# Patient Record
Sex: FEMALE | Race: WHITE | NOT HISPANIC OR LATINO | Employment: PART TIME | URBAN - METROPOLITAN AREA
[De-identification: names, ages, dates, MRNs, and addresses within clinical notes are randomized per-mention and may not be internally consistent; named-entity substitution may affect disease eponyms.]

---

## 2018-09-17 ENCOUNTER — OFFICE VISIT (OUTPATIENT)
Dept: URGENT CARE | Facility: CLINIC | Age: 53
End: 2018-09-17

## 2018-09-17 VITALS
RESPIRATION RATE: 18 BRPM | SYSTOLIC BLOOD PRESSURE: 115 MMHG | BODY MASS INDEX: 29.02 KG/M2 | WEIGHT: 170 LBS | HEIGHT: 64 IN | OXYGEN SATURATION: 97 % | DIASTOLIC BLOOD PRESSURE: 67 MMHG | TEMPERATURE: 97.4 F | HEART RATE: 78 BPM

## 2018-09-17 DIAGNOSIS — F41.8 DEPRESSION WITH ANXIETY: Primary | ICD-10-CM

## 2018-09-17 PROCEDURE — 99202 OFFICE O/P NEW SF 15 MIN: CPT | Performed by: NURSE PRACTITIONER

## 2018-09-17 RX ORDER — FLUOXETINE 20 MG/1
20 TABLET, FILM COATED ORAL DAILY
COMMUNITY
End: 2018-10-09

## 2018-09-17 RX ORDER — SIMVASTATIN 20 MG
20 TABLET ORAL
COMMUNITY
End: 2018-10-09

## 2018-09-17 RX ORDER — QUETIAPINE FUMARATE 50 MG/1
50 TABLET, EXTENDED RELEASE ORAL
COMMUNITY
End: 2018-10-09 | Stop reason: SDUPTHER

## 2018-09-17 NOTE — PATIENT INSTRUCTIONS
#s given for [facility] PCPs in the area  If she can get an appt in 2 weeks (for follow up) I will RX Prozac and Seroquel #14  Call or return for problems/concerns

## 2018-09-17 NOTE — PROGRESS NOTES
[facility]        NAME: Maggy Burr is a 46 y o  female  : 1965    MRN: [id number]  DATE: 2018  TIME: 1:19 PM    Assessment and Plan   Depression with anxiety [F41 8]  1  Depression with anxiety           Patient Instructions     Patient Instructions   #s given for [facility] PCPs in the area  If she can get an appt in 2 weeks (for follow up) I will RX Prozac and Seroquel #14  Call or return for problems/concerns    Follow up with PCP in 3-5 days  Proceed to  ER if symptoms worsen  Chief Complaint     Chief Complaint   Patient presents with    Medication Refill         History of Present Illness       Pt  Here for refills of her medication- has been without for 3 months due to loss of insurance  Is now having anxiety and depression, denies suicidal ideation  Review of Systems   Review of Systems   Constitutional: Negative for activity change, diaphoresis, fatigue and fever  HENT: Negative for congestion, facial swelling, hearing loss, rhinorrhea, sinus pain, sinus pressure, sneezing, sore throat and voice change  Eyes: Negative for discharge and visual disturbance  Respiratory: Negative for cough, choking, chest tightness, shortness of breath, wheezing and stridor  Cardiovascular: Negative for chest pain, palpitations and leg swelling  Gastrointestinal: Negative for abdominal distention, abdominal pain, constipation, diarrhea, nausea and vomiting  Endocrine: Negative for polydipsia, polyphagia and polyuria  Genitourinary: Negative for difficulty urinating, dysuria, frequency and urgency  Musculoskeletal: Negative for arthralgias, back pain, gait problem, joint swelling, myalgias, neck pain and neck stiffness  Skin: Negative for color change, rash and wound  Neurological: Negative for dizziness, syncope, speech difficulty, weakness, light-headedness and headaches  Hematological: Negative for adenopathy  Does not bruise/bleed easily  Psychiatric/Behavioral: Positive for dysphoric mood and sleep disturbance  Negative for agitation, behavioral problems, confusion, hallucinations and suicidal ideas  The patient is nervous/anxious  Current Medications       Current Outpatient Prescriptions:     FLUoxetine (PROzac) 20 MG tablet, Take 20 mg by mouth daily, Disp: , Rfl:     QUEtiapine (SEROquel XR) 50 mg, Take 50 mg by mouth daily at bedtime, Disp: , Rfl:     simvastatin (ZOCOR) 20 mg tablet, Take 20 mg by mouth daily at bedtime, Disp: , Rfl:     Current Allergies     Allergies as of 09/17/2018    (No Known Allergies)            The following portions of the patient's history were reviewed and updated as appropriate: allergies, current medications, past family history, past medical history, past social history, past surgical history and problem list      No past medical history on file  No past surgical history on file  No family history on file  Medications have been verified  Objective   /67   Pulse 78   Temp (!) 97 4 °F (36 3 °C)   Resp 18   Ht 5' 4" (1 626 m)   Wt 77 1 kg (170 lb)   SpO2 97%   BMI 29 18 kg/m²        Physical Exam     Physical Exam   Constitutional: She is oriented to person, place, and time  Vital signs are normal  She appears well-developed and well-nourished  She is active and cooperative  No distress  Eyes: EOM are normal    Cardiovascular: Normal rate, regular rhythm and normal heart sounds  No murmur heard  Pulmonary/Chest: Effort normal and breath sounds normal  No respiratory distress  She has no wheezes  Neurological: She is alert and oriented to person, place, and time  Skin: Skin is warm and dry  No rash noted  She is not diaphoretic  No erythema  Psychiatric: Her speech is normal and behavior is normal  Judgment and thought content normal  Her mood appears anxious  Her affect is not angry  She exhibits a depressed mood (flat affect, lack of eye contact)     Nursing note and vitals reviewed

## 2018-10-09 ENCOUNTER — OFFICE VISIT (OUTPATIENT)
Dept: INTERNAL MEDICINE CLINIC | Facility: CLINIC | Age: 53
End: 2018-10-09

## 2018-10-09 VITALS
HEART RATE: 95 BPM | HEIGHT: 64 IN | WEIGHT: 182 LBS | DIASTOLIC BLOOD PRESSURE: 80 MMHG | RESPIRATION RATE: 18 BRPM | BODY MASS INDEX: 31.07 KG/M2 | SYSTOLIC BLOOD PRESSURE: 118 MMHG | OXYGEN SATURATION: 98 % | TEMPERATURE: 98.5 F

## 2018-10-09 DIAGNOSIS — J30.2 SEASONAL ALLERGIES: ICD-10-CM

## 2018-10-09 DIAGNOSIS — J43.9 PULMONARY EMPHYSEMA, UNSPECIFIED EMPHYSEMA TYPE (HCC): ICD-10-CM

## 2018-10-09 DIAGNOSIS — E78.2 MIXED HYPERLIPIDEMIA: ICD-10-CM

## 2018-10-09 DIAGNOSIS — Z11.59 NEED FOR HEPATITIS C SCREENING TEST: ICD-10-CM

## 2018-10-09 DIAGNOSIS — Z78.0 POST-MENOPAUSAL: ICD-10-CM

## 2018-10-09 DIAGNOSIS — Z13.31 POSITIVE DEPRESSION SCREENING: ICD-10-CM

## 2018-10-09 DIAGNOSIS — Z12.39 SCREENING FOR BREAST CANCER: ICD-10-CM

## 2018-10-09 DIAGNOSIS — Z13.1 SCREENING FOR DIABETES MELLITUS (DM): ICD-10-CM

## 2018-10-09 DIAGNOSIS — Z23 ENCOUNTER FOR VACCINATION: ICD-10-CM

## 2018-10-09 DIAGNOSIS — Z72.0 TOBACCO ABUSE: ICD-10-CM

## 2018-10-09 DIAGNOSIS — K63.5 HYPERPLASTIC COLONIC POLYP, UNSPECIFIED PART OF COLON: ICD-10-CM

## 2018-10-09 DIAGNOSIS — Z59.89 DOES NOT HAVE HEALTH INSURANCE: ICD-10-CM

## 2018-10-09 DIAGNOSIS — F41.8 DEPRESSION WITH ANXIETY: Primary | ICD-10-CM

## 2018-10-09 DIAGNOSIS — M15.9 PRIMARY OSTEOARTHRITIS INVOLVING MULTIPLE JOINTS: ICD-10-CM

## 2018-10-09 PROBLEM — J45.30 MILD PERSISTENT ASTHMA WITHOUT COMPLICATION: Status: RESOLVED | Noted: 2018-10-09 | Resolved: 2018-10-09

## 2018-10-09 PROBLEM — J45.30 MILD PERSISTENT ASTHMA WITHOUT COMPLICATION: Status: ACTIVE | Noted: 2018-10-09

## 2018-10-09 PROBLEM — M15.0 PRIMARY OSTEOARTHRITIS INVOLVING MULTIPLE JOINTS: Status: ACTIVE | Noted: 2018-10-09

## 2018-10-09 PROCEDURE — 99203 OFFICE O/P NEW LOW 30 MIN: CPT | Performed by: INTERNAL MEDICINE

## 2018-10-09 RX ORDER — GEMFIBROZIL 600 MG/1
600 TABLET, FILM COATED ORAL
Qty: 60 TABLET | Refills: 5 | Status: CANCELLED | OUTPATIENT
Start: 2018-10-09

## 2018-10-09 RX ORDER — ATORVASTATIN CALCIUM 20 MG/1
20 TABLET, FILM COATED ORAL DAILY
Qty: 90 TABLET | Refills: 0 | Status: SHIPPED | OUTPATIENT
Start: 2018-10-09 | End: 2019-04-09 | Stop reason: SDUPTHER

## 2018-10-09 RX ORDER — ALBUTEROL SULFATE 90 UG/1
2 AEROSOL, METERED RESPIRATORY (INHALATION) EVERY 6 HOURS PRN
Status: ON HOLD | COMMUNITY
End: 2020-01-14 | Stop reason: SDUPTHER

## 2018-10-09 RX ORDER — QUETIAPINE FUMARATE 50 MG/1
50 TABLET, EXTENDED RELEASE ORAL
Qty: 30 TABLET | Refills: 5 | Status: CANCELLED | OUTPATIENT
Start: 2018-10-09

## 2018-10-09 RX ORDER — SIMVASTATIN 20 MG
20 TABLET ORAL
Qty: 30 TABLET | Refills: 5 | Status: CANCELLED | OUTPATIENT
Start: 2018-10-09

## 2018-10-09 RX ORDER — FLUOXETINE HYDROCHLORIDE 20 MG/1
20 CAPSULE ORAL DAILY
Qty: 90 CAPSULE | Refills: 0 | Status: SHIPPED | OUTPATIENT
Start: 2018-10-09 | End: 2018-12-15 | Stop reason: SDUPTHER

## 2018-10-09 RX ORDER — GEMFIBROZIL 600 MG/1
600 TABLET, FILM COATED ORAL
COMMUNITY
End: 2018-10-09

## 2018-10-09 RX ORDER — FLUTICASONE FUROATE AND VILANTEROL 100; 25 UG/1; UG/1
1 POWDER RESPIRATORY (INHALATION) DAILY
COMMUNITY
End: 2019-08-02

## 2018-10-09 RX ORDER — CETIRIZINE HYDROCHLORIDE 10 MG/1
10 TABLET, CHEWABLE ORAL DAILY
COMMUNITY
End: 2019-01-09

## 2018-10-09 RX ORDER — FLUOXETINE 20 MG/1
20 TABLET, FILM COATED ORAL DAILY
Qty: 30 TABLET | Refills: 5 | Status: CANCELLED | OUTPATIENT
Start: 2018-10-09

## 2018-10-09 RX ORDER — QUETIAPINE FUMARATE 50 MG/1
50 TABLET, EXTENDED RELEASE ORAL
Qty: 90 TABLET | Refills: 0 | Status: SHIPPED | OUTPATIENT
Start: 2018-10-09 | End: 2018-10-11 | Stop reason: SDUPTHER

## 2018-10-09 SDOH — ECONOMIC STABILITY - INCOME SECURITY: OTHER PROBLEMS RELATED TO HOUSING AND ECONOMIC CIRCUMSTANCES: Z59.89

## 2018-10-09 NOTE — PATIENT INSTRUCTIONS

## 2018-10-09 NOTE — PROGRESS NOTES
Assessment/Plan:    No problem-specific Assessment & Plan notes found for this encounter  Diagnoses and all orders for this visit:    Depression with anxiety  -     FLUoxetine (PROzac) 20 mg capsule; Take 1 capsule (20 mg total) by mouth daily  -     QUEtiapine (SEROquel XR) 50 mg; Take 1 tablet (50 mg total) by mouth daily at bedtime    Mixed hyperlipidemia  -     atorvastatin (LIPITOR) 20 mg tablet; Take 1 tablet (20 mg total) by mouth daily    Positive depression screening    Seasonal allergies    Post-menopausal    Tobacco abuse    Primary osteoarthritis involving multiple joints    Pulmonary emphysema, unspecified emphysema type (HCC)    Hyperplastic colonic polyp, unspecified part of colon    Does not have health insurance    Screening for breast cancer    Screening for diabetes mellitus (DM)    Encounter for vaccination    Need for hepatitis C screening test    Other orders  -     Discontinue: gemfibrozil (LOPID) 600 mg tablet; Take 600 mg by mouth 2 (two) times a day before meals  -     cetirizine (ZyrTEC) 10 MG chewable tablet; Chew 10 mg daily  -     albuterol (PROVENTIL HFA,VENTOLIN HFA) 90 mcg/act inhaler; Inhale 2 puffs every 6 (six) hours as needed for wheezing  -     fluticasone-vilanterol (BREO ELLIPTA) 100-25 mcg/inh inhaler; Inhale 1 puff daily Rinse mouth after use  -     Cancel: Mammo screening bilateral w 3d & cad; Future  -     Cancel: FLUoxetine (PROzac) 20 MG tablet; Take 1 tablet (20 mg total) by mouth daily  -     Cancel: QUEtiapine (SEROquel XR) 50 mg; Take 1 tablet (50 mg total) by mouth daily at bedtime  -     Cancel: simvastatin (ZOCOR) 20 mg tablet; Take 1 tablet (20 mg total) by mouth daily at bedtime  -     Cancel: gemfibrozil (LOPID) 600 mg tablet; Take 1 tablet (600 mg total) by mouth 2 (two) times a day before meals      A/P: Doing ok, but needs to restart her meds  Will change to $4 scripts from [facility]  Continue MDI, but could use a LAMA, but not covered   Needs labs, alpha one, and vaccines, but defers due to no insurance  Defers mammo as well  Continue treatment otherwise and RTC four weeks for f/u  Subjective:      Patient ID: Mary Akhtar is a 48 y o  female  WF, former pt of Dr Vriginia Reece and no insurance, presents to establish  No new issues, but depression and YANIRA are bad after running out of her meds several months ago  Not suicidal or violent  Was controlled before on her prior meds  Remains active w/o difficulty and no falls  Smokes daily and occasional ETOH  Has COPD and uses MDI with rescue inhaler at least once a day  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  Overdue for labs, mammo, and vaccines  The following portions of the patient's history were reviewed and updated as appropriate:   She  has a past medical history of Anxiety; COPD (chronic obstructive pulmonary disease) ([facility] [address] ); Depression; Menopause; Mild persistent asthma without complication (); Obesity; and Primary osteoarthritis involving multiple joints (10/9/2018)  She   Patient Active Problem List    Diagnosis Date Noted    Mixed hyperlipidemia 10/09/2018    Post-menopausal 10/09/2018    Pulmonary emphysema ([address] ) 10/09/2018    Tobacco abuse 10/09/2018    Hyperplastic colonic polyp 10/09/2018    Seasonal allergies 10/09/2018    Primary osteoarthritis involving multiple joints 10/09/2018    Positive depression screening 10/09/2018    Depression with anxiety 2018     She  has a past surgical history that includes Ankle surgery; Nose surgery; Total hip arthroplasty;  section; Tubal ligation; Total hip arthroplasty; Dilation and curettage, diagnostic / therapeutic; Tubal ligation; and Colonoscopy w/ polypectomy  Her family history includes Mental illness in her brother and mother; No Known Problems in her father; Suicidality in her brother  She  reports that she has been smoking  She has a 60 00 pack-year smoking history   She has never used smokeless tobacco  She reports that she drinks alcohol  She reports that she does not use drugs  Current Outpatient Prescriptions   Medication Sig Dispense Refill    albuterol (PROVENTIL HFA,VENTOLIN HFA) 90 mcg/act inhaler Inhale 2 puffs every 6 (six) hours as needed for wheezing      cetirizine (ZyrTEC) 10 MG chewable tablet Chew 10 mg daily      fluticasone-vilanterol (BREO ELLIPTA) 100-25 mcg/inh inhaler Inhale 1 puff daily Rinse mouth after use   QUEtiapine (SEROquel XR) 50 mg Take 1 tablet (50 mg total) by mouth daily at bedtime 90 tablet 0    atorvastatin (LIPITOR) 20 mg tablet Take 1 tablet (20 mg total) by mouth daily 90 tablet 0    FLUoxetine (PROzac) 20 mg capsule Take 1 capsule (20 mg total) by mouth daily 90 capsule 0     No current facility-administered medications for this visit  Current Outpatient Prescriptions on File Prior to Visit   Medication Sig    [DISCONTINUED] FLUoxetine (PROzac) 20 MG tablet Take 20 mg by mouth daily    [DISCONTINUED] QUEtiapine (SEROquel XR) 50 mg Take 50 mg by mouth daily at bedtime    [DISCONTINUED] simvastatin (ZOCOR) 20 mg tablet Take 20 mg by mouth daily at bedtime     No current facility-administered medications on file prior to visit  She has No Known Allergies       Review of Systems   Constitutional: Positive for fatigue  Negative for activity change, chills, diaphoresis and fever  HENT: Negative  Eyes: Negative for visual disturbance  Respiratory: Negative for cough, chest tightness, shortness of breath and wheezing  Cardiovascular: Negative for chest pain, palpitations and leg swelling  Gastrointestinal: Negative for abdominal pain, constipation, diarrhea, nausea and vomiting  Endocrine: Negative for cold intolerance and heat intolerance  Genitourinary: Negative for difficulty urinating, dysuria and frequency  Musculoskeletal: Negative for arthralgias, gait problem and myalgias  Allergic/Immunologic: Positive for environmental allergies  Neurological: Negative for dizziness, tremors, seizures, syncope, light-headedness and headaches  Psychiatric/Behavioral: Positive for decreased concentration, dysphoric mood, sleep disturbance and suicidal ideas  Negative for confusion and hallucinations  The patient is nervous/anxious  Objective:      /80 (BP Location: Left arm, Patient Position: Sitting, Cuff Size: Adult)   Pulse 95   Temp 98 5 °F (36 9 °C) (Tympanic)   Resp 18   Ht 5' 4" (1 626 m)   Wt 82 6 kg (182 lb)   SpO2 98%   BMI 31 24 kg/m²          Physical Exam   Constitutional: She is oriented to person, place, and time  She appears well-developed and well-nourished  No distress  HENT:   Head: Normocephalic and atraumatic  Mouth/Throat: Oropharynx is clear and moist    Eyes: Pupils are equal, round, and reactive to light  Conjunctivae and EOM are normal    Neck: Neck supple  No JVD present  Cardiovascular: Normal rate, regular rhythm and normal heart sounds  No murmur heard  Pulmonary/Chest: Effort normal and breath sounds normal  No respiratory distress  She has no wheezes  Abdominal: Soft  Bowel sounds are normal  She exhibits no distension  There is no tenderness  Musculoskeletal: She exhibits no edema  Neurological: She is alert and oriented to person, place, and time  No cranial nerve deficit  Coordination normal    Psychiatric: She has a normal mood and affect  Her behavior is normal  Judgment and thought content normal    Nursing note and vitals reviewed

## 2018-10-10 ENCOUNTER — TELEPHONE (OUTPATIENT)
Dept: INTERNAL MEDICINE CLINIC | Facility: CLINIC | Age: 53
End: 2018-10-10

## 2018-10-11 DIAGNOSIS — F41.8 DEPRESSION WITH ANXIETY: ICD-10-CM

## 2018-10-11 RX ORDER — QUETIAPINE FUMARATE 50 MG/1
50 TABLET, EXTENDED RELEASE ORAL
Qty: 30 TABLET | Refills: 0 | Status: SHIPPED | OUTPATIENT
Start: 2018-10-11 | End: 2019-01-10

## 2018-10-11 NOTE — TELEPHONE ENCOUNTER
Pt has no insurance, according to [facility]  its cheaper to go to [facility]   I will call patient to see if they are willing to do this

## 2018-12-15 DIAGNOSIS — F41.8 DEPRESSION WITH ANXIETY: ICD-10-CM

## 2018-12-16 RX ORDER — FLUOXETINE HYDROCHLORIDE 20 MG/1
CAPSULE ORAL
Qty: 90 CAPSULE | Refills: 0 | Status: SHIPPED | OUTPATIENT
Start: 2018-12-16 | End: 2019-04-11 | Stop reason: SDUPTHER

## 2018-12-30 ENCOUNTER — HOSPITAL ENCOUNTER (EMERGENCY)
Facility: HOSPITAL | Age: 53
Discharge: HOME | End: 2018-12-30
Admitting: EMERGENCY MEDICINE

## 2018-12-30 ENCOUNTER — APPOINTMENT (EMERGENCY)
Dept: CT IMAGING | Facility: HOSPITAL | Age: 53
End: 2018-12-30

## 2018-12-30 VITALS
TEMPERATURE: 97.9 F | HEART RATE: 81 BPM | WEIGHT: 160 LBS | BODY MASS INDEX: 27.31 KG/M2 | OXYGEN SATURATION: 96 % | HEIGHT: 64 IN | SYSTOLIC BLOOD PRESSURE: 116 MMHG | DIASTOLIC BLOOD PRESSURE: 69 MMHG | RESPIRATION RATE: 16 BRPM

## 2018-12-30 DIAGNOSIS — G51.0 LEFT-SIDED BELL'S PALSY: Primary | ICD-10-CM

## 2018-12-30 LAB
ALBUMIN SERPL BCP-MCNC: 4.7 G/DL (ref 3.5–5.7)
ALP SERPL-CCNC: 82 U/L (ref 40–150)
ALT SERPL W P-5'-P-CCNC: 12 U/L (ref 7–52)
ANION GAP SERPL CALCULATED.3IONS-SCNC: 10 MMOL/L (ref 4–13)
APTT PPP: 36 SECONDS (ref 26–38)
AST SERPL W P-5'-P-CCNC: 12 U/L (ref 13–39)
BASOPHILS # BLD AUTO: 0 THOUSANDS/ΜL (ref 0–0.1)
BASOPHILS NFR BLD AUTO: 0 % (ref 0–2)
BILIRUB SERPL-MCNC: 0.6 MG/DL (ref 0.2–1)
BUN SERPL-MCNC: 13 MG/DL (ref 7–25)
CALCIUM SERPL-MCNC: 9.6 MG/DL (ref 8.6–10.5)
CHLORIDE SERPL-SCNC: 105 MMOL/L (ref 98–107)
CO2 SERPL-SCNC: 23 MMOL/L (ref 21–31)
CREAT SERPL-MCNC: 1.07 MG/DL (ref 0.6–1.2)
EOSINOPHIL # BLD AUTO: 0.7 THOUSAND/ΜL (ref 0–0.61)
EOSINOPHIL NFR BLD AUTO: 4 % (ref 0–5)
ERYTHROCYTE [DISTWIDTH] IN BLOOD BY AUTOMATED COUNT: 14.5 % (ref 11.5–14.5)
GFR SERPL CREATININE-BSD FRML MDRD: 59 ML/MIN/1.73SQ M
GLUCOSE SERPL-MCNC: 95 MG/DL (ref 65–99)
HCT VFR BLD AUTO: 45.1 % (ref 34.8–46.1)
HGB BLD-MCNC: 15.4 G/DL (ref 12–16)
INR PPP: 1.01 (ref 0.9–1.5)
LYMPHOCYTES # BLD AUTO: 4.3 THOUSANDS/ΜL (ref 0.6–4.47)
LYMPHOCYTES NFR BLD AUTO: 28 % (ref 21–51)
MCH RBC QN AUTO: 31.3 PG (ref 26–34)
MCHC RBC AUTO-ENTMCNC: 34.2 G/DL (ref 31–37)
MCV RBC AUTO: 92 FL (ref 81–99)
MONOCYTES # BLD AUTO: 1.1 THOUSAND/ΜL (ref 0.17–1.22)
MONOCYTES NFR BLD AUTO: 7 % (ref 2–12)
NEUTROPHILS # BLD AUTO: 9.4 THOUSANDS/ΜL (ref 1.4–6.5)
NEUTS SEG NFR BLD AUTO: 60 % (ref 42–75)
NRBC BLD AUTO-RTO: 0 /100 WBCS
PLATELET # BLD AUTO: 350 THOUSANDS/UL (ref 149–390)
PMV BLD AUTO: 8.6 FL (ref 8.6–11.7)
POTASSIUM SERPL-SCNC: 3.8 MMOL/L (ref 3.5–5.5)
PROT SERPL-MCNC: 7.8 G/DL (ref 6.4–8.9)
PROTHROMBIN TIME: 11.7 SECONDS (ref 10.2–13)
RBC # BLD AUTO: 4.92 MILLION/UL (ref 3.9–5.2)
SODIUM SERPL-SCNC: 138 MMOL/L (ref 134–143)
WBC # BLD AUTO: 15.5 THOUSAND/UL (ref 4.8–10.8)

## 2018-12-30 PROCEDURE — 96360 HYDRATION IV INFUSION INIT: CPT

## 2018-12-30 PROCEDURE — 80053 COMPREHEN METABOLIC PANEL: CPT | Performed by: PHYSICIAN ASSISTANT

## 2018-12-30 PROCEDURE — 36415 COLL VENOUS BLD VENIPUNCTURE: CPT | Performed by: PHYSICIAN ASSISTANT

## 2018-12-30 PROCEDURE — 99284 EMERGENCY DEPT VISIT MOD MDM: CPT

## 2018-12-30 PROCEDURE — 85025 COMPLETE CBC W/AUTO DIFF WBC: CPT | Performed by: PHYSICIAN ASSISTANT

## 2018-12-30 PROCEDURE — 85610 PROTHROMBIN TIME: CPT | Performed by: PHYSICIAN ASSISTANT

## 2018-12-30 PROCEDURE — 85730 THROMBOPLASTIN TIME PARTIAL: CPT | Performed by: PHYSICIAN ASSISTANT

## 2018-12-30 PROCEDURE — 70450 CT HEAD/BRAIN W/O DYE: CPT

## 2018-12-30 RX ORDER — SODIUM CHLORIDE 9 MG/ML
125 INJECTION, SOLUTION INTRAVENOUS CONTINUOUS
Status: DISCONTINUED | OUTPATIENT
Start: 2018-12-30 | End: 2018-12-30 | Stop reason: HOSPADM

## 2018-12-30 RX ORDER — VALACYCLOVIR HYDROCHLORIDE 1 G/1
1000 TABLET, FILM COATED ORAL 3 TIMES DAILY
Qty: 21 TABLET | Refills: 0 | Status: SHIPPED | OUTPATIENT
Start: 2018-12-30 | End: 2019-05-10

## 2018-12-30 RX ORDER — PREDNISONE 20 MG/1
40 TABLET ORAL DAILY
Qty: 14 TABLET | Refills: 0 | Status: SHIPPED | OUTPATIENT
Start: 2018-12-30 | End: 2019-01-06

## 2018-12-30 RX ADMIN — SODIUM CHLORIDE 125 ML/HR: 0.9 INJECTION, SOLUTION INTRAVENOUS at 20:19

## 2018-12-31 NOTE — DISCHARGE INSTRUCTIONS
Abdullahi Palsy   WHAT YOU NEED TO KNOW:   Bell palsy is a sudden weakness or paralysis of one side of your face  Bell palsy occurs when the nerve that controls the muscles in your face becomes swollen or irritated  DISCHARGE INSTRUCTIONS:   Medicines:   · Medicine may be given  to decrease swelling and irritation of your facial nerve  You may receive antiviral medicine if your healthcare provider thinks a virus caused your Bell palsy  Your healthcare provider may also suggest acetaminophen or ibuprofen to reduce pain  These medicines are available without a doctor's order  Ask which medicine to take, and how much you need  Follow directions  Acetaminophen can cause liver damage, and ibuprofen can cause stomach bleeding or kidney damage  · Take your medicine as directed  Contact your healthcare provider if you think your medicine is not helping or if you have side effects  Tell him if you are allergic to any medicine  Keep a list of the medicines, vitamins, and herbs you take  Include the amounts, and when and why you take them  Bring the list or the pill bottles to follow-up visits  Carry your medicine list with you in case of an emergency  Follow up with your healthcare provider as directed:  Write down your questions so you remember to ask them during your visits  Eye care: Your healthcare provider may recommend that you use artificial tears during the day to keep your eye moist  You may need to use an eye ointment at night  You may also need to wear a patch over your eye and tape it shut while you sleep  This helps keep your eye from getting dry and infected  Wear sunglasses to protect your eye from direct sunlight  Stay away from places that have fumes, dust, or other particles in the air that may harm your eye  Physical therapy:  A physical therapist may teach you how to massage your face and exercise the nerves and muscles in your face  This may help you get better sooner and prevent long-term problems  You can exercise on your own when your facial movement begins to return  Open and close your eye, wink, and smile wide  Do the exercises for 15 or 20 minutes several times a day  Contact your healthcare provider if:   · You have a fever  · Your eye becomes red, irritated, or painful  · You have questions or concerns about your condition or care  Return to the emergency department if:   · You develop weakness or numbness on one side of your body (other than your face)  · You have double vision, or you lose vision in your eye  · You have trouble thinking clearly  © 2017 [employer]  Information is for End User's use only and may not be sold, redistributed or otherwise used for commercial purposes  All illustrations and images included in CareNotes® are the copyrighted property of A [employer] M [employer] Inc  or Blair Johansen  The above information is an  only  It is not intended as medical advice for individual conditions or treatments  Talk to your doctor, nurse or pharmacist before following any medical regimen to see if it is safe and effective for you

## 2018-12-31 NOTE — ED PROVIDER NOTES
History  Chief Complaint   Patient presents with   Cris Metzger     started yesterday     Patient is a 59-year-old female with no significant past medical history other than depression who presents to the emergency department with left-sided facial droop that began yesterday around 1500  She otherwise offers no other complaints she answers all questions appropriately she is alert and oriented x3  Does not take any blood thinners  Prior to Admission Medications   Prescriptions Last Dose Informant Patient Reported? Taking? FLUoxetine (PROzac) 20 mg capsule   No Yes   Sig: TAKE 1 CAPSULE BY MOUTH ONCE DAILY   QUEtiapine (SEROquel XR) 50 mg   No Yes   Sig: Take 1 tablet (50 mg total) by mouth daily at bedtime   albuterol (PROVENTIL HFA,VENTOLIN HFA) 90 mcg/act inhaler   Yes Yes   Sig: Inhale 2 puffs every 6 (six) hours as needed for wheezing   atorvastatin (LIPITOR) 20 mg tablet   No Yes   Sig: Take 1 tablet (20 mg total) by mouth daily   cetirizine (ZyrTEC) 10 MG chewable tablet Not Taking at Unknown time  Yes No   Sig: Chew 10 mg daily   fluticasone-vilanterol (BREO ELLIPTA) 100-25 mcg/inh inhaler   Yes Yes   Sig: Inhale 1 puff daily Rinse mouth after use        Facility-Administered Medications: None       Past Medical History:   Diagnosis Date    Anxiety     COPD (chronic obstructive pulmonary disease) ([facility] [address] )     Depression     Menopause     Mild persistent asthma without complication 5615    Obesity     Primary osteoarthritis involving multiple joints 10/9/2018       Past Surgical History:   Procedure Laterality Date    ANKLE SURGERY       SECTION      COLONOSCOPY W/ POLYPECTOMY      DILATION AND CURETTAGE, DIAGNOSTIC / THERAPEUTIC      NOSE SURGERY      TOTAL HIP ARTHROPLASTY      TOTAL HIP ARTHROPLASTY      TUBAL LIGATION      TUBAL LIGATION         Family History   Problem Relation Age of Onset    Mental illness Mother     No Known Problems Father     Mental illness Brother     Suicidality Brother      I have reviewed and agree with the history as documented  Social History   Substance Use Topics    Smoking status: Current Every Day Smoker     Packs/day: 1 50     Years: 40 00    Smokeless tobacco: Never Used    Alcohol use Yes      Comment: socially        Review of Systems   Constitutional: Negative for chills, diaphoresis, fatigue and fever  HENT: Negative for congestion, ear pain, rhinorrhea, sneezing and sore throat  Respiratory: Negative for cough, shortness of breath, wheezing and stridor  Cardiovascular: Negative for chest pain, palpitations and leg swelling  Gastrointestinal: Negative for abdominal distention, abdominal pain, blood in stool, constipation, diarrhea, nausea and vomiting  Genitourinary: Negative for difficulty urinating, dysuria, frequency, hematuria and urgency  Musculoskeletal: Negative for gait problem, myalgias and neck pain  Skin: Negative for rash  Neurological: Positive for facial asymmetry  Negative for dizziness, syncope, weakness, light-headedness and headaches  All other systems reviewed and are negative  Physical Exam  Physical Exam   Constitutional: She is oriented to person, place, and time  She appears well-developed and well-nourished  No distress  HENT:   Head: Normocephalic and atraumatic  Eyes: Pupils are equal, round, and reactive to light  EOM are normal    Neck: Normal range of motion  Neck supple  Cardiovascular: Normal rate, regular rhythm, normal heart sounds and intact distal pulses  Exam reveals no gallop and no friction rub  No murmur heard  Pulmonary/Chest: Effort normal and breath sounds normal  No respiratory distress  She has no wheezes  She has no rales  She exhibits no tenderness  Musculoskeletal: Normal range of motion  She exhibits no edema, tenderness or deformity  Lymphadenopathy:     She has no cervical adenopathy     Neurological: She is alert and oriented to person, place, and time  She displays normal reflexes  A cranial nerve deficit is present  No sensory deficit  She exhibits normal muscle tone  Coordination normal  GCS eye subscore is 4  GCS verbal subscore is 5  GCS motor subscore is 6  Moderate left-sided history droop with moderate flattening of the left nasal labial fold weakness does involve the left-sided forehead and eyebrow go with limited range of motion as well as limited closure of the left eyelid  Remaining neurologic exam is intact without focal findings and a stroke score of 2 for facial droop  Skin: Skin is warm and dry  No rash noted  She is not diaphoretic  No erythema  No pallor  Psychiatric: She has a normal mood and affect  Her behavior is normal    Nursing note and vitals reviewed        Vital Signs  ED Triage Vitals [12/30/18 1907]   Temperature Pulse Respirations Blood Pressure SpO2   97 9 °F (36 6 °C) 90 20 134/74 99 %      Temp Source Heart Rate Source Patient Position - Orthostatic VS BP Location FiO2 (%)   Temporal Monitor Lying Left arm --      Pain Score       8           Vitals:    12/30/18 1907   BP: 134/74   Pulse: 90   Patient Position - Orthostatic VS: Lying       Visual Acuity      ED Medications  Medications   sodium chloride 0 9 % infusion (not administered)       Diagnostic Studies  Results Reviewed     Procedure Component Value Units Date/Time    Comprehensive metabolic panel [[id number]]  (Abnormal) Collected:  12/30/18 1914    Lab Status:  Final result Specimen:  Blood from Arm, Right Updated:  12/30/18 1937     Sodium 138 mmol/L      Potassium 3 8 mmol/L      Chloride 105 mmol/L      CO2 23 mmol/L      ANION GAP 10 mmol/L      BUN 13 mg/dL      Creatinine 1 07 mg/dL      Glucose 95 mg/dL      Calcium 9 6 mg/dL      AST 12 (L) U/L      ALT 12 U/L      Alkaline Phosphatase 82 U/L      Total Protein 7 8 g/dL      Albumin 4 7 g/dL      Total Bilirubin 0 60 mg/dL      eGFR 59 ml/min/1 73sq m     Narrative:         Consolidated [employer] Disease Education Program recommendations are as follows:  GFR calculation is accurate only with a steady state creatinine  Chronic Kidney disease less than 60 ml/min/1 73 sq  meters  Kidney failure less than 15 ml/min/1 73 sq  meters  Protime-INR [[id number]]  (Normal) Collected:  12/30/18 1914    Lab Status:  Final result Specimen:  Blood from Arm, Right Updated:  12/30/18 1930     Protime 11 7 seconds      INR 1 01    APTT [[id number]]  (Normal) Collected:  12/30/18 1914    Lab Status:  Final result Specimen:  Blood from Arm, Right Updated:  12/30/18 1930     PTT 36 seconds     CBC and differential [[id number]]  (Abnormal) Collected:  12/30/18 1914    Lab Status:  Final result Specimen:  Blood from Arm, Right Updated:  12/30/18 1921     WBC 15 50 (H) Thousand/uL      RBC 4 92 Million/uL      Hemoglobin 15 4 g/dL      Hematocrit 45 1 %      MCV 92 fL      MCH 31 3 pg      MCHC 34 2 g/dL      RDW 14 5 %      MPV 8 6 fL      Platelets 274 Thousands/uL      nRBC 0 /100 WBCs      Neutrophils Relative 60 %      Lymphocytes Relative 28 %      Monocytes Relative 7 %      Eosinophils Relative 4 %      Basophils Relative 0 %      Neutrophils Absolute 9 40 (H) Thousands/µL      Lymphocytes Absolute 4 30 Thousands/µL      Monocytes Absolute 1 10 Thousand/µL      Eosinophils Absolute 0 70 (H) Thousand/µL      Basophils Absolute 0 00 Thousands/µL                  CT head without contrast    (Results Pending)              Procedures  Procedures       Phone Contacts  ED Phone Contact    ED Course  ED Course as of Dec 30 2006   Sun Dec 30, 2018   1932 Case discussed and care soon by Dr Epstein       36 Dr Jamal Wilkins to follow up on CT scan result                    Stroke Assessment     Row Name 12/30/18 1929             NIH Stroke Scale    Interval  --      Level of Consciousness (1a ) 0      LOC Questions (1b ) 0      LOC Commands (1c ) 0      Best Gaze (2 ) 0      Visual (3 ) 0      Facial Palsy (4 ) 2 Motor Arm, Left (5a ) 0      Motor Arm, Right (5b ) 0      Motor Leg, Left (6a ) 0      Motor Leg, Right (6b ) 0      Limb Ataxia (7 ) 0      Sensory (8 ) 0      Best Language (9 ) 0      Dysarthria (10 ) 0      Extinction and Inattention (11 ) (Formerly Neglect) 0      Total 2                          MDM  CritCare Time    Disposition  Final diagnoses:   Left-sided Bell's palsy     Time reflects when diagnosis was documented in both MDM as applicable and the Disposition within this note     Time User Action Codes Description Comment    12/30/2018  7:56 PM Galileo Kirk Add [G51 0] Left-sided Bell's palsy       ED Disposition     None      Follow-up Information     Follow up With Specialties Details Why Contact Info    Metta Leyden, DO Internal Medicine In 3 days  [address]  [phone]            Patient's Medications   Discharge Prescriptions    PREDNISONE 20 MG TABLET    Take 2 tablets (40 mg total) by mouth daily for 7 days       Start Date: 12/30/2018End Date: 1/6/2019       Order Dose: 40 mg       Quantity: 14 tablet    Refills: 0    VALACYCLOVIR (VALTREX) 1,000 MG TABLET    Take 1 tablet (1,000 mg total) by mouth 3 (three) times a day for 7 days       Start Date: 12/30/2018End Date: 1/6/2019       Order Dose: 1,000 mg       Quantity: 21 tablet    Refills: 0     No discharge procedures on file      ED Provider  Electronically Signed by           Anastasia Snyder PA-C  12/30/18 2006

## 2018-12-31 NOTE — ED ATTENDING ATTESTATION
Amy Morocho MD, saw and evaluated the patient  I have discussed the patient with the Advanced Practice Provider and agree with the non-physician practitioner's findings, Plan of Care, and MDM as documented in the non-physician practitioner's note, except where noted  All available labs and Radiology studies were reviewed  At this point I agree with the current assessment done in the Emergency Department  I have conducted an independent evaluation of this patient a history and physical is as follows:  Left-sided facial droop with otherwise neurologic examination        Critical Care Time  CritCare Time    Procedures

## 2018-12-31 NOTE — ED RE-EVALUATION NOTE
Patient is laying comfortably on the stretcher and I discussed with her the results of her blood tests and CT scan head  Patient remains stable and neurologically intact  Patient will be discharged with a prescription for steroids and antiviral   Patient agrees       Rigo Moore MD  12/30/18 [id number]

## 2019-01-09 ENCOUNTER — OFFICE VISIT (OUTPATIENT)
Dept: INTERNAL MEDICINE CLINIC | Facility: CLINIC | Age: 54
End: 2019-01-09

## 2019-01-09 VITALS
DIASTOLIC BLOOD PRESSURE: 64 MMHG | SYSTOLIC BLOOD PRESSURE: 112 MMHG | HEIGHT: 64 IN | WEIGHT: 182 LBS | RESPIRATION RATE: 18 BRPM | BODY MASS INDEX: 31.07 KG/M2 | HEART RATE: 86 BPM | TEMPERATURE: 98 F

## 2019-01-09 DIAGNOSIS — Z72.0 TOBACCO ABUSE: ICD-10-CM

## 2019-01-09 DIAGNOSIS — F41.8 DEPRESSION WITH ANXIETY: Primary | ICD-10-CM

## 2019-01-09 DIAGNOSIS — J40 BRONCHITIS: ICD-10-CM

## 2019-01-09 DIAGNOSIS — G51.0 LEFT-SIDED BELL'S PALSY: ICD-10-CM

## 2019-01-09 PROCEDURE — 99213 OFFICE O/P EST LOW 20 MIN: CPT | Performed by: INTERNAL MEDICINE

## 2019-01-09 RX ORDER — AMOXICILLIN AND CLAVULANATE POTASSIUM 875; 125 MG/1; MG/1
1 TABLET, FILM COATED ORAL EVERY 12 HOURS SCHEDULED
Qty: 20 TABLET | Refills: 0 | Status: SHIPPED | OUTPATIENT
Start: 2019-01-09 | End: 2019-01-19

## 2019-01-09 RX ORDER — GUAIFENESIN 600 MG
600 TABLET, EXTENDED RELEASE 12 HR ORAL EVERY 12 HOURS SCHEDULED
Qty: 20 TABLET | Refills: 0 | Status: SHIPPED | OUTPATIENT
Start: 2019-01-09 | End: 2019-05-10

## 2019-01-09 NOTE — PROGRESS NOTES
Assessment/Plan:    No problem-specific Assessment & Plan notes found for this encounter  Diagnoses and all orders for this visit:    Depression with anxiety    Left-sided Bell's palsy    Bronchitis  -     amoxicillin-clavulanate (AUGMENTIN) 875-125 mg per tablet; Take 1 tablet by mouth every 12 (twelve) hours for 10 days  -     guaiFENesin (MUCINEX) 600 mg 12 hr tablet; Take 1 tablet (600 mg total) by mouth every 12 (twelve) hours    Tobacco abuse    Other orders  -     Cancel: Mammo screening bilateral w 3d & cad; Future      A/P: Psych issues better and tolerating and able to afford the meds  Will continue  Abdullahi's slowly improving  Continue current treatment  Will add abx and mucinex for the URI  Rest and increase po fluids  Stop smoking  Hold on labs due to insurance issues, but labs in the ER were good except for elevated WBC  RTC four months for routine  Subjective:      Patient ID: Everette Steele is a 48 y o  female  Smoking [facility] RTC for f/u depression and YANIRA after starting SSRI and seroquel  Doing better with less depression and YANIRA  Was recently in the ER for left facial droop and dx with Bell's Palsy  Slow to improve  Now with a one week h/o URI s/s  No travel and no one else ill  No fever or chills  Notes a sore throat, headache, PND, yellow bloody d/c, and productive white cough  No SOB or wheezing  The following portions of the patient's history were reviewed and updated as appropriate:   She  has a past medical history of Anxiety; Bell's palsy; COPD (chronic obstructive pulmonary disease) ([facility] [address] ); Depression; Menopause; Mild persistent asthma without complication (69/1/6659); Obesity; and Primary osteoarthritis involving multiple joints (10/9/2018)    She   Patient Active Problem List    Diagnosis Date Noted    Mixed hyperlipidemia 10/09/2018    Post-menopausal 10/09/2018    Pulmonary emphysema ([address] ) 10/09/2018    Tobacco abuse 10/09/2018    Hyperplastic colonic polyp 10/09/2018  Seasonal allergies 10/09/2018    Primary osteoarthritis involving multiple joints 10/09/2018    Positive depression screening 10/09/2018    Depression with anxiety 2018     She  has a past surgical history that includes Ankle surgery; Nose surgery; Total hip arthroplasty;  section; Tubal ligation; Total hip arthroplasty; Dilation and curettage, diagnostic / therapeutic; Tubal ligation; and Colonoscopy w/ polypectomy  Her family history includes Mental illness in her brother and mother; No Known Problems in her father; Suicidality in her brother  She  reports that she has been smoking  She has a 60 00 pack-year smoking history  She has never used smokeless tobacco  She reports that she drinks alcohol  She reports that she does not use drugs  Current Outpatient Prescriptions   Medication Sig Dispense Refill    albuterol (PROVENTIL HFA,VENTOLIN HFA) 90 mcg/act inhaler Inhale 2 puffs every 6 (six) hours as needed for wheezing      atorvastatin (LIPITOR) 20 mg tablet Take 1 tablet (20 mg total) by mouth daily 90 tablet 0    FLUoxetine (PROzac) 20 mg capsule TAKE 1 CAPSULE BY MOUTH ONCE DAILY 90 capsule 0    fluticasone-vilanterol (BREO ELLIPTA) 100-25 mcg/inh inhaler Inhale 1 puff daily Rinse mouth after use   QUEtiapine (SEROquel XR) 50 mg Take 1 tablet (50 mg total) by mouth daily at bedtime 30 tablet 0    amoxicillin-clavulanate (AUGMENTIN) 875-125 mg per tablet Take 1 tablet by mouth every 12 (twelve) hours for 10 days 20 tablet 0    guaiFENesin (MUCINEX) 600 mg 12 hr tablet Take 1 tablet (600 mg total) by mouth every 12 (twelve) hours 20 tablet 0    valACYclovir (VALTREX) 1,000 mg tablet Take 1 tablet (1,000 mg total) by mouth 3 (three) times a day for 7 days 21 tablet 0     No current facility-administered medications for this visit        Current Outpatient Prescriptions on File Prior to Visit   Medication Sig    albuterol (PROVENTIL HFA,VENTOLIN HFA) 90 mcg/act inhaler Inhale 2 puffs every 6 (six) hours as needed for wheezing    atorvastatin (LIPITOR) 20 mg tablet Take 1 tablet (20 mg total) by mouth daily    FLUoxetine (PROzac) 20 mg capsule TAKE 1 CAPSULE BY MOUTH ONCE DAILY    fluticasone-vilanterol (BREO ELLIPTA) 100-25 mcg/inh inhaler Inhale 1 puff daily Rinse mouth after use   QUEtiapine (SEROquel XR) 50 mg Take 1 tablet (50 mg total) by mouth daily at bedtime    valACYclovir (VALTREX) 1,000 mg tablet Take 1 tablet (1,000 mg total) by mouth 3 (three) times a day for 7 days    [DISCONTINUED] cetirizine (ZyrTEC) 10 MG chewable tablet Chew 10 mg daily     No current facility-administered medications on file prior to visit  She has No Known Allergies       Review of Systems   Constitutional: Negative for activity change, chills, diaphoresis, fatigue and fever  HENT: Positive for congestion, postnasal drip and rhinorrhea  Negative for sinus pain, sinus pressure and sore throat  Eyes: Negative for visual disturbance  Respiratory: Positive for cough  Negative for chest tightness, shortness of breath and wheezing  Cardiovascular: Negative for chest pain, palpitations and leg swelling  Gastrointestinal: Negative for abdominal pain, constipation, diarrhea, nausea and vomiting  Endocrine: Negative for cold intolerance and heat intolerance  Genitourinary: Negative for difficulty urinating, dysuria and frequency  Musculoskeletal: Negative for arthralgias, gait problem and myalgias  Neurological: Positive for weakness and numbness  Negative for dizziness, tremors, seizures, syncope, light-headedness and headaches  Psychiatric/Behavioral: Positive for dysphoric mood  Negative for confusion and sleep disturbance  The patient is nervous/anxious            Objective:      /64   Pulse 86   Temp 98 °F (36 7 °C) (Tympanic)   Resp 18   Ht 5' 4" (1 626 m)   Wt 82 6 kg (182 lb)   BMI 31 24 kg/m²          Physical Exam   Constitutional: She is oriented to person, place, and time  She appears well-developed and well-nourished  HENT:   Head: Normocephalic and atraumatic  Right Ear: External ear normal    Left Ear: External ear normal    Mouth/Throat: Oropharynx is clear and moist  No oropharyngeal exudate  Eyes: Pupils are equal, round, and reactive to light  Conjunctivae and EOM are normal    Neck: Neck supple  Cardiovascular: Normal rate, regular rhythm and normal heart sounds  No murmur heard  Pulmonary/Chest: Effort normal and breath sounds normal  No respiratory distress  She has no wheezes  She has no rales  Abdominal: Soft  Bowel sounds are normal  She exhibits no distension  There is no tenderness  Musculoskeletal: She exhibits no edema  Lymphadenopathy:     She has no cervical adenopathy  Neurological: She is alert and oriented to person, place, and time  Left facial muscle weakness noted  Psychiatric: She has a normal mood and affect  Her behavior is normal  Judgment and thought content normal    Nursing note and vitals reviewed

## 2019-01-09 NOTE — PATIENT INSTRUCTIONS
Abdullahi Palsy   WHAT YOU NEED TO KNOW:   What is Bell palsy? Bell palsy is a sudden weakness or paralysis of one side of your face  Bell palsy occurs when the nerve that controls the muscles in your face becomes swollen or irritated  What causes Bell palsy? It is not known for sure what causes the facial nerve swelling that leads to Bell palsy  The herpes simplex virus may cause nerve swelling  Viruses that cause colds and the flu may increase your risk for Bell palsy  Your risk is also higher if you are pregnant or have diabetes  What are the signs and symptoms of Bell palsy? You may notice pain around an ear 1 or 2 days before you have other symptoms  A few days later, weakness may appear on the same side of your face as the ear pain  The symptoms often increase over the following several days  Bell palsy usually lasts about 2 to 3 weeks, but it can last several months  You may have any of the following:  · Weakness or paralysis on one side of your face    · Drooping of one side of your face    · Trouble closing your eye on the affected side of your face    · More sensitive hearing on the affected side    · Numbness or pain in your ear, tongue, or face    · A decrease in the amount of tears and saliva you make    · No ability to taste with the front part of your tongue    · Problems chewing food  How is Bell palsy diagnosed? Your healthcare provider will test how well you can move different parts of your face  He may also do an electromyography (EMG)  This test measures the electrical activity of your muscles  An EMG also tests the nerves that control muscles  How is Bell palsy treated? Bell palsy often goes away without treatment  Some treatments may help you get better faster or help prevent other problems caused by Bell palsy  · Eye care  may be needed if you have problems blinking or closing your eye   Use artificial tears during the day to keep your eye moist  Your healthcare provider may ask that you use an eye ointment at night  You may also need to wear a patch over your eye and tape it shut while you sleep  This helps to keep your eye from getting dry and infected  Wear sunglasses to protect your eye from direct sunlight  Stay away from places that have fumes, dust, or other particles in the air that may harm your eye  · Medicine may be given  to decrease swelling and irritation of your facial nerve  You may receive antiviral medicine if your healthcare provider thinks a virus caused your Bell palsy  Your healthcare provider may also suggest acetaminophen or ibuprofen to reduce pain  These medicines are available without a doctor's order  Ask which medicine to take, and how much you need  Follow directions  Acetaminophen can cause liver damage, and ibuprofen can cause stomach bleeding or kidney damage  · Physical therapy  may be recommended by your healthcare provider  A physical therapist can teach you how to massage your face and exercise the nerves and muscles in your face  This may help you get better sooner and prevent long-term problems  You can exercise on your own when your facial movement begins to return  Open and close your eye, wink, and smile wide  Do the exercises for 15 or 20 minutes several times a day  When should I contact my healthcare provider? · You have a fever  · Your eye becomes red, irritated, or painful  · You have questions or concerns about your condition or care  When should I seek immediate care or call 911? · You develop weakness or numbness on one side of your body (other than your face)  · You have double vision, or you lose vision in your eye  · You have trouble thinking clearly  CARE AGREEMENT:   You have the right to help plan your care  Learn about your health condition and how it may be treated  Discuss treatment options with your caregivers to decide what care you want to receive  You always have the right to refuse treatment   The above information is an  only  It is not intended as medical advice for individual conditions or treatments  Talk to your doctor, nurse or pharmacist before following any medical regimen to see if it is safe and effective for you  © 2017 [employer] Information is for End User's use only and may not be sold, redistributed or otherwise used for commercial purposes  All illustrations and images included in CareNotes® are the copyrighted property of A D [employer] M [employer]  or Blair Johansen

## 2019-01-10 ENCOUNTER — TELEPHONE (OUTPATIENT)
Dept: INTERNAL MEDICINE CLINIC | Facility: CLINIC | Age: 54
End: 2019-01-10

## 2019-01-10 DIAGNOSIS — F41.8 DEPRESSION WITH ANXIETY: Primary | ICD-10-CM

## 2019-01-10 DIAGNOSIS — F41.8 DEPRESSION WITH ANXIETY: ICD-10-CM

## 2019-01-10 RX ORDER — QUETIAPINE FUMARATE 50 MG/1
50 TABLET, FILM COATED ORAL
Qty: 90 TABLET | Refills: 0 | Status: SHIPPED | OUTPATIENT
Start: 2019-01-10 | End: 2019-01-10 | Stop reason: SDUPTHER

## 2019-01-10 RX ORDER — QUETIAPINE FUMARATE 50 MG/1
50 TABLET, FILM COATED ORAL
Qty: 90 TABLET | Refills: 1 | Status: SHIPPED | OUTPATIENT
Start: 2019-01-10 | End: 2019-07-30 | Stop reason: SDUPTHER

## 2019-01-10 NOTE — TELEPHONE ENCOUNTER
Pt called stating that Seroquel XR 50 mg is not covered under insurance and is costly  Pharmacy told her to call get new script for the nonXR Seroquel or a different medication be sent in to her pharmacy

## 2019-04-09 DIAGNOSIS — E78.2 MIXED HYPERLIPIDEMIA: ICD-10-CM

## 2019-04-09 RX ORDER — ATORVASTATIN CALCIUM 20 MG/1
20 TABLET, FILM COATED ORAL DAILY
Qty: 90 TABLET | Refills: 1 | Status: SHIPPED | OUTPATIENT
Start: 2019-04-09 | End: 2019-11-06 | Stop reason: SDUPTHER

## 2019-04-11 DIAGNOSIS — F41.8 DEPRESSION WITH ANXIETY: ICD-10-CM

## 2019-04-11 RX ORDER — FLUOXETINE HYDROCHLORIDE 20 MG/1
CAPSULE ORAL
Qty: 90 CAPSULE | Refills: 1 | Status: SHIPPED | OUTPATIENT
Start: 2019-04-11 | End: 2019-11-06 | Stop reason: SDUPTHER

## 2019-05-01 ENCOUNTER — OFFICE VISIT (OUTPATIENT)
Dept: URGENT CARE | Facility: CLINIC | Age: 54
End: 2019-05-01
Payer: COMMERCIAL

## 2019-05-01 ENCOUNTER — APPOINTMENT (OUTPATIENT)
Dept: RADIOLOGY | Facility: CLINIC | Age: 54
End: 2019-05-01
Payer: COMMERCIAL

## 2019-05-01 VITALS
RESPIRATION RATE: 18 BRPM | DIASTOLIC BLOOD PRESSURE: 62 MMHG | SYSTOLIC BLOOD PRESSURE: 132 MMHG | TEMPERATURE: 98 F | OXYGEN SATURATION: 99 % | HEART RATE: 74 BPM
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
## 2019-05-01 DIAGNOSIS — S60.212A CONTUSION OF LEFT WRIST, INITIAL ENCOUNTER: ICD-10-CM

## 2019-05-01 DIAGNOSIS — S13.4XXA NECK PAIN WITH NECK STIFFNESS AFTER WHIPLASH INJURY TO NECK: ICD-10-CM

## 2019-05-01 DIAGNOSIS — S69.92XA LEFT WRIST INJURY, INITIAL ENCOUNTER: ICD-10-CM
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
## 2019-05-01 DIAGNOSIS — S16.1XXA STRAIN OF MUSCLE, FASCIA AND TENDON AT NECK LEVEL, INITIAL ENCOUNTER: ICD-10-CM

## 2019-05-01 PROCEDURE — 72040 X-RAY EXAM NECK SPINE 2-3 VW: CPT

## 2019-05-01 PROCEDURE — G0382 LEV 3 HOSP TYPE B ED VISIT: HCPCS | Performed by: NURSE PRACTITIONER

## 2019-05-01 PROCEDURE — 73110 X-RAY EXAM OF WRIST: CPT

## 2019-05-01 RX ORDER — PREDNISONE 20 MG/1
20 TABLET ORAL 2 TIMES DAILY WITH MEALS
Qty: 10 TABLET | Refills: 0 | Status: SHIPPED | OUTPATIENT
Start: 2019-05-01 | End: 2019-05-06

## 2019-05-01 RX ORDER — CYCLOBENZAPRINE HCL 10 MG
10 TABLET ORAL 3 TIMES DAILY PRN
Qty: 30 TABLET | Refills: 0 | Status: SHIPPED | OUTPATIENT
Start: 2019-05-01 | End: 2019-05-10

## 2019-05-10 ENCOUNTER — OFFICE VISIT (OUTPATIENT)
Dept: INTERNAL MEDICINE CLINIC | Facility: CLINIC | Age: 54
End: 2019-05-10

## 2019-05-10 VITALS
OXYGEN SATURATION: 98 % | TEMPERATURE: 99.4 F | HEART RATE: 104 BPM | DIASTOLIC BLOOD PRESSURE: 72 MMHG | WEIGHT: 169 LBS | BODY MASS INDEX: 28.85 KG/M2 | RESPIRATION RATE: 18 BRPM | HEIGHT: 64 IN | SYSTOLIC BLOOD PRESSURE: 122 MMHG

## 2019-05-10 DIAGNOSIS — Z13.1 SCREENING FOR DIABETES MELLITUS (DM): ICD-10-CM

## 2019-05-10 DIAGNOSIS — J43.9 PULMONARY EMPHYSEMA, UNSPECIFIED EMPHYSEMA TYPE (HCC): ICD-10-CM

## 2019-05-10 DIAGNOSIS — M15.9 PRIMARY OSTEOARTHRITIS INVOLVING MULTIPLE JOINTS: ICD-10-CM

## 2019-05-10 DIAGNOSIS — Z12.11 SCREEN FOR COLON CANCER: Primary | ICD-10-CM

## 2019-05-10 DIAGNOSIS — F41.8 DEPRESSION WITH ANXIETY: ICD-10-CM

## 2019-05-10 DIAGNOSIS — G25.81 RESTLESS LEG SYNDROME: ICD-10-CM

## 2019-05-10 DIAGNOSIS — M25.532 LEFT WRIST PAIN: ICD-10-CM

## 2019-05-10 DIAGNOSIS — Z13.0 SCREENING FOR DEFICIENCY ANEMIA: ICD-10-CM

## 2019-05-10 DIAGNOSIS — Z13.29 SCREENING FOR THYROID DISORDER: ICD-10-CM

## 2019-05-10 DIAGNOSIS — Z12.39 SCREENING FOR BREAST CANCER: ICD-10-CM
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
## 2019-05-10 DIAGNOSIS — J30.2 SEASONAL ALLERGIES: ICD-10-CM

## 2019-05-10 DIAGNOSIS — E66.3 OVERWEIGHT (BMI 25.0-29.9): ICD-10-CM

## 2019-05-10 DIAGNOSIS — E78.2 MIXED HYPERLIPIDEMIA: ICD-10-CM

## 2019-05-10 DIAGNOSIS — Z59.89 DOES NOT HAVE HEALTH INSURANCE: ICD-10-CM

## 2019-05-10 DIAGNOSIS — Z72.0 TOBACCO ABUSE: ICD-10-CM

## 2019-05-10 PROCEDURE — 99212 OFFICE O/P EST SF 10 MIN: CPT | Performed by: INTERNAL MEDICINE

## 2019-05-10 RX ORDER — PRAMIPEXOLE DIHYDROCHLORIDE 0.5 MG/1
0.25 TABLET ORAL
Qty: 90 TABLET | Refills: 1 | Status: SHIPPED | OUTPATIENT
Start: 2019-05-10 | End: 2019-05-10 | Stop reason: SDUPTHER

## 2019-05-10 RX ORDER — LORATADINE 10 MG/1
10 TABLET ORAL DAILY
Qty: 90 TABLET | Refills: 1 | Status: ON HOLD | OUTPATIENT
Start: 2019-05-10 | End: 2020-01-14 | Stop reason: SDUPTHER

## 2019-05-10 RX ORDER — LORATADINE 10 MG/1
10 TABLET ORAL DAILY
Qty: 90 TABLET | Refills: 1 | Status: SHIPPED | OUTPATIENT
Start: 2019-05-10 | End: 2019-05-10 | Stop reason: SDUPTHER

## 2019-05-10 RX ORDER — PRAMIPEXOLE DIHYDROCHLORIDE 0.5 MG/1
0.25 TABLET ORAL
Qty: 90 TABLET | Refills: 1 | Status: SHIPPED | OUTPATIENT
Start: 2019-05-10 | End: 2020-01-15 | Stop reason: HOSPADM

## 2019-05-10 SDOH — ECONOMIC STABILITY - INCOME SECURITY: OTHER PROBLEMS RELATED TO HOUSING AND ECONOMIC CIRCUMSTANCES: Z59.89

## 2019-05-20 DIAGNOSIS — K63.5 HYPERPLASTIC COLONIC POLYP, UNSPECIFIED PART OF COLON: Primary | ICD-10-CM

## 2019-05-31 ENCOUNTER — TELEPHONE (OUTPATIENT)
Dept: INTERNAL MEDICINE CLINIC | Facility: CLINIC | Age: 54
End: 2019-05-31

## 2019-07-30 DIAGNOSIS — F41.8 DEPRESSION WITH ANXIETY: ICD-10-CM

## 2019-07-30 RX ORDER — QUETIAPINE FUMARATE 50 MG/1
TABLET, FILM COATED ORAL
Qty: 90 TABLET | Refills: 1 | Status: SHIPPED | OUTPATIENT
Start: 2019-07-30 | End: 2020-01-15 | Stop reason: HOSPADM

## 2019-08-02 ENCOUNTER — TELEPHONE (OUTPATIENT)
Dept: INTERNAL MEDICINE CLINIC | Facility: CLINIC | Age: 54
End: 2019-08-02

## 2019-08-02 DIAGNOSIS — J43.9 PULMONARY EMPHYSEMA, UNSPECIFIED EMPHYSEMA TYPE (HCC): Primary | ICD-10-CM

## 2019-08-02 NOTE — TELEPHONE ENCOUNTER
Patient called stating that she has no insurance coverage right now  She has a coupon thing downloaded onto her phone that she can use to get affordable inhalers, she would like trelegy prescribed for her, this is one of the inhalers that they can give her for a cheap out of pocket price

## 2019-10-01 DIAGNOSIS — J43.9 PULMONARY EMPHYSEMA, UNSPECIFIED EMPHYSEMA TYPE (HCC): ICD-10-CM

## 2019-10-01 NOTE — TELEPHONE ENCOUNTER
Pt is getting assistance through [employer] for her inhalers, they needs  A printed script

## 2019-11-06 DIAGNOSIS — F41.8 DEPRESSION WITH ANXIETY: ICD-10-CM

## 2019-11-06 DIAGNOSIS — E78.2 MIXED HYPERLIPIDEMIA: ICD-10-CM

## 2019-11-06 RX ORDER — FLUOXETINE HYDROCHLORIDE 20 MG/1
CAPSULE ORAL
Qty: 90 CAPSULE | Refills: 1 | Status: SHIPPED | OUTPATIENT
Start: 2019-11-06 | End: 2019-12-02

## 2019-11-06 RX ORDER — ATORVASTATIN CALCIUM 20 MG/1
TABLET, FILM COATED ORAL
Qty: 90 TABLET | Refills: 1 | Status: ON HOLD | OUTPATIENT
Start: 2019-11-06 | End: 2020-01-14 | Stop reason: SDUPTHER

## 2019-12-02 ENCOUNTER — OFFICE VISIT (OUTPATIENT)
Dept: INTERNAL MEDICINE CLINIC | Facility: CLINIC | Age: 54
End: 2019-12-02
Payer: COMMERCIAL

## 2019-12-02 VITALS
DIASTOLIC BLOOD PRESSURE: 72 MMHG | TEMPERATURE: 97.1 F | SYSTOLIC BLOOD PRESSURE: 116 MMHG | RESPIRATION RATE: 18 BRPM | HEART RATE: 96 BPM | WEIGHT: 172 LBS | BODY MASS INDEX: 29.37 KG/M2 | HEIGHT: 64 IN | OXYGEN SATURATION: 99 %

## 2019-12-02 DIAGNOSIS — F41.8 DEPRESSION WITH ANXIETY: ICD-10-CM

## 2019-12-02 DIAGNOSIS — Z13.31 POSITIVE DEPRESSION SCREENING: ICD-10-CM

## 2019-12-02 DIAGNOSIS — Z72.0 TOBACCO ABUSE: ICD-10-CM

## 2019-12-02 DIAGNOSIS — Z13.0 SCREENING FOR DEFICIENCY ANEMIA: ICD-10-CM

## 2019-12-02 DIAGNOSIS — Z11.4 SCREENING FOR HIV (HUMAN IMMUNODEFICIENCY VIRUS): ICD-10-CM

## 2019-12-02 DIAGNOSIS — F32.2 SEVERE MAJOR DEPRESSION, SINGLE EPISODE (HCC): ICD-10-CM

## 2019-12-02 DIAGNOSIS — Z12.11 SCREEN FOR COLON CANCER: ICD-10-CM

## 2019-12-02 DIAGNOSIS — M15.9 PRIMARY OSTEOARTHRITIS INVOLVING MULTIPLE JOINTS: ICD-10-CM

## 2019-12-02 DIAGNOSIS — E78.2 MIXED HYPERLIPIDEMIA: ICD-10-CM

## 2019-12-02 DIAGNOSIS — J98.01 BRONCHOSPASM: ICD-10-CM

## 2019-12-02 DIAGNOSIS — F41.8 ANXIETY WITH DEPRESSION: ICD-10-CM

## 2019-12-02 DIAGNOSIS — Z11.59 NEED FOR HEPATITIS C SCREENING TEST: ICD-10-CM

## 2019-12-02 DIAGNOSIS — Z59.89 DOES NOT HAVE HEALTH INSURANCE: ICD-10-CM

## 2019-12-02 DIAGNOSIS — M25.561 ACUTE PAIN OF RIGHT KNEE: ICD-10-CM

## 2019-12-02 DIAGNOSIS — Z12.39 SCREENING FOR BREAST CANCER: Primary | ICD-10-CM

## 2019-12-02 PROCEDURE — 99213 OFFICE O/P EST LOW 20 MIN: CPT | Performed by: INTERNAL MEDICINE

## 2019-12-02 RX ORDER — ESCITALOPRAM OXALATE 10 MG/1
10 TABLET ORAL
Qty: 90 TABLET | Refills: 3 | Status: SHIPPED | OUTPATIENT
Start: 2019-12-02 | End: 2019-12-19 | Stop reason: SDUPTHER

## 2019-12-02 SDOH — ECONOMIC STABILITY - INCOME SECURITY: OTHER PROBLEMS RELATED TO HOUSING AND ECONOMIC CIRCUMSTANCES: Z59.89

## 2019-12-02 NOTE — PATIENT INSTRUCTIONS
Cigarette Smoking and Your Health   AMBULATORY CARE:   Risks to your health if you smoke:  Nicotine and other chemicals found in tobacco damage every cell in your body  Even if you are a light smoker, you have an increased risk for cancer, heart disease, and lung disease  If you are pregnant or have diabetes, smoking increases your risk for complications  Benefits to your health if you stop smoking:   · You decrease respiratory symptoms such as coughing, wheezing, and shortness of breath  · You reduce your risk for cancers of the lung, mouth, throat, kidney, bladder, pancreas, stomach, and cervix  If you already have cancer, you increase the benefits of chemotherapy  You also reduce your risk for cancer returning or a second cancer from developing  · You reduce your risk for heart disease, blood clots, heart attack, and stroke  · You reduce your risk for lung infections, and diseases such as pneumonia, asthma, chronic bronchitis, and emphysema  · Your circulation improves  More oxygen can be delivered to your body  If you have diabetes, you lower your risk for complications, such as kidney, artery, and eye diseases  You also lower your risk for nerve damage  Nerve damage can lead to amputations, poor vision, and blindness  · You improve your body's ability to heal and to fight infections  Benefits to the health of others if you stop smoking:  Tobacco is harmful to nonsmokers who breathe in your secondhand smoke  The following are ways the health of others around you may improve when you stop smoking:  · You lower the risks for lung cancer and heart disease in nonsmoking adults  · If you are pregnant, you lower the risk for miscarriage, early delivery, low birth weight, and stillbirth  You also lower your baby's risk for SIDS, obesity, developmental delay, and neurobehavioral problems, such as ADHD       · If you have children, you lower their risk for ear infections, colds, pneumonia, bronchitis, and asthma  For more information and support to stop smoking:   · Smokefree  gov  Phone: 2- 328 - 120-9884  Web Address: www smokefree  [employer]  Follow up with your healthcare provider as directed:  Write down your questions so you remember to ask them during your visits  © 2017 [employer] Information is for End User's use only and may not be sold, redistributed or otherwise used for commercial purposes  All illustrations and images included in CareNotes® are the copyrighted property of A [employer] M [employer] Inc  or Blair Johansen  The above information is an  only  It is not intended as medical advice for individual conditions or treatments  Talk to your doctor, nurse or pharmacist before following any medical regimen to see if it is safe and effective for you  Depression   AMBULATORY CARE:   Depression  is a medical condition that causes feelings of sadness or hopelessness that do not go away  Depression may cause you to lose interest in things you used to enjoy  These feelings may interfere with your daily life  Common symptoms include the following:   · Appetite changes, or weight gain or loss    · Trouble going to sleep or staying asleep, or sleeping too much    · Fatigue or lack of energy    · Feeling restless, irritable, or withdrawn    · Feeling worthless, hopeless, discouraged, or guilty    · Trouble concentrating, remembering things, doing daily tasks, or making decisions    · Thoughts about hurting or killing yourself  Call 911 for any of the following:   · You think about harming yourself or someone else  Contact your healthcare provider if:   · Your symptoms do not improve  · You cannot make it to your next appointment  · You have new symptoms  · You have questions or concerns about your condition or care  Treatment for depression  may include medicine to improve or balance your mood  Therapy may also be used to treat your depression   A therapist will help you learn to cope with your thoughts and feelings  Therapy can be done alone or in a group  It may also be done with family members or a significant other  Self-care:   · Get regular physical activity  Try to exercise for 30 minutes, 3 to 5 days a week  Work with your healthcare provider to develop an exercise plan that you enjoy  Physical activity may improve your symptoms  · Get enough sleep  Create a routine to help you relax before bed  You can listen to music, read, or do yoga  Try to go to bed and wake up at the same time every day  Sleep is important for emotional health  · Eat a variety of healthy foods from all of the food groups  A healthy meal plan is low in fat, salt, and added sugar  Ask your healthcare provider for more information about a meal plan that is right for you  · Do not drink alcohol or use drugs  Alcohol and drugs can make your symptoms worse  Follow up with your healthcare provider as directed: Your healthcare provider will monitor your progress at follow-up visits  He or she will also monitor your medicine if you take antidepressants  Your healthcare provider will ask if the medicine is helping  Tell him or her about any side effects or problems you may have with your medicine  The type or amount of medicine may need to be changed  Write down your questions so you remember to ask them during your visits  © 2017 [employer]  Information is for End User's use only and may not be sold, redistributed or otherwise used for commercial purposes  All illustrations and images included in CareNotes® are the copyrighted property of [employer]  or Blair Johansen  The above information is an  only  It is not intended as medical advice for individual conditions or treatments  Talk to your doctor, nurse or pharmacist before following any medical regimen to see if it is safe and effective for you

## 2019-12-02 NOTE — PROGRESS NOTES
Assessment/Plan:  Problem List Items Addressed This Visit        Musculoskeletal and Integument    Primary osteoarthritis involving multiple joints       Other    Depression with anxiety    Relevant Medications    escitalopram (LEXAPRO) 10 mg tablet    Other Relevant Orders    Comprehensive metabolic panel    Hemoglobin A1C    TSH, 3rd generation with Free T4 reflex    Prolactin    Mixed hyperlipidemia    Relevant Orders    Comprehensive metabolic panel    TSH, 3rd generation with Free T4 reflex    Lipid Panel with Direct LDL reflex    Tobacco abuse      Other Visit Diagnoses     Screening for breast cancer    -  Primary    Relevant Orders    Mammo screening bilateral w 3d & cad    Screen for colon cancer        Relevant Orders    Occult Blood, Fecal Immunochemical    Screening for HIV (human immunodeficiency virus)        Relevant Orders    Human Immunodeficiency Virus 1/2 Antigen / Antibody ( Fourth Generation) with Reflex Testing    Does not have health insurance        Positive depression screening        Bronchospasm        Screening for deficiency anemia        Relevant Orders    CBC and differential    Need for hepatitis C screening test        Relevant Orders    Hepatitis C antibody    Acute pain of right knee        Severe major depression, single episode (HCC)        Relevant Medications    escitalopram (LEXAPRO) 10 mg tablet    Anxiety with depression        Relevant Medications    escitalopram (LEXAPRO) 10 mg tablet           Diagnoses and all orders for this visit:    Screening for breast cancer  -     Mammo screening bilateral w 3d & cad; Future    Depression with anxiety  -     Comprehensive metabolic panel; Future  -     Hemoglobin A1C; Future  -     TSH, 3rd generation with Free T4 reflex; Future  -     Prolactin; Future  -     escitalopram (LEXAPRO) 10 mg tablet;  Take 1 tablet (10 mg total) by mouth daily in the early morning    Screen for colon cancer  -     Occult Blood, Fecal Immunochemical; Future    Screening for HIV (human immunodeficiency virus)  -     Human Immunodeficiency Virus 1/2 Antigen / Antibody ( Fourth Generation) with Reflex Testing; Future    Does not have health insurance    Mixed hyperlipidemia  -     Comprehensive metabolic panel; Future  -     TSH, 3rd generation with Free T4 reflex; Future  -     Lipid Panel with Direct LDL reflex; Future    Tobacco abuse    Primary osteoarthritis involving multiple joints    Positive depression screening    Bronchospasm    Screening for deficiency anemia  -     CBC and differential; Future    Need for hepatitis C screening test  -     Hepatitis C antibody; Future    Acute pain of right knee    Severe major depression, single episode ([facility] [address] )    Anxiety with depression        No problem-specific Assessment & Plan notes found for this encounter  A/P: Doing poorly  Will check labs, but no insurance  Order mammo and CRC, but no insurance  Defers vaccines  Will change the prozac to lexapro and continue adjunct therapy with the atypical  Recommend counseling  Knee PE with slight effusion and suspect tendonitis/bursitis or possibly gout  Start OT NSAID since it is getting better  Wean tobacco  To the ER if feels suicidal  RTC two weeks for f/u on MDD, labs, knee, etc      Subjective:      Patient ID: Andriette Bosworth is a 47 y o  female  WF RTC for f/u MDD/YANIRA, hyperlipidemia, etc  Doing ok,but reports YANIRA/MDD is worse despite her meds  Trouble relaxing, concentrating, sherman, feels "blah", etc  No suicidal ideations  Denies any new triggers  No counseling  Remains active w/o difficulty and no falls  DJD pain is controlled, but reports a two day history of right knee pain, swelling, and redness  NO trauma  No prior issues and no h/o gout  Reports s/s better today, but not gone    Still smoking  Due for labs, CRC, vaccines, and mammo, but no insurance         The following portions of the patient's history were reviewed and updated as appropriate:   She has a past medical history of Anxiety, Bell's palsy, COPD (chronic obstructive pulmonary disease) ([facility] [address] ), Depression, Menopause, Mild persistent asthma without complication (), Obesity, and Primary osteoarthritis involving multiple joints (10/9/2018)  ,  does not have any pertinent problems on file  ,   has a past surgical history that includes Ankle surgery; Nose surgery; Total hip arthroplasty;  section; Tubal ligation; Total hip arthroplasty; Dilation and curettage, diagnostic / therapeutic; Tubal ligation; and Colonoscopy w/ polypectomy  ,  family history includes Mental illness in her brother and mother; No Known Problems in her father; Suicidality in her brother  ,   reports that she has been smoking  She has a 60 00 pack-year smoking history  She has never used smokeless tobacco  She reports that she drinks alcohol  She reports that she does not use drugs  ,  is allergic to pollen extract     Current Outpatient Medications   Medication Sig Dispense Refill    atorvastatin (LIPITOR) 20 mg tablet TAKE 1 TABLET BY MOUTH ONCE DAILY 90 tablet 1    fluticasone-umeclidinium-vilanterol (TRELEGY) 100-62 5-25 MCG/INH inhaler Inhale 1 puff daily Rinse mouth after use  3 Inhaler 3    loratadine (CLARITIN) 10 mg tablet Take 1 tablet (10 mg total) by mouth daily 90 tablet 1    pramipexole (MIRAPEX) 0 5 mg tablet Take 0 5 tablets (0 25 mg total) by mouth daily at bedtime 90 tablet 1    QUEtiapine (SEROquel) 50 mg tablet TAKE 1 TABLET BY MOUTH ONCE DAILY AT BEDTIME 90 tablet 1    albuterol (PROVENTIL HFA,VENTOLIN HFA) 90 mcg/act inhaler Inhale 2 puffs every 6 (six) hours as needed for wheezing      escitalopram (LEXAPRO) 10 mg tablet Take 1 tablet (10 mg total) by mouth daily in the early morning 90 tablet 3     No current facility-administered medications for this visit  Review of Systems   Constitutional: Negative for activity change, chills, diaphoresis, fatigue and fever  HENT: Negative      Eyes: Negative for visual disturbance  Respiratory: Negative for cough, chest tightness, shortness of breath and wheezing  Cardiovascular: Negative for chest pain, palpitations and leg swelling  Gastrointestinal: Negative for abdominal pain, constipation, diarrhea, nausea and vomiting  Endocrine: Negative for cold intolerance and heat intolerance  Genitourinary: Negative for difficulty urinating, dysuria and frequency  Musculoskeletal: Negative for arthralgias, gait problem and myalgias  Neurological: Negative for dizziness, seizures, syncope, weakness, light-headedness and headaches  Psychiatric/Behavioral: Positive for dysphoric mood  Negative for confusion, self-injury, sleep disturbance and suicidal ideas  The patient is nervous/anxious  PHQ-9 Depression Screening    PHQ-9:    Frequency of the following problems over the past two weeks:       Little interest or pleasure in doing things:  3 - nearly every day  Feeling down, depressed, or hopeless:  3 - nearly every day  Trouble falling or staying asleep, or sleeping too much:  3 - nearly every day  Feeling tired or having little energy:  3 - nearly every day  Poor appetite or overeating:  3 - nearly every day  Feeling bad about yourself - or that you are a failure or have let yourself or your family down:  3 - nearly every day  Trouble concentrating on things, such as reading the newspaper or watching television:  3 - nearly every day  Moving or speaking so slowly that other people could have noticed   Or the opposite - being so fidgety or restless that you have been moving around a lot more than usual:  2 - more than half the days  Thoughts that you would be better off dead, or of hurting yourself in some way:  0 - not at all  PHQ-2 Score:  6  PHQ-9 Score:  23        Objective:  Vitals:    12/02/19 1435   BP: 116/72   BP Location: Left arm   Patient Position: Sitting   Cuff Size: Adult   Pulse: 96   Resp: 18   Temp: (!) 97 1 °F (36 2 °C) TempSrc: Tympanic   SpO2: 99%   Weight: 78 kg (172 lb)   Height: 5' 4" (1 626 m)     Body mass index is 29 52 kg/m²  Physical Exam   Constitutional: She is oriented to person, place, and time  She appears well-developed and well-nourished  No distress  HENT:   Head: Normocephalic and atraumatic  Mouth/Throat: Oropharynx is clear and moist    Eyes: Pupils are equal, round, and reactive to light  Conjunctivae and EOM are normal    Neck: Neck supple  No JVD present  Cardiovascular: Normal rate, regular rhythm and normal heart sounds  Pulmonary/Chest: Effort normal and breath sounds normal  No respiratory distress  She has no wheezes  She has no rales  Abdominal: Soft  Bowel sounds are normal  She exhibits no distension  There is no tenderness  Musculoskeletal: She exhibits no edema  Neurological: She is alert and oriented to person, place, and time  Psychiatric: She has a normal mood and affect  Her behavior is normal  Judgment and thought content normal    Nursing note and vitals reviewed  Tobacco Cessation Counseling: Tobacco cessation counseling and education was provided  The patient is sincerely urged to quit consumption of tobacco  She is not ready to quit tobacco  The numerous health risks of tobacco consumption were discussed  If she decides to quit, there are a number of helpful adjunctive aids, and she can see me to discuss nicotine replacement therapy, chantix, or bupropion anytime in the future  Depression Screening Follow-up Plan: Patient's depression screening was positive with a PHQ-2 score of 6  Their PHQ-9 score was 23  Patient assessed for underlying major depression  They have no active suicidal ideations  Brief counseling provided and recommend additional follow-up/re-evaluation next office visit

## 2019-12-19 ENCOUNTER — OFFICE VISIT (OUTPATIENT)
Dept: INTERNAL MEDICINE CLINIC | Facility: CLINIC | Age: 54
End: 2019-12-19
Payer: COMMERCIAL

## 2019-12-19 VITALS
HEART RATE: 80 BPM | TEMPERATURE: 98.2 F | SYSTOLIC BLOOD PRESSURE: 122 MMHG | HEIGHT: 64 IN | WEIGHT: 168 LBS | BODY MASS INDEX: 28.68 KG/M2 | DIASTOLIC BLOOD PRESSURE: 72 MMHG

## 2019-12-19 DIAGNOSIS — M25.561 ACUTE PAIN OF RIGHT KNEE: ICD-10-CM

## 2019-12-19 DIAGNOSIS — Z02.89 ENCOUNTER FOR COMPLETION OF FORM WITH PATIENT: ICD-10-CM

## 2019-12-19 DIAGNOSIS — F41.8 DEPRESSION WITH ANXIETY: Primary | ICD-10-CM

## 2019-12-19 DIAGNOSIS — Z59.89 DOES NOT HAVE HEALTH INSURANCE: ICD-10-CM

## 2019-12-19 PROCEDURE — 99212 OFFICE O/P EST SF 10 MIN: CPT | Performed by: INTERNAL MEDICINE

## 2019-12-19 RX ORDER — ESCITALOPRAM OXALATE 20 MG/1
20 TABLET ORAL
Qty: 90 TABLET | Refills: 1 | Status: ON HOLD | OUTPATIENT
Start: 2019-12-19 | End: 2020-01-15 | Stop reason: SDUPTHER

## 2019-12-19 SDOH — ECONOMIC STABILITY - INCOME SECURITY: OTHER PROBLEMS RELATED TO HOUSING AND ECONOMIC CIRCUMSTANCES: Z59.89

## 2019-12-19 NOTE — PROGRESS NOTES
Assessment/Plan:  Problem List Items Addressed This Visit        Other    Depression with anxiety - Primary    Relevant Medications    escitalopram (LEXAPRO) 20 mg tablet      Other Visit Diagnoses     Acute pain of right knee        Does not have health insurance        Encounter for completion of form with patient               Diagnoses and all orders for this visit:    Depression with anxiety  -     escitalopram (LEXAPRO) 20 mg tablet; Take 1 tablet (20 mg total) by mouth daily in the early morning    Acute pain of right knee    Does not have health insurance    Encounter for completion of form with patient        No problem-specific Assessment & Plan notes found for this encounter  A/P: Doing better and unable to afford labs  YANIRA is better, but could be better  Will increase the lexapro  Discussed the knee and most likely DJD  Continue current treatment and once she gets insurance can image and refer to PT  Forms filled out  RTC four months for routine  Subjective:      Patient ID: Taran Hope is a 47 y o  female  WF RTC for f/u YANIRA/MDD and right knee pain  SSRI were switched and started on ice/heat and NSAID's  Reports YANIRA/MDD is significantly better  Tolerating the meds  Knee is only slightly better  Now reports an "noise" with bending  No swelling  Needs forms filled out for disabilty  Still no insurance  The following portions of the patient's history were reviewed and updated as appropriate:   She has a past medical history of Anxiety, Bell's palsy, COPD (chronic obstructive pulmonary disease) ([facility] [address] ), Depression, Menopause, Mild persistent asthma without complication (6500), Obesity, and Primary osteoarthritis involving multiple joints (10/9/2018)  ,  does not have any pertinent problems on file  ,   has a past surgical history that includes Ankle surgery; Nose surgery; Total hip arthroplasty;  section; Tubal ligation;  Total hip arthroplasty; Dilation and curettage, diagnostic / therapeutic; Tubal ligation; and Colonoscopy w/ polypectomy  ,  family history includes Mental illness in her brother and mother; No Known Problems in her father; Suicidality in her brother  ,   reports that she has been smoking  She has a 60 00 pack-year smoking history  She has never used smokeless tobacco  She reports that she drinks alcohol  She reports that she does not use drugs  ,  is allergic to pollen extract     Current Outpatient Medications   Medication Sig Dispense Refill    albuterol (PROVENTIL HFA,VENTOLIN HFA) 90 mcg/act inhaler Inhale 2 puffs every 6 (six) hours as needed for wheezing      atorvastatin (LIPITOR) 20 mg tablet TAKE 1 TABLET BY MOUTH ONCE DAILY 90 tablet 1    escitalopram (LEXAPRO) 20 mg tablet Take 1 tablet (20 mg total) by mouth daily in the early morning 90 tablet 1    fluticasone-umeclidinium-vilanterol (TRELEGY) 100-62 5-25 MCG/INH inhaler Inhale 1 puff daily Rinse mouth after use  3 Inhaler 3    loratadine (CLARITIN) 10 mg tablet Take 1 tablet (10 mg total) by mouth daily 90 tablet 1    pramipexole (MIRAPEX) 0 5 mg tablet Take 0 5 tablets (0 25 mg total) by mouth daily at bedtime 90 tablet 1    QUEtiapine (SEROquel) 50 mg tablet TAKE 1 TABLET BY MOUTH ONCE DAILY AT BEDTIME 90 tablet 1     No current facility-administered medications for this visit  Review of Systems   Constitutional: Positive for activity change  Negative for chills, diaphoresis, fatigue and fever  Respiratory: Negative for cough, chest tightness, shortness of breath and wheezing  Cardiovascular: Negative for chest pain, palpitations and leg swelling  Gastrointestinal: Negative for abdominal pain, constipation, diarrhea, nausea and vomiting  Genitourinary: Negative for difficulty urinating, dysuria and frequency  Musculoskeletal: Positive for arthralgias  Negative for gait problem, joint swelling and myalgias  Neurological: Negative for light-headedness and headaches  Psychiatric/Behavioral: Positive for dysphoric mood  Negative for confusion, sleep disturbance and suicidal ideas  The patient is nervous/anxious  PHQ-9 Depression Screening    PHQ-9:    Frequency of the following problems over the past two weeks:             Objective:  Vitals:    12/19/19 1257   BP: 122/72   Pulse: 80   Temp: 98 2 °F (36 8 °C)   TempSrc: Tympanic   Weight: 76 2 kg (168 lb)   Height: 5' 4" (1 626 m)     Body mass index is 28 84 kg/m²  Physical Exam   Constitutional: She is oriented to person, place, and time  She appears well-developed and well-nourished  No distress  HENT:   Head: Normocephalic and atraumatic  Mouth/Throat: Oropharynx is clear and moist    Eyes: Pupils are equal, round, and reactive to light  Conjunctivae and EOM are normal    Cardiovascular: Normal rate, regular rhythm and normal heart sounds  Pulmonary/Chest: Effort normal and breath sounds normal  No respiratory distress  She has no wheezes  She has no rales  Musculoskeletal: Normal range of motion  She exhibits tenderness  She exhibits no edema or deformity  Right knee w/o any gross deformities, increase temp, erythema, or swelling  No effusion or ballotment  Lots of crepitus  Collaterals intact  Negative Drawer's  Negative Payton's  ROM wnl  Tenderness diffusely         Neurological: She is alert and oriented to person, place, and time  Psychiatric: She has a normal mood and affect  Her behavior is normal  Judgment and thought content normal    Nursing note and vitals reviewed

## 2020-01-08 ENCOUNTER — HOSPITAL ENCOUNTER (INPATIENT)
Facility: HOSPITAL | Age: 55
LOS: 6 days | Discharge: HOME | DRG: 885 | End: 2020-01-15
Attending: PSYCHIATRY & NEUROLOGY | Admitting: PSYCHIATRY & NEUROLOGY
Payer: COMMERCIAL

## 2020-01-08 DIAGNOSIS — F33.2 MAJOR DEPRESSIVE DISORDER, RECURRENT, SEVERE WITHOUT PSYCHOTIC FEATURES (HCC): Primary | Chronic | ICD-10-CM

## 2020-01-08 DIAGNOSIS — F41.8 DEPRESSION WITH ANXIETY: ICD-10-CM

## 2020-01-08 DIAGNOSIS — J30.2 SEASONAL ALLERGIES: ICD-10-CM

## 2020-01-08 DIAGNOSIS — J43.9 PULMONARY EMPHYSEMA, UNSPECIFIED EMPHYSEMA TYPE (HCC): ICD-10-CM

## 2020-01-08 DIAGNOSIS — E55.9 VITAMIN D DEFICIENCY: ICD-10-CM

## 2020-01-08 DIAGNOSIS — R45.851 SUICIDAL IDEATIONS: ICD-10-CM

## 2020-01-08 DIAGNOSIS — Z72.0 TOBACCO ABUSE: ICD-10-CM

## 2020-01-08 DIAGNOSIS — E78.2 MIXED HYPERLIPIDEMIA: ICD-10-CM

## 2020-01-08 DIAGNOSIS — G25.81 RESTLESS LEG SYNDROME: ICD-10-CM

## 2020-01-08 DIAGNOSIS — J43.9 PULMONARY EMPHYSEMA (HCC): ICD-10-CM

## 2020-01-08 DIAGNOSIS — R09.81 NASAL CONGESTION: ICD-10-CM

## 2020-01-08 DIAGNOSIS — F10.20 ALCOHOL USE DISORDER, MODERATE, DEPENDENCE (HCC): ICD-10-CM

## 2020-01-08 LAB
ALBUMIN SERPL BCP-MCNC: 4.6 G/DL (ref 3.5–5.7)
ALP SERPL-CCNC: 74 U/L (ref 40–150)
ALT SERPL W P-5'-P-CCNC: 13 U/L (ref 7–52)
AMPHETAMINES SERPL QL SCN: NEGATIVE
ANION GAP SERPL CALCULATED.3IONS-SCNC: 11 MMOL/L (ref 4–13)
AST SERPL W P-5'-P-CCNC: 13 U/L (ref 13–39)
BARBITURATES UR QL: NEGATIVE
BASOPHILS # BLD AUTO: 0.1 THOUSANDS/ΜL (ref 0–0.1)
BASOPHILS NFR BLD AUTO: 1 % (ref 0–2)
BENZODIAZ UR QL: NEGATIVE
BILIRUB SERPL-MCNC: 0.3 MG/DL (ref 0.2–1)
BILIRUB UR QL STRIP: NEGATIVE
BUN SERPL-MCNC: 7 MG/DL (ref 7–25)
CALCIUM SERPL-MCNC: 9.4 MG/DL (ref 8.6–10.5)
CHLORIDE SERPL-SCNC: 111 MMOL/L (ref 98–107)
CLARITY UR: CLEAR
CO2 SERPL-SCNC: 20 MMOL/L (ref 21–31)
COCAINE UR QL: NEGATIVE
COLOR UR: ABNORMAL
CREAT SERPL-MCNC: 0.82 MG/DL (ref 0.6–1.2)
EOSINOPHIL # BLD AUTO: 0.4 THOUSAND/ΜL (ref 0–0.61)
EOSINOPHIL NFR BLD AUTO: 4 % (ref 0–5)
ERYTHROCYTE [DISTWIDTH] IN BLOOD BY AUTOMATED COUNT: 15.2 % (ref 11.5–14.5)
ETHANOL SERPL-MCNC: 186.1 MG/DL
GFR SERPL CREATININE-BSD FRML MDRD: 81 ML/MIN/1.73SQ M
GLUCOSE SERPL-MCNC: 104 MG/DL (ref 65–99)
GLUCOSE UR STRIP-MCNC: NEGATIVE MG/DL
HCT VFR BLD AUTO: 42.3 % (ref 42–47)
HGB BLD-MCNC: 13.6 G/DL (ref 12–16)
HGB UR QL STRIP.AUTO: NEGATIVE
KETONES UR STRIP-MCNC: NEGATIVE MG/DL
LEUKOCYTE ESTERASE UR QL STRIP: NEGATIVE
LYMPHOCYTES # BLD AUTO: 3 THOUSANDS/ΜL (ref 0.6–4.47)
LYMPHOCYTES NFR BLD AUTO: 27 % (ref 21–51)
MCH RBC QN AUTO: 30.4 PG (ref 26–34)
MCHC RBC AUTO-ENTMCNC: 32.1 G/DL (ref 31–37)
MCV RBC AUTO: 95 FL (ref 81–99)
METHADONE UR QL: NEGATIVE
MONOCYTES # BLD AUTO: 0.6 THOUSAND/ΜL (ref 0.17–1.22)
MONOCYTES NFR BLD AUTO: 5 % (ref 2–12)
NEUTROPHILS # BLD AUTO: 7.2 THOUSANDS/ΜL (ref 1.4–6.5)
NEUTS SEG NFR BLD AUTO: 64 % (ref 42–75)
NITRITE UR QL STRIP: NEGATIVE
OPIATES UR QL SCN: NEGATIVE
PCP UR QL: NEGATIVE
PH UR STRIP.AUTO: 6 [PH]
PLATELET # BLD AUTO: 432 THOUSANDS/UL (ref 149–390)
PMV BLD AUTO: 7.3 FL (ref 8.6–11.7)
POTASSIUM SERPL-SCNC: 3.7 MMOL/L (ref 3.5–5.5)
PROT SERPL-MCNC: 7.9 G/DL (ref 6.4–8.9)
PROT UR STRIP-MCNC: NEGATIVE MG/DL
RBC # BLD AUTO: 4.46 MILLION/UL (ref 3.9–5.2)
SODIUM SERPL-SCNC: 142 MMOL/L (ref 134–143)
SP GR UR STRIP.AUTO: <=1.005 (ref 1–1.03)
THC UR QL: NEGATIVE
TSH SERPL DL<=0.05 MIU/L-ACNC: 0.69 UIU/ML (ref 0.45–5.33)
UROBILINOGEN UR QL STRIP.AUTO: 0.2 E.U./DL
WBC # BLD AUTO: 11.4 THOUSAND/UL (ref 4.8–10.8)

## 2020-01-08 PROCEDURE — 84443 ASSAY THYROID STIM HORMONE: CPT | Performed by: EMERGENCY MEDICINE

## 2020-01-08 PROCEDURE — 81003 URINALYSIS AUTO W/O SCOPE: CPT | Performed by: EMERGENCY MEDICINE

## 2020-01-08 PROCEDURE — 85025 COMPLETE CBC W/AUTO DIFF WBC: CPT | Performed by: EMERGENCY MEDICINE

## 2020-01-08 PROCEDURE — 99283 EMERGENCY DEPT VISIT LOW MDM: CPT | Performed by: EMERGENCY MEDICINE

## 2020-01-08 PROCEDURE — 80053 COMPREHEN METABOLIC PANEL: CPT | Performed by: EMERGENCY MEDICINE

## 2020-01-08 PROCEDURE — 36415 COLL VENOUS BLD VENIPUNCTURE: CPT | Performed by: EMERGENCY MEDICINE

## 2020-01-08 PROCEDURE — 80320 DRUG SCREEN QUANTALCOHOLS: CPT | Performed by: EMERGENCY MEDICINE

## 2020-01-08 PROCEDURE — 99285 EMERGENCY DEPT VISIT HI MDM: CPT

## 2020-01-08 PROCEDURE — 93005 ELECTROCARDIOGRAM TRACING: CPT

## 2020-01-08 PROCEDURE — 80307 DRUG TEST PRSMV CHEM ANLYZR: CPT | Performed by: EMERGENCY MEDICINE

## 2020-01-08 RX ORDER — NICOTINE 21 MG/24HR
21 PATCH, TRANSDERMAL 24 HOURS TRANSDERMAL ONCE
Status: DISCONTINUED | OUTPATIENT
Start: 2020-01-08 | End: 2020-01-09 | Stop reason: SDUPTHER

## 2020-01-08 RX ORDER — LORAZEPAM 1 MG/1
1 TABLET ORAL ONCE
Status: COMPLETED | OUTPATIENT
Start: 2020-01-08 | End: 2020-01-08

## 2020-01-08 RX ADMIN — NICOTINE 21 MG: 21 PATCH, EXTENDED RELEASE TRANSDERMAL at 21:22

## 2020-01-08 RX ADMIN — LORAZEPAM 1 MG: 1 TABLET ORAL at 21:23

## 2020-01-09 PROBLEM — F33.9 RECURRENT MAJOR DEPRESSIVE DISORDER (HCC): Status: ACTIVE | Noted: 2020-01-09

## 2020-01-09 LAB
ATRIAL RATE: 91 BPM
ETHANOL SERPL-MCNC: <10 MG/DL
P AXIS: 66 DEGREES
PR INTERVAL: 130 MS
QRS AXIS: 47 DEGREES
QRSD INTERVAL: 70 MS
QT INTERVAL: 392 MS
QTC INTERVAL: 482 MS
T WAVE AXIS: 62 DEGREES
VENTRICULAR RATE: 91 BPM

## 2020-01-09 PROCEDURE — 80320 DRUG SCREEN QUANTALCOHOLS: CPT | Performed by: EMERGENCY MEDICINE

## 2020-01-09 PROCEDURE — 36415 COLL VENOUS BLD VENIPUNCTURE: CPT | Performed by: EMERGENCY MEDICINE

## 2020-01-09 PROCEDURE — 93010 ELECTROCARDIOGRAM REPORT: CPT | Performed by: INTERNAL MEDICINE

## 2020-01-09 RX ORDER — ACETAMINOPHEN 325 MG/1
650 TABLET ORAL EVERY 4 HOURS PRN
Status: DISCONTINUED | OUTPATIENT
Start: 2020-01-09 | End: 2020-01-15 | Stop reason: HOSPADM

## 2020-01-09 RX ORDER — ESCITALOPRAM OXALATE 10 MG/1
20 TABLET ORAL DAILY
Status: DISCONTINUED | OUTPATIENT
Start: 2020-01-09 | End: 2020-01-15 | Stop reason: HOSPADM

## 2020-01-09 RX ORDER — LORAZEPAM 2 MG/ML
2 INJECTION INTRAMUSCULAR EVERY 4 HOURS PRN
Status: DISCONTINUED | OUTPATIENT
Start: 2020-01-09 | End: 2020-01-12

## 2020-01-09 RX ORDER — LORATADINE 10 MG/1
10 TABLET ORAL DAILY PRN
Status: DISCONTINUED | OUTPATIENT
Start: 2020-01-09 | End: 2020-01-15 | Stop reason: HOSPADM

## 2020-01-09 RX ORDER — FLUTICASONE FUROATE AND VILANTEROL 100; 25 UG/1; UG/1
1 POWDER RESPIRATORY (INHALATION) DAILY
Status: DISCONTINUED | OUTPATIENT
Start: 2020-01-10 | End: 2020-01-15 | Stop reason: HOSPADM

## 2020-01-09 RX ORDER — QUETIAPINE FUMARATE 25 MG/1
50 TABLET, FILM COATED ORAL ONCE
Status: COMPLETED | OUTPATIENT
Start: 2020-01-09 | End: 2020-01-09

## 2020-01-09 RX ORDER — TRAZODONE HYDROCHLORIDE 50 MG/1
50 TABLET ORAL
Status: DISCONTINUED | OUTPATIENT
Start: 2020-01-09 | End: 2020-01-15 | Stop reason: HOSPADM

## 2020-01-09 RX ORDER — LORAZEPAM 1 MG/1
1 TABLET ORAL EVERY 4 HOURS PRN
Status: DISCONTINUED | OUTPATIENT
Start: 2020-01-09 | End: 2020-01-12

## 2020-01-09 RX ORDER — QUETIAPINE FUMARATE 50 MG/1
50 TABLET, FILM COATED ORAL
Status: DISCONTINUED | OUTPATIENT
Start: 2020-01-09 | End: 2020-01-10

## 2020-01-09 RX ORDER — PRAMIPEXOLE DIHYDROCHLORIDE 0.25 MG/1
0.25 TABLET ORAL ONCE
Status: COMPLETED | OUTPATIENT
Start: 2020-01-09 | End: 2020-01-09

## 2020-01-09 RX ORDER — NICOTINE 21 MG/24HR
1 PATCH, TRANSDERMAL 24 HOURS TRANSDERMAL DAILY
Status: CANCELLED | OUTPATIENT
Start: 2020-01-09

## 2020-01-09 RX ORDER — PRAMIPEXOLE DIHYDROCHLORIDE 0.5 MG/1
0.25 TABLET ORAL
Status: DISCONTINUED | OUTPATIENT
Start: 2020-01-09 | End: 2020-01-12

## 2020-01-09 RX ORDER — ALBUTEROL SULFATE 90 UG/1
2 AEROSOL, METERED RESPIRATORY (INHALATION) EVERY 6 HOURS PRN
Status: DISCONTINUED | OUTPATIENT
Start: 2020-01-09 | End: 2020-01-15 | Stop reason: HOSPADM

## 2020-01-09 RX ORDER — ACETAMINOPHEN 325 MG/1
975 TABLET ORAL ONCE
Status: COMPLETED | OUTPATIENT
Start: 2020-01-09 | End: 2020-01-09

## 2020-01-09 RX ORDER — ATORVASTATIN CALCIUM 20 MG/1
20 TABLET, FILM COATED ORAL ONCE
Status: COMPLETED | OUTPATIENT
Start: 2020-01-09 | End: 2020-01-09

## 2020-01-09 RX ORDER — ACETAMINOPHEN 325 MG/1
975 TABLET ORAL EVERY 6 HOURS PRN
Status: DISCONTINUED | OUTPATIENT
Start: 2020-01-09 | End: 2020-01-15 | Stop reason: HOSPADM

## 2020-01-09 RX ORDER — NICOTINE 21 MG/24HR
21 PATCH, TRANSDERMAL 24 HOURS TRANSDERMAL DAILY
Status: COMPLETED | OUTPATIENT
Start: 2020-01-09 | End: 2020-01-10

## 2020-01-09 RX ORDER — HYDROXYZINE HYDROCHLORIDE 25 MG/1
25 TABLET, FILM COATED ORAL EVERY 4 HOURS PRN
Status: DISCONTINUED | OUTPATIENT
Start: 2020-01-09 | End: 2020-01-15 | Stop reason: HOSPADM

## 2020-01-09 RX ORDER — ACETAMINOPHEN 325 MG/1
650 TABLET ORAL EVERY 6 HOURS PRN
Status: DISCONTINUED | OUTPATIENT
Start: 2020-01-09 | End: 2020-01-15 | Stop reason: HOSPADM

## 2020-01-09 RX ORDER — PRAMIPEXOLE DIHYDROCHLORIDE 0.5 MG/1
TABLET ORAL
Status: COMPLETED
Start: 2020-01-09 | End: 2020-01-09

## 2020-01-09 RX ORDER — THIAMINE MONONITRATE (VIT B1) 100 MG
100 TABLET ORAL DAILY
Status: DISCONTINUED | OUTPATIENT
Start: 2020-01-09 | End: 2020-01-15 | Stop reason: HOSPADM

## 2020-01-09 RX ORDER — FOLIC ACID 1 MG/1
1 TABLET ORAL DAILY
Status: DISCONTINUED | OUTPATIENT
Start: 2020-01-09 | End: 2020-01-15 | Stop reason: HOSPADM

## 2020-01-09 RX ORDER — NICOTINE 21 MG/24HR
21 PATCH, TRANSDERMAL 24 HOURS TRANSDERMAL DAILY
Status: DISCONTINUED | OUTPATIENT
Start: 2020-01-10 | End: 2020-01-09

## 2020-01-09 RX ORDER — ATORVASTATIN CALCIUM 20 MG/1
20 TABLET, FILM COATED ORAL DAILY
Status: DISCONTINUED | OUTPATIENT
Start: 2020-01-10 | End: 2020-01-15 | Stop reason: HOSPADM

## 2020-01-09 RX ADMIN — ACETAMINOPHEN 975 MG: 325 TABLET ORAL at 08:16

## 2020-01-09 RX ADMIN — LORAZEPAM 1 MG: 1 TABLET ORAL at 16:55

## 2020-01-09 RX ADMIN — QUETIAPINE FUMARATE 50 MG: 25 TABLET ORAL at 01:08

## 2020-01-09 RX ADMIN — Medication 1 TABLET: at 16:13

## 2020-01-09 RX ADMIN — FOLIC ACID 1 MG: 1 TABLET ORAL at 16:13

## 2020-01-09 RX ADMIN — NICOTINE 21 MG: 21 PATCH, EXTENDED RELEASE TRANSDERMAL at 21:20

## 2020-01-09 RX ADMIN — Medication 100 MG: at 16:13

## 2020-01-09 RX ADMIN — ESCITALOPRAM OXALATE 20 MG: 10 TABLET ORAL at 08:22

## 2020-01-09 RX ADMIN — ATORVASTATIN CALCIUM 20 MG: 20 TABLET, FILM COATED ORAL at 01:08

## 2020-01-09 RX ADMIN — PRAMIPEXOLE DIHYDROCHLORIDE 0.25 MG: 0.25 TABLET ORAL at 01:10

## 2020-01-09 RX ADMIN — QUETIAPINE FUMARATE 50 MG: 50 TABLET ORAL at 21:19

## 2020-01-09 RX ADMIN — PRAMIPEXOLE DIHYDROCHLORIDE 0.25 MG: 0.5 TABLET ORAL at 21:19

## 2020-01-09 RX ADMIN — PRAMIPEXOLE DIHYDROCHLORIDE 0.25 MG: 0.5 TABLET ORAL at 01:10

## 2020-01-09 NOTE — ED NOTES
Patient is a 47year old female who originally came to ED to be assessed for depression at the urging of family  She was intoxicated at the time of arrival and apparently insisted on leaving to smoke a cigarette and was unable to demonstrate adequate insight  Secondary to outward expression of suicidal ideas with a plan and her impulsivity, a 302 petition was completed by the ED physicians to hold her for safety  At this time, she is clinically sober, alert, and oriented x4  She presents with a flat affect and relates that she has not been doing well emotionally  When asked about triggers to her depression and anxiety, she states, "well I got dumped again"  Patient reports increased depression with tearfulness, sadness, social withdrawal, increased use of alcohol and poor sleep  She also reports anxiety  She reports sudden onset of suicidal ideas on 1/8/20 with a stated plan to crash her car into a tree or wall to end her life  Within the past week, patient was picked up on a DUI and brought to the hospital for a blood draw  Although she has not heard anything, she admits she was intoxicated  She drinks 3-5 glasses of wine several days per week  She has no insurance currently so her PCP prescribes Seroquel and Lexapro  She has no outpatient counseling, but would benefit from this  Patient demonstrates poor insight, and while intoxicated, poor judgment and impulse control  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  She worries about losing her job secondary to her current mental health and substance abuse issues  Family has expressed concerns for her safety  Reviewed the 36 and rights (patient indicated that she did not clearly understand same when initially explained)  She now voices understanding  She is in agreement with inpatient treatment and was willing to sign a 201 after explaining her rights and 72 hour notice  Patient is calm and cooperative  No HI    No hallucinations, delusions, or paranoia  Sleep is poor (falls asleep, but wakes after a few hours and cannot return to sleep) and appetite is fair

## 2020-01-09 NOTE — QUICK NOTE
Additional patient belongings brought in are as follows:    Eyeglasses (green)  Eyeglass case  Whole blend shampoo  Whole blend conditioner  Contact lens case  Contact lens solution  Lt purple comb  Black brush  Degree deodorant     Pharmacy:  Trele inhaler

## 2020-01-09 NOTE — ED NOTES
Pt  Crying when called to triage  Reluctant to walk into ED  Stating "I want to go home" "I don't want to be here"  Pt's son supportive and encouraging pt   To treatment room      Sarika Dexter RN  01/09/20 0025

## 2020-01-09 NOTE — ED NOTES
Patient requesting her daily Lexapro  ED physician aware  Resting without complaints  Contact lenses removed and in specimen cups at bedside  Breakfast tray ordered        Hardeep Nelson RN  01/09/20 5324
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
## 2020-01-09 NOTE — ED NOTES
Patient's son was contacted at her request Rose [place] # [phone])  He was provided with an update, visiting hours, OA phone number  He voiced support and understanding  Patient was notified and provided with an update regarding transfer to OA  Orders still pending  Unit indicated that they may need to await a scheduled discharge to create bed availability, but that they should be able to accept her at some point today

## 2020-01-09 NOTE — ED NOTES
EVS (Eligibility Verification System) called - [phone]  Automated system indicates: patient is not eligible  Patient states she has no insurance  She recently applied for MA, but was denied  Patient was assured that this could be explored by Case Management / Rachael Beltránks / Liability representatives on the inpatient unit

## 2020-01-09 NOTE — PROGRESS NOTES
Patient belongings are as follows:    Room:  [facility] [place]  Yellow sweatshirt  Eye glasses (tortoises shell color)  Black/white fuzzy socks    Cupboard:  Purple sneakers  Gray coat  Gray leggings w/string Hellen Fogo)    Cupboard:  Large bag swedish fish    Contraband cupboard:  [employer] cigarettes x2  Pink lighter  Altoids  Cell phone w/     On patient (RN ok):  Silver braclet  Teal/purple braclet  2 yellow ring ( 1 green stone)  1 silver ring (purple stone)

## 2020-01-09 NOTE — ED NOTES
Call received from Doretha Tadeo  There was some miscommunication by Intake regarding bed availability  There is no bed currently available for an Older female, but there is a scheduled discharge at 1300  Patient will likely be accepted and be able to go up to the Unit following that discharge and room cleaning; potentially no later than 1400

## 2020-01-09 NOTE — ED NOTES
Patient was updated that a bed will not be available until after 1300  She is agreeable to remaining in the ED until a bed is available on OA  TV with DVD player provided  One to one continues  Breakfast tray ordered

## 2020-01-09 NOTE — ED NOTES
Pt  Exited treatment room attempting to leave ED  Pt  tearful stating "I need a cigarette, I need a cigarette, you don't understand I've been smoking since I'm 14 I need a cigarette just let me go outside"  Two nurses, [facility], provider, and a  escorted pt  Back to treatment room  Pt  Briefly combative with staff  Escorted to room, explained 201 vs  302 vs  [place]  Pt's son bedside, tearful  Pt  Tearful  Pt  Dressed into paper scrubs and able to provide urine sample        Francis Walker RN  01/09/20 8253

## 2020-01-09 NOTE — ED NOTES
Patient is accepted at [facility] [address]  Patient is accepted by Dr Streeter Ahr per Alix Connelly,       Patient may go to the floor once RN report is received  (awaiting D/C for bed availability)    Unit RN will call ED for nurse report when bed is available prior to patient transfer

## 2020-01-09 NOTE — ED NOTES
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

## 2020-01-09 NOTE — ED NOTES
Crisis spoke to [place],  in regards to referring this pt to [facility]  Pt was referred at 05:02 per communication hand-off but upon further investigation it is now known there was a miscommunication about bed availability and this pt cannot be referred until there is a confirmed D/C for today as there are no beds currently available  Per previous notes, pt was informed there is a probable D/C at 13:00 today, so bed availability would be pending that D/C  Pt is in agreement w/ waiting in the ED for same  201, ekg, and medical clearance note faxed to Intake for referral when bed availability is confirmed
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
## 2020-01-09 NOTE — PLAN OF CARE
Problem: Alteration in Thoughts and Perception  Goal: Treatment Goal: Gain control of psychotic behaviors/thinking, reduce/eliminate presenting symptoms and demonstrate improved reality functioning upon discharge  Outcome: Progressing  Goal: Verbalize thoughts and feelings  Description  Interventions:  - Promote a nonjudgmental and trusting relationship with the patient through active listening and therapeutic communication  - Assess patient's level of functioning, behavior and potential for risk  - Engage patient in 1 on 1 interactions  - Encourage patient to express fears, feelings, frustrations, and discuss symptoms    - [place] patient to reality, help patient recognize reality-based thinking   - Administer medications as ordered and assess for potential side effects  - Provide the patient education related to the signs and symptoms of the illness and desired effects of prescribed medications  Outcome: Progressing  Goal: Refrain from acting on delusional thinking/internal stimuli  Description  Interventions:  - Monitor patient closely, per order   - Utilize least restrictive measures   - Set reasonable limits, give positive feedback for acceptable   - Administer medications as ordered and monitor of potential side effects  Outcome: Progressing  Goal: Agree to be compliant with medication regime, as prescribed and report medication side effects  Description  Interventions:  - Offer appropriate PRN medication and supervise ingestion; conduct AIMS, as needed   Outcome: Progressing  Goal: Attend and participate in unit activities, including therapeutic, recreational, and educational groups  Description  Interventions:  -Encourage Visitation and family involvement in care  Outcome: Progressing  Goal: Recognize dysfunctional thoughts, communicate reality-based thoughts at the time of discharge  Description  Interventions:  - Provide medication and psycho-education to assist patient in compliance and developing insight into his/her illness   Outcome: Progressing  Goal: Complete daily ADLs, including personal hygiene independently, as able  Description  Interventions:  - Observe, teach, and assist patient with ADLS  - Monitor and promote a balance of rest/activity, with adequate nutrition and elimination   Outcome: Progressing     Problem: Ineffective Coping  Goal: Cooperates with admission process  Description  Interventions:   - Complete admission process  Outcome: Progressing  Goal: Identifies ineffective coping skills  Outcome: Progressing  Goal: Identifies healthy coping skills  Outcome: Progressing  Goal: Demonstrates healthy coping skills  Outcome: Progressing  Goal: Participates in unit activities  Description  Interventions:  - Provide therapeutic environment   - Provide required programming   - Redirect inappropriate behaviors   Outcome: Progressing  Goal: Patient/Family participate in treatment and DC plans  Description  Interventions:  - Provide therapeutic environment  Outcome: Progressing  Goal: Patient/Family verbalizes awareness of resources  Outcome: Progressing  Goal: Understands least restrictive measures  Description  Interventions:  - Utilize least restrictive behavior  Outcome: Progressing  Goal: Free from restraint events  Description  - Utilize least restrictive measures   - Provide behavioral interventions   - Redirect inappropriate behaviors   Outcome: Progressing     Problem: Risk for Self Injury/Neglect  Goal: Treatment Goal: Remain safe during length of stay, learn and adopt new coping skills, and be free of self-injurious ideation, impulses and acts at the time of discharge  Outcome: Progressing  Goal: Verbalize thoughts and feelings  Description  Interventions:  - Assess and re-assess patient's lethality and potential for self-injury  - Engage patient in 1:1 interactions, daily, for a minimum of 15 minutes  - Encourage patient to express feelings, fears, frustrations, hopes  - Establish rapport/trust with patient   Outcome: Progressing  Goal: Refrain from harming self  Description  Interventions:  - Monitor patient closely, per order  - Develop a trusting relationship  - Supervise medication ingestion, monitor effects and side effects   Outcome: Progressing  Goal: Attend and participate in unit activities, including therapeutic, recreational, and educational groups  Description  Interventions:  - Provide therapeutic and educational activities daily, encourage attendance and participation, and document same in the medical record  - Obtain collateral information, encourage visitation and family involvement in care   Outcome: Progressing  Goal: Recognize maladaptive responses and adopt new coping mechanisms  Outcome: Progressing  Goal: Complete daily ADLs, including personal hygiene independently, as able  Description  Interventions:  - Observe, teach, and assist patient with ADLS  - Monitor and promote a balance of rest/activity, with adequate nutrition and elimination  Outcome: Progressing     Problem: Depression  Goal: Treatment Goal: Demonstrate behavioral control of depressive symptoms, verbalize feelings of improved mood/affect, and adopt new coping skills prior to discharge  Outcome: Progressing  Goal: Verbalize thoughts and feelings  Description  Interventions:  - Assess and re-assess patient's level of risk   - Engage patient in 1:1 interactions, daily, for a minimum of 15 minutes   - Encourage patient to express feelings, fears, frustrations, hopes   Outcome: Progressing  Goal: Refrain from harming self  Description  Interventions:  - Monitor patient closely, per order   - Supervise medication ingestion, monitor effects and side effects   Outcome: Progressing  Goal: Refrain from isolation  Description  Interventions:  - Develop a trusting relationship   - Encourage socialization   Outcome: Progressing  Goal: Refrain from self-neglect  Outcome: Progressing  Goal: Attend and participate in unit activities, including therapeutic, recreational, and educational groups  Description  Interventions:  - Provide therapeutic and educational activities daily, encourage attendance and participation, and document same in the medical record   Outcome: Progressing  Goal: Complete daily ADLs, including personal hygiene independently, as able  Description  Interventions:  - Observe, teach, and assist patient with ADLS  -  Monitor and promote a balance of rest/activity, with adequate nutrition and elimination   Outcome: Progressing     Problem: Anxiety  Goal: Anxiety is at manageable level  Description  Interventions:  - Assess and monitor patient's anxiety level  - Monitor for signs and symptoms (heart palpitations, chest pain, shortness of breath, headaches, nausea, feeling jumpy, restlessness, irritable, apprehensive)  - Collaborate with interdisciplinary team and initiate plan and interventions as ordered    - [employer] patient to unit/surroundings  - Explain treatment plan  - Encourage participation in care  - Encourage verbalization of concerns/fears  - Identify coping mechanisms  - Assist in developing anxiety-reducing skills  - Administer/offer alternative therapies  - Limit or eliminate stimulants  Outcome: Progressing

## 2020-01-09 NOTE — ED PROCEDURE NOTE
PROCEDURE  ECG 12 Lead Documentation Only  Date/Time: 1/9/2020 1:33 AM  Performed by: Dallas Sanz MD  Authorized by: Dallas Sanz MD     Indications / Diagnosis:  Medical clearance  ECG reviewed by me, the ED Provider: yes    Patient location:  ED  Previous ECG:     Comparison to cardiac monitor: Yes    Interpretation:     Interpretation: normal    Rate:     ECG rate:  91    ECG rate assessment: normal    Rhythm:     Rhythm: sinus rhythm    Ectopy:     Ectopy: none    QRS:     QRS axis:  Normal  Conduction:     Conduction: normal    ST segments:     ST segments:  Non-specific  T waves:     T waves: non-specific           Dallas Sanz MD  01/09/20 0133

## 2020-01-10 PROBLEM — F10.20 ALCOHOL USE DISORDER, MODERATE, DEPENDENCE (HCC): Status: ACTIVE | Noted: 2020-01-10

## 2020-01-10 LAB
25(OH)D3 SERPL-MCNC: 7.2 NG/ML (ref 30–100)
VIT B12 SERPL-MCNC: 449 PG/ML (ref 100–900)

## 2020-01-10 PROCEDURE — 99222 1ST HOSP IP/OBS MODERATE 55: CPT | Performed by: PSYCHIATRY & NEUROLOGY

## 2020-01-10 PROCEDURE — 82306 VITAMIN D 25 HYDROXY: CPT | Performed by: PHYSICIAN ASSISTANT

## 2020-01-10 PROCEDURE — 82607 VITAMIN B-12: CPT | Performed by: PHYSICIAN ASSISTANT

## 2020-01-10 RX ORDER — LIDOCAINE 50 MG/G
1 PATCH TOPICAL DAILY
Status: DISCONTINUED | OUTPATIENT
Start: 2020-01-10 | End: 2020-01-15 | Stop reason: HOSPADM

## 2020-01-10 RX ADMIN — FLUTICASONE FUROATE AND VILANTEROL TRIFENATATE 1 PUFF: 100; 25 POWDER RESPIRATORY (INHALATION) at 08:50

## 2020-01-10 RX ADMIN — LIDOCAINE 1 PATCH: 50 PATCH TOPICAL at 16:22

## 2020-01-10 RX ADMIN — ESCITALOPRAM OXALATE 20 MG: 10 TABLET ORAL at 08:50

## 2020-01-10 RX ADMIN — ATORVASTATIN CALCIUM 20 MG: 20 TABLET, FILM COATED ORAL at 08:50

## 2020-01-10 RX ADMIN — ACETAMINOPHEN 975 MG: 325 TABLET ORAL at 05:32

## 2020-01-10 RX ADMIN — PRAMIPEXOLE DIHYDROCHLORIDE 0.25 MG: 0.5 TABLET ORAL at 22:21

## 2020-01-10 RX ADMIN — Medication 1 TABLET: at 08:50

## 2020-01-10 RX ADMIN — QUETIAPINE FUMARATE 75 MG: 50 TABLET ORAL at 22:21

## 2020-01-10 RX ADMIN — FOLIC ACID 1 MG: 1 TABLET ORAL at 08:50

## 2020-01-10 RX ADMIN — ALBUTEROL SULFATE 2 PUFF: 90 AEROSOL, METERED RESPIRATORY (INHALATION) at 05:00

## 2020-01-10 RX ADMIN — LORAZEPAM 1 MG: 1 TABLET ORAL at 08:50

## 2020-01-10 RX ADMIN — Medication 100 MG: at 08:50

## 2020-01-10 NOTE — PROGRESS NOTES
01/10/20 1412   Team Meeting   Meeting Type Tx Team Meeting   Initial Conference Date 01/10/20   Next Conference Date 02/07/20   Team Members Present   Team Members Present Physician;Nurse;;Occupational Therapist   Physician Team Member Dr Deanna Mayer MD    Nursing Team Member Lizeth Perkins RN    Social Work Team Member Yari Arshad MSW, LSW    OT Team Member Myron Seals [place]    Patient/Family Present   Patient Present Yes   Patient's Family Present No     Initial Meeting     Pt and team met to review strengths, coping skills, and goals for patient treatment;   Pt identified stressors and goals as 1) "how to deal with multiple stressors at one time" 2) "learn goal setting - big goals into smaller ones"     Pt agreeable to goals - signed; copy on chart

## 2020-01-10 NOTE — H&P
Michael Wang#  :1965 F  IOA:[id number]    ERL:[id number]  Adm Date: 2020  8:38 PM   ATT PHY: Jason Mullins, [facility]       Chief Complaint: depression    History of Presenting Illness: Wolf Colon is a(n) 47y o  year old female  presenting to [address] [place] [address] for depression and suicidal ideations  We are asked to follow the patient along with reference to her medical co-morbidities  The patient was seen after reviewing the chart and discussing the case with caring staff  Today during our encounter, the patient reported no acute concerns  Labs reviewed and are largely stable  Allergies   Allergen Reactions    Pollen Extract        No current facility-administered medications on file prior to encounter  Current Outpatient Medications on File Prior to Encounter   Medication Sig Dispense Refill    atorvastatin (LIPITOR) 20 mg tablet TAKE 1 TABLET BY MOUTH ONCE DAILY 90 tablet 1    escitalopram (LEXAPRO) 20 mg tablet Take 1 tablet (20 mg total) by mouth daily in the early morning 90 tablet 1    fluticasone-umeclidinium-vilanterol (TRELEGY) 100-62 5-25 MCG/INH inhaler Inhale 1 puff daily Rinse mouth after use   3 Inhaler 3    pramipexole (MIRAPEX) 0 5 mg tablet Take 0 5 tablets (0 25 mg total) by mouth daily at bedtime 90 tablet 1    QUEtiapine (SEROquel) 50 mg tablet TAKE 1 TABLET BY MOUTH ONCE DAILY AT BEDTIME 90 tablet 1    albuterol (PROVENTIL HFA,VENTOLIN HFA) 90 mcg/act inhaler Inhale 2 puffs every 6 (six) hours as needed for wheezing      loratadine (CLARITIN) 10 mg tablet Take 1 tablet (10 mg total) by mouth daily 90 tablet 1       Active Ambulatory Problems     Diagnosis Date Noted    Depression with anxiety 2018    Mixed hyperlipidemia 10/09/2018    Post-menopausal 10/09/2018    Pulmonary emphysema ([address] ) 10/09/2018    Tobacco abuse 10/09/2018    Hyperplastic colonic polyp 10/09/2018    Seasonal allergies 10/09/2018    Primary osteoarthritis involving multiple joints 10/09/2018     Resolved Ambulatory Problems     Diagnosis Date Noted    Mild persistent asthma without complication      Past Medical History:   Diagnosis Date    Anxiety     Bell's palsy     COPD (chronic obstructive pulmonary disease) ([facility] [address]     Depression     Menopause     Obesity        Past Surgical History:   Procedure Laterality Date    ANKLE SURGERY       SECTION      COLONOSCOPY W/ POLYPECTOMY      DILATION AND CURETTAGE, DIAGNOSTIC / THERAPEUTIC      NOSE SURGERY      TOTAL HIP ARTHROPLASTY      TOTAL HIP ARTHROPLASTY      TUBAL LIGATION      TUBAL LIGATION         Social History     Socioeconomic History    Marital status: Single     Spouse name: None    Number of children: None    Years of education: None    Highest education level: None   Occupational History    Occupation: Homecare   Social Needs    Financial resource strain: None    Food insecurity:     Worry: None     Inability: None    Transportation needs:     Medical: None     Non-medical: None   Tobacco Use    Smoking status: Current Every Day Smoker     Packs/day: 2 00     Years: 40 00     Pack years: 80 00    Smokeless tobacco: Never Used   Substance and Sexual Activity    Alcohol use:  Yes     Alcohol/week: 3 0 - 5 0 standard drinks     Types: 3 - 5 Glasses of wine per week     Frequency: 2-3 times a week     Drinks per session: 3 or 4     Comment: socially    Drug use: No    Sexual activity: Not Currently     Partners: Male     Birth control/protection: None   Lifestyle    Physical activity:     Days per week: None     Minutes per session: None    Stress: None   Relationships    Social connections:     Talks on phone: None     Gets together: None     Attends Restoration service: None     Active member of club or organization: None     Attends meetings of clubs or organizations: None     Relationship status: None    Intimate partner violence:     Fear of current or ex partner: None     Emotionally abused: None     Physically abused: None     Forced sexual activity: None   Other Topics Concern    None   Social History Narrative      Has 3 children    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  Lives with son and two roommates  Family History   Problem Relation Age of Onset    Mental illness Mother     Suicidality Mother     No Known Problems Father     Mental illness Brother     Suicidality Brother        Review of Systems   Musculoskeletal: Positive for arthralgias  All other systems reviewed and are negative  Vitals:    01/10/20 0720   BP: 129/75   Pulse: 86   Resp: 18   Temp: 98 7 °F (37 1 °C)   SpO2: 98%       Physical Exam   Constitutional: Awake and Alert  Well-developed and well-nourished  No distress  HENT:   Head: Normocephalic and atraumatic  Cardiovascular: RRR with normal heart sounds  Exam reveals no friction rub  No murmur heard  Pulmonary/Chest: Effort normal and breath sounds normal  No respiratory distress  Patient has no wheezes, rales, or rhonchi  Abdominal: Soft  Bowel sounds are normal  No distension  There is no tenderness  There is no rebound and no guarding  Neurological: Cranial Nerves grossly intact  No sensory deficit  Coordination normal    Skin: Skin is warm and dry  No rash noted  No diaphoresis  No erythema  No edema  No cyanosis  Grace Ferreira is a(n) 47y o  year old female with MDD    1  Cardiac with history of Hyperlipidemia  Lipitor 20mg once daily  2  COPD/asthma  Breo Ellipta and PRN albuterol  3  Alcohol Abuse  Thiamine, folic acid, and multivitamin  4  Tobacco Abuse  Nicotine Transdermal Patch 21mg  5  DJD/OA with right knee pain  Tylenol as needed  Voltaren gel as needed for the knee pains  6  MDD  Defer to psychiatry  Prognosis: Fair  Discharge Plan: In progress      Advanced Directives: I have discussed in detail the patient the advanced directives  The patient does not appear to have a POA or a living will  First contact is Darlene Lord who can be reached at [phone], With reference to cardiac/pulmonary resuscitation efforts, the patient will be a FULL CODE  I have spent more than 50 minutes gathering data, doing physical examination, and discussing the advanced directives, which was witnessed by caring staff  The patient was discussed with Dr Luly Lara and he is in agreement with the above note

## 2020-01-10 NOTE — PROGRESS NOTES
[id number]  Patient attended group and was visible in the community this evening  She presents with anxiety and seems somewhat fearful about other patients but was reassured  Stated she is mostly feeling anxious about wanting a cigarette and was happy to receive her nicotine patch at bed time  CIWA found to be 4 due to anxiety and headache  Patient denied Tylenol  Did not appear to need Ativan for withdrawal  She states she only drinks occasionally  Will continue monitoring

## 2020-01-10 NOTE — SOCIAL WORK
SW placed phone call to [address] [place] [address] [phone] to schedule new pt intake - left message on voicemail for April Art - awaiting response

## 2020-01-10 NOTE — PROGRESS NOTES
01/10/20 1015   Team Meeting   Meeting Type Daily Rounds   Team Members Present   Team Members Present Physician;Nurse;; ;Occupational Therapist;Other (Discipline and Name)   Physician Team Member Dr Ferdinand Foster MD; Shimon Salgado[address]   Nursing Team Member Keith Whitman, [facility]   Care Management Team Member Burke Alcantara [address], [facility] [address]   Social Work Team Member Wanda Lynne, 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

## 2020-01-10 NOTE — SOCIAL WORK
SW met with patient in small group room; pt flat, depressed, tearful at times; pt denies SI/HI/AH/VH, dep 7/10, anx 7/10;  Pt AOx4 and able to provide assessment information without assistance     Pt is 48 yo  female - single; Pt admitted due to increased depression and SI with plan to drive into tree; pt stressors include caregivers stress, financial stress and relationship stressors; Pt strengths include insight, support system and voluntary admission; Pt limitations include ineffective coping skills, no insurance; Coping skills include spending time with children, computer games, listening to music, coloring, and audio books; pt admits MH hx Bipolar Depression; prev AIP [address] (); hx psych meds including lexapro and seroquel; admits med compliance; admits hx 2 SA in past - SA via OD (), SA via jumping from birdge "but the  saved me before I jumped" (); pt denies access to firearms; denies hx violence; Admits hx psychological trauma x 3 but did not disclose details; pt mother and brother - depression; older son - depression; brother hung himself at age 32 (pt was 29); mother and brother - etoh abuse; no family dementia;   Pt admits daily tobacco use - ciggs - 1 5 pk daily since age 15; admits wine use - 1-2 bottles weekly; denies drug use;  Pt agreeable to D&A referral to [facility]; pt denies hx legal issues but was pulled over last week - DUI pending blood results "but I was drinking so I know it's coming"; pt is single - no current relationship - hx 3 marriages; pt identifies as heterosexual - no concerns; pt has 2 adult sons - Sharlene Das (35) and Tommy Sargent (24); pt has 2 dogs, 2 chinchillas and 1 hamster; pt parents Vicky () and Lynn Amador (alive no relationship); pt only brother - Lynn Amador -  at age 32 via completed suicide; pt lives in her own home with a roommate and a friend that she provides care for; pt has associates degree in [employer] B[employer] and working towards BS in Psychology; 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; pt denies [place] hx; pt wears glasses; identifies as Spiritual but not Muslim; pt has no reiligous or cultural needs to be met while on unit; pt drives herself to appts; pt receives $1260/mo in part time employment; no POA - declined information; PCP - Dr Kaylen Lorenzo - signed Katiuska Stanton; no current psych provider - signed JOHNSON for referral; Pharmacy: [facility]     Pt signed ROIs for sons Isabela Grates and Joaner Minium), PCP, psych referral, D&A referral (outpt services)    SW and patient discussed coping skills and salf-care importance;  SW will provide patient with worksheets for self-care, coping skills, relaxation techniques, healthy boundaries and self-esteem

## 2020-01-10 NOTE — PROGRESS NOTES
Patient compliant with medications and meals  Patient requested ativan PRN with morning medications for increased anxiety  Patient rated her depression and anxiety 7 out of 10  Denies any SI/HI  No s/s of withdrawal noted  cwal was 4 this shift  Patient positive for groups  No other concerns or problems reported this shift  Q 7 mins checks in place for safety  Will continue to monitor for behaviors and changes

## 2020-01-10 NOTE — TREATMENT PLAN
TREATMENT PLAN REVIEW - [facility] Venu Weber 47 y o  1965 female MRN: [id number]    [address] [facility]  Room / Bed: Erasmo Villarreal [facility] Encounter: [id number]          Admit Date/Time:  1/8/2020  8:38 PM    Treatment Team: Attending Provider: Shelly Bermeo MD; Consulting Physician: Adelaida Sweet MD; Physician Assistant: Mike Ricardo PA-C; Patient Care Technician: Brisa Wan; Patient Care Assistant: Woody Garcia; Patient Care Assistant: Enio Magana;  Certified Nursing Assistant: Tash Angulo; : KALYAN Fierro; Patient Care Assistant: Annamaria Partida; Care Manager: Laura Barron RN; Recreational Therapist: Amber Chery; Registered Nurse: Jordana Sher RN    Diagnosis: Principal Problem:    Recurrent major depressive disorder [facility])  Active Problems:    Mixed hyperlipidemia    Tobacco abuse    Alcohol use disorder, moderate, dependence ([facility] [address] )      Patient Strengths/Assets: cooperative, communication skills, patient is on a voluntary commitment    Patient Barriers/Limitations: difficulty adapting, lack of financial means, poor self-care, substance abuse    Short Term Goals: decrease in depressive symptoms, decrease in anxiety symptoms, decrease in suicidal thoughts, ability to stay safe on the unit, sleep improvement, mood stabilization, acceptance of need for psychiatric treatment    Long Term Goals: improvement in depression, improvement in anxiety, stabilization of mood, free of suicidal thoughts, improved insight, acceptance of need for psychiatric medications, acceptance of need for psychiatric treatment, acceptance of need for psychiatric follow up after discharge    Progress Towards Goals: initial treatment plan    Recommended Treatment: medication management, patient medication education, group therapy, milieu therapy, continued Behavioral Health psychiatric evaluation/assessment process    Treatment Frequency: daily medication monitoring, group and milieu therapy daily, monitoring through interdisciplinary rounds, monitoring through weekly patient care conferences    Expected Discharge Date: 10 midnights    Discharge Plan: will be determined    Treatment Plan Created/Updated By: Kendall Aguilar MD

## 2020-01-10 NOTE — H&P
Psychiatric Evaluation - Behavioral Health     Identification Data:Carmen Melendez 47 y o  female MRN: [id number]  Unit/Bed#[facility] 209-01 Encounter: [id number]    Chief Complaint: depression, suicidal ideation and alcohol use    History of Present Illness     Kee Mehta is a 47 y o  female with a history of depression, alcohol use and insomnia who was admitted to the inpatient adult psychiatric unit on a voluntary 201 commitment basis due to depression, anxiety, signs and symptoms of alcohol abuse and suicidal ideation  Pt presented to ED very intoxicated, agitated, with SI  Initially 302 was petitioned by ED physician for safety but later pt signed 201  On evaluation in the inpatient psychiatric unit Carmen, reports increased depression, tearfulness, sadness, social withdrawal, poor sleep and increased use of alcohol due to ongoing life's stressors  She also reports increase anxiety and suicidal ideas with plan to crash her car into a tree or wall to end her life  Last week, patient admits she was picked up on a DUI and brought to the hospital for a blood draw  Although she was not hurt, she admits she was intoxicated  She drinks 3-5 glasses of wine several days per week  Pt states she has no insurance so her PCP has been prescribing Seroquel and Lexapro  Patient reports feeling overwhelmed and admits she has problem with drinking with worsening of judgment and impulse control when she is intoxicated  Patient denies any current SI and she is able to CFS  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  She also worries about losing her job secondary to her depression and and substance abuse issues         Psychiatric Review Of Systems:    Sleep changes: decreased  Appetite changes:decreased  Weight changes: no  Energy: decreased  Interest/pleasure/: yes  Anhedonia: yes  Anxiety: yes  Silvana: no  Guilt:  no  Hopeless:  no  Self injurious behavior/risky behavior: yes  Suicidal ideation: yes, no plan  Homicidal ideation: no  Auditory hallucinations: no  Visual hallucinations: no  Delusional thinking: no  Eating disorder history: no  Obsessive/compulsive symptoms: no    Historical Information     Past Psychiatric History:     Past Inpatient Psychiatric Treatment:   One past inpatient psychiatric admission  Past Outpatient Psychiatric Treatment:    Was in outpatient psychiatric treatment in the past with a psychiatrist  Past Suicide Attempts: yes  Past Violent Behavior: denies  Past Psychiatric Medication Trials: Lexapro and Seroquel     Substance Abuse History:    Social History     Tobacco History     Smoking Status  Current Every Day Smoker Smoking Frequency  2 packs/day for 40 years ([de-identified] pk yrs)    Smokeless Tobacco Use  Never Used          Alcohol History     Alcohol Use Status  Yes Drinks/Week  3-5 Glasses of wine per week Amount  3 0 - 5 0 standard drinks of alcohol/wk Comment  socially          Drug Use     Drug Use Status  No          Sexual Activity     Sexually Active  Not Currently Partners  Male Birth Control/Protection  None          Activities of Daily Living    Not Asked               Additional Substance Use Detail     Questions Responses    Substance Use Assessment Denies substance use within the past 12 months    Alcohol Use Frequency 3 or more times/week    Cannabis frequency Never used    Comment: Never used on 1/9/2020     Alcohol Drink of Choice Wine    Cocaine frequency Never used    Comment: Never used on 1/9/2020     Crack Cocaine Frequency Denies use in past 12 months    Methamphetamine Frequency Denies use in past 12 months    Narcotic Frequency Denies use in past 12 months    Benzodiazepine Frequency Denies use in past 12 months    Amphetamine frequency Denies use in past 12 months    Barbituate Frequency Denies use use in past 12 months    Inhalant frequency Never used    Comment: Never used on 1/9/2020     Hallucinogen frequency Never used    Comment: Never used on 1/9/2020 Ecstasy frequency Never used    Comment: Never used on 2020     Other drug frequency Never used    Comment: Never used on 2020     Opiate frequency Denies use in past 12 months    Last reviewed by Rebekah Amor RN on 2020        I have assessed this patient for substance use within the past 12 months    Alcohol use: drinks 3 times per week  Recreational drug use: denies    Social History:    Education: high school graduate  Marital History:   Children: 2 adult children  Living Arrangement: lives alone in an apartment  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 [address] )     Depression     Menopause     Mild persistent asthma without complication     Obesity     Primary osteoarthritis involving multiple joints 10/9/2018     Past Surgical History:   Procedure Laterality Date    ANKLE SURGERY       SECTION      COLONOSCOPY W/ POLYPECTOMY      DILATION AND CURETTAGE, DIAGNOSTIC / THERAPEUTIC      NOSE SURGERY      TOTAL HIP ARTHROPLASTY      TOTAL HIP ARTHROPLASTY      TUBAL LIGATION      TUBAL LIGATION         Medical Review Of Systems:    Pertinent items are noted in HPI  Allergies: Allergies   Allergen Reactions    Pollen Extract        Medications: All current active medications have been reviewed      OBJECTIVE:    Vital signs in last 24 hours:    Temp:  [98 2 °F (36 8 °C)-98 7 °F (37 1 °C)] 98 7 °F (37 1 °C)  HR:  [81-86] 86  Resp:  [18] 18  BP: (105-147)/(68-79) 129/75      Intake/Output Summary (Last 24 hours) at 1/10/2020 1329  Last data filed at 2020  Gross per 24 hour   Intake 480 ml   Output    Net 480 ml        Mental Status Evaluation:    Appearance:  age appropriate   Behavior:  cooperative Speech:  normal rate and volume   Mood:  depressed   Affect:  constricted   Language: naming objects   Thought Process:  goal directed   Associations: intact associations   Thought Content:  no overt delusions   Perceptual Disturbances: none   Risk Potential: Suicidal ideation - Yes, without plan  Homicidal ideation - None at present  Potential for aggression - No   Sensorium:  oriented to person, place and time/date   Memory:  recent memory mildly impaired   Consciousness:  alert and awake   Attention: attention span and concentration appear shorter than expected for age   Intellect: average   Fund of Knowledge: awareness of current events: yes   Insight:  limited   Judgment: limited   Muscle Strength Muscle Tone: normal  normal   Gait/Station: normal gait/station   Motor Activity: no abnormal movements       Laboratory Results:   I have personally reviewed all pertinent laboratory/tests results  Imaging Studies:   No results found  Code Status: Level 1 - Full Code  Advance Directive and Living Will: <no information>    Assessment/Plan   Principal Problem:    Recurrent major [facility] disorder ([facility] )  Active Problems:    Mixed hyperlipidemia    Tobacco abuse    Alcohol use disorder, moderate, dependence ([facility] )      Patient Strengths: cooperative, communication skills, patient is on a voluntary commitment     Patient Barriers: financial instability, limited insight, poor self-care, substance abuse    Treatment Plan:     Planned Treatment and Medication Changes: All current active medications have been reviewed  Encourage group therapy, milieu therapy and occupational therapy  Behavioral Health checks every 7 minutes  Lexapro 20 mg and Seroquel 75 mg daily were ordered  Risks / Benefits of Treatment:    Risks, benefits, and possible side effects of medications explained to patient and patient verbalizes understanding and agreement for treatment      Counseling / Coordination of Care:    Patient's presentation on admission and proposed treatment plan discussed with treatment team   Diagnosis, medication changes and treatment plan reviewed with patient  Events leading to admission reviewed with patient      Inpatient Psychiatric Certification:    Estimated length of stay: 10 midnights      Evans Harrington MD 01/10/20

## 2020-01-10 NOTE — PLAN OF CARE
Problem: Ineffective Coping  Goal: Identifies healthy coping skills  Outcome: Progressing  Goal: Participates in unit activities  Description  Interventions:  - Provide therapeutic environment   - Provide required programming   - Redirect inappropriate behaviors   Outcome: Progressing  Goal: Patient/Family participate in treatment and DC plans  Description  Interventions:  - Provide therapeutic environment  Outcome: Progressing   Patient agreed to meet with this writer and complete [facility] Admission Self Assessment  Patient identified her stressors as " a lot of things plying up  My oldest son moved and my youngest has mental health illness " Patient states she is feeling overwhelmed, hopeless, overworked which has caused her mood swings and panic attacks  Patient stated during our conversation she has been working as a [employer] [address] to a male friend of hers which has become non stop due to his increased health/wellness needs  Patient also states she feels very guilty because she is not able to sustain his care and he may need a higher level of care she is not able to provide  Patient also stated the stress she is feeling has effected her ability to cope in the community "all" of the time  When ask what she likes most about her life she replied, " my two children and my pets " What she likes least about her life is " I hate my job  Taking care of a friend who needs 24/7 care " Patient did complete High School, has an Associates Degree in Office Billing/Management and recently attempted to attend college for her Bachelors Degree in Psychology but needed to stop due to her demanding job  Patient enjoys reading, playing with her animals, visiting her children, arts/crafts and music  Patient feels working on her self esteem, assertiveness, anger management, improving her concentration, relaxation techniques, knowledge of her illness and community resources would be beneficial to her   When Pt was ask how will she know she will be ready for discharge Patient simply replied, "I am now " Patient was very tearful during our conversation but remained focused and cooperative  Patient signed document and was given copy

## 2020-01-10 NOTE — PROGRESS NOTES
[id number]  Patient sleeping throughout the night without difficulty  Awoke early this AM and reported feeling SOB  Does have hx of COPD  Received albuterol inhaler around 0500  Received Tylenol for complaints of knee pain and went back to sleep  Will continue monitoring

## 2020-01-10 NOTE — PROGRESS NOTES
[address] [facility] Admission Self Assessment completed  01/10/20 1330   Activity/Group Checklist   Group Admission/Discharge  [facility] Admission Self Assessment )   Attendance Attended   Attendance Duration (min) 16-30   Interactions Interacted appropriately   Affect/Mood Appropriate;Normal range   Goals Achieved Identified feelings; Discussed coping strategies; Discussed discharge plans; Discussed self-esteem issues; Able to listen to others; Able to engage in interactions; Able to reflect/comment on own behavior;Able to manage/cope with feelings; Able to self-disclose

## 2020-01-10 NOTE — PLAN OF CARE
Problem: Ineffective Coping  Goal: Identifies ineffective coping skills  Outcome: Progressing  Goal: Identifies healthy coping skills  Outcome: Progressing  Goal: Demonstrates healthy coping skills  Outcome: Progressing  Goal: Participates in unit activities  Description  Interventions:  - Provide therapeutic environment   - Provide required programming   - Redirect inappropriate behaviors   Outcome: Progressing  Goal: Patient/Family participate in treatment and DC plans  Description  Interventions:  - Provide therapeutic environment  Outcome: Progressing  Goal: Patient/Family verbalizes awareness of resources  Outcome: Progressing  Goal: Understands least restrictive measures  Description  Interventions:  - Utilize least restrictive behavior  Outcome: Progressing  Goal: Free from restraint events  Description  - Utilize least restrictive measures   - Provide behavioral interventions   - Redirect inappropriate behaviors   Outcome: Progressing     Problem: Ineffective Coping  Goal: Cooperates with admission process  Description  Interventions:   - Complete admission process  Outcome: Completed

## 2020-01-11 PROBLEM — F33.9 RECURRENT MAJOR DEPRESSIVE DISORDER (HCC): Chronic | Status: ACTIVE | Noted: 2020-01-09

## 2020-01-11 PROBLEM — F33.2 MAJOR DEPRESSIVE DISORDER, RECURRENT, SEVERE WITHOUT PSYCHOTIC FEATURES (HCC): Chronic | Status: ACTIVE | Noted: 2020-01-09

## 2020-01-11 LAB — HCV AB SER QL: NORMAL

## 2020-01-11 PROCEDURE — 99232 SBSQ HOSP IP/OBS MODERATE 35: CPT | Performed by: NURSE PRACTITIONER

## 2020-01-11 PROCEDURE — 86803 HEPATITIS C AB TEST: CPT | Performed by: PHYSICIAN ASSISTANT

## 2020-01-11 RX ORDER — GABAPENTIN 100 MG/1
100 CAPSULE ORAL 3 TIMES DAILY
Status: DISCONTINUED | OUTPATIENT
Start: 2020-01-11 | End: 2020-01-12

## 2020-01-11 RX ORDER — NICOTINE 21 MG/24HR
21 PATCH, TRANSDERMAL 24 HOURS TRANSDERMAL DAILY
Status: DISCONTINUED | OUTPATIENT
Start: 2020-01-11 | End: 2020-01-15 | Stop reason: HOSPADM

## 2020-01-11 RX ORDER — FLUCONAZOLE 100 MG/1
200 TABLET ORAL DAILY
Status: COMPLETED | OUTPATIENT
Start: 2020-01-11 | End: 2020-01-13

## 2020-01-11 RX ADMIN — QUETIAPINE FUMARATE 75 MG: 50 TABLET ORAL at 21:28

## 2020-01-11 RX ADMIN — ATORVASTATIN CALCIUM 20 MG: 20 TABLET, FILM COATED ORAL at 08:56

## 2020-01-11 RX ADMIN — GABAPENTIN 100 MG: 100 CAPSULE ORAL at 21:29

## 2020-01-11 RX ADMIN — GABAPENTIN 100 MG: 100 CAPSULE ORAL at 13:12

## 2020-01-11 RX ADMIN — LIDOCAINE 1 PATCH: 50 PATCH TOPICAL at 08:55

## 2020-01-11 RX ADMIN — FOLIC ACID 1 MG: 1 TABLET ORAL at 08:56

## 2020-01-11 RX ADMIN — PRAMIPEXOLE DIHYDROCHLORIDE 0.25 MG: 0.5 TABLET ORAL at 21:27

## 2020-01-11 RX ADMIN — FLUCONAZOLE 200 MG: 100 TABLET ORAL at 18:31

## 2020-01-11 RX ADMIN — Medication 1 TABLET: at 08:56

## 2020-01-11 RX ADMIN — ACETAMINOPHEN 650 MG: 325 TABLET ORAL at 21:28

## 2020-01-11 RX ADMIN — ESCITALOPRAM OXALATE 20 MG: 10 TABLET ORAL at 08:56

## 2020-01-11 RX ADMIN — NICOTINE 21 MG: 21 PATCH, EXTENDED RELEASE TRANSDERMAL at 10:31

## 2020-01-11 RX ADMIN — LORAZEPAM 1 MG: 1 TABLET ORAL at 23:18

## 2020-01-11 RX ADMIN — FLUTICASONE FUROATE AND VILANTEROL TRIFENATATE 1 PUFF: 100; 25 POWDER RESPIRATORY (INHALATION) at 08:59

## 2020-01-11 RX ADMIN — Medication 100 MG: at 08:56

## 2020-01-11 NOTE — PROGRESS NOTES
Patient compliant with medications and meals  Pleasant and cooperative  Patient rated her pain 7 out of 10 this am and stated she would wait for her lidoderm patch, did not want any po pain medications  Patch was applied on R knee  Patient rated her depression and anxiety 7 out of 10  Denies any SI/HI  Patient stated she has a vaginal discharge with "different odor, like a yeast infection" will report to medical doctor of patient's concern  Positive for groups  Q 7 mins checks in place for safety  Will continue to monitor for behaviors and changes

## 2020-01-11 NOTE — PROGRESS NOTES
Progress Note - Behavioral Health     Katrina Vu 47 y o  female MRN: [id number]   Unit/Bed#: [facility] 209-01 Encounter: [id number]    Behavior over the last 24 hours: unchanged  Hal Landeros reports no symptoms of alcohol withdrawal   Minimizes alcohol issues and states she can "stop cold turkey" and will consider AA meetings, but no other follow up  She asks about Bipolar disorder and relates family history  She completed mood disorder questionnaire and responded yes to 7 of the 13 items, but has only caused her mild/moderate distress in her life  She is agreeable to start Neurontin  She reports her depression is "about the same", sleep is improved  She is willing to consider partial program if she can get medical leave from work  Participates appropriately in milieu  Attends groups  Sleep: improved  Appetite: fair  Medication side effects: No   ROS: all other systems are negative    Mental Status Evaluation:    Appearance:  age appropriate   Behavior:  pleasant, cooperative   Speech:  soft   Mood:  depressed, anxious   Affect:  flat   Thought Process:  organized   Associations: intact associations   Thought Content:  negative thinking, ruminating thoughts   Perceptual Disturbances: none   Risk Potential: Suicidal ideation - None at present  Homicidal ideation - None  Potential for aggression - No   Sensorium:  oriented to person, place, time/date and situation   Memory:  recent and remote memory grossly intact   Consciousness:  alert and awake   Attention: attention span and concentration are age appropriate   Insight:  fair   Judgment: fair   Gait/Station: normal gait/station, normal balance   Motor Activity: no abnormal movements     Vital signs in last 24 hours:    Temp:  [97 8 °F (36 6 °C)-98 7 °F (37 1 °C)] 97 8 °F (36 6 °C)  HR:  [85-88] 88  Resp:  [18] 18  BP: (117)/(62-66) 117/62    Laboratory results: I have personally reviewed all pertinent laboratory/tests results      Suicide/Homicide Risk Assessment:    Risk of Harm to Self:   Current Specific Risk Factors include: current depressive symptoms, current anxiety symptoms  Protective Factors: no current suicidal ideation, ability to communicate with staff on the unit, able to contract for safety on the unit, taking medications as ordered on the unit  Based on today's assessment, Karen [place] presents the following risk of harm to self: minimal    Risk of Harm to Others:  Current Specific Risk Factors include: none  Based on today's assessment, Karen [place] presents the following risk of harm to others: none    The following interventions are recommended: behavioral checks every 7 minutes, continued hospitalization on locked unit     Progress Toward Goals: limited improvement    Assessment/Plan   Principal Problem:    Major depressive disorder, recurrent, severe without psychotic features ([facility] [address] )  Active Problems:    Mixed hyperlipidemia    Tobacco abuse    Alcohol use disorder, moderate, dependence ([facility] )    Recommended Treatment:     Planned medication and treatment changes:     All current active medications have been reviewed  Encourage group therapy, milieu therapy and occupational therapy  Behavioral Health checks every 7 minutes  Start Neurontin 100 mg TID    Current Facility-Administered Medications:  acetaminophen 650 mg Oral Q6H PRN MICHELEL Pugh   acetaminophen 650 mg Oral Q4H PRN MICHELLE Pugh   acetaminophen 975 mg Oral Q6H PRN MICHELLE Pugh   albuterol 2 puff Inhalation Q6H PRN Kim Conde PA-C   atorvastatin 20 mg Oral Daily Dc Conde PA-C   diclofenac sodium 2 g Topical 4x Daily PRN Kim Conde PA-C   escitalopram 20 mg Oral Daily MICHELLE Puhg   fluticasone-vilanterol 1 puff Inhalation Daily Kim Conde PA-C   folic acid 1 mg Oral Daily MICHELLE Pugh   hydrOXYzine HCL 25 mg Oral Q4H PRN MICHELLE Pugh   lidocaine 1 patch Topical Daily Kim Conde PA-C   loratadine 10 mg Oral Daily PRN Kim Silva MARBIN Coned   LORazepam 2 mg Intramuscular Q4H PRN Leonard Puff, CRNP   LORazepam 1 mg Oral Q4H PRN Leonard Puff, CRNP   multivitamin-minerals 1 tablet Oral Daily Leonard Puff, CRNP   nicotine 21 mg Transdermal Daily Leonard Puff, CRNP   pneumococcal 23-valent polysaccharide vaccine 0 5 mL Subcutaneous Prior to discharge Alessandro De Leon PA-C   pramipexole 0 25 mg Oral HS Dc Conde PA-C   QUEtiapine 75 mg Oral HS Elvie Thomas MD   thiamine 100 mg Oral Daily Leonard Puff, CRNP   traZODone 50 mg Oral HS PRN Leonard Puff, CRNP       Risks / Benefits of Treatment:    Risks, benefits, and possible side effects of medications explained to patient and patient verbalizes understanding and agreement for treatment  Counseling / Coordination of Care:    Patient's progress reviewed with nursing staff  Medications, treatment progress and treatment plan reviewed with patient      MICHELLE Donahue 01/11/20

## 2020-01-11 NOTE — PROGRESS NOTES
Progress Note - Federico Rosales 47 y o  female MRN: [id number]    Unit/Bed#James Goff 209-01 Encounter: [id number]      Assessment:  Patient was examined in the day room  Nursing and Allied staff reporting patient having a vaginal discharge and will be treated for vaginal yeast infection  She has no other subjective complaints  1  Cardiac with history of Hyperlipidemia  Lipitor 20mg once daily  2  COPD/asthma  Breo Ellipta and PRN albuterol  3  Alcohol Abuse  Thiamine, folic acid, and multivitamin  4  Tobacco Abuse  Nicotine Transdermal Patch 21mg  5  DJD/OA with right knee pain  Tylenol as needed  Voltaren gel as needed for the knee pains  6  MDD  Defer to psychiatry  Plan:  See above    Subjective:   See above    Objective:     Vitals: Blood pressure 117/68, pulse 97, temperature 97 7 °F (36 5 °C), temperature source Temporal, resp  rate 18, height 5' 4" (1 626 m), weight 74 3 kg (163 lb 12 8 oz), SpO2 98 %  ,Body mass index is 28 12 kg/m²        Intake/Output Summary (Last 24 hours) at 1/11/2020 1816  Last data filed at 1/11/2020 1733  Gross per 24 hour   Intake 465 ml   Output    Net 465 ml       Physical Exam: /68 (BP Location: Left arm)   Pulse 97   Temp 97 7 °F (36 5 °C) (Temporal)   Resp 18   Ht 5' 4" (1 626 m)   Wt 74 3 kg (163 lb 12 8 oz)   SpO2 98%   BMI 28 12 kg/m²     General Appearance:    Alert, cooperative, no distress, appears stated age   Head:    Normocephalic, without obvious abnormality, atraumatic   Eyes:    PERRL, conjunctiva/corneas clear, EOM's intact, fundi     benign, both eyes   Ears:     Nose:    Throat:    Neck:   Supple, symmetrical, trachea midline, no adenopathy;     thyroid:  no enlargement/tenderness/nodules; no carotid    bruit or JVD   Back:     Symmetric, no curvature, ROM normal, no CVA tenderness   Lungs:     Clear to auscultation bilaterally, respirations unlabored   Chest Wall:    No tenderness or deformity    Heart:    Regular rate and rhythm, S1 and S2 normal, no murmur, rub   or gallop   Breast Exam:    No tenderness, masses, or nipple abnormality   Abdomen:     Soft, non-tender, bowel sounds active all four quadrants,     no masses, no organomegaly   Genitalia:     Rectal:     Extremities:   Extremities normal, atraumatic, no cyanosis or edema   Pulses:   2+ and symmetric all extremities   Skin:   Skin color, texture, turgor normal, no rashes or lesions   Lymph nodes:   Cervical, supraclavicular, and axillary nodes normal   Neurologic:   CNII-XII intact, normal strength, sensation and reflexes     throughout        Invasive Devices     None                 Lab, Imaging and other studies: I have personally reviewed pertinent reports

## 2020-01-11 NOTE — PROGRESS NOTES
Patient out in the milieu social with peers and staff  Ate HS snack and attended group  Upon approach patient's mood and affective is flat and depressed  Rates anxiety and depression 6/10  Denies SI/HI/hallucinations  Patient stated the Lidoderm patch is helping her right knee pain  Rates knee pain 3/10  Took medications without difficulty  Will continue to monitor safety and behaviors every 7 minutes

## 2020-01-11 NOTE — PROGRESS NOTES
Pt family brought in the following items    Grey yoga pants x2  Black sports bra  3 pairs of socks  Grey kayece pants  Grey sweater   green sweater  Grey house slippers  Activity box x3    Items kept at [address]    Items that was sent home with family    Hair mouse

## 2020-01-11 NOTE — PLAN OF CARE
Problem: Ineffective Coping  Goal: Identifies ineffective coping skills  Outcome: Progressing  Goal: Identifies healthy coping skills  Outcome: Progressing  Goal: Demonstrates healthy coping skills  Outcome: Progressing  Goal: Participates in unit activities  Description  Interventions:  - Provide therapeutic environment   - Provide required programming   - Redirect inappropriate behaviors   Outcome: Progressing  Goal: Patient/Family participate in treatment and DC plans  Description  Interventions:  - Provide therapeutic environment  Outcome: Progressing  Goal: Patient/Family verbalizes awareness of resources  Outcome: Progressing  Goal: Understands least restrictive measures  Description  Interventions:  - Utilize least restrictive behavior  Outcome: Progressing  Goal: Free from restraint events  Description  - Utilize least restrictive measures   - Provide behavioral interventions   - Redirect inappropriate behaviors   Outcome: Progressing     Problem: Risk for Self Injury/Neglect  Goal: Treatment Goal: Remain safe during length of stay, learn and adopt new coping skills, and be free of self-injurious ideation, impulses and acts at the time of discharge  Outcome: Progressing  Goal: Verbalize thoughts and feelings  Description  Interventions:  - Assess and re-assess patient's lethality and potential for self-injury  - Engage patient in 1:1 interactions, daily, for a minimum of 15 minutes  - Encourage patient to express feelings, fears, frustrations, hopes  - Establish rapport/trust with patient   Outcome: Progressing  Goal: Refrain from harming self  Description  Interventions:  - Monitor patient closely, per order  - Develop a trusting relationship  - Supervise medication ingestion, monitor effects and side effects   Outcome: Progressing  Goal: Complete daily ADLs, including personal hygiene independently, as able  Description  Interventions:  - Observe, teach, and assist patient with ADLS  - Monitor and promote a balance of rest/activity, with adequate nutrition and elimination  Outcome: Progressing     Problem: Depression  Goal: Treatment Goal: Demonstrate behavioral control of depressive symptoms, verbalize feelings of improved mood/affect, and adopt new coping skills prior to discharge  Outcome: Progressing  Goal: Verbalize thoughts and feelings  Description  Interventions:  - Assess and re-assess patient's level of risk   - Engage patient in 1:1 interactions, daily, for a minimum of 15 minutes   - Encourage patient to express feelings, fears, frustrations, hopes   Outcome: Progressing  Goal: Refrain from isolation  Description  Interventions:  - Develop a trusting relationship   - Encourage socialization   Outcome: Progressing     Problem: Anxiety  Goal: Anxiety is at manageable level  Description  Interventions:  - Assess and monitor patient's anxiety level  - Monitor for signs and symptoms (heart palpitations, chest pain, shortness of breath, headaches, nausea, feeling jumpy, restlessness, irritable, apprehensive)  - Collaborate with interdisciplinary team and initiate plan and interventions as ordered    - [place] patient to unit/surroundings  - Explain treatment plan  - Encourage participation in care  - Encourage verbalization of concerns/fears  - Identify coping mechanisms  - Assist in developing anxiety-reducing skills  - Administer/offer alternative therapies  - Limit or eliminate stimulants  Outcome: Progressing     Problem: SUBSTANCE USE/ABUSE  Goal: Will have no detox symptoms and will verbalize plan for changing substance-related behavior  Description  INTERVENTIONS:  - Monitor physical status and assess for symptoms of withdrawal  - Administer medication as ordered  - Provide emotional support with 1 on 1 interaction with staff  - Encourage recovery focused program/ addiction education  - Assess for verbalization of changing behaviors related to substance abuse  - Initiate consults and referrals as appropriate (Case Management, Spiritual Care, etc )  Outcome: Progressing  Goal: By discharge, will develop insight into their chemical dependency and sustain motivation to continue in recovery  Description  INTERVENTIONS:  - Attends all daily group sessions and scheduled AA groups  - Actively practices coping skills through participation in the therapeutic community and adherence to program rules  - Reviews and completes assignments from individual treatment plan  - Assist patient development of understanding of their personal cycle of addiction and relapse triggers  Outcome: Progressing  Goal: By discharge, patient will have ongoing treatment plan addressing chemical dependency  Description  INTERVENTIONS:  - Assist patient with resources and/or appointments for ongoing recovery based living  Outcome: Progressing

## 2020-01-11 NOTE — PROGRESS NOTES
Patient appears to be sleeping throughout the night during 7 minute safety checks  No behavioral issues  Will continue to monitor safety and behaviors every 7 minutes  AM blood work obtained and sent to the lab

## 2020-01-12 PROCEDURE — 99232 SBSQ HOSP IP/OBS MODERATE 35: CPT | Performed by: NURSE PRACTITIONER

## 2020-01-12 RX ORDER — GABAPENTIN 100 MG/1
200 CAPSULE ORAL 3 TIMES DAILY
Status: DISCONTINUED | OUTPATIENT
Start: 2020-01-12 | End: 2020-01-13

## 2020-01-12 RX ORDER — LORAZEPAM 2 MG/ML
1 INJECTION INTRAMUSCULAR EVERY 4 HOURS PRN
Status: DISCONTINUED | OUTPATIENT
Start: 2020-01-12 | End: 2020-01-15 | Stop reason: HOSPADM

## 2020-01-12 RX ORDER — PRAMIPEXOLE DIHYDROCHLORIDE 0.5 MG/1
0.5 TABLET ORAL
Status: DISCONTINUED | OUTPATIENT
Start: 2020-01-12 | End: 2020-01-15 | Stop reason: HOSPADM

## 2020-01-12 RX ORDER — LORAZEPAM 0.5 MG/1
0.5 TABLET ORAL EVERY 4 HOURS PRN
Status: DISCONTINUED | OUTPATIENT
Start: 2020-01-12 | End: 2020-01-15 | Stop reason: HOSPADM

## 2020-01-12 RX ADMIN — PRAMIPEXOLE DIHYDROCHLORIDE 0.5 MG: 0.5 TABLET ORAL at 21:28

## 2020-01-12 RX ADMIN — FLUTICASONE FUROATE AND VILANTEROL TRIFENATATE 1 PUFF: 100; 25 POWDER RESPIRATORY (INHALATION) at 08:35

## 2020-01-12 RX ADMIN — GABAPENTIN 100 MG: 100 CAPSULE ORAL at 08:35

## 2020-01-12 RX ADMIN — FLUCONAZOLE 200 MG: 100 TABLET ORAL at 08:35

## 2020-01-12 RX ADMIN — ESCITALOPRAM OXALATE 20 MG: 10 TABLET ORAL at 08:35

## 2020-01-12 RX ADMIN — FOLIC ACID 1 MG: 1 TABLET ORAL at 08:35

## 2020-01-12 RX ADMIN — LIDOCAINE 1 PATCH: 50 PATCH TOPICAL at 08:34

## 2020-01-12 RX ADMIN — Medication 100 MG: at 08:34

## 2020-01-12 RX ADMIN — QUETIAPINE FUMARATE 75 MG: 50 TABLET ORAL at 21:28

## 2020-01-12 RX ADMIN — GABAPENTIN 200 MG: 100 CAPSULE ORAL at 21:28

## 2020-01-12 RX ADMIN — ACETAMINOPHEN 975 MG: 325 TABLET ORAL at 15:02

## 2020-01-12 RX ADMIN — NICOTINE 21 MG: 21 PATCH, EXTENDED RELEASE TRANSDERMAL at 08:35

## 2020-01-12 RX ADMIN — GABAPENTIN 200 MG: 100 CAPSULE ORAL at 15:02

## 2020-01-12 RX ADMIN — Medication 1 TABLET: at 08:35

## 2020-01-12 RX ADMIN — ATORVASTATIN CALCIUM 20 MG: 20 TABLET, FILM COATED ORAL at 08:35

## 2020-01-12 NOTE — PROGRESS NOTES
Patient had a good visit with son today  Social with peers  Patient requested tylenol for a headache this shift  No other changes from previous note  Q 7 mins checks in place for safety  Will continue to monitor for behaviors and changes

## 2020-01-12 NOTE — PLAN OF CARE
Problem: Ineffective Coping  Goal: Identifies ineffective coping skills  Outcome: Progressing  Goal: Identifies healthy coping skills  Outcome: Progressing  Goal: Demonstrates healthy coping skills  Outcome: Progressing  Goal: Participates in unit activities  Description  Interventions:  - Provide therapeutic environment   - Provide required programming   - Redirect inappropriate behaviors   Outcome: Progressing  Goal: Patient/Family participate in treatment and DC plans  Description  Interventions:  - Provide therapeutic environment  Outcome: Progressing  Goal: Patient/Family verbalizes awareness of resources  Outcome: Progressing  Goal: Understands least restrictive measures  Description  Interventions:  - Utilize least restrictive behavior  Outcome: Progressing  Goal: Free from restraint events  Description  - Utilize least restrictive measures   - Provide behavioral interventions   - Redirect inappropriate behaviors   Outcome: Progressing     Problem: Risk for Self Injury/Neglect  Goal: Treatment Goal: Remain safe during length of stay, learn and adopt new coping skills, and be free of self-injurious ideation, impulses and acts at the time of discharge  Outcome: Progressing  Goal: Verbalize thoughts and feelings  Description  Interventions:  - Assess and re-assess patient's lethality and potential for self-injury  - Engage patient in 1:1 interactions, daily, for a minimum of 15 minutes  - Encourage patient to express feelings, fears, frustrations, hopes  - Establish rapport/trust with patient   Outcome: Progressing  Goal: Refrain from harming self  Description  Interventions:  - Monitor patient closely, per order  - Develop a trusting relationship  - Supervise medication ingestion, monitor effects and side effects   Outcome: Progressing  Goal: Complete daily ADLs, including personal hygiene independently, as able  Description  Interventions:  - Observe, teach, and assist patient with ADLS  - Monitor and promote a balance of rest/activity, with adequate nutrition and elimination  Outcome: Progressing     Problem: Depression  Goal: Treatment Goal: Demonstrate behavioral control of depressive symptoms, verbalize feelings of improved mood/affect, and adopt new coping skills prior to discharge  Outcome: Progressing  Goal: Verbalize thoughts and feelings  Description  Interventions:  - Assess and re-assess patient's level of risk   - Engage patient in 1:1 interactions, daily, for a minimum of 15 minutes   - Encourage patient to express feelings, fears, frustrations, hopes   Outcome: Progressing  Goal: Refrain from isolation  Description  Interventions:  - Develop a trusting relationship   - Encourage socialization   Outcome: Progressing     Problem: Anxiety  Goal: Anxiety is at manageable level  Description  Interventions:  - Assess and monitor patient's anxiety level  - Monitor for signs and symptoms (heart palpitations, chest pain, shortness of breath, headaches, nausea, feeling jumpy, restlessness, irritable, apprehensive)  - Collaborate with interdisciplinary team and initiate plan and interventions as ordered    - [place] patient to unit/surroundings  - Explain treatment plan  - Encourage participation in care  - Encourage verbalization of concerns/fears  - Identify coping mechanisms  - Assist in developing anxiety-reducing skills  - Administer/offer alternative therapies  - Limit or eliminate stimulants  Outcome: Progressing     Problem: SUBSTANCE USE/ABUSE  Goal: Will have no detox symptoms and will verbalize plan for changing substance-related behavior  Description  INTERVENTIONS:  - Monitor physical status and assess for symptoms of withdrawal  - Administer medication as ordered  - Provide emotional support with 1 on 1 interaction with staff  - Encourage recovery focused program/ addiction education  - Assess for verbalization of changing behaviors related to substance abuse  - Initiate consults and referrals as appropriate (Case Management, Spiritual Care, etc )  Outcome: Progressing  Goal: By discharge, will develop insight into their chemical dependency and sustain motivation to continue in recovery  Description  INTERVENTIONS:  - Attends all daily group sessions and scheduled AA groups  - Actively practices coping skills through participation in the therapeutic community and adherence to program rules  - Reviews and completes assignments from individual treatment plan  - Assist patient development of understanding of their personal cycle of addiction and relapse triggers  Outcome: Progressing  Goal: By discharge, patient will have ongoing treatment plan addressing chemical dependency  Description  INTERVENTIONS:  - Assist patient with resources and/or appointments for ongoing recovery based living  Outcome: Progressing

## 2020-01-12 NOTE — PROGRESS NOTES
Patient appears to be sleeping without difficulty throughout the night and 7 minute safety checks  No behavioral issues  Will continue to monitor safety and behaviors every 7 minutes

## 2020-01-12 NOTE — PROGRESS NOTES
Patient out in the milieu social with peers and staff  Ate HS snack and attended group  Upon approach patient's mood and affect is flat and depressed  Rates her anxiety and depression 6/10  Patient states she is feeling better today than yesterday  No s/s of ETOH withdrawal  Calm and cooperative  Denies SI/HI/hallucinations  Complained of 7/10 right knee pain  Gave 2 Tylenol for symptoms  Took medications without difficulty  Will continue to monitor safety and behaviors every 7 minutes

## 2020-01-12 NOTE — PROGRESS NOTES
Patient compliant with medications and meals this shift  Denies any SI/HI  Patient rated her depression and anxiety "as better than yesterday" social with peers  Positive for group  Patient c/o she had a hard time falling at sleep because of restless legs, provided aware  No other concerns or problems reported  Will continue to monitor for behaviors and changes

## 2020-01-12 NOTE — PROGRESS NOTES
Patient complained of restless anxiety and an increase in her restless leg syndrome  Gave 1 mg PRN Ativan for symptoms at 2318  [place] scale was "19 " Will continue to monitor safety and behaviors every 7 minutes

## 2020-01-12 NOTE — PROGRESS NOTES
Progress Note - Behavioral Health     Deniz Hickey 47 y o  female MRN: [id number]   Unit/Bed#: [facility] 209-01 Encounter: [id number]    Behavior over the last 24 hours: corrine Choudhury presents as flat and depressed  She denies symptoms of withdrawal and minimizes alcohol abuse  She is tolerating addition of Neurontin and will increase to 200 mg TID  Reports increase in her restless legs last evening for which she takes Mirapex  Participates appropriately in milieu  Attends groups  Sleep: slept off and on  Appetite: fair  Medication side effects: No   ROS: reports restless legs, all other systems are negative    Mental Status Evaluation:    Appearance:  age appropriate   Behavior:  cooperative   Speech:  normal rate, normal volume   Mood:  depressed   Affect:  flat   Thought Process:  normal rate of thoughts, normal abstract reasoning   Associations: intact associations   Thought Content:  no overt delusions   Perceptual Disturbances: none   Risk Potential: Suicidal ideation - None at present  Homicidal ideation - None at present  Potential for aggression - No   Sensorium:  oriented to person, place, time/date and situation   Memory:  recent and remote memory grossly intact   Consciousness:  alert and awake   Attention: attention span and concentration are age appropriate   Insight:  fair   Judgment: fair   Gait/Station: normal gait/station, normal balance   Motor Activity: no abnormal movements     Vital signs in last 24 hours:    Temp:  [97 6 °F (36 4 °C)-98 1 °F (36 7 °C)] 97 6 °F (36 4 °C)  HR:  [] 100  Resp:  [18-20] 20  BP: ()/(54-74) 94/54    Laboratory results: I have personally reviewed all pertinent laboratory/tests results      Suicide/Homicide Risk Assessment:    Risk of Harm to Self:   Current Specific Risk Factors include: current depressive symptoms  Protective Factors: no current suicidal plan or intent, ability to communicate with staff on the unit, able to contract for safety on the unit, taking medications as ordered on the unit  Based on today's assessment, Bobby Lynne presents the following risk of harm to self: low    Risk of Harm to Others:  Current Specific Risk Factors include: none  Based on today's assessment, Bobby Lynne presents the following risk of harm to others: none    The following interventions are recommended: behavioral checks every 7 minutes, continued hospitalization on locked unit     Progress Toward Goals: slight improvement    Assessment/Plan   Principal Problem:    Major depressive disorder, recurrent, severe without psychotic features ([facility] )  Active Problems:    Mixed hyperlipidemia    Tobacco abuse    Alcohol use disorder, moderate, dependence ([facility] )    Recommended Treatment:     Planned medication and treatment changes:     All current active medications have been reviewed  Encourage group therapy, milieu therapy and occupational therapy  Behavioral Health checks every 7 minutes  Increase Neurontin 200 mg TID    Current Facility-Administered Medications:  acetaminophen 650 mg Oral Q6H PRN Jus Frames, CRNP   acetaminophen 650 mg Oral Q4H PRN Jus Frames, CRNP   acetaminophen 975 mg Oral Q6H PRN Jus Frames, CRNP   albuterol 2 puff Inhalation Q6H PRN Jazzmine Conde PA-C   atorvastatin 20 mg Oral Daily Dc Conde PA-C   diclofenac sodium 2 g Topical 4x Daily PRN Jazzmine Conde PA-C   escitalopram 20 mg Oral Daily Jus Frames, CRNP   fluconazole 200 mg Oral Daily Mehran Alberts MD   fluticasone-vilanterol 1 puff Inhalation Daily Jazzmine Conde PA-C   folic acid 1 mg Oral Daily Jus Frames, CRNP   gabapentin 200 mg Oral TID Jus Frames, CRNP   hydrOXYzine HCL 25 mg Oral Q4H PRN Jus Frames, CRNP   lidocaine 1 patch Topical Daily Jazzmine Conde PA-C   loratadine 10 mg Oral Daily PRN Jazzmine Conde PA-C   LORazepam 1 mg Intramuscular Q4H PRN Jus Frames, CRNP   LORazepam 0 5 mg Oral Q4H PRN Jus Frames, CRNP   multivitamin-minerals 1 tablet Oral Daily MICHELLE Adames   nicotine 21 mg Transdermal Daily MICHELLE Adames   pneumococcal 23-valent polysaccharide vaccine 0 5 mL Subcutaneous Prior to discharge Henry Pina PA-C   pramipexole 0 5 mg Oral HS MICHELLE Adames   QUEtiapine 75 mg Oral HS Dagmar Gilbert MD   thiamine 100 mg Oral Daily MICHELLE Adames   traZODone 50 mg Oral HS PRN MICHELLE Adames       Risks / Benefits of Treatment:    Risks, benefits, and possible side effects of medications explained to patient and patient verbalizes understanding and agreement for treatment  Counseling / Coordination of Care:    Patient's progress reviewed with nursing staff  Medications, treatment progress and treatment plan reviewed with patient      MICHELLE Adames 01/12/20

## 2020-01-13 PROCEDURE — 99232 SBSQ HOSP IP/OBS MODERATE 35: CPT | Performed by: NURSE PRACTITIONER

## 2020-01-13 RX ORDER — ECHINACEA PURPUREA EXTRACT 125 MG
1 TABLET ORAL
Status: DISCONTINUED | OUTPATIENT
Start: 2020-01-13 | End: 2020-01-13

## 2020-01-13 RX ORDER — PSEUDOEPHEDRINE HYDROCHLORIDE 30 MG/1
30 TABLET ORAL EVERY 6 HOURS PRN
Status: DISCONTINUED | OUTPATIENT
Start: 2020-01-13 | End: 2020-01-15 | Stop reason: HOSPADM

## 2020-01-13 RX ORDER — GABAPENTIN 300 MG/1
300 CAPSULE ORAL 3 TIMES DAILY
Status: DISCONTINUED | OUTPATIENT
Start: 2020-01-13 | End: 2020-01-15 | Stop reason: HOSPADM

## 2020-01-13 RX ORDER — MELATONIN
1000 DAILY
Status: DISCONTINUED | OUTPATIENT
Start: 2020-04-14 | End: 2020-01-15 | Stop reason: HOSPADM

## 2020-01-13 RX ORDER — PSEUDOEPHEDRINE HCL 120 MG/1
120 TABLET, FILM COATED, EXTENDED RELEASE ORAL EVERY 12 HOURS PRN
Status: DISCONTINUED | OUTPATIENT
Start: 2020-01-13 | End: 2020-01-13

## 2020-01-13 RX ORDER — ERGOCALCIFEROL 1.25 MG/1
50000 CAPSULE ORAL WEEKLY
Status: DISCONTINUED | OUTPATIENT
Start: 2020-01-13 | End: 2020-01-15 | Stop reason: HOSPADM

## 2020-01-13 RX ADMIN — LORATADINE 10 MG: 10 TABLET ORAL at 03:52

## 2020-01-13 RX ADMIN — PRAMIPEXOLE DIHYDROCHLORIDE 0.5 MG: 0.5 TABLET ORAL at 21:22

## 2020-01-13 RX ADMIN — ACETAMINOPHEN 975 MG: 325 TABLET ORAL at 03:51

## 2020-01-13 RX ADMIN — ACETAMINOPHEN 975 MG: 325 TABLET ORAL at 21:21

## 2020-01-13 RX ADMIN — GABAPENTIN 300 MG: 300 CAPSULE ORAL at 16:43

## 2020-01-13 RX ADMIN — FOLIC ACID 1 MG: 1 TABLET ORAL at 09:06

## 2020-01-13 RX ADMIN — ACETAMINOPHEN 650 MG: 325 TABLET ORAL at 12:56

## 2020-01-13 RX ADMIN — GABAPENTIN 300 MG: 300 CAPSULE ORAL at 21:22

## 2020-01-13 RX ADMIN — FLUCONAZOLE 200 MG: 100 TABLET ORAL at 17:07

## 2020-01-13 RX ADMIN — ESCITALOPRAM OXALATE 20 MG: 10 TABLET ORAL at 09:06

## 2020-01-13 RX ADMIN — Medication 100 MG: at 09:05

## 2020-01-13 RX ADMIN — LIDOCAINE 1 PATCH: 50 PATCH TOPICAL at 10:18

## 2020-01-13 RX ADMIN — SALINE NASAL SPRAY 1 SPRAY: 1.5 SOLUTION NASAL at 14:47

## 2020-01-13 RX ADMIN — GABAPENTIN 200 MG: 100 CAPSULE ORAL at 09:06

## 2020-01-13 RX ADMIN — QUETIAPINE FUMARATE 75 MG: 50 TABLET ORAL at 21:22

## 2020-01-13 RX ADMIN — ATORVASTATIN CALCIUM 20 MG: 20 TABLET, FILM COATED ORAL at 09:06

## 2020-01-13 RX ADMIN — ERGOCALCIFEROL 50000 UNITS: 1.25 CAPSULE ORAL at 10:06

## 2020-01-13 RX ADMIN — SALINE NASAL SPRAY 1 SPRAY: 1.5 SOLUTION NASAL at 10:56

## 2020-01-13 RX ADMIN — Medication 1 TABLET: at 09:06

## 2020-01-13 RX ADMIN — FLUTICASONE FUROATE AND VILANTEROL TRIFENATATE 1 PUFF: 100; 25 POWDER RESPIRATORY (INHALATION) at 10:07

## 2020-01-13 RX ADMIN — NICOTINE 21 MG: 21 PATCH, EXTENDED RELEASE TRANSDERMAL at 10:21

## 2020-01-13 RX ADMIN — PSEUDOEPHEDRINE HCL 30 MG: 30 TABLET, FILM COATED ORAL at 18:44

## 2020-01-13 NOTE — PLAN OF CARE
Problem: Ineffective Coping  Goal: Identifies ineffective coping skills  Outcome: Progressing  Goal: Identifies healthy coping skills  Outcome: Progressing  Goal: Demonstrates healthy coping skills  Outcome: Progressing  Goal: Participates in unit activities  Description  Interventions:  - Provide therapeutic environment   - Provide required programming   - Redirect inappropriate behaviors   Outcome: Progressing  Goal: Patient/Family participate in treatment and DC plans  Description  Interventions:  - Provide therapeutic environment  Outcome: Progressing  Goal: Patient/Family verbalizes awareness of resources  Outcome: Progressing  Goal: Understands least restrictive measures  Description  Interventions:  - Utilize least restrictive behavior  Outcome: Progressing  Goal: Free from restraint events  Description  - Utilize least restrictive measures   - Provide behavioral interventions   - Redirect inappropriate behaviors   Outcome: Progressing     Problem: Risk for Self Injury/Neglect  Goal: Treatment Goal: Remain safe during length of stay, learn and adopt new coping skills, and be free of self-injurious ideation, impulses and acts at the time of discharge  Outcome: Progressing  Goal: Verbalize thoughts and feelings  Description  Interventions:  - Assess and re-assess patient's lethality and potential for self-injury  - Engage patient in 1:1 interactions, daily, for a minimum of 15 minutes  - Encourage patient to express feelings, fears, frustrations, hopes  - Establish rapport/trust with patient   Outcome: Progressing  Goal: Refrain from harming self  Description  Interventions:  - Monitor patient closely, per order  - Develop a trusting relationship  - Supervise medication ingestion, monitor effects and side effects   Outcome: Progressing  Goal: Complete daily ADLs, including personal hygiene independently, as able  Description  Interventions:  - Observe, teach, and assist patient with ADLS  - Monitor and promote a balance of rest/activity, with adequate nutrition and elimination  Outcome: Progressing     Problem: Depression  Goal: Treatment Goal: Demonstrate behavioral control of depressive symptoms, verbalize feelings of improved mood/affect, and adopt new coping skills prior to discharge  Outcome: Progressing  Goal: Verbalize thoughts and feelings  Description  Interventions:  - Assess and re-assess patient's level of risk   - Engage patient in 1:1 interactions, daily, for a minimum of 15 minutes   - Encourage patient to express feelings, fears, frustrations, hopes   Outcome: Progressing  Goal: Refrain from isolation  Description  Interventions:  - Develop a trusting relationship   - Encourage socialization   Outcome: Progressing     Problem: Anxiety  Goal: Anxiety is at manageable level  Description  Interventions:  - Assess and monitor patient's anxiety level  - Monitor for signs and symptoms (heart palpitations, chest pain, shortness of breath, headaches, nausea, feeling jumpy, restlessness, irritable, apprehensive)  - Collaborate with interdisciplinary team and initiate plan and interventions as ordered    - [place] patient to unit/surroundings  - Explain treatment plan  - Encourage participation in care  - Encourage verbalization of concerns/fears  - Identify coping mechanisms  - Assist in developing anxiety-reducing skills  - Administer/offer alternative therapies  - Limit or eliminate stimulants  Outcome: Progressing     Problem: SUBSTANCE USE/ABUSE  Goal: Will have no detox symptoms and will verbalize plan for changing substance-related behavior  Description  INTERVENTIONS:  - Monitor physical status and assess for symptoms of withdrawal  - Administer medication as ordered  - Provide emotional support with 1 on 1 interaction with staff  - Encourage recovery focused program/ addiction education  - Assess for verbalization of changing behaviors related to substance abuse  - Initiate consults and referrals as appropriate (Case Management, Spiritual Care, etc )  Outcome: Progressing  Goal: By discharge, will develop insight into their chemical dependency and sustain motivation to continue in recovery  Description  INTERVENTIONS:  - Attends all daily group sessions and scheduled AA groups  - Actively practices coping skills through participation in the therapeutic community and adherence to program rules  - Reviews and completes assignments from individual treatment plan  - Assist patient development of understanding of their personal cycle of addiction and relapse triggers  Outcome: Progressing  Goal: By discharge, patient will have ongoing treatment plan addressing chemical dependency  Description  INTERVENTIONS:  - Assist patient with resources and/or appointments for ongoing recovery based living  Outcome: Progressing

## 2020-01-13 NOTE — DISCHARGE INSTR - APPOINTMENTS
The treatment team recommends ongoing medication management upon discharge  Due to your lack of health insurance, a referral has been made to [facility] [address], [address] [facility], [place], [address], Phone: [phone]  You are scheduled for intake on Friday, January 17th at 1030am   Please bring along your photo ID, social security card, 3 months paycheck stubs and envelope marked "Shantel Carrington" in this discharge packet  Your discharge will be faxed to this provider for continuity of care  You also requested a referral for outpatient substance abuse   Due to a lack of insurance, these services are available through [facility], [address], [place], [address], Phone: [phone]  You are required to contact them directly with your intent to participate in these services directly  Please contact them at the number listed above  You identified Dr Valentin Galvan as your primary care provider but declined a follow up appointment at this time  Please schedule as needed  Your discharge will be faxed to this provider for continuity of care

## 2020-01-13 NOTE — PLAN OF CARE
Problem: Ineffective Coping  Goal: Participates in unit activities  Description  Interventions:  - Provide therapeutic environment   - Provide required programming   - Redirect inappropriate behaviors   Outcome: Progressing   Patient has been joining groups regularly and she is  quiet but will share when encouraged  She has been social with peers and staff

## 2020-01-13 NOTE — PROGRESS NOTES
Progress Note - Stanley Score 47 y o  female MRN: [id number]    Unit/Bed#Nini Gordon 209-01 Encounter: [id number]      Assessment:  Patient was examined in the day room  Patient is received 2 doses of Diflucan for her vaginal yeast infection which is significantly improved from yesterday  Patient has no other subjective complaints  [facility] at [facility] staff have no acute issues to report over the last 24 hours the patient was last seen by medicine services  Laboratory and medications were reviewed  Medical comorbidities and their plan for treatment are listed below  1  Cardiac with history of Hyperlipidemia  Lipitor 20mg once daily  2  COPD/asthma  Breo Ellipta and PRN albuterol  3  Alcohol Abuse  Thiamine, folic acid, and multivitamin  4  Tobacco Abuse  Nicotine Transdermal Patch 21mg  5  DJD/OA with right knee pain  Tylenol as needed  Voltaren gel as needed for the knee pains  6  MDD  Defer to psychiatry  Plan:  As above    Subjective:   As above    Objective:     Vitals: Blood pressure 122/68, pulse 90, temperature 98 2 °F (36 8 °C), temperature source Temporal, resp  rate 16, height 5' 4" (1 626 m), weight 74 3 kg (163 lb 12 8 oz), SpO2 99 %  ,Body mass index is 28 12 kg/m²        Intake/Output Summary (Last 24 hours) at 1/12/2020 1958  Last data filed at 1/12/2020 1700  Gross per 24 hour   Intake 240 ml   Output    Net 240 ml       Physical Exam: /68 (BP Location: Right arm)   Pulse 90   Temp 98 2 °F (36 8 °C) (Temporal)   Resp 16   Ht 5' 4" (1 626 m)   Wt 74 3 kg (163 lb 12 8 oz)   SpO2 99%   BMI 28 12 kg/m²     General Appearance:    Alert, cooperative, no distress, appears stated age   Head:    Normocephalic, without obvious abnormality, atraumatic   Eyes:    PERRL, conjunctiva/corneas clear, EOM's intact, fundi     benign, both eyes               Neck:   Supple, symmetrical, trachea midline, no adenopathy;     thyroid:  no enlargement/tenderness/nodules; no carotid    bruit or JVD Back:     Symmetric, no curvature, ROM normal, no CVA tenderness   Lungs:     Clear to auscultation bilaterally, respirations unlabored   Chest Wall:    No tenderness or deformity    Heart:    Regular rate and rhythm, S1 and S2 normal, no murmur, rub   or gallop       Abdomen:     Soft, non-tender, bowel sounds active all four quadrants,     no masses, no organomegaly           Extremities:   Extremities normal, atraumatic, no cyanosis or edema   Pulses:   2+ and symmetric all extremities   Skin:   Skin color, texture, turgor normal, no rashes or lesions   Lymph nodes:   Cervical, supraclavicular, and axillary nodes normal   Neurologic:   CNII-XII intact, normal strength, sensation and reflexes     throughout        Invasive Devices     None                 Lab, Imaging and other studies: I have personally reviewed pertinent reports

## 2020-01-13 NOTE — PROGRESS NOTES
Patient has been visible in milieu  She has attended groups at around 1 pm she complained of general aches and requested PRN tylenol for #6 pain  She stated a little relief but feels she is getting a cold  Temp 97 5  Prn ocean spray also given as requested  Q 7 minute safety checks maintained to monitor for behaviors

## 2020-01-13 NOTE — PROGRESS NOTES
Patient visible in milieu, socializes with staff and peers  Pleasant and cooperative in interaction  Patient endorses anxiety at 6/10, depression at 4/10, denies SI/HI, hallucinations  Remains medication compliant and on 7" checks for safety and behaviors

## 2020-01-13 NOTE — PROGRESS NOTES
Patient was able to return to sleep this AM  On reassessment of PRN medications, patient informs the acetaminophen worked and eliminated her headache  She still c/o nasal congestion (note was left for provider)  No acute behaviors exhibited  q7 minute safety checks in progress

## 2020-01-13 NOTE — DISCHARGE INSTR - OTHER ORDERS
You are being [address], Phone: [phone]    Triggers you have identified during your hospitalization that led to your distressed mood due to caregiver stress and ineffective coping skills  Coping skills you have identified during your hospitalization include journaling, reading, and coloring  If you are unable to deal with your distressed mood alone please contact your son, Luca Wilde  If that is not effective and you continue to have distressed mood, please contact [address] at [phone], dial 863, or go to the nearest emergency center  [facility] Crisis Hotline: [place]  Sayda Vicente Suicide Prevention Lifeline:  9-925.769.9486  *Alcohol Anonymous: 444.944.5980  *[place] [facility] & Alcohol Commission: (886) 461-9447  [address] [facility]  on [address] [place] [address] ([facility]) [employer]: 823.841.8308/Website: www latricia org  *Substance Abuse and [address] [facility]) [employer], which is a confidential, free, 24-hour-a-day, 365-day-a-year, information service for individuals and family members facing mental health and/or substance use disorders  This service provides referrals to local treatment facilities, support groups, and community-based organizations  Callers can also order free publications and other information  Call 9-674.655.3557/Website: www [facility] gov  *[facility] 2-1-1: This is a toll free, confidential, 24-hour-a-day service which connects you to a community  in your area who can help you find services and resources that are available to you locally and provide critical services that can improve and save lives    Call: 211  /Website: https://tierney gotti/

## 2020-01-13 NOTE — PROGRESS NOTES
Patient was present in the community this evening; attending both group and snack times  She presents with flat affect and depressed mood  She rates both her anxiety and depression 5 5/10, denies SI, HI and hallucinations  Patient was medication compliant at HS; removed lidoderm patch as per order  CIWA scoring performed with a result of 1  She c/o b/l knee pain with a rating of 4/10; she did request PRN voltaren gel however medication is unavailable per pharmacy (spoke with Nini Henley in rx)  This writer entered patient's room to inform and offer alternative pain analgesia but patient was asleep  Will CTM via q7 minute safety checks

## 2020-01-13 NOTE — PROGRESS NOTES
Progress Note - Behavioral Health     Sylvain Reyes 47 y o  female MRN: [id number]   Unit/Bed#: [facility] 209-01 Encounter: [id number]    Behavior over the last 24 hours: corrine Rodriguez denies Suicidal thoughts and feels remorseful regarding her suicidal thoughts  She continues to minimize alcohol use prior to admission and need for substance abuse follow up  She does report improvement in her mood and will continue to titrate up on Neurontin for mood lability  Participates appropriately in milieu  Attends groups  Sleep: restless sleep  Appetite: fair  Medication side effects: No   ROS: all other systems are negative    Mental Status Evaluation:    Appearance:  age appropriate   Behavior:  cooperative   Speech:  normal rate, normal volume   Mood:  depressed   Affect:  tearful, flat   Thought Process:  coherent, goal directed   Associations: intact associations   Thought Content:  no overt delusions   Perceptual Disturbances: none   Risk Potential: Suicidal ideation - None  Homicidal ideation - None  Potential for aggression - No   Sensorium:  oriented to person, place, time/date and situation   Memory:  recent and remote memory grossly intact   Consciousness:  alert and awake   Attention: attention span and concentration are age appropriate   Insight:  fair   Judgment: fair   Gait/Station: normal gait/station, normal balance   Motor Activity: no abnormal movements     Vital signs in last 24 hours:    Temp:  [97 6 °F (36 4 °C)-98 5 °F (36 9 °C)] 97 6 °F (36 4 °C)  HR:  [80-91] 80  Resp:  [16-18] 16  BP: (114-122)/(60-71) 114/60    Laboratory results: I have personally reviewed all pertinent laboratory/tests results      Suicide/Homicide Risk Assessment:    Risk of Harm to Self:   Current Specific Risk Factors include: current depressive symptoms  Protective Factors: no current suicidal ideation  Based on today's assessment, Nathen Rodriguez presents the following risk of harm to self: low    Risk of Harm to Others:  Current Specific Risk Factors include: none  Based on today's assessment, Berlin Crump presents the following risk of harm to others: none    The following interventions are recommended: behavioral checks every 7 minutes, continued hospitalization on locked unit     Progress Toward Goals: limited improvement    Assessment/Plan   Principal Problem:    Major depressive disorder, recurrent, severe without psychotic features ([facility] )  Active Problems:    Mixed hyperlipidemia    Tobacco abuse    Alcohol use disorder, moderate, dependence ([facility] )    Recommended Treatment:     Planned medication and treatment changes:     All current active medications have been reviewed  Encourage group therapy, milieu therapy and occupational therapy  Behavioral Health checks every 7 minutes  Increase Neurontin 300 mg TID    Current Facility-Administered Medications:  acetaminophen 650 mg Oral Q6H PRN Evertonel Oddi, CRNP   acetaminophen 650 mg Oral Q4H PRN Evertonel Oddi, CRNP   acetaminophen 975 mg Oral Q6H PRN Evertonel Oddi, CRNP   albuterol 2 puff Inhalation Q6H PRN Johana Conde PA-C   atorvastatin 20 mg Oral Daily Johana Conde PA-C   [START ON 4/14/2020] cholecalciferol 1,000 Units Oral Daily Johana Conde PA-C   diclofenac sodium 2 g Topical 4x Daily PRN Johana Conde PA-C   ergocalciferol 50,000 Units Oral Weekly Dc Conde PA-C   escitalopram 20 mg Oral Daily Shayan Nunez, MICHELLE   fluconazole 200 mg Oral Daily Walter Vazquez MD   fluticasone-vilanterol 1 puff Inhalation Daily Johana Conde PA-C   folic acid 1 mg Oral Daily Shayan Cabelloi, CRABDULAZIZ   gabapentin 200 mg Oral TID Shayan Cabelloi, CRABDULAZIZ   hydrOXYzine HCL 25 mg Oral Q4H PRN Shayan Oddi, MICHELLE   lidocaine 1 patch Topical Daily Johana Conde PA-C   loratadine 10 mg Oral Daily PRN Johana Conde PA-C   LORazepam 1 mg Intramuscular Q4H PRN Evertonel Oddi, CRNP   LORazepam 0 5 mg Oral Q4H PRN Evertonel Oddi, CRNP   multivitamin-minerals 1 tablet Oral Daily Shayan Cabelloi, MICHELLE nicotine 21 mg Transdermal Daily MIHCELLE Shay   pneumococcal 23-valent polysaccharide vaccine 0 5 mL Subcutaneous Prior to discharge Rolan Melendez PA-C   pramipexole 0 5 mg Oral HS MICHELLE Shay   QUEtiapine 75 mg Oral HS Alexi Vo MD   sodium chloride 1 spray Each Nare Q3H PRN Honey Conde PA-C   thiamine 100 mg Oral Daily MICHELLE Shay   traZODone 50 mg Oral HS PRN MICHELLE Shay       Risks / Benefits of Treatment:    Risks, benefits, and possible side effects of medications explained to patient and patient verbalizes understanding and agreement for treatment  Counseling / Coordination of Care:    Patient's progress discussed with staff in treatment team meeting  Medications, treatment progress and treatment plan reviewed with patient      MICHELLE Shay 01/13/20

## 2020-01-13 NOTE — SOCIAL WORK
SW met with patient in patient room; pt appears flat, depressed, brightens with contact; Pt and SW discussed discharge planning for Weds - pt agreeable and expressed interest in PHP - due to pt lack of insurance, SW unable to make referral at this time; Pt identified coping skills as reading, going for a walk and "taking care of myself for once"; Pt expressed how hard it is for her to not take care of everyone else;  Pt and SW discussed importance of self care;   Pt agreeable to referral to [place] [address] for outpt follow up and D&A ;  Pt denies SI/HI/AH/VH, dep 5, anx 7;  No additional questions or concerns for SW at this time

## 2020-01-13 NOTE — PROGRESS NOTES
Michael Wang#  :1965 F  KMR:[id number]    F  Adm Date: 2020  8:38 PM   ATT PHY: Lyndal Patches, [address]         Subjective     The patient was seen after reviewing the chart and discussing the case with caring staff  Today during our encounter, the patient reported no acute concerns other than nasal/sinus congestion  Vitamin D level is low at 7 2  Objective     Vitals:    20 0652   BP: 114/60   Pulse: 80   Resp: 16   Temp: 97 6 °F (36 4 °C)   SpO2: 99%       General Appearance: Awake and Alert  No acute distress  HEENT: Normocephalic, atraumatic  PERRLA, EOMI, MMM  Heart: RRR, no murmurs  Normal S1 and S2   Lungs: CTA bilaterally with fair air entry  Tevin Polk is a(n) 47y o  year old female with MDD     1  Cardiac with history of Hyperlipidemia  Lipitor 20mg once daily  2  COPD/asthma  Breo Ellipta and PRN albuterol  3  Alcohol Abuse  Thiamine, folic acid, and multivitamin  4  Tobacco Abuse  Nicotine Transdermal Patch 21mg  5  DJD/OA with right knee pain  Tylenol as needed  Voltaren gel as needed for the knee pains  6  Nasal/Sinus Congestion  Ocean nasal spray as needed  7  Vitamin D Deficiency  Vitamin D2 63668Y for 14 weeks followed by Vitamin D3 1000U daily  The patient was discussed with Dr Odell Clemens and he is in agreement with the above note

## 2020-01-13 NOTE — PROGRESS NOTES
Patient informs of headache and congestion  She informs it is d/t allergies and dry air and requests medication  Administered PRN claritin as per order for allergies and also PRN acetaminophen for 8/10 headache; as per order for severe pain  Will monitor for medication effectiveness

## 2020-01-13 NOTE — SOCIAL WORK
SW received phone call from [address] [place] [address] at [address] [facility] - scheduled intake appointment for mental health services on Friday, January 17th at [address] [place] [address] with Adeola Blair

## 2020-01-13 NOTE — SOCIAL WORK
Fannie received voicemail from Dulce Li at [address] [facility] returning SW call from Friday to schedule intake appointment;  SW attempted return call x [phone] - left voicemail requesting return call - awaiting response

## 2020-01-13 NOTE — PLAN OF CARE
Pt progressing; no discharge date at this time; pt will dc home with [facility] referral for follow up

## 2020-01-13 NOTE — PROGRESS NOTES
01/13/20 1014   Team Meeting   Meeting Type Daily Rounds   Team Members Present   Team Members Present Physician;Nurse;;Occupational Therapist;; Other (Discipline and Name)   Physician Team Member Dr Izabela Bahena MD; Adi Brown[address]    Nursing Team Member Eloy Aponte, RN    Care Management Team Member Jm Wayne RN (covering CM)   Social Work Team Member [facility], [place]    OT Team Member Flor Seay, BERTRANDS    Other (Discipline and Name) Claribel Giron, Student NP    Patient/Family Present   Patient Present No   Patient's Family Present No     C/o increased restless leg symptoms; awake at 350a due to nasal congestion d/c weds

## 2020-01-14 PROCEDURE — 99232 SBSQ HOSP IP/OBS MODERATE 35: CPT | Performed by: NURSE PRACTITIONER

## 2020-01-14 RX ORDER — NICOTINE 21 MG/24HR
1 PATCH, TRANSDERMAL 24 HOURS TRANSDERMAL DAILY
Qty: 28 PATCH | Refills: 0 | Status: SHIPPED | OUTPATIENT
Start: 2020-01-15 | End: 2020-01-28

## 2020-01-14 RX ORDER — PSEUDOEPHEDRINE HYDROCHLORIDE 30 MG/1
30 TABLET ORAL EVERY 6 HOURS PRN
Qty: 20 TABLET | Refills: 0 | Status: SHIPPED | OUTPATIENT
Start: 2020-01-14 | End: 2020-01-28

## 2020-01-14 RX ORDER — LANOLIN ALCOHOL/MO/W.PET/CERES
100 CREAM (GRAM) TOPICAL DAILY
Qty: 30 TABLET | Refills: 0 | Status: SHIPPED | OUTPATIENT
Start: 2020-01-15 | End: 2021-05-25 | Stop reason: ALTCHOICE

## 2020-01-14 RX ORDER — PRAMIPEXOLE DIHYDROCHLORIDE 0.5 MG/1
0.5 TABLET ORAL
Qty: 30 TABLET | Refills: 1 | Status: SHIPPED | OUTPATIENT
Start: 2020-01-14 | End: 2020-04-20

## 2020-01-14 RX ORDER — ATORVASTATIN CALCIUM 20 MG/1
20 TABLET, FILM COATED ORAL DAILY
Qty: 30 TABLET | Refills: 1 | Status: SHIPPED | OUTPATIENT
Start: 2020-01-14 | End: 2020-08-24

## 2020-01-14 RX ORDER — QUETIAPINE FUMARATE 100 MG/1
100 TABLET, FILM COATED ORAL
Status: DISCONTINUED | OUTPATIENT
Start: 2020-01-14 | End: 2020-01-15 | Stop reason: HOSPADM

## 2020-01-14 RX ORDER — MELATONIN
1000 DAILY
Qty: 30 TABLET | Refills: 1 | Status: SHIPPED | OUTPATIENT
Start: 2020-04-14 | End: 2020-02-14 | Stop reason: ALTCHOICE

## 2020-01-14 RX ORDER — ALBUTEROL SULFATE 90 UG/1
2 AEROSOL, METERED RESPIRATORY (INHALATION) EVERY 6 HOURS PRN
Qty: 1 INHALER | Refills: 0 | Status: SHIPPED | OUTPATIENT
Start: 2020-01-14 | End: 2020-01-28

## 2020-01-14 RX ORDER — FOLIC ACID 1 MG/1
1 TABLET ORAL DAILY
Qty: 30 TABLET | Refills: 0 | Status: SHIPPED | OUTPATIENT
Start: 2020-01-15

## 2020-01-14 RX ORDER — ERGOCALCIFEROL 1.25 MG/1
50000 CAPSULE ORAL WEEKLY
Qty: 13 CAPSULE | Refills: 0 | Status: SHIPPED | OUTPATIENT
Start: 2020-01-20 | End: 2020-02-14 | Stop reason: ALTCHOICE

## 2020-01-14 RX ORDER — LORATADINE 10 MG/1
10 TABLET ORAL DAILY
Qty: 30 TABLET | Refills: 1 | Status: SHIPPED | OUTPATIENT
Start: 2020-01-14 | End: 2020-02-14 | Stop reason: ALTCHOICE

## 2020-01-14 RX ADMIN — ATORVASTATIN CALCIUM 20 MG: 20 TABLET, FILM COATED ORAL at 08:27

## 2020-01-14 RX ADMIN — GABAPENTIN 300 MG: 300 CAPSULE ORAL at 21:05

## 2020-01-14 RX ADMIN — Medication 1 TABLET: at 08:27

## 2020-01-14 RX ADMIN — ESCITALOPRAM OXALATE 20 MG: 10 TABLET ORAL at 08:27

## 2020-01-14 RX ADMIN — ACETAMINOPHEN 975 MG: 325 TABLET ORAL at 21:11

## 2020-01-14 RX ADMIN — FLUTICASONE FUROATE AND VILANTEROL TRIFENATATE 1 PUFF: 100; 25 POWDER RESPIRATORY (INHALATION) at 08:25

## 2020-01-14 RX ADMIN — ACETAMINOPHEN 975 MG: 325 TABLET ORAL at 08:26

## 2020-01-14 RX ADMIN — PSEUDOEPHEDRINE HCL 30 MG: 30 TABLET, FILM COATED ORAL at 01:33

## 2020-01-14 RX ADMIN — FOLIC ACID 1 MG: 1 TABLET ORAL at 08:27

## 2020-01-14 RX ADMIN — GABAPENTIN 300 MG: 300 CAPSULE ORAL at 08:27

## 2020-01-14 RX ADMIN — HYDROXYZINE HYDROCHLORIDE 25 MG: 25 TABLET ORAL at 15:07

## 2020-01-14 RX ADMIN — Medication 100 MG: at 08:27

## 2020-01-14 RX ADMIN — LIDOCAINE 1 PATCH: 50 PATCH TOPICAL at 08:28

## 2020-01-14 RX ADMIN — QUETIAPINE FUMARATE 100 MG: 100 TABLET ORAL at 21:06

## 2020-01-14 RX ADMIN — PSEUDOEPHEDRINE HCL 30 MG: 30 TABLET, FILM COATED ORAL at 11:45

## 2020-01-14 RX ADMIN — PRAMIPEXOLE DIHYDROCHLORIDE 0.5 MG: 0.5 TABLET ORAL at 21:06

## 2020-01-14 RX ADMIN — NICOTINE 21 MG: 21 PATCH, EXTENDED RELEASE TRANSDERMAL at 08:28

## 2020-01-14 RX ADMIN — GABAPENTIN 300 MG: 300 CAPSULE ORAL at 15:07

## 2020-01-14 RX ADMIN — LORATADINE 10 MG: 10 TABLET ORAL at 04:09

## 2020-01-14 NOTE — PROGRESS NOTES
01/14/20 1015   Team Meeting   Meeting Type Daily Rounds   Team Members Present   Team Members Present Physician;Nurse;;Occupational Therapist;; Other (Discipline and Name)   Physician Team Member Dr Enrrique Lambert MD; Shani Mckee[address]    Nursing Team Member Emelyn Ferguson, RN    Care Management Team Member Mathieu Rodriguez RN (Covering CM)   Social Work Team Member Brijesh Mi, MSW, LSW    OT Team Member Tulio White, CTRS    Other (Discipline and Name) Dagmar Stone, Student NP    Patient/Family Present   Patient Present No   Patient's Family Present No     D/c weds; c/o sinus issues; dep 4/10, anx 6/10; awake throughout night due to another patient

## 2020-01-14 NOTE — NURSING NOTE
Belongings dropped off today    Belongings with patient:      one blue/white nightgown   one white/blue PJ top/shirt   one white/blue PJ bottom/shirt

## 2020-01-14 NOTE — PROGRESS NOTES
Progress Note - Behavioral Health     Harinder Johnson 47 y o  female MRN: [id number]   Unit/Bed#: [facility] 209-01 Encounter: [id number]    Behavior over the last 24 hours: improved  Parris Ohara reports some increased anxiety related to her work  She is otherwise reporting mood improved and no longer having suicidal thoughts  Reports restless leg symptoms have improved and sleep is better  Participates appropriately in milieu  Attends groups  Sleep: improved  Appetite: normal  Medication side effects: No   ROS: all other systems are negative    Mental Status Evaluation:    Appearance:  age appropriate   Behavior:  pleasant, cooperative   Speech:  normal rate, normal volume   Mood:  depressed   Affect:  flat   Thought Process:  organized   Associations: intact associations   Thought Content:  normal   Perceptual Disturbances: none   Risk Potential: Suicidal ideation - None  Homicidal ideation - None  Potential for aggression - No   Sensorium:  oriented to person, place, time/date and situation   Memory:  recent and remote memory grossly intact   Consciousness:  alert and awake   Attention: attention span and concentration are age appropriate   Insight:  fair   Judgment: fair   Gait/Station: normal gait/station, normal balance   Motor Activity: no abnormal movements     Vital signs in last 24 hours:    Temp:  [97 °F (36 1 °C)-98 4 °F (36 9 °C)] 98 4 °F (36 9 °C)  HR:  [60-91] 60  Resp:  [18-19] 18  BP: (108-134)/(59-65) 108/61    Laboratory results: I have personally reviewed all pertinent laboratory/tests results      Suicide/Homicide Risk Assessment:    Risk of Harm to Self:   Current Specific Risk Factors include: diagnosis of depression  Protective Factors: no current suicidal ideation, ability to communicate with staff on the unit, able to contract for safety on the unit, taking medications as ordered on the unit  Based on today's assessment, Parris Ohara presents the following risk of harm to self: low    Risk of Harm to Others:  Current Specific Risk Factors include: none  Based on today's assessment, Hal Landeros presents the following risk of harm to others: none    The following interventions are recommended: behavioral checks every 7 minutes, continued hospitalization on locked unit     Progress Toward Goals: progressing    Assessment/Plan   Principal Problem:    Major depressive disorder, recurrent, severe without psychotic features ([facility] )  Active Problems:    Mixed hyperlipidemia    Tobacco abuse    Alcohol use disorder, moderate, dependence ([facility] )    Recommended Treatment:     Planned medication and treatment changes:     All current active medications have been reviewed  Encourage group therapy, milieu therapy and occupational therapy  Behavioral Health checks every 7 minutes  Continue current medications:    Current Facility-Administered Medications:  acetaminophen 650 mg Oral Q6H PRN Eric Colander, CRNP   acetaminophen 650 mg Oral Q4H PRN Eric Colander, CRNP   acetaminophen 975 mg Oral Q6H PRN Eric Colander, CRNP   albuterol 2 puff Inhalation Q6H PRN Lashae Conde PA-C   atorvastatin 20 mg Oral Daily Lashae Conde PA-C   [START ON 4/14/2020] cholecalciferol 1,000 Units Oral Daily Lashae Conde PA-C   diclofenac sodium 2 g Topical 4x Daily PRN Lashae Conde PA-C   ergocalciferol 50,000 Units Oral Weekly Dc Conde PA-C   escitalopram 20 mg Oral Daily Eric Colander, CRABDULAZIZ   fluticasone-vilanterol 1 puff Inhalation Daily Lashae Conde PA-C   folic acid 1 mg Oral Daily Eric Colander, CRNP   gabapentin 300 mg Oral TID Eric Colander, CRNP   hydrOXYzine HCL 25 mg Oral Q4H PRN Erci Colander, CRNP   lidocaine 1 patch Topical Daily Lashae Conde PA-C   loratadine 10 mg Oral Daily PRN Lashae Conde PA-C   LORazepam 1 mg Intramuscular Q4H PRN Eric Colander, CRNP   LORazepam 0 5 mg Oral Q4H PRN Eric Colander, CRNP   multivitamin-minerals 1 tablet Oral Daily Eric Colander, CRNP   nicotine 21 mg Transdermal Daily Eric Colander, MICHELLE   pneumococcal 23-valent polysaccharide vaccine 0 5 mL Subcutaneous Prior to discharge Huber Bhakta PA-C   pramipexole 0 5 mg Oral HS MICHELLE Frost   pseudoephedrine 30 mg Oral Q6H PRN Mariano Conde PA-C   QUEtiapine 75 mg Oral HS Stu Hooper MD   thiamine 100 mg Oral Daily MICHELLE Frost   traZODone 50 mg Oral HS PRN MICHELLE Frost       Risks / Benefits of Treatment:    Risks, benefits, and possible side effects of medications explained to patient and patient verbalizes understanding and agreement for treatment  Counseling / Coordination of Care:    Patient's progress discussed with staff in treatment team meeting  Medications, treatment progress and treatment plan reviewed with patient      MICHELLE Frost 01/14/20

## 2020-01-14 NOTE — PLAN OF CARE
Pt progressing; discharge scheduled Weds, 1/15 to pt home with referral to [facility] for follow up

## 2020-01-14 NOTE — PROGRESS NOTES
Patient came out to the desk c/o nasal congestion   Patient requested Claritin for symptoms  Will continue to monitor safety and behaviors every 7 minutes

## 2020-01-14 NOTE — PROGRESS NOTES
Michael Wang#  :1965 F  IWS:[id number]    PXM:[id number]  Adm Date: 2020  8:38 PM   ATT PHY: Cassandra Tanjohn paul, [address]         [facility]     The patient was seen after reviewing the chart and discussing the case with caring staff  Today during our encounter, the patient reported no acute concerns other than contnued nasal/sinus congestion  Admits to some ear pain, but otherwise denies sore throat, cough, shortness of breath, chest pains, fevers or chills  Of note, patient is scheduled for discharge tomorrow  Patient is medically cleared for discharge  Medication list has been reconciled  Scripts have been addressed  Objective     Vitals:    20 0713   BP: 108/61   Pulse: 60   Resp: 18   Temp: 98 4 °F (36 9 °C)   SpO2: 100%       General Appearance: Awake and Alert  No acute distress  HEENT: Normocephalic, atraumatic  PERRLA, EOMI, MMM  Heart: RRR, no murmurs  Normal S1 and S2   Lungs: CTA bilaterally with fair air entry  Buddy Gomez is a(n) 47y o  year old female with MDD    Medical Clearance: Patient is medically cleared for discharge  Medication list has been reconciled  Scripts have been filled out      1  Cardiac with history of Hyperlipidemia  Lipitor 20mg once daily  2  COPD/asthma  Breo Ellipta and PRN albuterol  3  Alcohol Abuse  Thiamine, folic acid, and multivitamin  4  Tobacco Abuse  Nicotine Transdermal Patch 21mg  5  DJD/OA with right knee pain  Tylenol as needed  Voltaren gel as needed for the knee pains  6  Nasal/Sinus Congestion  Sudafed as needed  If S/S get worse, may consider antibiotic prescription prior to discharge tomorrow  7  Vitamin D Deficiency  Vitamin D2 84785G for 14 weeks followed by Vitamin D3 1000U daily  The patient was discussed with Dr Harrison Hoffman and he is in agreement with the above note

## 2020-01-14 NOTE — PROGRESS NOTES
Patient out to the nurses desk requesting PRN Sudafed and Nasal spray  Gave Sudafed at 0133  [place] was D/C  Patient is requesting nasal spray to be reorder  Will pass the information on to morning shift  Will continue to monitor safety and behaviors every 7 minutes

## 2020-01-14 NOTE — PROGRESS NOTES
Pt up ad lucy & gait is steady  Pt denies any suicidal or homicidal ideations  Pt c/o depression 2/10 & anxiety 4/10  Pt medicated for generalized discomfort & headache @ 1136 with Tylenol po as ordered by MD with mild relief obtained   Q 7 min checks maintained to monitor pt's behavior & safety

## 2020-01-14 NOTE — PROGRESS NOTES
Pt medicated for increasing anxiety 8/10 @ 1510 with Atarax 25 mg po as ordered by MD Goldie Hamlin - 12 @ that time

## 2020-01-14 NOTE — PROGRESS NOTES
Patient out in the milieu social with peers and staff  Ate HS snack and attended group  Upon approach patient's mood and affect is flat and depressed  Patient rated her anxiety and depression 6/10  Patient had a nice visit with her youngest son, which went well  Patient c/o feeling achy with cold symptoms  Denies SI/HI/hallucinations  Took medication without difficulty  Gave 975 mg Tylenol for 8/10 right knee pain  Will continue to monitor safety and behaviors every 7 minutes

## 2020-01-14 NOTE — PROGRESS NOTES
Patient awaken several times due to cold like symptoms  PRNs given  Patient awake now due to another patient yelling  Will continue to monitor safety and behaviors every 7 minutes

## 2020-01-14 NOTE — PLAN OF CARE
Problem: Ineffective Coping  Goal: Participates in unit activities  Description  Interventions:  - Provide therapeutic environment   - Provide required programming   - Redirect inappropriate behaviors   Outcome: Progressing   Patient out for groups daily with participation  She is scheduled for discharge Wednesday

## 2020-01-15 VITALS
SYSTOLIC BLOOD PRESSURE: 111 MMHG | HEART RATE: 77 BPM | DIASTOLIC BLOOD PRESSURE: 66 MMHG | OXYGEN SATURATION: 99 % | TEMPERATURE: 98.1 F | HEIGHT: 64 IN | WEIGHT: 163.8 LBS | RESPIRATION RATE: 16 BRPM | BODY MASS INDEX: 27.96 KG/M2

## 2020-01-15 PROCEDURE — 90471 IMMUNIZATION ADMIN: CPT | Performed by: PHYSICIAN ASSISTANT

## 2020-01-15 PROCEDURE — 90732 PPSV23 VACC 2 YRS+ SUBQ/IM: CPT | Performed by: PHYSICIAN ASSISTANT

## 2020-01-15 PROCEDURE — 99239 HOSP IP/OBS DSCHRG MGMT >30: CPT | Performed by: NURSE PRACTITIONER

## 2020-01-15 RX ORDER — GABAPENTIN 300 MG/1
300 CAPSULE ORAL 3 TIMES DAILY
Qty: 90 CAPSULE | Refills: 0 | Status: SHIPPED | OUTPATIENT
Start: 2020-01-15 | End: 2020-02-11 | Stop reason: SDUPTHER

## 2020-01-15 RX ORDER — ESCITALOPRAM OXALATE 20 MG/1
20 TABLET ORAL
Qty: 30 TABLET | Refills: 0 | Status: SHIPPED | OUTPATIENT
Start: 2020-01-15 | End: 2021-01-14

## 2020-01-15 RX ORDER — QUETIAPINE FUMARATE 100 MG/1
100 TABLET, FILM COATED ORAL
Qty: 30 TABLET | Refills: 0 | Status: SHIPPED | OUTPATIENT
Start: 2020-01-15 | End: 2020-04-02 | Stop reason: SDUPTHER

## 2020-01-15 RX ADMIN — ESCITALOPRAM OXALATE 20 MG: 10 TABLET ORAL at 08:15

## 2020-01-15 RX ADMIN — PNEUMOCOCCAL VACCINE POLYVALENT 0.5 ML
25; 25; 25; 25; 25; 25; 25; 25; 25; 25; 25; 25; 25; 25; 25; 25; 25; 25; 25; 25; 25; 25; 25 INJECTION, SOLUTION INTRAMUSCULAR; SUBCUTANEOUS at 11:49

## 2020-01-15 RX ADMIN — LIDOCAINE 1 PATCH: 50 PATCH TOPICAL at 08:14

## 2020-01-15 RX ADMIN — Medication 1 TABLET: at 08:15

## 2020-01-15 RX ADMIN — FLUTICASONE FUROATE AND VILANTEROL TRIFENATATE 1 PUFF: 100; 25 POWDER RESPIRATORY (INHALATION) at 08:15

## 2020-01-15 RX ADMIN — FOLIC ACID 1 MG: 1 TABLET ORAL at 08:15

## 2020-01-15 RX ADMIN — GABAPENTIN 300 MG: 300 CAPSULE ORAL at 08:15

## 2020-01-15 RX ADMIN — NICOTINE 21 MG: 21 PATCH, EXTENDED RELEASE TRANSDERMAL at 08:14

## 2020-01-15 RX ADMIN — ATORVASTATIN CALCIUM 20 MG: 20 TABLET, FILM COATED ORAL at 08:14

## 2020-01-15 RX ADMIN — Medication 100 MG: at 08:14

## 2020-01-15 RX ADMIN — ACETAMINOPHEN 975 MG: 325 TABLET ORAL at 04:56

## 2020-01-15 NOTE — PROGRESS NOTES
Patient complained of 8/10 generalized pain  Gave 975 mg Tylenol for symptoms  Will continue to monitor safety and behaviors every 7 minutes

## 2020-01-15 NOTE — PROGRESS NOTES
Patient being discharged with list of belongings  Sherel Gloss  Black/white socks  Gray coat  Purple sneakers  Swedish fish  Cell phone / plug  2 packs pall mall cigaretes  Pink lighter  aholds  White metal ring purple stone  2 yellow rings one with green stone  White metal bracelet  Green/purple bracelet  2 pair eye glasses green/tortious  Blue eye glass case  Degree deodorant  Whole blend shampoo  Whole blend conditioner  Contact lens case  Contact lens solution  Purple comb  Black brush  Purple short sleeve shirt  Dark purple shirt short sleeve  Blue/white night gown  White/blue pj top  Blue/white pj pants  2 pair gray yoga pants  Black sport bra  3 pair socks  Gray kaycee pants  Thersia Bogus sweater  Green sweater  [employer]  3 activity boxes

## 2020-01-15 NOTE — PROGRESS NOTES
01/15/20 1000   Team Meeting   Meeting Type Daily Rounds   Team Members Present   Team Members Present Physician;Nurse;;; Occupational Therapist   Physician Team Member Dr Steven Haney MD; Shani Mckee[address]   Nursing Team Member Emelyn Ferguson, [facility]   Care Management Team Member Burke [facility], [facility]   Social Work Team Member Brijesh Mi [facility]   OT Team Member Tulio White, [place]   Patient/Family Present   Patient Present No   Patient's Family Present No     PRN atarax yesterday at 1500, anxiety regarding care giver stress, D/C today at 1:30pm

## 2020-01-15 NOTE — PROGRESS NOTES
Patient slept most of the shift without difficulty  Patient had early morning awakening which is her norm  No behavioral issues  Will continue to monitor safety and behaviors every 7 minutes

## 2020-01-15 NOTE — PROGRESS NOTES
Patient has been visible in milieu for this shift  She is pleasant and cooperative  She is medication complaint  She states atarax was effective in decreasing anxiety to #4  Rated depression #2  Denies SI/HI, or hallucinations  At 2111 tylenol administered as requested PRN for generalized pain #7  She states tylenol was minimally effective in decreasing pain  Q 7 minute safety checks maintained

## 2020-01-15 NOTE — NURSING NOTE
Pt instructed on discharge instructions & medications; & verbalized understanding of instructions  Pt discharged home via main entrance of hospital via ambulation accompanied by RN & son  Pt transported home via car  Belongings sent with pt

## 2020-01-15 NOTE — PROGRESS NOTES
Pt denies any suicidal or homicidal ideations  Q 7 min checks maintained to monitor pt's behavior & safety  Pt c/o depression 2/10 & anxiety 4/10  Pt up ad lucy & gait is steady  Pt to be discharged home today

## 2020-01-15 NOTE — BH TRANSITION RECORD
Contact Information: If you have any questions, concerns, pended studies, tests and/or procedures, or emergencies regarding your inpatient behavioral health visit  Please contact [facility] [phone] and ask to speak to a , nurse or physician  A contact is available 24 hours/ 7 days a week at this number  Summary of Procedures Performed During your Stay:  Below is a list of major procedures performed during your hospital stay and a summary of results:  - No major procedures performed  Pending Studies (From admission, onward)    None        If studies are pending at discharge, follow up with your PCP and/or referring provider

## 2020-01-15 NOTE — PROGRESS NOTES
Patient appears to be sleeping during 7 minute safety checks  No behavioral issues  Will continue to monitor safety and behaviors every 7 minutes

## 2020-01-15 NOTE — PROGRESS NOTES
Michael Wang#  :1965 F  BTK:[id number]    YHD:[id number]  Adm Date: 2020  8:38 PM   ATT PHY: Roselyn Quinn [facility]         Subjective     The patient was seen after reviewing the chart and discussing the case with caring staff  Today during our encounter, the patient reported no acute concerns  Patient is scheduled for discharge today  Patient is medically cleared for discharge  Medication list has been reconciled  Scripts have been addressed  Objective     Vitals:    01/15/20 0659   BP: 111/66   Pulse: 77   Resp: 16   Temp: 98 1 °F (36 7 °C)   SpO2: 99%       General Appearance: Awake and Alert  No acute distress  HEENT: Normocephalic, atraumatic  PERRLA, EOMI, MMM  Heart: RRR, no murmurs  Normal S1 and S2   Lungs: CTA bilaterally with fair air entry  Tevin Polk is a(n) 47y o  year old female with MDD    Medical Clearance: Patient is medically cleared for discharge  Medication list has been reconciled  Scripts have been filled out      1  Cardiac with history of Hyperlipidemia  Lipitor 20mg once daily  2  COPD/asthma  Breo Ellipta and PRN albuterol  3  Alcohol Abuse  Thiamine, folic acid, and multivitamin  4  Tobacco Abuse  Nicotine Transdermal Patch 21mg  5  DJD/OA with right knee pain  Tylenol as needed  Voltaren gel as needed for the knee pains  6  Nasal/Sinus Congestion  Sudafed as needed  If S/S get worse, may consider antibiotic prescription prior to discharge tomorrow  7  Vitamin D Deficiency  Vitamin D2 61891Y for 14 weeks followed by Vitamin D3 1000U daily

## 2020-01-16 NOTE — DISCHARGE SUMMARY
Discharge Summary - Behavioral Health   Crissy Recinos 47 y o  female MRN: [id number]  Unit/Bed#: [facility] Encounter: [id number]     Admission Date: 1/8/2020         Discharge Date: 1/15/2020  1:20 PM    Attending Psychiatrist: Dr Patrice Smith    Reason for Admission/HPI: Seasonal allergies [J30 2]  Psychiatric problem [F99]  Major depression [F32 9]    According to admission report from Dr Patrice Smith:    Crissy Recinos is a 47 y o  female with a history of depression, alcohol use and insomnia who was admitted to the inpatient adult psychiatric unit on a voluntary 201 commitment basis due to depression, anxiety, signs and symptoms of alcohol abuse and suicidal ideation  Pt presented to ED very intoxicated, agitated, with SI  Initially 302 was petitioned by ED physician for safety but later pt signed 201  On evaluation in the inpatient psychiatric unit Carmen, reports increased depression, tearfulness, sadness, social withdrawal, poor sleep and increased use of alcohol due to ongoing life's stressors  She also reports increase anxiety and suicidal ideas with plan to crash her car into a tree or wall to end her life  Last week, patient admits she was picked up on a DUI and brought to the hospital for a blood draw  Although she was not hurt, she admits she was intoxicated  She drinks 3-5 glasses of wine several days per week     Pt states she has no insurance so her PCP has been prescribing Seroquel and Lexapro  Patient reports feeling overwhelmed and admits she has problem with drinking with worsening of judgment and impulse control when she is intoxicated  Patient denies any current SI and she is able to CFS  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  She also worries about losing her job secondary to her depression and and substance abuse issues        Hospital Course: The patient was admitted to the inpatient psychiatric unit and started on every 7 minutes precautions   During the hospitalization the patient was attending individual therapy, group therapy, milieu therapy and occupational therapy  Psychiatric medications were titrated over the hospital stay  To address depressive symptoms and potential alcohol withdrawal symptoms the patient was started on antidepressant Lexapro, mood stabilizer Neurontin and antipsychotic medication Seroquel  Medication doses were titrated during the hospital course  Prior to beginning of treatment medications risks and benefits and possible side effects including risk of parkinsonian symptoms, Tardive Dyskinesia and metabolic syndrome related to treatment with antipsychotic medications and risk of cardiovascular events in elderly related to treatment with antipsychotic medications were reviewed with the patient  The patient verbalized understanding and agreement for treatment  Patient's symptoms improved gradually over the hospital course  At the end of treatment the patient was doing well  Mood was stable at the time of discharge  The patient denied suicidal ideation, intent or plan at the time of discharge and denied homicidal ideation, intent or plan at the time of discharge  There was no overt psychosis at the time of discharge  Sleep and appetite were improved  The patient was tolerating medications and was not reporting any significant side effects at the time of discharge  Since the patient was doing well at the end of the hospitalization, treatment team felt that the patient could be safely discharged to outpatient care  The outpatient follow up with a psychiatrist was arranged by the unit  upon discharge      Mental Status at time of Discharge:     Appearance:  age appropriate   Behavior:  normal   Speech:  normal pitch and normal volume   Mood:  normal   Affect:  normal   Thought Process:  normal   Thought Content:  normal   Perceptual Disturbances: None   Risk Potential: Suicidal Ideations none, Homicidal Ideations none and Potential for Aggression No   Sensorium:  person   Cognition:  grossly intact   Consciousness:  alert and awake    Attention: attention span and concentration were age appropriate   Insight:  fair   Judgment: fair   Gait/Station: normal gait/station and normal balance   Motor Activity: no abnormal movements     Admission Diagnosis:Seasonal allergies [J30 2]  Psychiatric problem [F99]  Major depression [F32 9]    Discharge Diagnosis:   Principal Problem:    Major depressive disorder, recurrent, severe without psychotic features ([facility] )  Active Problems:    Mixed hyperlipidemia    Tobacco abuse    Alcohol use disorder, moderate, dependence ([facility] )  Resolved Problems:    * No resolved hospital problems   *        Lab results:  Admission on 01/08/2020, Discharged on 01/15/2020   Component Date Value    Sodium 01/08/2020 142     Potassium 01/08/2020 3 7     Chloride 01/08/2020 111*    CO2 01/08/2020 20*    ANION GAP 01/08/2020 11     BUN 01/08/2020 7     Creatinine 01/08/2020 0 82     Glucose 01/08/2020 104*    Calcium 01/08/2020 9 4     AST 01/08/2020 13     ALT 01/08/2020 13     Alkaline Phosphatase 01/08/2020 74     Total Protein 01/08/2020 7 9     Albumin 01/08/2020 4 6     Total Bilirubin 01/08/2020 0 30     eGFR 01/08/2020 81     WBC 01/08/2020 11 40*    RBC 01/08/2020 4 46     Hemoglobin 01/08/2020 13 6     Hematocrit 01/08/2020 42 3     MCV 01/08/2020 95     MCH 01/08/2020 30 4     MCHC 01/08/2020 32 1     RDW 01/08/2020 15 2*    MPV 01/08/2020 7 3*    Platelets 23/68/4284 432*    Neutrophils Relative 01/08/2020 64     Lymphocytes Relative 01/08/2020 27     Monocytes Relative 01/08/2020 5     Eosinophils Relative 01/08/2020 4     Basophils Relative 01/08/2020 1     Neutrophils Absolute 01/08/2020 7 20*    Lymphocytes Absolute 01/08/2020 3 00     Monocytes Absolute 01/08/2020 0 60     Eosinophils Absolute 01/08/2020 0 40     Basophils Absolute 01/08/2020 0 10     Ethanol Lvl 01/08/2020 186 1*    Color, UA 01/08/2020 Straw     Clarity, UA 01/08/2020 Clear     Specific Gravity, UA 01/08/2020 <=1 005*    pH, UA 01/08/2020 6 0     Leukocytes, UA 01/08/2020 Negative     Nitrite, UA 01/08/2020 Negative     Protein, UA 01/08/2020 Negative     Glucose, UA 01/08/2020 Negative     Ketones, UA 01/08/2020 Negative     Urobilinogen, UA 01/08/2020 0 2     Bilirubin, UA 01/08/2020 Negative     Blood, UA 01/08/2020 Negative     Amph/Meth UR 01/08/2020 Negative     Barbiturate Ur 01/08/2020 Negative     Benzodiazepine Urine 01/08/2020 Negative     Cocaine Urine 01/08/2020 Negative     Methadone Urine 01/08/2020 Negative     Opiate Urine 01/08/2020 Negative     PCP Ur 01/08/2020 Negative     THC Urine 01/08/2020 Negative     TSH 3RD GENERATON 01/08/2020 0 690     Ethanol Lvl 01/09/2020 <10     Ventricular Rate 01/08/2020 91     Atrial Rate 01/08/2020 91     NC Interval 01/08/2020 130     QRSD Interval 01/08/2020 70     QT Interval 01/08/2020 392     QTC Interval 01/08/2020 482     P Axis 01/08/2020 66     QRS Axis 01/08/2020 47     T Wave [place] 01/08/2020 62     Vit D, 25-Hydroxy 01/10/2020 7 2*    Vitamin B-12 01/10/2020 449     Hepatitis C Ab 01/11/2020 Non-reactive        Discharge Medications:  Discharge Medication List as of 1/15/2020 12:02 PM      START taking these medications    Details   cholecalciferol (VITAMIN D3) 1,000 units tablet Take 1 tablet (1,000 Units total) by mouth daily, Starting Tue 4/14/2020, Print      ergocalciferol (VITAMIN D2) 50,000 units Take 1 capsule (50,000 Units total) by mouth once a week for 13 doses, Starting Mon 1/20/2020, Until Tue 1/551/55/4248, Print      folic acid (FOLVITE) 1 mg tablet Take 1 tablet (1 mg total) by mouth daily, Starting Wed 1/15/2020, Print      gabapentin (NEURONTIN) 300 mg capsule Take 1 capsule (300 mg total) by mouth 3 (three) times a day, Starting Wed 1/15/2020, Normal      nicotine (NICODERM CQ) 21 mg/24 hr TD 24 hr patch Place 1 patch on the skin daily, Starting Wed 1/15/2020, Print      pseudoephedrine (SUDAFED) 30 mg tablet Take 1 tablet (30 mg total) by mouth every 6 (six) hours as needed for congestion, Starting Tue 1/14/2020, Print      thiamine 100 MG tablet Take 1 tablet (100 mg total) by mouth daily, Starting Wed 1/15/2020, Print            Discharge Medication List as of 1/15/2020 12:02 PM         Discharge Medication List as of 1/15/2020 12:02 PM      CONTINUE these medications which have CHANGED    Details   albuterol (PROVENTIL HFA,VENTOLIN HFA) 90 mcg/act inhaler Inhale 2 puffs every 6 (six) hours as needed for wheezing, Starting Tue 1/14/2020, Print      atorvastatin (LIPITOR) 20 mg tablet Take 1 tablet (20 mg total) by mouth daily, Starting Tue 1/14/2020, Print      escitalopram (LEXAPRO) 20 mg tablet Take 1 tablet (20 mg total) by mouth daily in the early morning, Starting Wed 1/15/2020, Normal      fluticasone-umeclidinium-vilanterol (TRELEGY) 100-62 5-25 MCG/INH inhaler Inhale 1 puff daily Rinse mouth after use , Starting Tue 1/14/2020, Print      loratadine (CLARITIN) 10 mg tablet Take 1 tablet (10 mg total) by mouth daily, Starting Tue 1/14/2020, Print      pramipexole (MIRAPEX) 0 5 mg tablet Take 1 tablet (0 5 mg total) by mouth daily at bedtime, Starting Tue 1/14/2020, Print      QUEtiapine (SEROquel) 100 mg tablet Take 1 tablet (100 mg total) by mouth daily at bedtime, Starting Wed 1/15/2020, Normal            Discharge Medication List as of 1/15/2020 12:02 PM           Discharge instructions/Information to patient and family:   See after visit summary for information provided to patient and family  Provisions for Follow-Up Care:  See after visit summary for information related to follow-up care and any pertinent home health orders  Discharge Statement     I spent 35 minutes discharging the patient  This time was spent on the day of discharge  I had direct contact with the patient on the day of discharge       Additional documentation is required if more than 30 minutes were spent on discharge:    I reviewed with Carmen importance of compliance with medications and outpatient treatment after discharge  I discussed the medication regimen and possible side effects of the medications with Carmen prior to discharge  At the time of discharge she was tolerating psychiatric medications  I discussed outpatient follow up with Eileen Pemberton  I reviewed with Carmen crisis plan and safety plan upon discharge  I discussed with Eileen Pemberton recommendation to follow up with outpatient drug and alcohol counseling and AA meetings  Eileen Pemberton agreed to abstain from drug and alcohol use after discharge

## 2020-01-27 ENCOUNTER — HOSPITAL ENCOUNTER (EMERGENCY)
Facility: HOSPITAL | Age: 55
Discharge: HOME | End: 2020-01-27
Attending: EMERGENCY MEDICINE
Payer: COMMERCIAL

## 2020-01-27 VITALS
SYSTOLIC BLOOD PRESSURE: 138 MMHG | WEIGHT: 165 LBS | RESPIRATION RATE: 28 BRPM | BODY MASS INDEX: 28.17 KG/M2 | HEART RATE: 96 BPM | DIASTOLIC BLOOD PRESSURE: 93 MMHG | OXYGEN SATURATION: 96 % | TEMPERATURE: 98.3 F | HEIGHT: 64 IN

## 2020-01-27 DIAGNOSIS — F41.0 ANXIETY ATTACK: Primary | ICD-10-CM

## 2020-01-27 PROCEDURE — 99284 EMERGENCY DEPT VISIT MOD MDM: CPT | Performed by: EMERGENCY MEDICINE

## 2020-01-27 PROCEDURE — 99283 EMERGENCY DEPT VISIT LOW MDM: CPT

## 2020-01-27 RX ORDER — LORAZEPAM 1 MG/1
2 TABLET ORAL ONCE
Status: COMPLETED | OUTPATIENT
Start: 2020-01-27 | End: 2020-01-27

## 2020-01-27 RX ADMIN — LORAZEPAM 2 MG: 1 TABLET ORAL at 09:34

## 2020-01-27 NOTE — ED PROVIDER NOTES
History  Chief Complaint   Patient presents with    Anxiety     patient belives she is having a reaction to her vitamins     Patient is a 77-year-old female  She does have a history of depression and anxiety  She was recently admitted for suicidal ideation  Last night she took vitamin-D 3  Shortly afterwards she developed a lot of anxiety  She is worried she might be having some sort of reaction  She denies any drug or alcohol abuse at present  She is not suicidal or homicidal   No hallucinations  There is no facial swelling or trouble breathing  No rashes  Mainly patient is complaining of a lot of anxiety and and inability to sit still  Symptoms are moderately severe without relieving factors  Prior to Admission Medications   Prescriptions Last Dose Informant Patient Reported? Taking? QUEtiapine (SEROquel) 100 mg tablet   No No   Sig: Take 1 tablet (100 mg total) by mouth daily at bedtime   albuterol (PROVENTIL HFA,VENTOLIN HFA) 90 mcg/act inhaler   No No   Sig: Inhale 2 puffs every 6 (six) hours as needed for wheezing   atorvastatin (LIPITOR) 20 mg tablet   No No   Sig: Take 1 tablet (20 mg total) by mouth daily   cholecalciferol (VITAMIN D3) 1,000 units tablet   No No   Sig: Take 1 tablet (1,000 Units total) by mouth daily   ergocalciferol (VITAMIN D2) 50,000 units   No No   Sig: Take 1 capsule (50,000 Units total) by mouth once a week for 13 doses   escitalopram (LEXAPRO) 20 mg tablet   No No   Sig: Take 1 tablet (20 mg total) by mouth daily in the early morning   fluticasone-umeclidinium-vilanterol (TRELEGY) 100-62 5-25 MCG/INH inhaler   No No   Sig: Inhale 1 puff daily Rinse mouth after use     folic acid (FOLVITE) 1 mg tablet   No No   Sig: Take 1 tablet (1 mg total) by mouth daily   gabapentin (NEURONTIN) 300 mg capsule   No No   Sig: Take 1 capsule (300 mg total) by mouth 3 (three) times a day   loratadine (CLARITIN) 10 mg tablet   No No   Sig: Take 1 tablet (10 mg total) by mouth daily   nicotine (NICODERM CQ) 21 mg/24 hr TD 24 hr patch   No No   Sig: Place 1 patch on the skin daily   pramipexole (MIRAPEX) 0 5 mg tablet   No No   Sig: Take 1 tablet (0 5 mg total) by mouth daily at bedtime   pseudoephedrine (SUDAFED) 30 mg tablet   No No   Sig: Take 1 tablet (30 mg total) by mouth every 6 (six) hours as needed for congestion   thiamine 100 MG tablet   No No   Sig: Take 1 tablet (100 mg total) by mouth daily      Facility-Administered Medications: None       Past Medical History:   Diagnosis Date    Anxiety     Bell's palsy     COPD [facility]ch[facility] obstructive [facility]     Depression     Menopause     Mild persistent asthma without complication     Obesity     Primary osteoarthritis involving multiple joints 10/9/2018       Past Surgical History:   Procedure Laterality Date    ANKLE SURGERY       SECTION      COLONOSCOPY W/ POLYPECTOMY      DILATION AND CURETTAGE, DIAGNOSTIC / THERAPEUTIC      NOSE SURGERY      TOTAL HIP ARTHROPLASTY      TOTAL HIP ARTHROPLASTY      TUBAL LIGATION      TUBAL LIGATION         Family History   Problem Relation Age of Onset    Mental illness Mother     Suicidality Mother     No Known Problems Father     Mental illness Brother     Suicidality Brother      I have reviewed and agree with the history as documented  Social History     Tobacco Use    Smoking status: Current Every Day Smoker     Packs/day: 2 00     Years: 40 00     Pack years: 80 00    Smokeless tobacco: Never Used   Substance Use Topics    Alcohol use: Yes     Alcohol/week: 3 0 - 5 0 standard drinks     Types: 3 - 5 Glasses of wine per week     Frequency: 2-3 times a week     Drinks per session: 3 or 4     Comment: socially    Drug use: No        Review of Systems   Constitutional: Negative for chills and fever  HENT: Negative for rhinorrhea and sore throat  Eyes: Negative for pain, redness and visual disturbance     Respiratory: Negative for cough and shortness of breath  Cardiovascular: Negative for chest pain and leg swelling  Gastrointestinal: Negative for abdominal pain, diarrhea and vomiting  Endocrine: Negative for polydipsia and polyuria  Genitourinary: Negative for dysuria, frequency, hematuria, vaginal bleeding and vaginal discharge  Musculoskeletal: Negative for back pain and neck pain  Skin: Negative for rash and wound  Allergic/Immunologic: Negative for immunocompromised state  Neurological: Negative for weakness, numbness and headaches  Hematological: Does not bruise/bleed easily  Psychiatric/Behavioral: Negative for hallucinations and suicidal ideas  The patient is nervous/anxious  All other systems reviewed and are negative  Physical Exam  Physical Exam   Constitutional: She is oriented to person, place, and time  She appears well-developed and well-nourished  HENT:   Head: Normocephalic and atraumatic  Mouth/Throat: Oropharynx is clear and moist    Eyes: Conjunctivae are normal  Right eye exhibits no discharge  Left eye exhibits no discharge  No scleral icterus  Neck: Normal range of motion  Neck supple  Cardiovascular: Normal rate, regular rhythm, normal heart sounds and intact distal pulses  Exam reveals no gallop and no friction rub  No murmur heard  Pulmonary/Chest: Effort normal and breath sounds normal  No stridor  No respiratory distress  She has no wheezes  She has no rales  Abdominal: Soft  Bowel sounds are normal  She exhibits no distension  There is no tenderness  There is no rebound and no guarding  Musculoskeletal: Normal range of motion  She exhibits no edema, tenderness or deformity  No CVA tenderness  No calf tenderness/palpable cords  Neurological: She is alert and oriented to person, place, and time  She has normal strength  No sensory deficit  GCS eye subscore is 4  GCS verbal subscore is 5  GCS motor subscore is 6  Skin: Skin is warm and dry  No rash noted  Psychiatric:   Anxious  Unable to sit still  Hyperactive  Vitals reviewed  Vital Signs  ED Triage Vitals [01/27/20 0848]   Temperature Pulse Respirations Blood Pressure SpO2   98 3 °F (36 8 °C) 96 (!) 28 138/93 96 %      Temp Source Heart Rate Source Patient Position - Orthostatic VS BP Location FiO2 (%)   Temporal Monitor Sitting Left arm --      Pain Score       No Pain           Vitals:    01/27/20 0848   BP: 138/93   Pulse: 96   Patient Position - Orthostatic VS: Sitting         Visual Acuity      ED Medications  Medications   LORazepam (ATIVAN) tablet 2 mg (2 mg Oral Given 1/27/20 0934)       Diagnostic Studies  Results Reviewed     None                 No orders to display              Procedures  Procedures         ED Course                               MDM  Number of Diagnoses or Management Options  Diagnosis management comments: Patient feels better after Ativan  This was most likely an anxiety/panic attack  Reviewed side effects of vitamin D3  This is unlikely to be a side effect  Patient has an established history of depression and anxiety  Patient is not suicidal   Not homicidal   No psychosis  Appropriate for discharge and outpatient management  Disposition  Final diagnoses:   Anxiety attack     Time reflects when diagnosis was documented in both MDM as applicable and the Disposition within this note     Time User Action Codes Description Comment    1/27/2020 10:56 AM Miranda Gray Add [F41 0] Anxiety attack       ED Disposition     ED Disposition Condition Date/Time Comment    Discharge Stable Mon Jan 27, 2020 10:56 AM Summer Berman discharge to home/self care              Follow-up Information     Follow up With Specialties Details Why Contact Cassidy Garcia DO Internal Medicine In 2 days  [facility] [address] [facility]  [phone]            Patient's Medications   Discharge Prescriptions    No medications on file     No discharge procedures on file      ED Provider  Electronically Signed by           Alistair Williamson MD  01/27/20 1050

## 2020-01-28 ENCOUNTER — OFFICE VISIT (OUTPATIENT)
Dept: INTERNAL MEDICINE CLINIC | Facility: CLINIC | Age: 55
End: 2020-01-28

## 2020-01-28 ENCOUNTER — TELEPHONE (OUTPATIENT)
Dept: INTERNAL MEDICINE CLINIC | Facility: CLINIC | Age: 55
End: 2020-01-28

## 2020-01-28 VITALS
RESPIRATION RATE: 18 BRPM | HEART RATE: 61 BPM | SYSTOLIC BLOOD PRESSURE: 118 MMHG | OXYGEN SATURATION: 95 % | DIASTOLIC BLOOD PRESSURE: 80 MMHG | HEIGHT: 64 IN | WEIGHT: 165.2 LBS | TEMPERATURE: 98.1 F | BODY MASS INDEX: 28.2 KG/M2

## 2020-01-28 DIAGNOSIS — F41.0 PANIC ATTACKS: ICD-10-CM

## 2020-01-28 DIAGNOSIS — E66.3 OVERWEIGHT (BMI 25.0-29.9): ICD-10-CM

## 2020-01-28 DIAGNOSIS — F32.0 MILD MAJOR DEPRESSION, SINGLE EPISODE (HCC): ICD-10-CM

## 2020-01-28 DIAGNOSIS — F41.1 GAD (GENERALIZED ANXIETY DISORDER): ICD-10-CM

## 2020-01-28 DIAGNOSIS — F10.20 ALCOHOL USE DISORDER, MODERATE, DEPENDENCE (HCC): ICD-10-CM

## 2020-01-28 DIAGNOSIS — F41.8 ANXIETY WITH DEPRESSION: ICD-10-CM

## 2020-01-28 DIAGNOSIS — F33.2 MAJOR DEPRESSIVE DISORDER, RECURRENT, SEVERE WITHOUT PSYCHOTIC FEATURES (HCC): Primary | Chronic | ICD-10-CM

## 2020-01-28 DIAGNOSIS — J45.20 MILD INTERMITTENT ASTHMA WITHOUT COMPLICATION: ICD-10-CM

## 2020-01-28 DIAGNOSIS — Z13.31 POSITIVE DEPRESSION SCREENING: ICD-10-CM

## 2020-01-28 DIAGNOSIS — Z86.59 HISTORY OF SUICIDAL IDEATION: ICD-10-CM

## 2020-01-28 PROCEDURE — 99214 OFFICE O/P EST MOD 30 MIN: CPT | Performed by: INTERNAL MEDICINE

## 2020-01-28 RX ORDER — PNV NO.95/FERROUS FUM/FOLIC AC 28MG-0.8MG
TABLET ORAL
COMMUNITY
End: 2020-07-10

## 2020-01-28 RX ORDER — VITAMIN B COMPLEX
TABLET ORAL DAILY
COMMUNITY

## 2020-01-28 RX ORDER — ALBUTEROL SULFATE 90 UG/1
2 AEROSOL, METERED RESPIRATORY (INHALATION) EVERY 6 HOURS PRN
Qty: 3 INHALER | Refills: 3 | Status: SHIPPED | OUTPATIENT
Start: 2020-01-28 | End: 2021-02-12 | Stop reason: SDUPTHER

## 2020-01-28 RX ORDER — VITAMIN E 268 MG
400 CAPSULE ORAL DAILY
COMMUNITY

## 2020-01-28 NOTE — PATIENT INSTRUCTIONS
Depression   AMBULATORY CARE:   Depression  is a medical condition that causes feelings of sadness or hopelessness that do not go away  Depression may cause you to lose interest in things you used to enjoy  These feelings may interfere with your daily life  Common symptoms include the following:   · Appetite changes, or weight gain or loss    · Trouble going to sleep or staying asleep, or sleeping too much    · Fatigue or lack of energy    · Feeling restless, irritable, or withdrawn    · Feeling worthless, hopeless, discouraged, or guilty    · Trouble concentrating, remembering things, doing daily tasks, or making decisions    · Thoughts about hurting or killing yourself  Call 911 for any of the following:   · You think about harming yourself or someone else  Contact your healthcare provider if:   · Your symptoms do not improve  · You cannot make it to your next appointment  · You have new symptoms  · You have questions or concerns about your condition or care  Treatment for depression  may include medicine to improve or balance your mood  Therapy may also be used to treat your depression  A therapist will help you learn to cope with your thoughts and feelings  Therapy can be done alone or in a group  It may also be done with family members or a significant other  Self-care:   · Get regular physical activity  Try to exercise for 30 minutes, 3 to 5 days a week  Work with your healthcare provider to develop an exercise plan that you enjoy  Physical activity may improve your symptoms  · Get enough sleep  Create a routine to help you relax before bed  You can listen to music, read, or do yoga  Try to go to bed and wake up at the same time every day  Sleep is important for emotional health  · Eat a variety of healthy foods from all of the food groups  A healthy meal plan is low in fat, salt, and added sugar   Ask your healthcare provider for more information about a meal plan that is right for you  · Do not drink alcohol or use drugs  Alcohol and drugs can make your symptoms worse  Follow up with your healthcare provider as directed: Your healthcare provider will monitor your progress at follow-up visits  He or she will also monitor your medicine if you take antidepressants  Your healthcare provider will ask if the medicine is helping  Tell him or her about any side effects or problems you may have with your medicine  The type or amount of medicine may need to be changed  Write down your questions so you remember to ask them during your visits  © 2017 [facility] Information is for End User's use only and may not be sold, redistributed or otherwise used for commercial purposes  All illustrations and images included in CareNotes® are the copyrighted property of A D A M [employer]  or Blair Johansen  The above information is an  only  It is not intended as medical advice for individual conditions or treatments  Talk to your doctor, nurse or pharmacist before following any medical regimen to see if it is safe and effective for you  Weight Management   AMBULATORY CARE:   Why it is important to manage your weight:  Being overweight increases your risk of health conditions such as heart disease, high blood pressure, type 2 diabetes, and certain types of cancer  It can also increase your risk for osteoarthritis, sleep apnea, and other respiratory problems  Aim for a slow, steady weight loss  Even a small amount of weight loss can lower your risk of health problems  How to lose weight safely:  A safe and healthy way to lose weight is to eat fewer calories and get regular exercise  You can lose up about 1 pound a week by decreasing the number of calories you eat by 500 calories each day  You can decrease calories by eating smaller portion sizes or by cutting out high-calorie foods  Read labels to find out how many calories are in the foods you eat   You can also burn calories with exercise such as walking, swimming, or biking  You will be more likely to keep weight off if you make these changes part of your lifestyle  Healthy meal plan for weight management:  A healthy meal plan includes a variety of foods, contains fewer calories, and helps you stay healthy  A healthy meal plan includes the following:  · Eat whole-grain foods more often  A healthy meal plan should contain fiber  Fiber is the part of grains, fruits, and vegetables that is not broken down by your body  Whole-grain foods are healthy and provide extra fiber in your diet  Some examples of whole-grain foods are whole-wheat breads and pastas, oatmeal, brown rice, and bulgur  · Eat a variety of vegetables every day  Include dark, leafy greens such as spinach, kale, marlee greens, and mustard greens  Eat yellow and orange vegetables such as carrots, sweet potatoes, and winter squash  · Eat a variety of fruits every day  Choose fresh or canned fruit (canned in its own juice or light syrup) instead of juice  Fruit juice has very little or no fiber  · Eat low-fat dairy foods  Drink fat-free (skim) milk or 1% milk  Eat fat-free yogurt and low-fat cottage cheese  Try low-fat cheeses such as mozzarella and other reduced-fat cheeses  · Choose meat and other protein foods that are low in fat  Choose beans or other legumes such as split peas or lentils  Choose fish, skinless poultry (chicken or turkey), or lean cuts of red meat (beef or pork)  Before you cook meat or poultry, cut off any visible fat  · Use less fat and oil  Try baking foods instead of frying them  Add less fat, such as margarine, sour cream, regular salad dressing and mayonnaise to foods  Eat fewer high-fat foods  Some examples of high-fat foods include french fries, doughnuts, ice cream, and cakes  · Eat fewer sweets  Limit foods and drinks that are high in sugar   This includes candy, cookies, regular soda, and sweetened drinks  Ways to decrease calories:   · Eat smaller portions  ¨ Use a small plate with smaller servings  ¨ Do not eat second helpings  ¨ When you eat at a restaurant, ask for a box and place half of your meal in the box before you eat  ¨ Share an entrée with someone else  · Replace high-calorie snacks with healthy, low-calorie snacks  ¨ Choose fresh fruit, vegetables, fat-free rice cakes, or air-popped popcorn instead of potato chips, nuts, or chocolate  ¨ Choose water or calorie-free drinks instead of soda or sweetened drinks  · Eat regular meals  Skipping meals can lead to overeating later in the day  Eat a healthy snack in place of a meal if you do not have time to eat a regular meal      · Do not shop for groceries when you are hungry  You may be more likely to make unhealthy food choices  Take a grocery list of healthy foods and shop after you have eaten  Exercise:  Exercise at least 30 minutes per day on most days of the week  Some examples of exercise include walking, biking, dancing, and swimming  You can also fit in more physical activity by taking the stairs instead of the elevator or parking farther away from stores  Ask your healthcare provider about the best exercise plan for you  Other things to consider as you try to lose weight:   · Be aware of situations that may give you the urge to overeat, such as eating while watching television  Find ways to avoid these situations  For example, read a book, go for a walk, or do crafts  · Meet with a weight loss support group or friends who are also trying to lose weight  This may help you stay motivated to continue working on your weight loss goals  © 2017 [address] [employer]  Information is for End User's use only and may not be sold, redistributed or otherwise used for commercial purposes  All illustrations and images included in CareNotes® are the copyrighted property of [employer]  or Blair Johansen    The above information is an  only  It is not intended as medical advice for individual conditions or treatments  Talk to your doctor, nurse or pharmacist before following any medical regimen to see if it is safe and effective for you  Low Fat Diet   AMBULATORY CARE:   A low-fat diet  is an eating plan that is low in total fat, unhealthy fat, and cholesterol  You may need to follow a low-fat diet if you have trouble digesting or absorbing fat  You may also need to follow this diet if you have high cholesterol  You can also lower your cholesterol by increasing the amount of fiber in your diet  Soluble fiber is a type of fiber that helps to decrease cholesterol levels  Different types of fat in food:   · Limit unhealthy fats  A diet that is high in cholesterol, saturated fat, and trans fat may cause unhealthy cholesterol levels  Unhealthy cholesterol levels increase your risk of heart disease  ¨ Cholesterol:  Limit intake of cholesterol to less than 200 mg per day  Cholesterol is found in meat, eggs, and dairy  ¨ Saturated fat:  Limit saturated fat to less than 7% of your total daily calories  Ask your dietitian how many calories you need each day  Saturated fat is found in butter, cheese, ice cream, whole milk, and palm oil  Saturated fat is also found in meat, such as beef, pork, chicken skin, and processed meats  Processed meats include sausage, hot dogs, and bologna  ¨ Trans fat:  Avoid trans fat as much as possible  Trans fat is used in fried and baked foods  Foods that say trans fat free on the label may still have up to 0 5 grams of trans fat per serving  · Include healthy fats  Replace foods that are high in saturated and trans fat with foods high in healthy fats  This may help to decrease high cholesterol levels  ¨ Monounsaturated fats: These are found in avocados, nuts, and vegetable oils, such as olive, canola, and sunflower oil  ¨ Polyunsaturated fats:   These can be found in vegetable oils, such as soybean or corn oil  Omega-3 fats can help to decrease the risk of heart disease  Omega-3 fats are found in fish, such as salmon, herring, trout, and tuna  Omega-3 fats can also be found in plant foods, such as walnuts, flaxseed, soybeans, and canola oil    Foods to limit or avoid:   · Grains:      ¨ Snacks that are made with partially hydrogenated oils, such as chips, regular crackers, and butter-flavored popcorn    ¨ High-fat baked goods, such as biscuits, croissants, doughnuts, pies, cookies, and pastries    · Dairy:      ¨ Whole milk, 2% milk, and yogurt and ice cream made with whole milk    ¨ Half and half creamer, heavy cream, and whipping cream    ¨ Cheese, cream cheese, and sour cream    · Meats and proteins:      ¨ High-fat cuts of meat (T-bone steak, regular hamburger, and ribs)    ¨ Fried meat, poultry (turkey and chicken), and fish    ¨ Poultry (chicken and turkey) with skin    ¨ Cold cuts (salami or bologna), hot dogs, angel, and sausage    ¨ Whole eggs and egg yolks    · Vegetables and fruits with added fat:      ¨ Fried vegetables or vegetables in butter or high-fat sauces, such as cream or cheese sauces    ¨ Fried fruit or fruit served with butter or cream    · Fats:      ¨ Butter, stick margarine, and shortening    ¨ Coconut, palm oil, and palm kernel oil  Foods to include:   · Grains:      ¨ Whole-grain breads, cereals, pasta, and brown rice    ¨ Low-fat crackers and pretzels    · Vegetables and fruits:      ¨ Fresh, frozen, or canned vegetables (no salt or low-sodium)    ¨ Fresh, frozen, dried, or canned fruit (canned in light syrup or fruit juice)    ¨ Avocado    · Low-fat dairy products:      ¨ Nonfat (skim) or 1% milk    ¨ Nonfat or low-fat cheese, yogurt, and cottage cheese    · Meats and proteins:      ¨ Chicken or turkey with no skin    ¨ Baked or broiled fish    ¨ Lean beef and pork (loin, round, extra lean hamburger)    ¨ Beans and peas, unsalted nuts, soy products    ¨ Egg whites and substitutes    ¨ Seeds and nuts    · Fats:      ¨ Unsaturated oil, such as canola, olive, peanut, soybean, or sunflower oil    ¨ Soft or liquid margarine and vegetable oil spread    ¨ Low-fat salad dressing  Other ways to decrease fat:   · Read food labels before you buy foods  Choose foods that have less than 30% of calories from fat  Choose low-fat or fat-free dairy products  Remember that fat free does not mean calorie free  These foods still contain calories, and too many calories can lead to weight gain  · Trim fat from meat and avoid fried food  Trim all visible fat from meat before you cook it  Remove the skin from poultry  Do not hinds meat, fish, or poultry  Bake, roast, boil, or broil these foods instead  Avoid fried foods  Eat a baked potato instead of [place] fries  Steam vegetables instead of sautéing them in butter  · Add less fat to foods  Use imitation angel bits on salads and baked potatoes instead of regular angel bits  Use fat-free or low-fat salad dressings instead of regular dressings  Use low-fat or nonfat butter-flavored topping instead of regular butter or margarine on popcorn and other foods  Ways to decrease fat in recipes:  Replace high-fat ingredients with low-fat or nonfat ones  This may cause baked goods to be drier than usual  You may need to use nonfat cooking spray on pans to prevent food from sticking  You also may need to change the amount of other ingredients, such as water, in the recipe  Try the following:  · Use low-fat or light margarine instead of regular margarine or shortening  · Use lean ground turkey breast or chicken, or lean ground beef (less than 5% fat) instead of hamburger  · Add 1 teaspoon of canola oil to 8 ounces of skim milk instead of using cream or half and half  · Use grated zucchini, carrots, or apples in breads instead of coconut      · Use blenderized, low-fat cottage cheese, plain tofu, or low-fat ricotta cheese instead of cream cheese  · Use 1 egg white and 1 teaspoon of canola oil, or use ¼ cup (2 ounces) of fat-free egg substitute instead of a whole egg  · Replace half of the oil that is called for in a recipe with applesauce when you bake  Use 3 tablespoons of cocoa powder and 1 tablespoon of canola oil instead of a square of baking chocolate  How to increase fiber:  Eat enough high-fiber foods to get 20 to 30 grams of fiber every day  Slowly increase your fiber intake to avoid stomach cramps, gas, and other problems  · Eat 3 ounces of whole-grain foods each day  An ounce is about 1 slice of bread  Eat whole-grain breads, such as whole-wheat bread  Whole wheat, whole-wheat flour, or other whole grains should be listed as the first ingredient on the food label  Replace white flour with whole-grain flour or use half of each in recipes  Whole-grain flour is heavier than white flour, so you may have to add more yeast or baking powder  · Eat a high-fiber cereal for breakfast   Oatmeal is a good source of soluble fiber  Look for cereals that have bran or fiber in the name  Choose whole-grain products, such as brown rice, barley, and whole-wheat pasta  · Eat more beans, peas, and lentils  For example, add beans to soups or salads  Eat at least 5 cups of fruits and vegetables each day  Eat fruits and vegetables with the peel because the peel is high in fiber  © 2017 [address] [employer]  Information is for End User's use only and may not be sold, redistributed or otherwise used for commercial purposes  All illustrations and images included in CareNotes® are the copyrighted property of [employer]  or Blair Johansen  The above information is an  only  It is not intended as medical advice for individual conditions or treatments  Talk to your doctor, nurse or pharmacist before following any medical regimen to see if it is safe and effective for you      Heart Healthy Diet AMBULATORY CARE:   A heart healthy diet  is an eating plan low in total fat, unhealthy fats, and sodium (salt)  A heart healthy diet helps decrease your risk for heart disease and stroke  Limit the amount of fat you eat to 25% to 35% of your total daily calories  Limit sodium to less than 2,300 mg each day  Healthy fats:  Healthy fats can help improve cholesterol levels  The risk for heart disease is decreased when cholesterol levels are normal  Choose healthy fats, such as the following:  · Unsaturated fat  is found in foods such as soybean, canola, olive, corn, and safflower oils  It is also found in soft tub margarine that is made with liquid vegetable oil  · Omega-3 fat  is found in certain fish, such as salmon, tuna, and trout, and in walnuts and flaxseed  Unhealthy fats:  Unhealthy fats can cause unhealthy cholesterol levels in your blood and increase your risk of heart disease  Limit unhealthy fats, such as the following:  · Cholesterol  is found in animal foods, such as eggs and lobster, and in dairy products made from whole milk  Limit cholesterol to less than 300 milligrams (mg) each day  You may need to limit cholesterol to 200 mg each day if you have heart disease  · Saturated fat  is found in meats, such as angel and hamburger  It is also found in chicken or turkey skin, whole milk, and butter  Limit saturated fat to less than 7% of your total daily calories  Limit saturated fat to less than 6% if you have heart disease or are at increased risk for it  · Trans fat  is found in packaged foods, such as potato chips and cookies  It is also in hard margarine, some fried foods, and shortening  Avoid trans fats as much as possible    Heart healthy foods and drinks to include:  Ask your dietitian or healthcare provider how many servings to have from each of the following food groups:  · Grains:      ¨ Whole-wheat breads, cereals, and pastas, and brown rice    ¨ Low-fat, low-sodium crackers and chips    · Vegetables:      ¨ Broccoli, green beans, green peas, and spinach    ¨ Collards, kale, and lima beans    ¨ Carrots, sweet potatoes, tomatoes, and peppers    ¨ Canned vegetables with no salt added    · Fruits:      ¨ Bananas, peaches, pears, and pineapple    ¨ Grapes, raisins, and dates    ¨ Oranges, tangerines, grapefruit, orange juice, and grapefruit juice    ¨ Apricots, mangoes, melons, and papaya    ¨ Raspberries and strawberries    ¨ Canned fruit with no added sugar    · Low-fat dairy products:      ¨ Nonfat (skim) milk, 1% milk, and low-fat almond, cashew, or soy milks fortified with calcium    ¨ Low-fat cheese, regular or frozen yogurt, and cottage cheese    · Meats and proteins , such as lean cuts of beef and pork (loin, leg, round), skinless chicken and turkey, legumes, soy products, egg whites, and nuts  Foods and drinks to limit or avoid:  Ask your dietitian or healthcare provider about these and other foods that are high in unhealthy fat, sodium, and sugar:  · Snack or packaged foods , such as frozen dinners, cookies, macaroni and cheese, and cereals with more than 300 mg of sodium per serving    · Canned or dry mixes  for cakes, soups, sauces, or gravies    · Vegetables with added sodium , such as instant potatoes, vegetables with added sauces, or regular canned vegetables    · Other foods high in sodium , such as ketchup, barbecue sauce, salad dressing, pickles, olives, soy sauce, and miso    · High-fat dairy foods  such as whole or 2% milk, cream cheese, or sour cream, and cheeses     · High-fat protein foods  such as high-fat cuts of beef (T-bone steaks, ribs), chicken or turkey with skin, and organ meats, such as liver    · Cured or smoked meats , such as hot dogs, angel, and sausage    · Unhealthy fats and oils , such as butter, stick margarine, shortening, and cooking oils such as coconut or palm oil    · Food and drinks high in sugar , such as soft drinks (soda), sports drinks, sweetened tea, candy, cake, cookies, pies, and doughnuts  Other diet guidelines to follow:   · Eat more foods containing omega-3 fats  Eat fish high in omega-3 fats at least 2 times a week  · Limit alcohol  Too much alcohol can damage your heart and raise your blood pressure  Women should limit alcohol to 1 drink a day  Men should limit alcohol to 2 drinks a day  A drink of alcohol is 12 ounces of beer, 5 ounces of wine, or 1½ ounces of liquor  · Choose low-sodium foods  High-sodium foods can lead to high blood pressure  Add little or no salt to food you prepare  Use herbs and spices in place of salt  · Eat more fiber  to help lower cholesterol levels  Eat at least 5 servings of fruits and vegetables each day  Eat 3 ounces of whole-grain foods each day  Legumes (beans) are also a good source of fiber  Lifestyle guidelines:   · Do not smoke  Nicotine and other chemicals in cigarettes and cigars can cause lung and heart damage  Ask your healthcare provider for information if you currently smoke and need help to quit  E-cigarettes or smokeless tobacco still contain nicotine  Talk to your healthcare provider before you use these products  · Exercise regularly  to help you maintain a healthy weight and improve your blood pressure and cholesterol levels  Ask your healthcare provider about the best exercise plan for you  Do not start an exercise program without asking your healthcare provider  Follow up with your healthcare provider as directed:  Write down your questions so you remember to ask them during your visits  © 2017 [address] [employer] Information is for End User's use only and may not be sold, redistributed or otherwise used for commercial purposes  All illustrations and images included in CareNotes® are the copyrighted property of [employer]  or Blair Johansen  The above information is an  only   It is not intended as medical advice for individual conditions or treatments  Talk to your doctor, nurse or pharmacist before following any medical regimen to see if it is safe and effective for you

## 2020-01-28 NOTE — TELEPHONE ENCOUNTER
Pt would like a rescue inhaler, had albuterol before but made her anxious, is there another one you could prescribe?  Pt uses [facility]

## 2020-01-28 NOTE — PROGRESS NOTES
BMI Counseling: Body mass index is 28 36 kg/m²  The BMI is above normal  Nutrition recommendations include decreasing portion sizes, increasing intake of lean protein and reducing intake of saturated and trans fat  Exercise recommendations include moderate physical activity 150 minutes/week  No pharmacotherapy was ordered  Assessment/Plan:  Problem List Items Addressed This Visit        Other    Major depressive disorder, recurrent, severe without psychotic features ([place] [address] ) - Primary (Chronic)    Alcohol use disorder, moderate, dependence ([place] [address] )    History of suicidal ideation      Other Visit Diagnoses     YANIRA (generalized anxiety disorder)        Panic attacks        Positive depression screening        Mild major depression, single episode ([place] [address] )        Anxiety with depression        Overweight (BMI 25 0-29  9)        Mild intermittent asthma without complication        Relevant Medications    albuterol (PROVENTIL HFA,VENTOLIN HFA) 90 mcg/act inhaler           Diagnoses and all orders for this visit:    Major depressive disorder, recurrent, severe without psychotic features ([place] [address] )    YANIRA (generalized anxiety disorder)    Alcohol use disorder, moderate, dependence ([place] [address] )    Panic attacks    History of suicidal ideation    Positive depression screening    Mild major depression, single episode ([place] [address] )    Anxiety with depression    Overweight (BMI 25 0-29  9)    Mild intermittent asthma without complication  -     albuterol (PROVENTIL HFA,VENTOLIN HFA) 90 mcg/act inhaler; Inhale 2 puffs every 6 (six) hours as needed for wheezing or shortness of breath    Other orders  -     Ascorbic Acid (C-1000/ALIX HIPS PO); Take by mouth  -     MAGNESIUM CITRATE PO; Take 400 mg by mouth  -     vitamin E, tocopherol, 400 units capsule; Take 400 Units by mouth daily  -     Coenzyme Q10 (COQ10) 100 MG CAPS; Take by mouth  -     BIOTIN 5000 PO;  Take by mouth  -     Ferrous Sulfate (IRON) 325 (65 Fe) MG TABS; Take by mouth        No problem-specific Assessment & Plan notes found for this encounter  A/P: Clinically stable  Doubt the vit d caused the event, but told pt she could stop it for now  ??panic attack or possibly some withdraw from the ETOH  Recommend she get into AA and also keep f/u with psych next week  PE not overly impressive at this time  Continue current meds for now  RTC two months for f/u and now that she has insurance, routine  Wants meds for arthritis, but in view of her recent med changes and yesterday's event, would hold off until pt settles in  Subjective:      Patient ID: Venu Weber is a 47 y o  female  WF presents for ER f/u  Pt with long h/o psych issues and hasn't had insurance in while  Was recently admitted to behavioral health for ETOH abuse,  MDD, YANIRA, and suicidal ideations  Meds adjusted and started on some supplement and vitamins  D/c to home and reports having to go the ER yesterday due to being unable to relax  Dx with YANIRA/panic attack and d/c'd  Doing better  Pt feels it is because of the vit d she started  No longer suicidal  Taking her meds  Still using ETOH  The following portions of the patient's history were reviewed and updated as appropriate:   She has a past medical history of Anxiety, Bell's palsy, COPD (chronic obstructive pulmonary disease) ([facility] [address] ), Depression, Menopause, Mild persistent asthma without complication ([address]), Obesity, and Primary osteoarthritis involving multiple joints (10/9/2018)  ,  does not have any pertinent problems on file  ,   has a past surgical history that includes Ankle surgery; Nose surgery; Total hip arthroplasty;  section; Tubal ligation; Total hip arthroplasty; Dilation and curettage, diagnostic / therapeutic; Tubal ligation; and Colonoscopy w/ polypectomy  ,  family history includes Mental illness in her brother and mother; No Known Problems in her father; Suicidality in her brother and mother  ,   reports that she has been smoking   She has a 80 00 pack-year smoking history  She has never used smokeless tobacco  She reports that she drinks about 3 0 - 5 0 standard drinks of alcohol per week  She reports that she does not use drugs  ,  is allergic to pollen extract     Current Outpatient Medications   Medication Sig Dispense Refill    Ascorbic Acid (C-1000/ALIX HIPS PO) Take by mouth      atorvastatin (LIPITOR) 20 mg tablet Take 1 tablet (20 mg total) by mouth daily 30 tablet 1    BIOTIN 5000 PO Take by mouth      [START ON 4/14/2020] cholecalciferol (VITAMIN D3) 1,000 units tablet Take 1 tablet (1,000 Units total) by mouth daily 30 tablet 1    Coenzyme Q10 (COQ10) 100 MG CAPS Take by mouth      ergocalciferol (VITAMIN D2) 50,000 units Take 1 capsule (50,000 Units total) by mouth once a week for 13 doses 13 capsule 0    escitalopram (LEXAPRO) 20 mg tablet Take 1 tablet (20 mg total) by mouth daily in the early morning 30 tablet 0    Ferrous Sulfate (IRON) 325 (65 Fe) MG TABS Take by mouth      fluticasone-umeclidinium-vilanterol (TRELEGY) 100-62 5-25 MCG/INH inhaler Inhale 1 puff daily Rinse mouth after use   1 Inhaler 0    folic acid (FOLVITE) 1 mg tablet Take 1 tablet (1 mg total) by mouth daily 30 tablet 0    gabapentin (NEURONTIN) 300 mg capsule Take 1 capsule (300 mg total) by mouth 3 (three) times a day 90 capsule 0    loratadine (CLARITIN) 10 mg tablet Take 1 tablet (10 mg total) by mouth daily 30 tablet 1    MAGNESIUM CITRATE PO Take 400 mg by mouth      pramipexole (MIRAPEX) 0 5 mg tablet Take 1 tablet (0 5 mg total) by mouth daily at bedtime 30 tablet 1    QUEtiapine (SEROquel) 100 mg tablet Take 1 tablet (100 mg total) by mouth daily at bedtime 30 tablet 0    thiamine 100 MG tablet Take 1 tablet (100 mg total) by mouth daily 30 tablet 0    vitamin E, tocopherol, 400 units capsule Take 400 Units by mouth daily      albuterol (PROVENTIL HFA,VENTOLIN HFA) 90 mcg/act inhaler Inhale 2 puffs every 6 (six) hours as needed for wheezing or shortness of breath 3 Inhaler 3     No current facility-administered medications for this visit  Review of Systems   Constitutional: Negative for activity change, chills, diaphoresis, fatigue and fever  Respiratory: Negative for cough, chest tightness, shortness of breath and wheezing  Cardiovascular: Negative for chest pain, palpitations and leg swelling  Gastrointestinal: Negative for abdominal pain, constipation, diarrhea, nausea and vomiting  Genitourinary: Negative for difficulty urinating, dysuria and frequency  Musculoskeletal: Negative for arthralgias, gait problem and myalgias  Neurological: Negative for light-headedness and headaches  Psychiatric/Behavioral: Positive for dysphoric mood  Negative for confusion, self-injury, sleep disturbance and suicidal ideas  The patient is nervous/anxious  PHQ-9 Depression Screening    PHQ-9:    Frequency of the following problems over the past two weeks:             Objective:  Vitals:    01/28/20 1519   BP: 118/80   BP Location: Right arm   Patient Position: Sitting   Cuff Size: Adult   Pulse: 61   Resp: 18   Temp: 98 1 °F (36 7 °C)   TempSrc: Tympanic   SpO2: 95%   Weight: 74 9 kg (165 lb 3 2 oz)   Height: 5' 4" (1 626 m)     Body mass index is 28 36 kg/m²  Physical Exam   Constitutional: She is oriented to person, place, and time  She appears well-developed and well-nourished  No distress  HENT:   Head: Normocephalic and atraumatic  Mouth/Throat: Oropharynx is clear and moist    Eyes: Pupils are equal, round, and reactive to light  Conjunctivae and EOM are normal    Cardiovascular: Normal rate, regular rhythm and normal heart sounds  Pulmonary/Chest: Effort normal and breath sounds normal  No respiratory distress  She has no wheezes  She has no rales  Neurological: She is alert and oriented to person, place, and time  Psychiatric: Her behavior is normal  Judgment and thought content normal    Very anxious  Nursing note and vitals reviewed  Depression Screening Follow-up Plan: Patient's depression screening was positive with a PHQ-2 score of   Their PHQ-9 score was   Continue regular follow-up with their psychologist/therapist/psychiatrist who is managing their mental health condition(s)  BMI Counseling: Body mass index is 28 36 kg/m²  The BMI is above normal  Nutrition recommendations include reducing portion sizes, decreasing overall calorie intake, reducing intake of saturated fat and trans fat and reducing intake of cholesterol  Exercise recommendations include moderate aerobic physical activity for 150 minutes/week

## 2020-02-03 DIAGNOSIS — J43.9 PULMONARY EMPHYSEMA, UNSPECIFIED EMPHYSEMA TYPE (HCC): ICD-10-CM

## 2020-02-11 DIAGNOSIS — F33.2 MAJOR DEPRESSIVE DISORDER, RECURRENT, SEVERE WITHOUT PSYCHOTIC FEATURES (HCC): Chronic | ICD-10-CM

## 2020-02-11 RX ORDER — GABAPENTIN 300 MG/1
300 CAPSULE ORAL 3 TIMES DAILY
Qty: 90 CAPSULE | Refills: 5 | Status: SHIPPED | OUTPATIENT
Start: 2020-02-11 | End: 2021-09-07

## 2020-02-14 ENCOUNTER — HOSPITAL ENCOUNTER (EMERGENCY)
Facility: HOSPITAL | Age: 55
Discharge: HOME | End: 2020-02-14
Attending: INTERNAL MEDICINE | Admitting: INTERNAL MEDICINE
Payer: COMMERCIAL

## 2020-02-14 VITALS
RESPIRATION RATE: 18 BRPM | WEIGHT: 164 LBS | HEIGHT: 64 IN | DIASTOLIC BLOOD PRESSURE: 75 MMHG | SYSTOLIC BLOOD PRESSURE: 116 MMHG | OXYGEN SATURATION: 97 % | BODY MASS INDEX: 28 KG/M2 | TEMPERATURE: 98.3 F | HEART RATE: 94 BPM

## 2020-02-14 DIAGNOSIS — F32.A DEPRESSION: Primary | ICD-10-CM

## 2020-02-14 LAB
ALBUMIN SERPL BCP-MCNC: 4.5 G/DL (ref 3.5–5.7)
ALP SERPL-CCNC: 72 U/L (ref 40–150)
ALT SERPL W P-5'-P-CCNC: 7 U/L (ref 7–52)
AMPHETAMINES SERPL QL SCN: NEGATIVE
ANION GAP SERPL CALCULATED.3IONS-SCNC: 11 MMOL/L (ref 4–13)
AST SERPL W P-5'-P-CCNC: 9 U/L (ref 13–39)
ATRIAL RATE: 78 BPM
BARBITURATES UR QL: NEGATIVE
BASOPHILS # BLD AUTO: 0.1 THOUSANDS/ΜL (ref 0–0.1)
BASOPHILS NFR BLD AUTO: 1 % (ref 0–2)
BENZODIAZ UR QL: NEGATIVE
BILIRUB SERPL-MCNC: 0.3 MG/DL (ref 0.2–1)
BILIRUB UR QL STRIP: NEGATIVE
BUN SERPL-MCNC: 4 MG/DL (ref 7–25)
CALCIUM SERPL-MCNC: 9.1 MG/DL (ref 8.6–10.5)
CHLORIDE SERPL-SCNC: 109 MMOL/L (ref 98–107)
CLARITY UR: CLEAR
CO2 SERPL-SCNC: 23 MMOL/L (ref 21–31)
COCAINE UR QL: NEGATIVE
COLOR UR: YELLOW
CREAT SERPL-MCNC: 0.77 MG/DL (ref 0.6–1.2)
EOSINOPHIL # BLD AUTO: 0.6 THOUSAND/ΜL (ref 0–0.61)
EOSINOPHIL NFR BLD AUTO: 5 % (ref 0–5)
ERYTHROCYTE [DISTWIDTH] IN BLOOD BY AUTOMATED COUNT: 15.2 % (ref 11.5–14.5)
ETHANOL EXG-MCNC: 0.05 MG/DL
GFR SERPL CREATININE-BSD FRML MDRD: 88 ML/MIN/1.73SQ M
GLUCOSE SERPL-MCNC: 136 MG/DL (ref 65–99)
GLUCOSE UR STRIP-MCNC: NEGATIVE MG/DL
HCT VFR BLD AUTO: 42.1 % (ref 42–47)
HGB BLD-MCNC: 13.3 G/DL (ref 12–16)
HGB UR QL STRIP.AUTO: NEGATIVE
KETONES UR STRIP-MCNC: NEGATIVE MG/DL
LEUKOCYTE ESTERASE UR QL STRIP: NEGATIVE
LYMPHOCYTES # BLD AUTO: 2.9 THOUSANDS/ΜL (ref 0.6–4.47)
LYMPHOCYTES NFR BLD AUTO: 26 % (ref 21–51)
MCH RBC QN AUTO: 29.6 PG (ref 26–34)
MCHC RBC AUTO-ENTMCNC: 31.6 G/DL (ref 31–37)
MCV RBC AUTO: 93 FL (ref 81–99)
METHADONE UR QL: NEGATIVE
MONOCYTES # BLD AUTO: 0.6 THOUSAND/ΜL (ref 0.17–1.22)
MONOCYTES NFR BLD AUTO: 5 % (ref 2–12)
NEUTROPHILS # BLD AUTO: 6.9 THOUSANDS/ΜL (ref 1.4–6.5)
NEUTS SEG NFR BLD AUTO: 62 % (ref 42–75)
NITRITE UR QL STRIP: NEGATIVE
OPIATES UR QL SCN: NEGATIVE
P AXIS: 71 DEGREES
PCP UR QL: NEGATIVE
PH UR STRIP.AUTO: 6.5 [PH]
PLATELET # BLD AUTO: 352 THOUSANDS/UL (ref 149–390)
PMV BLD AUTO: 7.5 FL (ref 8.6–11.7)
POTASSIUM SERPL-SCNC: 3.6 MMOL/L (ref 3.5–5.5)
PR INTERVAL: 132 MS
PROT SERPL-MCNC: 7.3 G/DL (ref 6.4–8.9)
PROT UR STRIP-MCNC: NEGATIVE MG/DL
QRS AXIS: 54 DEGREES
QRSD INTERVAL: 68 MS
QT INTERVAL: 436 MS
QTC INTERVAL: 497 MS
RBC # BLD AUTO: 4.5 MILLION/UL (ref 3.9–5.2)
SODIUM SERPL-SCNC: 143 MMOL/L (ref 134–143)
SP GR UR STRIP.AUTO: <=1.005 (ref 1–1.03)
T WAVE AXIS: 70 DEGREES
THC UR QL: NEGATIVE
TSH SERPL DL<=0.05 MIU/L-ACNC: 0.41 UIU/ML (ref 0.45–5.33)
UROBILINOGEN UR QL STRIP.AUTO: 0.2 E.U./DL
VENTRICULAR RATE: 78 BPM
WBC # BLD AUTO: 11.2 THOUSAND/UL (ref 4.8–10.8)

## 2020-02-14 PROCEDURE — 81003 URINALYSIS AUTO W/O SCOPE: CPT | Performed by: INTERNAL MEDICINE

## 2020-02-14 PROCEDURE — 99285 EMERGENCY DEPT VISIT HI MDM: CPT

## 2020-02-14 PROCEDURE — 93010 ELECTROCARDIOGRAM REPORT: CPT | Performed by: INTERNAL MEDICINE

## 2020-02-14 PROCEDURE — 80053 COMPREHEN METABOLIC PANEL: CPT | Performed by: INTERNAL MEDICINE

## 2020-02-14 PROCEDURE — 80307 DRUG TEST PRSMV CHEM ANLYZR: CPT | Performed by: INTERNAL MEDICINE

## 2020-02-14 PROCEDURE — 82075 ASSAY OF BREATH ETHANOL: CPT | Performed by: INTERNAL MEDICINE

## 2020-02-14 PROCEDURE — 93005 ELECTROCARDIOGRAM TRACING: CPT

## 2020-02-14 PROCEDURE — 36415 COLL VENOUS BLD VENIPUNCTURE: CPT | Performed by: INTERNAL MEDICINE

## 2020-02-14 PROCEDURE — 84443 ASSAY THYROID STIM HORMONE: CPT | Performed by: INTERNAL MEDICINE

## 2020-02-14 PROCEDURE — 99283 EMERGENCY DEPT VISIT LOW MDM: CPT | Performed by: INTERNAL MEDICINE

## 2020-02-14 PROCEDURE — 85025 COMPLETE CBC W/AUTO DIFF WBC: CPT | Performed by: INTERNAL MEDICINE

## 2020-02-14 RX ORDER — NICOTINE 21 MG/24HR
21 PATCH, TRANSDERMAL 24 HOURS TRANSDERMAL DAILY
Status: DISCONTINUED | OUTPATIENT
Start: 2020-02-14 | End: 2020-02-14

## 2020-02-14 RX ORDER — QUETIAPINE FUMARATE 25 MG/1
100 TABLET, FILM COATED ORAL ONCE
Status: DISCONTINUED | OUTPATIENT
Start: 2020-02-14 | End: 2020-02-14

## 2020-02-14 RX ORDER — GABAPENTIN 300 MG/1
300 CAPSULE ORAL ONCE
Status: DISCONTINUED | OUTPATIENT
Start: 2020-02-14 | End: 2020-02-14

## 2020-02-14 RX ORDER — ATORVASTATIN CALCIUM 20 MG/1
20 TABLET, FILM COATED ORAL ONCE
Status: DISCONTINUED | OUTPATIENT
Start: 2020-02-14 | End: 2020-02-14

## 2020-02-14 RX ORDER — ATORVASTATIN CALCIUM 20 MG/1
20 TABLET, FILM COATED ORAL
Status: DISCONTINUED | OUTPATIENT
Start: 2020-02-14 | End: 2020-02-14

## 2020-02-14 RX ORDER — PRAMIPEXOLE DIHYDROCHLORIDE 0.5 MG/1
0.5 TABLET ORAL 3 TIMES DAILY
Status: DISCONTINUED | OUTPATIENT
Start: 2020-02-14 | End: 2020-02-14

## 2020-02-14 RX ORDER — PRAMIPEXOLE DIHYDROCHLORIDE 0.5 MG/1
0.5 TABLET ORAL ONCE
Status: DISCONTINUED | OUTPATIENT
Start: 2020-02-14 | End: 2020-02-14

## 2020-02-14 NOTE — ED NOTES
Pt states to this nurse that she just "wants to go home " Pt has not expressed to this nurse any SI/HI  Dr Luz Marina Gonzalez and Kateryna Clarke [facility] notified       Suzanne Favre, RN  02/14/20 3495
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
## 2020-02-14 NOTE — ED NOTES
Per crisis, pt states she wants to go home and is calling for a ride  Pt appears alert and oriented  Pt expresses no SI/HI  Pt ambulatory with no s/sx of distress       Rebekah Dietrich RN  02/14/20 [id number]

## 2020-02-17 ENCOUNTER — TELEPHONE (OUTPATIENT)
Dept: INTERNAL MEDICINE CLINIC | Facility: CLINIC | Age: 55
End: 2020-02-17

## 2020-02-17 NOTE — TELEPHONE ENCOUNTER
Pt called  She has bad arthritis in her r knee, she has swelling and pain  Is it ok for her to go to yoga? Pt feels if she gets it moving, the swelling may come down

## 2020-02-21 DIAGNOSIS — M25.561 ACUTE PAIN OF RIGHT KNEE: Primary | ICD-10-CM

## 2020-02-27 ENCOUNTER — TELEPHONE (OUTPATIENT)
Dept: INTERNAL MEDICINE CLINIC | Facility: CLINIC | Age: 55
End: 2020-02-27

## 2020-02-27 DIAGNOSIS — M15.9 PRIMARY OSTEOARTHRITIS INVOLVING MULTIPLE JOINTS: Primary | ICD-10-CM

## 2020-02-27 RX ORDER — NAPROXEN 500 MG/1
500 TABLET ORAL 2 TIMES DAILY WITH MEALS
Qty: 180 TABLET | Refills: 3 | Status: SHIPPED | OUTPATIENT
Start: 2020-02-27 | End: 2020-03-03

## 2020-03-01 ENCOUNTER — NURSE TRIAGE (OUTPATIENT)
Dept: OTHER | Facility: OTHER | Age: 55
End: 2020-03-01

## 2020-03-01 NOTE — TELEPHONE ENCOUNTER
Regarding: Knee pain  ----- Message from Jerica Martínez sent at 3/1/2020 12:17 PM EST -----  " I am having a lot of pain in my right knee and it's very swollen "

## 2020-03-01 NOTE — TELEPHONE ENCOUNTER
Reason for Disposition   [1] Very swollen joint AND [2] no fever    Answer Assessment - Initial Assessment Questions  1  LOCATION and RADIATION: "Where is the pain located?"       Right knee    2  QUALITY: "What does the pain feel like?"  (e g , sharp, dull, aching, burning)     Aching pain  3  SEVERITY: "How bad is the pain?" "What does it keep you from doing?"   (Scale 1-10; or mild, moderate, severe)         8/10- aching    4  ONSET: "When did the pain start?" "Does it come and go, or is it there all the time?"     This is an ongoing issue  Today pain seems more severe  5  RECURRENT: "Have you had this pain before?" If so, ask: "When, and what happened then?"      Does usually have knee pain  Today pain is stronger  6  SETTING: "Has there been any recent work, exercise or other activity that involved that part of the body?"       States she has not done anything differently  7  AGGRAVATING FACTORS: "What makes the knee pain worse?" (e g , walking, climbing stairs, running)      Walking and climbing stairs makes it worse  Resting and propping her leg up feels better  8  ASSOCIATED SYMPTOMS: "Is there any swelling or redness of the knee?"           Swelling and slight redness to outside of knee  9  OTHER SYMPTOMS: "Do you have any other symptoms?" (e g , chest pain, difficulty breathing, fever, calf pain)      No fever  Temp: 98 6 (oral)  Is still able to walk but just hurts a lot  Protocols used: KNEE PAIN-ADULT-        Patient states she has an appt on 3/3 already  Will follow up with office to see if appt can be moved up

## 2020-03-03 ENCOUNTER — OFFICE VISIT (OUTPATIENT)
Dept: INTERNAL MEDICINE CLINIC | Facility: CLINIC | Age: 55
End: 2020-03-03
Payer: COMMERCIAL

## 2020-03-03 VITALS
HEIGHT: 64 IN | BODY MASS INDEX: 29.41 KG/M2 | WEIGHT: 172.25 LBS | TEMPERATURE: 97.9 F | DIASTOLIC BLOOD PRESSURE: 60 MMHG | HEART RATE: 76 BPM | OXYGEN SATURATION: 94 % | SYSTOLIC BLOOD PRESSURE: 98 MMHG

## 2020-03-03 DIAGNOSIS — Z13.31 POSITIVE DEPRESSION SCREENING: ICD-10-CM

## 2020-03-03 DIAGNOSIS — E78.2 MIXED HYPERLIPIDEMIA: ICD-10-CM

## 2020-03-03 DIAGNOSIS — M15.9 PRIMARY OSTEOARTHRITIS INVOLVING MULTIPLE JOINTS: ICD-10-CM

## 2020-03-03 DIAGNOSIS — F33.2 MAJOR DEPRESSIVE DISORDER, RECURRENT, SEVERE WITHOUT PSYCHOTIC FEATURES (HCC): Chronic | ICD-10-CM

## 2020-03-03 DIAGNOSIS — Z72.0 TOBACCO ABUSE: ICD-10-CM

## 2020-03-03 DIAGNOSIS — D72.829 LEUKOCYTOSIS, UNSPECIFIED TYPE: ICD-10-CM

## 2020-03-03 DIAGNOSIS — F10.20 ALCOHOL USE DISORDER, MODERATE, DEPENDENCE (HCC): ICD-10-CM

## 2020-03-03 DIAGNOSIS — J43.9 PULMONARY EMPHYSEMA, UNSPECIFIED EMPHYSEMA TYPE (HCC): Primary | ICD-10-CM

## 2020-03-03 DIAGNOSIS — Z13.29 SCREENING FOR THYROID DISORDER: ICD-10-CM

## 2020-03-03 DIAGNOSIS — J30.2 SEASONAL ALLERGIES: ICD-10-CM

## 2020-03-03 DIAGNOSIS — Z13.220 SCREENING FOR LIPID DISORDERS: ICD-10-CM

## 2020-03-03 DIAGNOSIS — Z13.1 SCREENING FOR DIABETES MELLITUS (DM): ICD-10-CM

## 2020-03-03 DIAGNOSIS — E55.9 VITAMIN D DEFICIENCY: ICD-10-CM

## 2020-03-03 DIAGNOSIS — Z11.4 SCREENING FOR HIV (HUMAN IMMUNODEFICIENCY VIRUS): ICD-10-CM

## 2020-03-03 DIAGNOSIS — R79.89 ABNORMAL THYROID BLOOD TEST: ICD-10-CM

## 2020-03-03 DIAGNOSIS — Z13.0 SCREENING FOR DEFICIENCY ANEMIA: ICD-10-CM

## 2020-03-03 DIAGNOSIS — R73.09 ELEVATED GLUCOSE: ICD-10-CM

## 2020-03-03 DIAGNOSIS — F41.8 ANXIETY WITH DEPRESSION: ICD-10-CM

## 2020-03-03 DIAGNOSIS — F32.1 MODERATE MAJOR DEPRESSION, SINGLE EPISODE (HCC): ICD-10-CM

## 2020-03-03 PROCEDURE — 96372 THER/PROPH/DIAG INJ SC/IM: CPT | Performed by: INTERNAL MEDICINE

## 2020-03-03 PROCEDURE — 3008F BODY MASS INDEX DOCD: CPT | Performed by: INTERNAL MEDICINE

## 2020-03-03 PROCEDURE — 1111F DSCHRG MED/CURRENT MED MERGE: CPT | Performed by: INTERNAL MEDICINE

## 2020-03-03 PROCEDURE — 99214 OFFICE O/P EST MOD 30 MIN: CPT | Performed by: INTERNAL MEDICINE

## 2020-03-03 PROCEDURE — 4004F PT TOBACCO SCREEN RCVD TLK: CPT | Performed by: INTERNAL MEDICINE

## 2020-03-03 RX ORDER — FEXOFENADINE HCL 180 MG/1
180 TABLET ORAL DAILY
Qty: 30 TABLET | Refills: 1 | Status: SHIPPED | OUTPATIENT
Start: 2020-03-03 | End: 2020-07-10

## 2020-03-03 RX ORDER — MONTELUKAST SODIUM 10 MG/1
10 TABLET ORAL
Qty: 90 TABLET | Refills: 1 | Status: SHIPPED | OUTPATIENT
Start: 2020-03-03 | End: 2020-09-14

## 2020-03-03 RX ORDER — FLUTICASONE PROPIONATE 50 MCG
1 SPRAY, SUSPENSION (ML) NASAL DAILY
Qty: 16 G | Refills: 0 | Status: SHIPPED | OUTPATIENT
Start: 2020-03-03 | End: 2021-01-14

## 2020-03-03 RX ORDER — KETOROLAC TROMETHAMINE 30 MG/ML
30 INJECTION, SOLUTION INTRAMUSCULAR; INTRAVENOUS ONCE
Status: COMPLETED | OUTPATIENT
Start: 2020-03-03 | End: 2020-03-03

## 2020-03-03 RX ORDER — MELOXICAM 15 MG/1
15 TABLET ORAL DAILY
Qty: 30 TABLET | Refills: 1 | Status: SHIPPED | OUTPATIENT
Start: 2020-03-03 | End: 2020-06-03

## 2020-03-03 RX ADMIN — KETOROLAC TROMETHAMINE 30 MG: 30 INJECTION, SOLUTION INTRAMUSCULAR; INTRAVENOUS at 14:17

## 2020-03-03 NOTE — PATIENT INSTRUCTIONS
Cigarette Smoking and Your Health   AMBULATORY CARE:   Risks to your health if you smoke:  Nicotine and other chemicals found in tobacco damage every cell in your body  Even if you are a light smoker, you have an increased risk for cancer, heart disease, and lung disease  If you are pregnant or have diabetes, smoking increases your risk for complications  Benefits to your health if you stop smoking:   · You decrease respiratory symptoms such as coughing, wheezing, and shortness of breath  · You reduce your risk for cancers of the lung, mouth, throat, kidney, bladder, pancreas, stomach, and cervix  If you already have cancer, you increase the benefits of chemotherapy  You also reduce your risk for cancer returning or a second cancer from developing  · You reduce your risk for heart disease, blood clots, heart attack, and stroke  · You reduce your risk for lung infections, and diseases such as pneumonia, asthma, chronic bronchitis, and emphysema  · Your circulation improves  More oxygen can be delivered to your body  If you have diabetes, you lower your risk for complications, such as kidney, artery, and eye diseases  You also lower your risk for nerve damage  Nerve damage can lead to amputations, poor vision, and blindness  · You improve your body's ability to heal and to fight infections  Benefits to the health of others if you stop smoking:  Tobacco is harmful to nonsmokers who breathe in your secondhand smoke  The following are ways the health of others around you may improve when you stop smoking:  · You lower the risks for lung cancer and heart disease in nonsmoking adults  · If you are pregnant, you lower the risk for miscarriage, early delivery, low birth weight, and stillbirth  You also lower your baby's risk for SIDS, obesity, developmental delay, and neurobehavioral problems, such as ADHD       · If you have children, you lower their risk for ear infections, colds, pneumonia, bronchitis, and asthma  For more information and support to stop smoking:   · Smokefree  gov  Phone: 6- 654 - 934-3883  Web Address: www smokefree  gov  Follow up with your healthcare provider as directed:  Write down your questions so you remember to ask them during your visits  © 2017 [employer] Information is for End User's use only and may not be sold, redistributed or otherwise used for commercial purposes  All illustrations and images included in CareNotes® are the copyrighted property of [employer]  or Blair [employer]ansen  The above information is an  only  It is not intended as medical advice for individual conditions or treatments  Talk to your doctor, nurse or pharmacist before following any medical regimen to see if it is safe and effective for you  Depression   AMBULATORY CARE:   Depression  is a medical condition that causes feelings of sadness or hopelessness that do not go away  Depression may cause you to lose interest in things you used to enjoy  These feelings may interfere with your daily life  Common symptoms include the following:   · Appetite changes, or weight gain or loss    · Trouble going to sleep or staying asleep, or sleeping too much    · Fatigue or lack of energy    · Feeling restless, irritable, or withdrawn    · Feeling worthless, hopeless, discouraged, or guilty    · Trouble concentrating, remembering things, doing daily tasks, or making decisions    · Thoughts about hurting or killing yourself  Call 911 for any of the following:   · You think about harming yourself or someone else  Contact your healthcare provider if:   · Your symptoms do not improve  · You cannot make it to your next appointment  · You have new symptoms  · You have questions or concerns about your condition or care  Treatment for depression  may include medicine to improve or balance your mood  Therapy may also be used to treat your depression   A therapist will help you learn to cope with your thoughts and feelings  Therapy can be done alone or in a group  It may also be done with family members or a significant other  Self-care:   · Get regular physical activity  Try to exercise for 30 minutes, 3 to 5 days a week  Work with your healthcare provider to develop an exercise plan that you enjoy  Physical activity may improve your symptoms  · Get enough sleep  Create a routine to help you relax before bed  You can listen to music, read, or do yoga  Try to go to bed and wake up at the same time every day  Sleep is important for emotional health  · Eat a variety of healthy foods from all of the food groups  A healthy meal plan is low in fat, salt, and added sugar  Ask your healthcare provider for more information about a meal plan that is right for you  · Do not drink alcohol or use drugs  Alcohol and drugs can make your symptoms worse  Follow up with your healthcare provider as directed: Your healthcare provider will monitor your progress at follow-up visits  He or she will also monitor your medicine if you take antidepressants  Your healthcare provider will ask if the medicine is helping  Tell him or her about any side effects or problems you may have with your medicine  The type or amount of medicine may need to be changed  Write down your questions so you remember to ask them during your visits  © 2017 [employer]  Information is for End User's use only and may not be sold, redistributed or otherwise used for commercial purposes  All illustrations and images included in CareNotes® are the copyrighted property of [employer]  or Blair Johansen  The above information is an  only  It is not intended as medical advice for individual conditions or treatments  Talk to your doctor, nurse or pharmacist before following any medical regimen to see if it is safe and effective for you

## 2020-03-03 NOTE — PROGRESS NOTES
Assessment/Plan:  Problem List Items Addressed This Visit        Respiratory    Pulmonary emphysema ([facility] [address] ) - Primary    Relevant Medications    montelukast (SINGULAIR) 10 mg tablet    fluticasone (FLONASE) 50 mcg/act nasal spray    fexofenadine (ALLEGRA) 180 MG tablet       Musculoskeletal and Integument    Primary osteoarthritis involving multiple joints    Relevant Medications    ketorolac (TORADOL) injection 30 mg (Completed)    meloxicam (MOBIC) 15 mg tablet       Other    Major depressive disorder, recurrent, severe without psychotic features (HCC) (Chronic)    Mixed hyperlipidemia    Relevant Orders    Comprehensive metabolic panel    Lipid Panel with Direct LDL reflex    Tobacco abuse    Seasonal allergies    Relevant Medications    montelukast (SINGULAIR) 10 mg tablet    fluticasone (FLONASE) 50 mcg/act nasal spray    fexofenadine (ALLEGRA) 180 MG tablet    Alcohol use disorder, moderate, dependence (HCC)      Other Visit Diagnoses     Vitamin D deficiency        Elevated glucose        Relevant Orders    Hemoglobin A1C    Leukocytosis, unspecified type        Relevant Medications    ketorolac (TORADOL) injection 30 mg (Completed)    meloxicam (MOBIC) 15 mg tablet    Other Relevant Orders    CBC and differential    Abnormal thyroid blood test        Relevant Orders    TSH, 3rd generation    T4, free    T3    Thyroid Antibodies Panel    Screening for lipid disorders        Screening for diabetes mellitus (DM)        Relevant Orders    Hemoglobin A1C    Screening for thyroid disorder        Relevant Orders    TSH, 3rd generation    T4, free    T3    Thyroid Antibodies Panel    Screening for deficiency anemia        Screening for HIV (human immunodeficiency virus)        Relevant Orders    [facility] HIV 1/2 AG-AB combo    Positive depression screening        Moderate major depression, single episode ([facility] [address] )        Anxiety with depression               Diagnoses and all orders for this visit:    Pulmonary emphysema, unspecified emphysema type ([place] [address] )    Primary osteoarthritis involving multiple joints  -     ketorolac (TORADOL) injection 30 mg  -     meloxicam (MOBIC) 15 mg tablet; Take 1 tablet (15 mg total) by mouth daily    Alcohol use disorder, moderate, dependence ([facility])    Major depressive disorder, recurrent, severe without psychotic features ([place] [address] )    Mixed hyperlipidemia  -     Comprehensive metabolic panel; Future  -     Lipid Panel with Direct LDL reflex; Future    Seasonal allergies  -     montelukast (SINGULAIR) 10 mg tablet; Take 1 tablet (10 mg total) by mouth daily at bedtime  -     fluticasone (FLONASE) 50 mcg/act nasal spray; 1 spray into each nostril daily  -     fexofenadine (ALLEGRA) 180 MG tablet; Take 1 tablet (180 mg total) by mouth daily    Tobacco abuse    Vitamin D deficiency    Elevated glucose  -     Hemoglobin A1C; Future    Leukocytosis, unspecified type  -     CBC and differential; Future    Abnormal thyroid blood test  -     TSH, 3rd generation; Future  -     T4, free; Future  -     T3; Future  -     Thyroid Antibodies Panel; Future    Screening for lipid disorders    Screening for diabetes mellitus (DM)  -     Hemoglobin A1C; Future    Screening for thyroid disorder  -     TSH, 3rd generation; Future  -     T4, free; Future  -     T3; Future  -     Thyroid Antibodies Panel; Future    Screening for deficiency anemia    Screening for HIV (human immunodeficiency virus)  -     [facility] HIV 1/2 AG-AB combo; Future    Positive depression screening    Moderate major depression, single episode ([place] [address] )    Anxiety with depression        No problem-specific Assessment & Plan notes found for this encounter  A/P: Discussed labs  Will repeat the thyroid and treat if still persisits  Will check additional labs  May have DM  Will switch NSAID and if knee pain continues, start PT and may need to drain the knee  Will d/c claritin and add allegra, Singulair, and INS  Keep f/u with psych   Recommend f/u for the CRC  Continue current treatment and RTC one week for f/u labs, allergies, and knee  Wean tobacco      Subjective:      Patient ID: Summer Berman is a 47 y o  female  WF RTC for f/u COPD, allegies, etc  Pt just out of the hospital for a psych admission for flare of her depression  Meds adjusted  Labs while in hospital showed elevated sugar, low vit d, elevated WBC, and low TSH  No h/o DM or thyroid issues  Tried taking vit d in the past and couldn't tolerate the meds  Reports breathing is worse due to allergies flaring and doesn't feel her meds are working  Reports her arthritis pain, especially the right knee is worse  No trauma  Remains active otherwise  No falls  Depression is improving  Still smoking  Due for labs and CRC  The following portions of the patient's history were reviewed and updated as appropriate:   She has a past medical history of Anxiety, Bell's palsy, COPD (chronic obstructive pulmonary disease) ([facility] [address] ), Depression, Menopause, Mild persistent asthma without complication (), Obesity, and Primary osteoarthritis involving multiple joints (10/9/2018)  ,  does not have any pertinent problems on file  ,   has a past surgical history that includes Ankle surgery; Nose surgery; Total hip arthroplasty (Left);  section; Dilation and curettage, diagnostic / therapeutic; Tubal ligation; and Colonoscopy w/ polypectomy  ,  family history includes Mental illness in her brother and mother; No Known Problems in her father; Suicidality in her brother and mother  ,   reports that she has been smoking  She has a 80 00 pack-year smoking history  She has never used smokeless tobacco  She reports that she drinks about 3 0 - 5 0 standard drinks of alcohol per week  She reports that she does not use drugs  ,  is allergic to pollen extract     Current Outpatient Medications   Medication Sig Dispense Refill    albuterol (PROVENTIL HFA,VENTOLIN HFA) 90 mcg/act inhaler Inhale 2 puffs every 6 (six) hours as needed for wheezing or shortness of breath 3 Inhaler 3    Ascorbic Acid (C-1000/ALIX HIPS PO) Take by mouth      atorvastatin (LIPITOR) 20 mg tablet Take 1 tablet (20 mg total) by mouth daily 30 tablet 1    BIOTIN 5000 PO Take by mouth      Coenzyme Q10 (COQ10) 100 MG CAPS Take by mouth      diclofenac sodium (VOLTAREN) 1 % Apply 2 g topically 4 (four) times a day 1 Tube 1    escitalopram (LEXAPRO) 20 mg tablet Take 1 tablet (20 mg total) by mouth daily in the early morning 30 tablet 0    Ferrous Sulfate (IRON) 325 (65 Fe) MG TABS Take by mouth      fluticasone-umeclidinium-vilanterol (TRELEGY) 100-62 5-25 MCG/INH inhaler Inhale 1 puff daily Rinse mouth after use  1 Inhaler 5    folic acid (FOLVITE) 1 mg tablet Take 1 tablet (1 mg total) by mouth daily 30 tablet 0    gabapentin (NEURONTIN) 300 mg capsule Take 1 capsule (300 mg total) by mouth 3 (three) times a day 90 capsule 5    MAGNESIUM CITRATE PO Take 400 mg by mouth      pramipexole (MIRAPEX) 0 5 mg tablet Take 1 tablet (0 5 mg total) by mouth daily at bedtime 30 tablet 1    QUEtiapine (SEROquel) 100 mg tablet Take 1 tablet (100 mg total) by mouth daily at bedtime 30 tablet 0    thiamine 100 MG tablet Take 1 tablet (100 mg total) by mouth daily 30 tablet 0    vitamin E, tocopherol, 400 units capsule Take 400 Units by mouth daily      fexofenadine (ALLEGRA) 180 MG tablet Take 1 tablet (180 mg total) by mouth daily 30 tablet 1    fluticasone (FLONASE) 50 mcg/act nasal spray 1 spray into each nostril daily 16 g 0    meloxicam (MOBIC) 15 mg tablet Take 1 tablet (15 mg total) by mouth daily 30 tablet 1    montelukast (SINGULAIR) 10 mg tablet Take 1 tablet (10 mg total) by mouth daily at bedtime 90 tablet 1     No current facility-administered medications for this visit  Review of Systems   Constitutional: Positive for activity change  Negative for chills, diaphoresis, fatigue and fever     HENT: Positive for congestion, postnasal drip, rhinorrhea, sinus pressure, sore throat and voice change  Negative for ear discharge, ear pain, facial swelling, sinus pain and trouble swallowing  Respiratory: Positive for cough, shortness of breath and wheezing  Negative for chest tightness  Cardiovascular: Negative for chest pain, palpitations and leg swelling  Gastrointestinal: Negative for abdominal pain, constipation, diarrhea, nausea and vomiting  Endocrine: Negative for cold intolerance and heat intolerance  Genitourinary: Negative for difficulty urinating, dysuria and frequency  Musculoskeletal: Positive for arthralgias, gait problem and joint swelling  Negative for myalgias  Allergic/Immunologic: Positive for environmental allergies  Neurological: Negative for dizziness, seizures, syncope, weakness, light-headedness and headaches  Psychiatric/Behavioral: Positive for dysphoric mood  Negative for confusion, sleep disturbance and suicidal ideas  The patient is nervous/anxious  PHQ-9 Depression Screening    PHQ-9:    Frequency of the following problems over the past two weeks:             Objective:  Vitals:    03/03/20 1340   BP: 98/60   Pulse: 76   Temp: 97 9 °F (36 6 °C)   SpO2: 94%   Weight: 78 1 kg (172 lb 4 oz)   Height: 5' 4" (1 626 m)     Body mass index is 29 57 kg/m²  Physical Exam   Constitutional: She is oriented to person, place, and time  She appears well-developed and well-nourished  No distress  HENT:   Head: Normocephalic and atraumatic  Right Ear: External ear normal    Left Ear: External ear normal    Mouth/Throat: Oropharynx is clear and moist  No oropharyngeal exudate  No sinus tenderness  Turbinates red and inflamed  Cobblestoning noted  Eyes: Pupils are equal, round, and reactive to light  Conjunctivae and EOM are normal    Neck: Normal range of motion  Neck supple  No JVD present  Cardiovascular: Normal rate, regular rhythm and normal heart sounds     Pulmonary/Chest: Effort normal  No respiratory distress  She has no wheezes  She has no rales  Decreased BS  Abdominal: Soft  Bowel sounds are normal  She exhibits no distension  There is no tenderness  Musculoskeletal: She exhibits tenderness  She exhibits no edema or deformity  Right knee any gross deformities, increase temp, erythema, or swelling  Small effusion in the prepatellar area, but no  ballotment  Some crepitus  Collaterals intact  Negative Drawer's  Negative Payton's  ROM wnl  Tenderness diffusely         Lymphadenopathy:     She has no cervical adenopathy  Neurological: She is alert and oriented to person, place, and time  Psychiatric: She has a normal mood and affect  Her behavior is normal  Judgment and thought content normal    Nursing note and vitals reviewed  Depression Screening Follow-up Plan: Patient's depression screening was positive with a PHQ-2 score of   Their PHQ-9 score was   Continue regular follow-up with their psychologist/therapist/psychiatrist who is managing their mental health condition(s)

## 2020-03-04 ENCOUNTER — HOSPITAL ENCOUNTER (OUTPATIENT)
Dept: MAMMOGRAPHY | Facility: HOSPITAL | Age: 55
Discharge: HOME | End: 2020-03-04
Payer: COMMERCIAL

## 2020-03-04 VITALS — HEIGHT: 64 IN | WEIGHT: 172 LBS | BODY MASS INDEX: 29.37 KG/M2

## 2020-03-04 DIAGNOSIS — Z12.39 SCREENING FOR BREAST CANCER: ICD-10-CM

## 2020-03-04 PROCEDURE — 77063 BREAST TOMOSYNTHESIS BI: CPT

## 2020-03-04 PROCEDURE — 77067 SCR MAMMO BI INCL CAD: CPT

## 2020-04-02 DIAGNOSIS — F33.2 MAJOR DEPRESSIVE DISORDER, RECURRENT, SEVERE WITHOUT PSYCHOTIC FEATURES (HCC): Chronic | ICD-10-CM

## 2020-04-02 RX ORDER — QUETIAPINE FUMARATE 100 MG/1
100 TABLET, FILM COATED ORAL
Qty: 30 TABLET | Refills: 5 | Status: SHIPPED | OUTPATIENT
Start: 2020-04-02 | End: 2020-06-25

## 2020-04-06 DIAGNOSIS — M25.561 ACUTE PAIN OF RIGHT KNEE: ICD-10-CM

## 2020-04-09 ENCOUNTER — TELEMEDICINE (OUTPATIENT)
Dept: INTERNAL MEDICINE CLINIC | Facility: CLINIC | Age: 55
End: 2020-04-09
Payer: COMMERCIAL

## 2020-04-09 DIAGNOSIS — G89.29 CHRONIC PAIN OF RIGHT KNEE: Primary | ICD-10-CM

## 2020-04-09 DIAGNOSIS — R20.2 PARESTHESIA OF RIGHT ARM: ICD-10-CM

## 2020-04-09 DIAGNOSIS — M25.561 CHRONIC PAIN OF RIGHT KNEE: Primary | ICD-10-CM

## 2020-04-09 DIAGNOSIS — Z12.12 ENCOUNTER FOR SCREENING FOR COLORECTAL MALIGNANT NEOPLASM: ICD-10-CM

## 2020-04-09 DIAGNOSIS — Z12.11 ENCOUNTER FOR SCREENING FOR COLORECTAL MALIGNANT NEOPLASM: ICD-10-CM

## 2020-04-09 DIAGNOSIS — M25.469 KNEE SWELLING: ICD-10-CM

## 2020-04-09 DIAGNOSIS — R26.9 GAIT ABNORMALITY: ICD-10-CM

## 2020-04-09 PROCEDURE — 99214 OFFICE O/P EST MOD 30 MIN: CPT | Performed by: INTERNAL MEDICINE

## 2020-04-09 RX ORDER — PREDNISONE 10 MG/1
TABLET ORAL
Qty: 50 TABLET | Refills: 0 | Status: SHIPPED | OUTPATIENT
Start: 2020-04-09 | End: 2020-07-10

## 2020-04-18 DIAGNOSIS — G25.81 RESTLESS LEG SYNDROME: ICD-10-CM

## 2020-04-20 RX ORDER — PRAMIPEXOLE DIHYDROCHLORIDE 0.5 MG/1
TABLET ORAL
Qty: 90 TABLET | Refills: 1 | Status: SHIPPED | OUTPATIENT
Start: 2020-04-20 | End: 2020-05-01

## 2020-05-01 DIAGNOSIS — G25.81 RESTLESS LEG SYNDROME: ICD-10-CM

## 2020-05-01 RX ORDER — PRAMIPEXOLE DIHYDROCHLORIDE 0.5 MG/1
TABLET ORAL
Qty: 90 TABLET | Refills: 0 | Status: SHIPPED | OUTPATIENT
Start: 2020-05-01 | End: 2020-05-14 | Stop reason: SDUPTHER

## 2020-05-14 DIAGNOSIS — G25.81 RESTLESS LEG SYNDROME: ICD-10-CM

## 2020-05-14 RX ORDER — PRAMIPEXOLE DIHYDROCHLORIDE 0.5 MG/1
0.5 TABLET ORAL DAILY
Qty: 90 TABLET | Refills: 1 | Status: SHIPPED | OUTPATIENT
Start: 2020-05-14 | End: 2021-07-13 | Stop reason: SDUPTHER

## 2020-06-03 DIAGNOSIS — M15.9 PRIMARY OSTEOARTHRITIS INVOLVING MULTIPLE JOINTS: ICD-10-CM

## 2020-06-03 RX ORDER — MELOXICAM 15 MG/1
TABLET ORAL
Qty: 30 TABLET | Refills: 5 | Status: SHIPPED | OUTPATIENT
Start: 2020-06-03 | End: 2020-07-10

## 2020-06-03 NOTE — TELEPHONE ENCOUNTER
----- Message from Kamla Brannon DO sent at 6/3/2020  7:00 AM EDT -----  Pt has appt on 6/8 and appears to be scheduled through My Chart for an annual  Pt just missed an appt for work PE and had a physical just a few months ago  Need to find out what this is about

## 2020-06-25 ENCOUNTER — OFFICE VISIT (OUTPATIENT)
Dept: INTERNAL MEDICINE CLINIC | Facility: CLINIC | Age: 55
End: 2020-06-25
Payer: COMMERCIAL

## 2020-06-25 VITALS
BODY MASS INDEX: 24.75 KG/M2 | SYSTOLIC BLOOD PRESSURE: 112 MMHG | HEIGHT: 64 IN | TEMPERATURE: 97.9 F | DIASTOLIC BLOOD PRESSURE: 80 MMHG | OXYGEN SATURATION: 96 % | HEART RATE: 95 BPM | WEIGHT: 145 LBS

## 2020-06-25 DIAGNOSIS — Z13.220 SCREENING FOR LIPID DISORDERS: ICD-10-CM

## 2020-06-25 DIAGNOSIS — Z13.1 SCREENING FOR DIABETES MELLITUS (DM): ICD-10-CM

## 2020-06-25 DIAGNOSIS — E61.1 IRON DEFICIENCY: ICD-10-CM

## 2020-06-25 DIAGNOSIS — Z02.89 ENCOUNTER FOR PHYSICAL EXAMINATION RELATED TO EMPLOYMENT: Primary | ICD-10-CM

## 2020-06-25 DIAGNOSIS — Z13.0 SCREENING FOR DEFICIENCY ANEMIA: ICD-10-CM

## 2020-06-25 DIAGNOSIS — Z13.29 SCREENING FOR THYROID DISORDER: ICD-10-CM

## 2020-06-25 DIAGNOSIS — F33.2 MAJOR DEPRESSIVE DISORDER, RECURRENT, SEVERE WITHOUT PSYCHOTIC FEATURES (HCC): Chronic | ICD-10-CM

## 2020-06-25 PROCEDURE — 99396 PREV VISIT EST AGE 40-64: CPT | Performed by: INTERNAL MEDICINE

## 2020-06-25 RX ORDER — QUETIAPINE FUMARATE 50 MG/1
25 TABLET, FILM COATED ORAL
COMMUNITY
Start: 2020-06-24 | End: 2020-11-22

## 2020-07-08 ENCOUNTER — OFFICE VISIT (OUTPATIENT)
Dept: URGENT CARE | Facility: CLINIC | Age: 55
End: 2020-07-08
Payer: COMMERCIAL

## 2020-07-08 VITALS
SYSTOLIC BLOOD PRESSURE: 146 MMHG | RESPIRATION RATE: 18 BRPM | HEART RATE: 99 BPM | TEMPERATURE: 97.6 F | OXYGEN SATURATION: 95 % | DIASTOLIC BLOOD PRESSURE: 62 MMHG

## 2020-07-08 DIAGNOSIS — M25.561 ACUTE PAIN OF RIGHT KNEE: Primary | ICD-10-CM

## 2020-07-08 PROCEDURE — 99283 EMERGENCY DEPT VISIT LOW MDM: CPT | Performed by: PHYSICIAN ASSISTANT

## 2020-07-08 PROCEDURE — G0382 LEV 3 HOSP TYPE B ED VISIT: HCPCS | Performed by: PHYSICIAN ASSISTANT

## 2020-07-08 PROCEDURE — 99203 OFFICE O/P NEW LOW 30 MIN: CPT | Performed by: PHYSICIAN ASSISTANT

## 2020-07-08 PROCEDURE — 96372 THER/PROPH/DIAG INJ SC/IM: CPT | Performed by: PHYSICIAN ASSISTANT

## 2020-07-08 RX ORDER — KETOROLAC TROMETHAMINE 30 MG/ML
30 INJECTION, SOLUTION INTRAMUSCULAR; INTRAVENOUS ONCE
Status: COMPLETED | OUTPATIENT
Start: 2020-07-08 | End: 2020-07-08

## 2020-07-08 RX ADMIN — KETOROLAC TROMETHAMINE 30 MG: 30 INJECTION, SOLUTION INTRAMUSCULAR; INTRAVENOUS at 20:32

## 2020-07-09 ENCOUNTER — HOSPITAL ENCOUNTER (OUTPATIENT)
Dept: RADIOLOGY | Facility: HOSPITAL | Age: 55
Discharge: HOME | End: 2020-07-09
Payer: COMMERCIAL

## 2020-07-09 DIAGNOSIS — M25.561 CHRONIC PAIN OF RIGHT KNEE: ICD-10-CM

## 2020-07-09 DIAGNOSIS — G89.29 CHRONIC PAIN OF RIGHT KNEE: ICD-10-CM

## 2020-07-09 PROCEDURE — 73562 X-RAY EXAM OF KNEE 3: CPT

## 2020-07-09 NOTE — PROGRESS NOTES
[address] [facility] [facility]        NAME: Jennifer Eugene is a 47 y o  female  : 1965    MRN: [id number]  DATE: 2020  TIME: 8:31 PM    Assessment and Plan   Acute pain of right knee [M25 561]  1  Acute pain of right knee  ketorolac (TORADOL) injection 30 mg     Discussed risks of additional NSAID administration via toradol  Monitor stool for blood  Patient verbalized understanding  Toradol administered in office  Patient Instructions     Tylenol for pain  Do not take any NSAIDs until tomorrow  Ice 20 minutes 3-4 times per day for 3 days  Insulate the skin from the ice to prevent frostbite  Rest and Elevate  Follow up with orthopedic   Follow up with PCP in 3-5 days  Go to ED if symptoms worsen  Chief Complaint     Chief Complaint   Patient presents with    Knee Pain     Pt c/o right knee pain since yesterday  History of Present Illness       States she has ortho appointment tomorrow  She is additionally supposed to get an XR tomorrow  Knee Pain    Incident onset: chronic; sudden worsening today without injury  There was no injury mechanism  Pain location: R knee  The quality of the pain is described as aching (shooting to R leg)  The pain is moderate  Pertinent negatives include no inability to bear weight, loss of motion, loss of sensation, numbness or tingling  The symptoms are aggravated by weight bearing  She has tried ice and elevation (mobic this morning) for the symptoms  Review of Systems   Review of Systems   Constitutional: Negative for chills and fever  Musculoskeletal: Positive for gait problem (pain with walking)  Negative for joint swelling  Neurological: Negative for tingling, weakness and numbness           Current Medications       Current Outpatient Medications:     albuterol (PROVENTIL HFA,VENTOLIN HFA) 90 mcg/act inhaler, Inhale 2 puffs every 6 (six) hours as needed for wheezing or shortness of breath, Disp: 3 Inhaler, Rfl: 3    Ascorbic Acid (C-1000/ALIX HIPS PO), Take by mouth, Disp: , Rfl:     atorvastatin (LIPITOR) 20 mg tablet, Take 1 tablet (20 mg total) by mouth daily, Disp: 30 tablet, Rfl: 1    BIOTIN 5000 PO, Take by mouth, Disp: , Rfl:     Coenzyme Q10 (COQ10) 100 MG CAPS, Take by mouth, Disp: , Rfl:     diclofenac sodium (VOLTAREN) 1 %, Apply 2 g topically 4 (four) times a day  May also apply 2 g 4 (four) times a day as needed (for pain)  , Disp: 100 g, Rfl: 0    escitalopram (LEXAPRO) 20 mg tablet, Take 1 tablet (20 mg total) by mouth daily in the early morning, Disp: 30 tablet, Rfl: 0    Ferrous Sulfate (IRON) 325 (65 Fe) MG TABS, Take by mouth, Disp: , Rfl:     fexofenadine (ALLEGRA) 180 MG tablet, Take 1 tablet (180 mg total) by mouth daily, Disp: 30 tablet, Rfl: 1    fluticasone (FLONASE) 50 mcg/act nasal spray, 1 spray into each nostril daily, Disp: 16 g, Rfl: 0    fluticasone-umeclidinium-vilanterol (TRELEGY) 100-62 5-25 MCG/INH inhaler, Inhale 1 puff daily Rinse mouth after use , Disp: 1 Inhaler, Rfl: 5    folic acid (FOLVITE) 1 mg tablet, Take 1 tablet (1 mg total) by mouth daily, Disp: 30 tablet, Rfl: 0    gabapentin (NEURONTIN) 300 mg capsule, Take 1 capsule (300 mg total) by mouth 3 (three) times a day, Disp: 90 capsule, Rfl: 5    MAGNESIUM CITRATE PO, Take 400 mg by mouth, Disp: , Rfl:     meloxicam (MOBIC) 15 mg tablet, Take 1 tablet by mouth once daily, Disp: 30 tablet, Rfl: 5    montelukast (SINGULAIR) 10 mg tablet, Take 1 tablet (10 mg total) by mouth daily at bedtime, Disp: 90 tablet, Rfl: 1    pramipexole (MIRAPEX) 0 5 mg tablet, Take 1 tablet (0 5 mg total) by mouth daily, Disp: 90 tablet, Rfl: 1    predniSONE 10 mg tablet, 40mg po q d x 5, then 30mg po q d x 5, then 20mg po q d x 5, then 10mg po q d x 5, then d/c , Disp: 50 tablet, Rfl: 0    QUEtiapine (SEROquel) 50 mg tablet, , Disp: , Rfl:     thiamine 100 MG tablet, Take 1 tablet (100 mg total) by mouth daily, Disp: 30 tablet, Rfl: 0    vitamin E, tocopherol, 400 units capsule, Take 400 Units by mouth daily, Disp: , Rfl:     Current Facility-Administered Medications:     ketorolac (TORADOL) injection 30 mg, 30 mg, Intramuscular, Once, Christi Yates PA-C    Current Allergies     Allergies as of 2020 - Reviewed 2020   Allergen Reaction Noted    Pollen extract  2019            The following portions of the patient's history were reviewed and updated as appropriate: allergies, current medications, past family history, past medical history, past social history, past surgical history and problem list      Past Medical History:   Diagnosis Date    Anxiety     Bell's palsy     COPD (chronic obstructive pulmonary disease) ([facility] [address] )     Depression     Menopause     Mild persistent asthma without complication 5966    Obesity     Primary osteoarthritis involving multiple joints 10/9/2018       Past Surgical History:   Procedure Laterality Date    ANKLE SURGERY      BREAST BIOPSY  12 yrs ago     SECTION      COLONOSCOPY W/ POLYPECTOMY      DILATION AND CURETTAGE, DIAGNOSTIC / THERAPEUTIC      NOSE SURGERY      TOTAL HIP ARTHROPLASTY Left     TUBAL LIGATION         Family History   Problem Relation Age of Onset    Mental illness Mother     Suicidality Mother     No Known Problems Father     Mental illness Brother     Suicidality Brother     No Known Problems Maternal Grandmother     No Known Problems Paternal Grandmother     No Known Problems Maternal Aunt     No Known Problems Paternal Aunt     No Known Problems Paternal Aunt     No Known Problems Paternal Aunt     No Known Problems Paternal Aunt          Medications have been verified  Objective   /62   Pulse 99   Temp 97 6 °F (36 4 °C)   Resp 18   SpO2 95%        Physical Exam     Physical Exam   Constitutional: She is oriented to person, place, and time  She appears well-developed and well-nourished  No distress     Cardiovascular: Normal rate, regular rhythm, normal heart sounds and intact distal pulses  Exam reveals no gallop and no friction rub  No murmur heard  Pulmonary/Chest: Effort normal and breath sounds normal  No respiratory distress  She has no wheezes  She has no rales  She exhibits no tenderness  Musculoskeletal: Normal range of motion  She exhibits no edema, tenderness or deformity  Neurological: She is alert and oriented to person, place, and time  She has normal reflexes  No sensory deficit  Skin: Skin is warm  No rash noted  No erythema  Psychiatric: She has a normal mood and affect  Her behavior is normal  Judgment and thought content normal    Vitals reviewed

## 2020-07-09 NOTE — PATIENT INSTRUCTIONS
Tylenol for pain  Do not take any NSAIDs until tomorrow  Ice 20 minutes 3-4 times per day for 3 days  Insulate the skin from the ice to prevent frostbite  Rest and Elevate  Follow up with orthopedic   Follow up with PCP in 3-5 days  Go to ED if symptoms worsen  Knee Pain   WHAT YOU NEED TO KNOW:   Knee pain may start suddenly, or it may be a long-term problem  You may have pain on the side, front, or back of your knee  You may have knee stiffness and swelling  You may hear popping sounds or feel like your knee is giving way or locking up as you walk  You may feel pain when you sit, stand, walk, or climb up and down stairs  Knee pain can be caused by conditions such as obesity, inflammation, or strains or tears in ligaments or tendons  DISCHARGE INSTRUCTIONS:   Follow up with your healthcare provider within 24 hours or as directed: You may need follow-up treatments, such as steroid injections to decrease pain  Write down your questions so you remember to ask them during your visits  Self-care:   · Rest  your knee so it can heal  Limit activities that increase your pain  · Ice  can help reduce swelling  Wrap ice in a towel and put it on your knee for as long and as often as directed  · Compression  with a brace or bandage can help reduce swelling  Use a brace or bandage only as directed  · Elevation  helps decrease pain and swelling  Elevate your knee while you are sitting or lying down  Prop your leg on pillows to keep your knee above the level of your heart  Medicines:   · NSAIDs  help decrease swelling and pain or fever  This medicine is available with or without a doctor's order  NSAIDs can cause stomach bleeding or kidney problems in certain people  If you take blood thinner medicine, always ask your healthcare provider if NSAIDs are safe for you  Always read the medicine label and follow directions  · Acetaminophen  decreases pain and fever  It is available without a doctor's order   Ask how much to take and when to take it  Follow directions  Acetaminophen can cause liver damage if not taken correctly  · Take your medicine as directed  Contact your healthcare provider if you think your medicine is not helping or if you have side effects  Tell him or her if you are allergic to any medicine  Keep a list of the medicines, vitamins, and herbs you take  Include the amounts, and when and why you take them  Bring the list or the pill bottles to follow-up visits  Carry your medicine list with you in case of an emergency  Exercise as directed: You may need to see a physical therapist or do recommended exercises to improve movement and decrease your pain  You may be directed to walk, swim, or ride a bike  Follow your exercise plan exactly as directed to avoid further injury  Contact your healthcare provider if:   · You have questions or concerns about your condition or care  Return to the emergency department if:   · Your pain is worse, even after treatment  · You cannot bend or straighten your leg completely  · The swelling around your knee does not go down even with treatment  · Your knee is painful and hot to the touch  © 2017 [facility] Information is for End User's use only and may not be sold, redistributed or otherwise used for commercial purposes  All illustrations and images included in CareNotes® are the copyrighted property of [employer]  or Blair Johansen  The above information is an  only  It is not intended as medical advice for individual conditions or treatments  Talk to your doctor, nurse or pharmacist before following any medical regimen to see if it is safe and effective for you

## 2020-07-10 ENCOUNTER — OFFICE VISIT (OUTPATIENT)
Dept: INTERNAL MEDICINE CLINIC | Facility: CLINIC | Age: 55
End: 2020-07-10
Payer: COMMERCIAL

## 2020-07-10 VITALS
HEART RATE: 88 BPM | TEMPERATURE: 97.8 F | BODY MASS INDEX: 25.27 KG/M2 | OXYGEN SATURATION: 97 % | DIASTOLIC BLOOD PRESSURE: 66 MMHG | WEIGHT: 148 LBS | HEIGHT: 64 IN | SYSTOLIC BLOOD PRESSURE: 152 MMHG

## 2020-07-10 DIAGNOSIS — M25.561 CHRONIC PAIN OF RIGHT KNEE: Primary | ICD-10-CM

## 2020-07-10 DIAGNOSIS — M17.11 PRIMARY OSTEOARTHRITIS OF RIGHT KNEE: ICD-10-CM

## 2020-07-10 DIAGNOSIS — G89.29 CHRONIC PAIN OF RIGHT KNEE: Primary | ICD-10-CM

## 2020-07-10 DIAGNOSIS — L60.0 INGROWN NAIL: ICD-10-CM

## 2020-07-10 DIAGNOSIS — M70.50 ANSERINE BURSITIS: ICD-10-CM

## 2020-07-10 PROCEDURE — 99213 OFFICE O/P EST LOW 20 MIN: CPT | Performed by: INTERNAL MEDICINE

## 2020-07-10 PROCEDURE — 3008F BODY MASS INDEX DOCD: CPT | Performed by: INTERNAL MEDICINE

## 2020-07-10 PROCEDURE — 4004F PT TOBACCO SCREEN RCVD TLK: CPT | Performed by: INTERNAL MEDICINE

## 2020-07-10 RX ORDER — MELOXICAM 15 MG/1
15 TABLET ORAL DAILY
Qty: 30 TABLET | Refills: 1 | Status: SHIPPED | OUTPATIENT
Start: 2020-07-10 | End: 2020-10-09

## 2020-07-10 NOTE — PROGRESS NOTES
Assessment/Plan:  Problem List Items Addressed This Visit     None      Visit Diagnoses     Chronic pain of right knee    -  Primary    Relevant Medications    meloxicam (MOBIC) 15 mg tablet    Menthol, Topical Analgesic, (Biofreeze Roll-On) 4 % GEL    Other Relevant Orders    Ambulatory referral to Physical Therapy    Ingrown nail        Relevant Orders    Ambulatory referral to Podiatry    Anserine bursitis        Relevant Medications    Menthol, Topical Analgesic, (Biofreeze Roll-On) 4 % GEL    Primary osteoarthritis of right knee               Diagnoses and all orders for this visit:    Chronic pain of right knee  -     meloxicam (MOBIC) 15 mg tablet; Take 1 tablet (15 mg total) by mouth daily  -     Ambulatory referral to Physical Therapy; Future  -     Menthol, Topical Analgesic, (Biofreeze Roll-On) 4 % GEL; Apply 1 application topically 3 (three) times a day as needed (pain)    Ingrown nail  -     Ambulatory referral to Podiatry; Future    Anserine bursitis  -     Menthol, Topical Analgesic, (Biofreeze Roll-On) 4 % GEL; Apply 1 application topically 3 (three) times a day as needed (pain)    Primary osteoarthritis of right knee        No problem-specific Assessment & Plan notes found for this encounter  A/P: Await xray of the knee  Continue the mobic and will add ice, biofreeze to the bursitis and refer to PT  May need an injection  Will refer to [facility] toes  RTC three weeks for f/u  Subjective:      Patient ID: Sue Garza is a 47 y o  female  WF with DJD of the right knee and just having xray done today and pending, presents with a two day h/o acute medial knee pain  NO trauma  No swelling  Also, notes pain around several toenails for months  No swelling or redness  No trauma         The following portions of the patient's history were reviewed and updated as appropriate:   She has a past medical history of Anxiety, Bell's palsy, COPD (chronic obstructive pulmonary disease) ([address] ), Depression, Menopause, Mild persistent asthma without complication (2417), Obesity, and Primary osteoarthritis involving multiple joints (10/9/2018)  ,  does not have any pertinent problems on file  ,   has a past surgical history that includes Ankle surgery; Nose surgery; Total hip arthroplasty (Left);  section; Dilation and curettage, diagnostic / therapeutic; Tubal ligation; Colonoscopy w/ polypectomy; and Breast biopsy (12 yrs ago)  ,  family history includes Mental illness in her brother and mother; No Known Problems in her father, maternal aunt, maternal grandmother, paternal aunt, paternal aunt, paternal aunt, paternal aunt, and paternal grandmother; Suicidality in her brother and mother  ,   reports that she has been smoking  She has a 80 00 pack-year smoking history  She has never used smokeless tobacco  She reports that she drinks about 3 0 - 5 0 standard drinks of alcohol per week  She reports that she does not use drugs  ,  is allergic to pollen extract     Current Outpatient Medications   Medication Sig Dispense Refill    albuterol (PROVENTIL HFA,VENTOLIN HFA) 90 mcg/act inhaler Inhale 2 puffs every 6 (six) hours as needed for wheezing or shortness of breath 3 Inhaler 3    Ascorbic Acid (C-1000/ALIX HIPS PO) Take by mouth      atorvastatin (LIPITOR) 20 mg tablet Take 1 tablet (20 mg total) by mouth daily 30 tablet 1    BIOTIN 5000 PO Take by mouth      Coenzyme Q10 (COQ10) 100 MG CAPS Take by mouth      escitalopram (LEXAPRO) 20 mg tablet Take 1 tablet (20 mg total) by mouth daily in the early morning 30 tablet 0    fluticasone (FLONASE) 50 mcg/act nasal spray 1 spray into each nostril daily 16 g 0    fluticasone-umeclidinium-vilanterol (TRELEGY) 100-62 5-25 MCG/INH inhaler Inhale 1 puff daily Rinse mouth after use   1 Inhaler 5    folic acid (FOLVITE) 1 mg tablet Take 1 tablet (1 mg total) by mouth daily 30 tablet 0    gabapentin (NEURONTIN) 300 mg capsule Take 1 capsule (300 mg total) by mouth 3 (three) times a day 90 capsule 5    MAGNESIUM CITRATE PO Take 400 mg by mouth      montelukast (SINGULAIR) 10 mg tablet Take 1 tablet (10 mg total) by mouth daily at bedtime 90 tablet 1    pramipexole (MIRAPEX) 0 5 mg tablet Take 1 tablet (0 5 mg total) by mouth daily 90 tablet 1    QUEtiapine (SEROquel) 50 mg tablet 25 mg       thiamine 100 MG tablet Take 1 tablet (100 mg total) by mouth daily 30 tablet 0    vitamin E, tocopherol, 400 units capsule Take 400 Units by mouth daily      meloxicam (MOBIC) 15 mg tablet Take 1 tablet (15 mg total) by mouth daily 30 tablet 1    Menthol, Topical Analgesic, (Biofreeze Roll-On) 4 % GEL Apply 1 application topically 3 (three) times a day as needed (pain) 150 Tube 1     No current facility-administered medications for this visit  Review of Systems   Constitutional: Positive for activity change  Negative for chills, diaphoresis, fatigue and fever  Respiratory: Negative for cough, chest tightness, shortness of breath and wheezing  Cardiovascular: Negative for chest pain, palpitations and leg swelling  Gastrointestinal: Negative for abdominal pain, constipation, diarrhea, nausea and vomiting  Genitourinary: Negative for difficulty urinating, dysuria and frequency  Musculoskeletal: Positive for arthralgias and gait problem  Negative for joint swelling and myalgias  Skin:        Ingrown toe nail   Neurological: Negative for light-headedness and headaches  Psychiatric/Behavioral: Negative for confusion  The patient is not nervous/anxious  PHQ-9 Depression Screening    PHQ-9:    Frequency of the following problems over the past two weeks:             Objective:  Vitals:    07/10/20 0833   BP: 152/66   Pulse: 88   Temp: 97 8 °F (36 6 °C)   SpO2: 97%   Weight: 67 1 kg (148 lb)   Height: 5' 4" (1 626 m)     Body mass index is 25 4 kg/m²  Physical Exam   Constitutional: She is oriented to person, place, and time   She appears well-developed and well-nourished  No distress  HENT:   Head: Normocephalic and atraumatic  Mouth/Throat: Oropharynx is clear and moist    Eyes: Pupils are equal, round, and reactive to light  Conjunctivae and EOM are normal    Musculoskeletal: Normal range of motion  She exhibits tenderness  She exhibits no edema or deformity  Right knee any gross deformities, increase temp, erythema, or swelling  No effusion or ballotment  Moderate crepitus  Collaterals intact  Negative Drawer's  Negative Payton's  ROM wnl  Tenderness over the anserine bursa         Neurological: She is alert and oriented to person, place, and time  Skin:   Several toes both feet with ingrown nails, but no s/s of infection at this time  Psychiatric: She has a normal mood and affect  Her behavior is normal  Judgment and thought content normal    Nursing note and vitals reviewed

## 2020-07-10 NOTE — PATIENT INSTRUCTIONS

## 2020-08-21 DIAGNOSIS — E78.2 MIXED HYPERLIPIDEMIA: ICD-10-CM

## 2020-08-24 RX ORDER — ATORVASTATIN CALCIUM 20 MG/1
TABLET, FILM COATED ORAL
Qty: 90 TABLET | Refills: 1 | Status: SHIPPED | OUTPATIENT
Start: 2020-08-24 | End: 2021-03-10

## 2020-08-26 ENCOUNTER — TELEPHONE (OUTPATIENT)
Dept: INTERNAL MEDICINE CLINIC | Facility: CLINIC | Age: 55
End: 2020-08-26

## 2020-08-26 NOTE — TELEPHONE ENCOUNTER
Pt states bio freeze is not covered by insurance and would like to know what you would recommend for her other than the bio freeze?

## 2020-09-13 DIAGNOSIS — J30.2 SEASONAL ALLERGIES: ICD-10-CM

## 2020-09-14 RX ORDER — MONTELUKAST SODIUM 10 MG/1
TABLET ORAL
Qty: 90 TABLET | Refills: 1 | Status: SHIPPED | OUTPATIENT
Start: 2020-09-14 | End: 2021-04-14

## 2020-10-09 DIAGNOSIS — G89.29 CHRONIC PAIN OF RIGHT KNEE: ICD-10-CM

## 2020-10-09 DIAGNOSIS — M25.561 CHRONIC PAIN OF RIGHT KNEE: ICD-10-CM

## 2020-10-09 RX ORDER — MELOXICAM 15 MG/1
TABLET ORAL
Qty: 30 TABLET | Refills: 1 | Status: SHIPPED | OUTPATIENT
Start: 2020-10-09 | End: 2020-12-29

## 2020-11-02 ENCOUNTER — TELEPHONE (OUTPATIENT)
Dept: INTERNAL MEDICINE CLINIC | Facility: CLINIC | Age: 55
End: 2020-11-02

## 2020-11-22 ENCOUNTER — OFFICE VISIT (OUTPATIENT)
Dept: URGENT CARE | Facility: CLINIC | Age: 55
End: 2020-11-22
Payer: COMMERCIAL

## 2020-11-22 VITALS
OXYGEN SATURATION: 99 % | TEMPERATURE: 97.8 F | RESPIRATION RATE: 18 BRPM | BODY MASS INDEX: 22.82 KG/M2 | DIASTOLIC BLOOD PRESSURE: 66 MMHG | HEART RATE: 80 BPM | WEIGHT: 137 LBS | HEIGHT: 65 IN | SYSTOLIC BLOOD PRESSURE: 114 MMHG

## 2020-11-22 DIAGNOSIS — L03.039 PARONYCHIA OF TOE, UNSPECIFIED LATERALITY: Primary | ICD-10-CM

## 2020-11-22 PROCEDURE — 99283 EMERGENCY DEPT VISIT LOW MDM: CPT | Performed by: PHYSICIAN ASSISTANT

## 2020-11-22 PROCEDURE — 99213 OFFICE O/P EST LOW 20 MIN: CPT | Performed by: PHYSICIAN ASSISTANT

## 2020-11-22 PROCEDURE — G0382 LEV 3 HOSP TYPE B ED VISIT: HCPCS | Performed by: PHYSICIAN ASSISTANT

## 2020-11-22 RX ORDER — CEPHALEXIN 500 MG/1
500 CAPSULE ORAL EVERY 8 HOURS SCHEDULED
Qty: 15 CAPSULE | Refills: 0 | Status: SHIPPED | OUTPATIENT
Start: 2020-11-22 | End: 2020-11-22

## 2020-11-22 RX ORDER — ARIPIPRAZOLE 30 MG/1
30 TABLET ORAL 2 TIMES DAILY
COMMUNITY
End: 2021-06-22 | Stop reason: ALTCHOICE

## 2020-11-22 RX ORDER — CEPHALEXIN 500 MG/1
500 CAPSULE ORAL EVERY 8 HOURS SCHEDULED
Qty: 15 CAPSULE | Refills: 0 | Status: SHIPPED | OUTPATIENT
Start: 2020-11-22 | End: 2020-11-27

## 2020-12-29 DIAGNOSIS — G89.29 CHRONIC PAIN OF RIGHT KNEE: ICD-10-CM

## 2020-12-29 DIAGNOSIS — M25.561 CHRONIC PAIN OF RIGHT KNEE: ICD-10-CM

## 2020-12-29 RX ORDER — MELOXICAM 15 MG/1
TABLET ORAL
Qty: 30 TABLET | Refills: 5 | Status: SHIPPED | OUTPATIENT
Start: 2020-12-29 | End: 2021-06-28

## 2021-01-14 ENCOUNTER — TELEMEDICINE (OUTPATIENT)
Dept: INTERNAL MEDICINE CLINIC | Facility: CLINIC | Age: 56
End: 2021-01-14
Payer: COMMERCIAL

## 2021-01-14 DIAGNOSIS — J06.9 UPPER RESPIRATORY TRACT INFECTION, UNSPECIFIED TYPE: Primary | ICD-10-CM

## 2021-01-14 DIAGNOSIS — J43.9 PULMONARY EMPHYSEMA, UNSPECIFIED EMPHYSEMA TYPE (HCC): ICD-10-CM

## 2021-01-14 DIAGNOSIS — Z20.822 CLOSE EXPOSURE TO COVID-19 VIRUS: ICD-10-CM

## 2021-01-14 DIAGNOSIS — Z72.0 TOBACCO ABUSE: ICD-10-CM

## 2021-01-14 PROCEDURE — 4004F PT TOBACCO SCREEN RCVD TLK: CPT | Performed by: INTERNAL MEDICINE

## 2021-01-14 PROCEDURE — 99214 OFFICE O/P EST MOD 30 MIN: CPT | Performed by: INTERNAL MEDICINE

## 2021-01-14 RX ORDER — FLUTICASONE PROPIONATE 50 MCG
1 SPRAY, SUSPENSION (ML) NASAL DAILY
Qty: 16 G | Refills: 0 | Status: SHIPPED | OUTPATIENT
Start: 2021-01-14 | End: 2021-05-25 | Stop reason: ALTCHOICE

## 2021-01-14 RX ORDER — AMOXICILLIN AND CLAVULANATE POTASSIUM 875; 125 MG/1; MG/1
1 TABLET, FILM COATED ORAL EVERY 12 HOURS SCHEDULED
Qty: 20 TABLET | Refills: 0 | Status: SHIPPED | OUTPATIENT
Start: 2021-01-14 | End: 2021-01-24

## 2021-01-14 RX ORDER — GUAIFENESIN 600 MG
600 TABLET, EXTENDED RELEASE 12 HR ORAL EVERY 12 HOURS SCHEDULED
Qty: 20 TABLET | Refills: 0 | Status: SHIPPED | OUTPATIENT
Start: 2021-01-14 | End: 2021-05-25 | Stop reason: ALTCHOICE

## 2021-01-14 NOTE — PATIENT INSTRUCTIONS
COVID-19 (Coronavirus Disease 2019)   WHAT YOU NEED TO KNOW:   COVID-19 is the disease caused by the novel (new) coronavirus first discovered in December 2019  Coronaviruses generally cause upper respiratory (nose, throat, and lung) infections, such as a cold  The new virus can also cause serious lower respiratory conditions, such as pneumonia or acute respiratory distress syndrome (ARDS)  Anyone can develop serious problems from the new virus, but your risk is higher if you are 65 or older  A weak immune system, diabetes, or a heart or lung condition can also increase your risk  DISCHARGE INSTRUCTIONS:   If you think you or someone you know may be infected:  Do the following to protect others:  · If emergency care is needed,  tell the  about the possible infection, or call ahead and tell the emergency department  · Call a healthcare provider  for instructions if symptoms are mild  Anyone who may be infected should not  arrive without calling first  The provider will need to protect staff members and other patients  · The person who may be infected needs to wear a face covering  while getting medical care  This will help lower the risk of infecting others  Coverings are not used for anyone who is younger than 2 years, has breathing problems, or cannot remove it  The provider can give you instructions for anyone who cannot wear a covering  Call your local emergency number (911 in the [facility]) or go to the emergency department if:   · You have trouble breathing or shortness of breath at rest     · You have chest pain or pressure that lasts longer than 5 minutes  · You become confused or hard to wake  · Your lips or face are blue  · You have a fever of 104°F (40°C) or higher  Call your doctor if:   · You do not  have symptoms of COVID-19 but had close physical contact within 14 days with someone who tested positive  · You have questions or concerns about your condition or care      Medicines: You may need any of the following for mild symptoms:  · Decongestants  help reduce nasal congestion and help you breathe more easily  If you take decongestant pills, they may make you feel restless or cause problems with your sleep  Do not use decongestant sprays for more than a few days  · Cough suppressants  help reduce coughing  Ask your healthcare provider which type of cough medicine is best for you  · Acetaminophen  decreases pain and fever  It is available without a doctor's order  Ask how much to take and how often to take it  Follow directions  Read the labels of all other medicines you are using to see if they also contain acetaminophen, or ask your doctor or pharmacist  Acetaminophen can cause liver damage if not taken correctly  Do not use more than 4 grams (4,000 milligrams) total of acetaminophen in one day  · NSAIDs , such as ibuprofen, help decrease swelling, pain, and fever  NSAIDs can cause stomach bleeding or kidney problems in certain people  If you take blood thinner medicine, always ask your healthcare provider if NSAIDs are safe for you  Always read the medicine label and follow directions  · Take your medicine as directed  Contact your healthcare provider if you think your medicine is not helping or if you have side effects  Tell him or her if you are allergic to any medicine  Keep a list of the medicines, vitamins, and herbs you take  Include the amounts, and when and why you take them  Bring the list or the pill bottles to follow-up visits  Carry your medicine list with you in case of an emergency  How the 2019 coronavirus spreads: The virus spreads quickly and easily  You can become infected if you are in contact with a large amount of the virus, even for a short time  You can also become infected by being around a small amount of virus for a long time   The following are ways the virus is thought to spread, but more information may be coming:  · Droplets are the most common way all coronaviruses spread  The virus can travel in droplets that form when a person talks, coughs, or sneezes  Anyone who breathes in the droplets or gets them in his or her eyes can become infected with the virus  Close personal contact with an infected person is thought to be the main way the virus spreads  Close personal contact means you are within 6 feet (2 meters) of the person  · Person-to-person contact can spread the virus  For example, a person with the virus on his or her hands can spread it by shaking hands with someone  At this time, it does not appear that the virus can be passed to a baby during pregnancy or delivery  The baby can be infected after he or she is born through person-to-person contact  The virus also does not appear to spread in breast milk  If you are pregnant or breastfeeding, talk to your healthcare provider or obstetrician about any concerns you have  · The virus can stay on objects and surfaces  A person can get the virus on his or her hands by touching the object or surface  Infection happens if the person then touches his or her eyes or mouth with unwashed hands  It is not yet known how long the virus can stay on an object or surface  That is why it is important to clean all surfaces that are used regularly  · An infected animal may be able to infect a person who touches it  This may happen at live markets or on a farm  How everyone can lower the risk for COVID-19:  The best way to prevent infection is to avoid anyone who is infected, but this can be hard to do  An infected person can spread the virus before signs or symptoms begin, or even if signs or symptoms never develop  The following can help lower the risk for infection:      · Wash your hands often throughout the day  Use soap and water  Rub your soapy hands together, lacing your fingers  Wash the front and back of each hand, and in between your fingers   Use the fingers of one hand to scrub under the fingernails of the other hand  Wash for at least 20 seconds  Rinse with warm, running water for several seconds  Then dry your hands with a clean towel or paper towel  Use hand  that contains alcohol if soap and water are not available  Do not touch your eyes, nose, or mouth without washing your hands first  Teach children how to wash their hands and use hand   · Cover a sneeze or cough  This prevents droplets from traveling from you to others  Turn your face away and cover your mouth and nose with a tissue  Throw the tissue away  Use the bend of your arm if a tissue is not available  Then wash your hands well with soap and water or use hand   Turn and cover your face if you are around someone who is sneezing or coughing  Teach children how to cover a cough or sneeze  · Follow worldwide, national, and local social distancing guidelines  Social distancing means people avoid close physical contact so the virus cannot spread from one person to another  Keep at least 6 feet (2 meters) between you and others  Also keep this distance from anyone who comes to your home, such as someone making a delivery  · Make a habit of not touching your face  It is not known how long the virus can stay on objects and surfaces  If you get the virus on your hands, you can transfer it to your eyes, nose, or mouth and become infected  You can also transfer it to objects, surfaces, or people  Be aware of what you touch when you go out  Examples include handrails and elevator buttons  Try not to touch anything with bare hands unless it is necessary  Wash your hands before you leave your home and when you return  · Clean and disinfect high-touch surfaces and objects often  Use a disinfecting solution or wipes  You can make a solution by diluting 4 teaspoons of bleach in 1 quart (4 cups) of water   Clean and disinfect even if you think no one living in or coming to your home is infected with the virus  You can wipe items with a disinfecting cloth before you bring them into your home  Wash your hands after you handle anything you bring into your home  · Make your immune system as healthy as possible  A weakened immune system makes you more vulnerable to the new coronavirus  No COVID-19 vaccine is available yet  Vaccines such as the flu and pneumonia vaccines can help your immune system  Your healthcare provider can tell you which vaccines to get, and when to get them  Keep your immune system as strong as possible  Do not smoke  Eat healthy foods, exercise regularly, and try to manage stress  Go to bed and wake up at the same times each day  Social distancing guidelines:  National and local social distancing rules vary  Rules may change over time as restrictions are lifted  Restrictions may return if an outbreak happens where you live  It is important to know and follow all current social distancing rules in your area  The following are general guidelines:  · Limit trips out of your home  You may be able to have food, medicines, and other supplies delivered  If possible, have delivered items left at your door or other area  Try not to have someone hand you an item  You will be so close to the person that the virus can spread between you  · Do not have close physical contact with anyone who does not live in your home  Do not shake hands with, hug, or kiss a person as a greeting  Stand or walk as far from others as possible  If you must use public transportation (such as a bus or subway), try to sit or stand away from others  You can stay safely connected with others through phone calls, e-mail messages, social media websites, and video chats  Check in on anyone who may be having a hard time socially distancing, or who lives alone  Ask administrators at nursing homes or long-term care facilities how you can safely communicate with someone living there      · Wear a cloth face covering around others who do not live in your home  Face coverings help prevent the virus from spreading to others in droplets  You can use a clear face covering if someone needs to read your lips  This is a cloth covering that has plastic over the mouth area so your lips can be seen  Do not use coverings that have breathing valves or vents  The virus can travel out of the valve or vent and be spread to others  Do not take your covering off to talk, cough, or sneeze  Do not use coverings on children younger than 2 years or on anyone who has breathing problems or cannot remove it  · Only allow medical or other necessary professionals into your home  Wear your face covering, and remind professionals to wear a face covering  Remind them to wash their hands when they arrive and before they leave  Do not  let anyone who does not live in your home in, even if the person is not sick  A person can pass the virus to others before symptoms of COVID-19 begin  Some people never even develop symptoms  Children commonly have mild symptoms or no symptoms  It may be hard to tell a child not to hug or kiss you  Explain that this is how he or she can help you stay healthy  · Do not go to someone else's home unless it is necessary  Do not go over to visit, even if the person is lonely  Only go if you need to help him or her  Make sure you both wear face coverings while you are there  · Avoid large gatherings and crowds  Gatherings or crowds of 10 or more individuals can cause the virus to spread  Examples of gatherings include parties, sporting events, [employer] services, and conferences  Crowds may form at beaches, [place], and tourist attractions  Protect yourself by staying away from large gatherings and crowds  · Ask your healthcare provider for other ways to have appointments  You may be able to have appointments without having to go into the provider's office  Some providers offer phone, video, or other types of appointments  You may also be able to get prescriptions for a few months of your medicines at a time  · Stay safe if you must go out to work  You may have a job that can only be done outside your home  Keep physical distance between you and other workers as much as possible  Follow your employer's rules so everyone stays safe  If you have COVID-19 and are recovering at home:  Healthcare providers will give you specific instructions to follow  The following are general guidelines to remind you how to keep others safe until you are well:  · Wash your hands often  Use soap and water as much as possible  You can use hand  that contains alcohol if soap and water are not available  Do not share towels with anyone  If you use paper towels, throw them away in a lined trash can kept in your room or area  Use a covered trash can, if possible  · Do not go out of your home unless it is necessary  You may have to go to your healthcare provider's office for check-ups or to get prescription refills  Do not arrive at the provider's office without an appointment  Providers have to make their offices safe for staff and other patients  · Do not have close physical contact with anyone unless it is necessary  Only have close physical contact with a person giving direct care, or a baby or child you must care for  Family members and friends should not visit you  If possible, stay in a separate area or room of your home if you live with others  No one should go into the area or room except to give you care  You can visit with others by phone, video chat, e-mail, or similar systems  It is important to stay connected with others in your life while you recover  · Wear a face covering while others are near you  This can help prevent droplets from spreading the virus when you talk, sneeze, or cough  Put the covering on before anyone comes into your room or area   Remind the person to cover his or her nose and mouth before going in to provide care for you  · Do not share items  Do not share dishes, towels, or other items with anyone  Items need to be washed after you use them  · Protect your baby  Wash your hands with soap and water often throughout the day  Wear a clean face covering while you breastfeed, or while you express or pump breast milk  If possible, ask someone who is well to care for your baby  You can put breast milk in bottles for the person to use, if needed  Talk to your healthcare provider if you have any questions or concerns about caring for or bonding with your baby  He or she will tell you when to bring your baby in for check-ups and vaccines  He or she will also tell you what to do if you think your baby was infected with the new virus  · Do not handle live animals  Until more is known, it is best not to touch, play with, or handle live animals  Some animals, including pets, have been infected with the new coronavirus  Do not handle or care for animals until you are well  Care includes feeding, petting, and cuddling your pet  Do not let your pet lick you or share your food  Ask someone who is not infected to take care of your pet, if possible  If you must care for a pet, wear a face covering  Wash your hands before and after you give care  · Follow directions from your healthcare provider for being around others after you recover  You will need to wait at least 10 days after symptoms first appeared  Then you will need to have no fever for 24 hours without fever medicine, and no other symptoms  A loss of taste or smell may continue for several months  It is considered okay to be around others if this is your only symptom  It is not known for sure if or for how long a recovered person can pass the virus to others  Your provider may give you instructions, such as continuing social distancing or wearing a face covering around others      How to take care of someone who has COVID-19:  If the person lives in another home, arrange for a time to give care  Remember to bring a few pairs of disposable gloves and a cloth face covering  The following are general guidelines to help you safely care for anyone who has COVID-19:  · Wash your hands often  Wash before and after you go into the person's home, area, or room  Throw paper towels away in a lined trash can that has a lid, if possible  · Do not allow others to go near the person  No one should come into the person's home unless it is necessary  If possible, the person should be in a separate area or room if he or she lives with others  Keep the room's door shut unless you need to go in or out  Have others call, video chat, or e-mail the person if he or she is feeling well enough  The person may feel lonely if he or she is kept separate for a long period of time  Safe communication can help him or her stay connected to family and friends  · Make sure the person's room has good air flow  You may be able to open the window if the weather allows  An air conditioner can also be turned on to help air move  · Contact the person before you go in to give care  Make sure the person is wearing a face covering  Remind him or her to wash his or her hands with soap and water  He or she can use hand  that contains alcohol if soap and water are not available  Put on a face covering before you go in to give care  · Wear gloves while you give care and clean  Clean items the person uses often  Clean countertops, cooking surfaces, and the fronts and insides of the microwave and refrigerator  Clean the shower, toilet, the area around the toilet, the sink, the area around the sink, and faucets  Gather used laundry or bedding  Wash and dry items on the warmest settings the fabric allows  Wash dishes and silverware in hot, soapy water or in a   · Anything you throw away needs to go into a lined trash can    When you need to empty the trash, close the open end of the lining and tie it closed  This helps prevent items the virus is on from spilling out of the trash  Remove your gloves and throw them away  Wash your hands  Follow up with your doctor as directed:  Write down your questions so you remember to ask them during your visits  For more information:   · Centers for Disease Control and Prevention  [address] , [address]  Phone: 7- 161 - [phone]  Web Address: [employer] com br    © Copyright [address] [place] [address] Drive Information is for End User's use only and may not be sold, redistributed or otherwise used for commercial purposes  All illustrations and images included in CareNotes® are the copyrighted property of [employer] D [employer] M [employer]  or [facility]   The above information is an  only  It is not intended as medical advice for individual conditions or treatments  Talk to your doctor, nurse or pharmacist before following any medical regimen to see if it is safe and effective for you

## 2021-01-14 NOTE — PROGRESS NOTES
COVID-19 Virtual Visit     Assessment/Plan:    Problem List Items Addressed This Visit        Respiratory    Pulmonary emphysema (HCC)    Relevant Medications    amoxicillin-clavulanate (AUGMENTIN) 875-125 mg per tablet    guaiFENesin (Mucinex) 600 mg 12 hr tablet    fluticasone (FLONASE) 50 mcg/act nasal spray       Other    Tobacco abuse    Relevant Medications    amoxicillin-clavulanate (AUGMENTIN) 875-125 mg per tablet    guaiFENesin (Mucinex) 600 mg 12 hr tablet    fluticasone (FLONASE) 50 mcg/act nasal spray      Other Visit Diagnoses     Upper respiratory tract infection, unspecified type    -  Primary    Relevant Medications    amoxicillin-clavulanate (AUGMENTIN) 875-125 mg per tablet    guaiFENesin (Mucinex) 600 mg 12 hr tablet    fluticasone (FLONASE) 50 mcg/act nasal spray    Close exposure to COVID-19 virus        Relevant Medications    amoxicillin-clavulanate (AUGMENTIN) 875-125 mg per tablet    guaiFENesin (Mucinex) 600 mg 12 hr tablet    fluticasone (FLONASE) 50 mcg/act nasal spray         Disposition:         A/P: Pt clinically sounds stable and able to carry on a conversation w/o SOB  Doesn't meet the criteria for COVID-19 and offered the pt screening, but defers  Suspect a sinusitis and will increase their PO fluids, OTC PRN motrin or tylenol, and will start abx, INS, and mucinex  Wean tobacco  Increase KARLA use  Start vit d, vit c, and zinc  To continue social distancing and other universal precautions  Pt to continue to keep bliar for COVID-19  RTC as scheduled  I have spent 15 minutes directly with the patient  Greater than 50% of this time was spent in counseling/coordination of care regarding: diagnostic results, prognosis, risks and benefits of treatment options, instructions for management, patient and family education, importance of treatment compliance, risk factor reductions and impressions          Encounter provider Omi Garcia DO    Provider located at [facility] NOW  [facility]  [address] [address]    Recent Visits  No visits were found meeting these conditions  Showing recent visits within past 7 days and meeting all other requirements     Today's Visits  Date Type Provider Dept   01/14/21 Telemedicine DO Jama Peguero today's visits and meeting all other requirements     Future Appointments  No visits were found meeting these conditions  Showing future appointments within next 150 days and meeting all other requirements      This virtual check-in was done via Zula and patient was informed that this is a secure, HIPAA-compliant platform  She agrees to proceed  Patient agrees to participate in a virtual check in via telephone or video visit instead of presenting to the office to address urgent/immediate medical needs  Patient is aware this is a billable service  After connecting through [facility], the patient was identified by name and date of birth  Cuca Correa was informed that this was a telemedicine visit and that the exam was being conducted confidentially over secure lines  My office door was closed  No one else was in the room  Cuca Correa acknowledged consent and understanding of privacy and security of the telemedicine visit  I informed the patient that I have reviewed her record in Epic and presented the opportunity for her to ask any questions regarding the visit today  The patient agreed to participate  Subjective:   Cuca Correa is a 54 y o  female who is concerned about COVID-19  Patient's symptoms include chills, fatigue, nasal congestion, rhinorrhea, sore throat and headache  Patient denies fever, anosmia, loss of taste, cough, shortness of breath, chest tightness, abdominal pain, nausea, vomiting, diarrhea and myalgias       Date of symptom onset: 1/10/2021    Exposure:   Contact with a person who is under investigation (PUI) for or who is positive for COVID-19 within the last 14 days?: No    Hospitalized recently for fever and/or lower respiratory symptoms?: No      Currently a healthcare worker that is involved in direct patient care?: No      Works in a special setting where the risk of COVID-19 transmission may be high? (this may include long-term care, correctional and long-term facilities; homeless shelters; assisted-living facilities and group homes ): No      Resident in a special setting where the risk of COVID-19 transmission may be high? (this may include long-term care, correctional and long-term facilities; homeless shelters; assisted-living facilities and group homes ): No      No results found for: SARSC[employer], [address] [facility], [address], [place] [address]  Past Medical History:   Diagnosis Date    Anxiety     Bell's palsy     COPD (chronic obstructive pulmonary disease) ([facility] [address] )     Depression     Menopause     Mild persistent asthma without complication     Obesity     Primary osteoarthritis involving multiple joints 10/9/2018     Past Surgical History:   Procedure Laterality Date    ANKLE SURGERY      BREAST BIOPSY  12 yrs ago     SECTION      COLONOSCOPY W/ POLYPECTOMY      DILATION AND CURETTAGE, DIAGNOSTIC / THERAPEUTIC      NOSE SURGERY      TOTAL HIP ARTHROPLASTY Left     TUBAL LIGATION       Current Outpatient Medications   Medication Sig Dispense Refill    albuterol (PROVENTIL HFA,VENTOLIN HFA) 90 mcg/act inhaler Inhale 2 puffs every 6 (six) hours as needed for wheezing or shortness of breath 3 Inhaler 3    ARIPiprazole (ABILIFY) 30 mg tablet Take 30 mg by mouth 2 (two) times a day       Ascorbic Acid (C-1000/ALIX HIPS PO) Take by mouth      atorvastatin (LIPITOR) 20 mg tablet Take 1 tablet by mouth once daily 90 tablet 1    BIOTIN 5000 PO Take by mouth      Coenzyme Q10 (COQ10) 100 MG CAPS Take by mouth      fluticasone-umeclidinium-vilanterol (TRELEGY) 100-62 5-25 MCG/INH inhaler Inhale 1 puff daily Rinse mouth after use  1 Inhaler 5    folic acid (FOLVITE) 1 mg tablet Take 1 tablet (1 mg total) by mouth daily 30 tablet 0    gabapentin (NEURONTIN) 300 mg capsule Take 1 capsule (300 mg total) by mouth 3 (three) times a day 90 capsule 5    MAGNESIUM CITRATE PO Take 400 mg by mouth      meloxicam (MOBIC) 15 mg tablet Take 1 tablet by mouth once daily 30 tablet 5    Menthol, Topical Analgesic, (Biofreeze Roll-On) 4 % GEL Apply 1 application topically 3 (three) times a day as needed (pain) 150 Tube 1    pramipexole (MIRAPEX) 0 5 mg tablet Take 1 tablet (0 5 mg total) by mouth daily 90 tablet 1    thiamine 100 MG tablet Take 1 tablet (100 mg total) by mouth daily 30 tablet 0    vitamin E, tocopherol, 400 units capsule Take 400 Units by mouth daily      amoxicillin-clavulanate (AUGMENTIN) 875-125 mg per tablet Take 1 tablet by mouth every 12 (twelve) hours for 10 days 20 tablet 0    fluticasone (FLONASE) 50 mcg/act nasal spray 1 spray into each nostril daily 16 g 0    guaiFENesin (Mucinex) 600 mg 12 hr tablet Take 1 tablet (600 mg total) by mouth every 12 (twelve) hours 20 tablet 0    montelukast (SINGULAIR) 10 mg tablet TAKE 1 TABLET BY MOUTH ONCE DAILY AT BEDTIME (Patient not taking: Reported on 1/14/2021) 90 tablet 1     No current facility-administered medications for this visit  Allergies   Allergen Reactions    Pollen Extract        Review of Systems   Constitutional: Positive for activity change, chills, diaphoresis and fatigue  Negative for fever  HENT: Positive for congestion, postnasal drip, rhinorrhea and sore throat  Negative for sinus pressure and sinus pain  No loss of taste/smell  Respiratory: Negative for cough, chest tightness, shortness of breath and wheezing  Cardiovascular: Negative for chest pain, palpitations and leg swelling  Gastrointestinal: Negative for abdominal pain, constipation, diarrhea, nausea and vomiting     Genitourinary: Negative for difficulty urinating, dysuria and frequency  Musculoskeletal: Negative for arthralgias, gait problem and myalgias  Neurological: Positive for headaches  Negative for light-headedness  Psychiatric/Behavioral: Negative for confusion  The patient is not nervous/anxious  Objective:    Vitals:       Physical Exam  Vitals signs and nursing note reviewed  Constitutional:       General: She is not in acute distress  Appearance: Normal appearance  She is not ill-appearing  HENT:      Head: Normocephalic and atraumatic  Eyes:      Extraocular Movements: Extraocular movements intact  Conjunctiva/sclera: Conjunctivae normal       Pupils: Pupils are equal, round, and reactive to light  Pulmonary:      Effort: Pulmonary effort is normal  No respiratory distress  Neurological:      General: No focal deficit present  Mental Status: She is alert and oriented to person, place, and time  Mental status is at baseline  Psychiatric:         Mood and Affect: Mood normal          Behavior: Behavior normal          Thought Content: Thought content normal          Judgment: Judgment normal        VIRTUAL VISIT DISCLAIMER    Carmen ISABELLE SeniorZita Radha acknowledges that she has consented to an online visit or consultation  She understands that the online visit is based solely on information provided by her, and that, in the absence of a face-to-face physical evaluation by the physician, the diagnosis she receives is both limited and provisional in terms of accuracy and completeness  This is not intended to replace a full medical face-to-face evaluation by the physician  Chelsea Carter understands and accepts these terms

## 2021-01-20 ENCOUNTER — TELEPHONE (OUTPATIENT)
Dept: INTERNAL MEDICINE CLINIC | Facility: CLINIC | Age: 56
End: 2021-01-20

## 2021-01-20 DIAGNOSIS — M25.531 PAIN IN BOTH WRISTS: Primary | ICD-10-CM

## 2021-01-20 DIAGNOSIS — M25.532 PAIN IN BOTH WRISTS: Primary | ICD-10-CM

## 2021-01-20 NOTE — TELEPHONE ENCOUNTER
----- Message from Kaushal Salazar DO sent at 1/19/2021  1:29 PM EST -----  Regarding: FW: Visit Follow-Up Question  Contact: [phone]  Put in referral for pt  ----- Message -----  From: Eulalia Penaloza  Sent: 1/19/2021   1:28 PM EST  To: Kaushal Salazar DO  Subject: FW: Visit Follow-Up Question                       ----- Message -----  From: Bipin Spaulding  Sent: 1/19/2021   1:22 PM EST  To: [facility]  Subject: Visit Follow-Up Question                         Is my prescription for physical therapy still valid (both wrists) or do I need another appt with you??

## 2021-01-28 ENCOUNTER — EVALUATION (OUTPATIENT)
Dept: OCCUPATIONAL THERAPY | Facility: CLINIC | Age: 56
End: 2021-01-28
Payer: COMMERCIAL

## 2021-01-28 DIAGNOSIS — M25.531 PAIN IN BOTH WRISTS: Primary | ICD-10-CM

## 2021-01-28 DIAGNOSIS — M25.532 PAIN IN BOTH WRISTS: Primary | ICD-10-CM

## 2021-01-28 PROCEDURE — 97035 APP MDLTY 1+ULTRASOUND EA 15: CPT

## 2021-01-28 PROCEDURE — 97166 OT EVAL MOD COMPLEX 45 MIN: CPT

## 2021-01-28 PROCEDURE — 97535 SELF CARE MNGMENT TRAINING: CPT

## 2021-01-28 NOTE — PROGRESS NOTES
OT Evaluation     Today's date: 2021  Patient name: Shona Sanders  : 1965  MRN: [id number]  Referring provider: Genaro Cardona DO  Dx:   Encounter Diagnosis     ICD-10-CM    1  Pain in both wrists  M25 531 Ambulatory referral to Physical Therapy    M25 532                   Assessment  Assessment details: Patient presenting to OP OT services with a dx of bilateral hand pain  Patient reports symptoms began ten months ago  Patient reports right is worse then left  Patient reports sensation deficits throughout palmar aspect of bilateral hands and extending into forearms and elbow  Patient reports difficulty manipulating objects and dropping objects  Patient is RHD  Impairments: abnormal coordination, abnormal or restricted ROM and impaired physical strength  Other impairment: decreased functional use    Symptom irritability: moderateBarriers to therapy: Past Medical History:  No date: Anxiety  No date: Bell's palsy  No date: COPD (chronic obstructive pulmonary disease) (HCC)  No date: Depression  No date: Menopause  10/9/2018: Mild persistent asthma without complication  No date: Obesity  10/9/2018: Primary osteoarthritis involving multiple joints  Understanding of Dx/Px/POC: good   Prognosis: good    Goals  STGs    Pt will increase  strength by 5-10#  Pt will increase wrist strength by 1/2 grade  Pt will report a decrease in sensation deficits by 25%    Independent with HEP      LTGs     Pt will increase  strength by an additional 5-10#  Pt will increase wrist strength by 1-2 grade  Pt will increase pinch strength by 3-5#  Pt will report an increase in ADL/IADL participation    Pt will report a decrease in sensation deficits by 50%      Plan  Plan details: Patient has presenting to OP OT services with a dx of bilateral hand numbness  Patient demonstrating increased numbness, decreased strength, decreased ROM and decreased activity   Pt would benefit from continued Occupational Therapy services two times per week for 4 weeks to return to prior level of function and achieve all established goals   Thank you for the referral!    Patient would benefit from: OT eval and skilled occupational therapy  Referral necessary: Yes  Planned modality interventions: ultrasound, unattended electrical stimulation, thermotherapy: hydrocollator packs, cryotherapy and thermotherapy: paraffin bath  Planned therapy interventions: massage, manual therapy, joint mobilization, patient education, strengthening, stretching, fine motor coordination training, home exercise program, neuromuscular re-education, self care, therapeutic exercise and therapeutic activities  Frequency: 2x week  Duration in visits: 8  Duration in weeks: 4  Treatment plan discussed with: patient        Subjective Evaluation    History of Present Illness  Mechanism of injury: Bilateral Hand Numbness          Recurrent probem    Quality of life: good    Pain  Current pain ratin  At best pain ratin  At worst pain ratin    Hand dominance: right    Treatments  Current treatment: occupational therapy  Patient Goals  Patient goals for therapy: increased motion, increased strength and independence with ADLs/IADLs  Patient goal: Improve sensation deficits        Objective     Active Range of Motion     Left Elbow   Normal active range of motion    Right Elbow   Normal active range of motion    Left Wrist   Normal active range of motion    Right Wrist   Normal active range of motion    Left Thumb   Opposition: WNL    Right Thumb   Opposition: WNL    Additional Active Range of Motion Details  Patient presenting with within normal limits of bilateral elbow/forearm  Patient demonstrating WNL bilaterally  Composite fist noted    Strength/Myotome Testing     Left Elbow   Normal strength    Right Elbow   Normal strength    Left Wrist/Hand   Wrist extension: 4-  Wrist flexion: 4-  Radial deviation: 4-  Ulnar deviation: 4-     (2nd hand position) Trial 1: 42    Thumb Strength  Key/Lateral Pinch     Trial 1: 18    Right Wrist/Hand   Wrist extension: 4-  Wrist flexion: 4-  Radial deviation: 4-  Ulnar deviation: 4-     (2nd hand position)     Trial 1: 40    Thumb Strength   Key/Lateral Pinch     Trial 1: 18    Additional Strength Details  Patient demonstrating with decreased wrist,  and pinch strength bilaterally  Tests     Left Wrist/Hand   Positive Phalen's sign  Right Wrist/Hand   Positive Phalen's sign  Additional Tests Details  Positive Phalen's Bilaterally       Patient tolerated session well  Patient and therapist discussed exercises as per initial HEP  Patient verbalized understanding of education and demonstrated proper technique  Therapist will make increases as tolerated   Continue with POC            Precautions CTS       Manuals 01/28/2021       Manual Massage        Wrist Flexor Stretch                        Neuro Re-Ed  01/28/2021                                                               Ther Ex 01/28/2021       TGE        Median Nerve Glide        Prayer Stretch        Wrist AROM   Flex/Ext  RD/UD        Digi-Flex        Wrist Maze        Hand Power Pro        Wrist Iso  Flexion  Extension  RD  UD        Lateral Neck Stretch        Levator Scapulae Stretch                        Ther Activity 01/28/2021        Tension Pin w/ Pom Pom                                        Modalities 01/28/2021       Ultrasound B/L Wrists  3 3 MHz  50%  0 8 Intensity       MH B/L Hands at end of session       CP

## 2021-02-01 ENCOUNTER — APPOINTMENT (OUTPATIENT)
Dept: OCCUPATIONAL THERAPY | Facility: CLINIC | Age: 56
End: 2021-02-01
Payer: COMMERCIAL

## 2021-02-03 ENCOUNTER — APPOINTMENT (OUTPATIENT)
Dept: OCCUPATIONAL THERAPY | Facility: CLINIC | Age: 56
End: 2021-02-03
Payer: COMMERCIAL

## 2021-02-08 ENCOUNTER — APPOINTMENT (OUTPATIENT)
Dept: OCCUPATIONAL THERAPY | Facility: CLINIC | Age: 56
End: 2021-02-08
Payer: COMMERCIAL

## 2021-02-10 ENCOUNTER — OFFICE VISIT (OUTPATIENT)
Dept: OCCUPATIONAL THERAPY | Facility: CLINIC | Age: 56
End: 2021-02-10
Payer: COMMERCIAL

## 2021-02-10 ENCOUNTER — TELEPHONE (OUTPATIENT)
Dept: INTERNAL MEDICINE CLINIC | Facility: CLINIC | Age: 56
End: 2021-02-10

## 2021-02-10 DIAGNOSIS — M25.531 PAIN IN BOTH WRISTS: Primary | ICD-10-CM

## 2021-02-10 DIAGNOSIS — M25.532 PAIN IN BOTH WRISTS: Primary | ICD-10-CM

## 2021-02-10 PROCEDURE — 97035 APP MDLTY 1+ULTRASOUND EA 15: CPT

## 2021-02-10 PROCEDURE — 97110 THERAPEUTIC EXERCISES: CPT

## 2021-02-10 PROCEDURE — 97140 MANUAL THERAPY 1/> REGIONS: CPT

## 2021-02-10 NOTE — PROGRESS NOTES
Daily Note     Today's date: 2/10/2021  Patient name: Andriette Bosworth  : 1965  MRN: [id number]  Referring provider: Tran Hernandez DO  Dx:   Encounter Diagnosis     ICD-10-CM    1  Pain in both wrists  M25 531     M25 532                   Subjective: When I wake up my hands are numb in the morning  Objective: See treatment diary below      Assessment: Tolerated treatment well  Patient demonstrated fatigue post treatment, exhibited good technique with therapeutic exercises and would benefit from continued OT  Pain pre 5 decreased post tx to 3-4  Wrist extension tightness noted on R wrist  Decreased post tx  Provided pt with wrist cock up braces for b/l wrists for night time wear  Pt demonstrated good knowledge of wear of braces  Plan: Continue per plan of care  Progress treatment as tolerated            Precautions CTS       Manuals 2021 2/10/21      Manual Massage  Performed        Wrist Flexor Stretch  performed                      Neuro Re-Ed  2021 2/10/21                                                              Ther Ex 2021 2/10/21      TGE        Median Nerve Glide        Prayer Stretch  20 sec x 3      Wrist AROM   Flex/Ext  RD/UD    1# x 10  1# x 10      Digi-Flex        Wrist Maze  x4 b/l      Hand Power Pro        Wrist Iso  Flexion  Extension  RD  UD        Lateral Neck Stretch        Levator Scapulae Stretch        Flex Bar  Twist   Bend  Cream  X 10  X 10      Power web    Red  10/2      Ther Activity 2021  2/10/21      Tension Pin w/ Pom Pom  Red pin b/l hands                                      Modalities 2021 2/10/21      Ultrasound B/L Wrists  3 3 MHz  50%  0 8 Intensity B/l wrists  3 3 MHz  50%  0 8 intensity      MH B/L Hands at end of session       CP

## 2021-02-11 ENCOUNTER — APPOINTMENT (OUTPATIENT)
Dept: OCCUPATIONAL THERAPY | Facility: CLINIC | Age: 56
End: 2021-02-11
Payer: COMMERCIAL

## 2021-02-12 ENCOUNTER — APPOINTMENT (OUTPATIENT)
Dept: OCCUPATIONAL THERAPY | Facility: CLINIC | Age: 56
End: 2021-02-12
Payer: COMMERCIAL

## 2021-02-12 DIAGNOSIS — J45.20 MILD INTERMITTENT ASTHMA WITHOUT COMPLICATION: ICD-10-CM

## 2021-02-12 DIAGNOSIS — Z23 ENCOUNTER FOR IMMUNIZATION: ICD-10-CM

## 2021-02-12 RX ORDER — ALBUTEROL SULFATE 90 UG/1
2 AEROSOL, METERED RESPIRATORY (INHALATION) EVERY 6 HOURS PRN
Qty: 3 INHALER | Refills: 3 | Status: SHIPPED | OUTPATIENT
Start: 2021-02-12 | End: 2021-02-12 | Stop reason: CLARIF

## 2021-02-15 ENCOUNTER — APPOINTMENT (OUTPATIENT)
Dept: OCCUPATIONAL THERAPY | Facility: CLINIC | Age: 56
End: 2021-02-15
Payer: COMMERCIAL

## 2021-02-18 ENCOUNTER — APPOINTMENT (OUTPATIENT)
Dept: OCCUPATIONAL THERAPY | Facility: CLINIC | Age: 56
End: 2021-02-18
Payer: COMMERCIAL

## 2021-02-19 DIAGNOSIS — K63.5 HYPERPLASTIC COLONIC POLYP, UNSPECIFIED PART OF COLON: Primary | ICD-10-CM

## 2021-02-19 DIAGNOSIS — Z12.11 SCREEN FOR COLON CANCER: Primary | ICD-10-CM

## 2021-02-22 ENCOUNTER — APPOINTMENT (OUTPATIENT)
Dept: OCCUPATIONAL THERAPY | Facility: CLINIC | Age: 56
End: 2021-02-22
Payer: COMMERCIAL

## 2021-02-25 ENCOUNTER — APPOINTMENT (OUTPATIENT)
Dept: OCCUPATIONAL THERAPY | Facility: CLINIC | Age: 56
End: 2021-02-25
Payer: COMMERCIAL

## 2021-02-25 ENCOUNTER — TELEPHONE (OUTPATIENT)
Dept: INTERNAL MEDICINE CLINIC | Facility: CLINIC | Age: 56
End: 2021-02-25

## 2021-02-25 DIAGNOSIS — J43.9 PULMONARY EMPHYSEMA, UNSPECIFIED EMPHYSEMA TYPE (HCC): Primary | ICD-10-CM

## 2021-02-25 NOTE — TELEPHONE ENCOUNTER
Pt called, stated Trelegy inhaler is not covered by her insurance, stated  Atrovent and Combivent is suggested, pls send to [facility]

## 2021-03-01 ENCOUNTER — APPOINTMENT (OUTPATIENT)
Dept: OCCUPATIONAL THERAPY | Facility: CLINIC | Age: 56
End: 2021-03-01
Payer: COMMERCIAL

## 2021-03-04 ENCOUNTER — APPOINTMENT (OUTPATIENT)
Dept: OCCUPATIONAL THERAPY | Facility: CLINIC | Age: 56
End: 2021-03-04
Payer: COMMERCIAL

## 2021-03-10 ENCOUNTER — TELEPHONE (OUTPATIENT)
Dept: INTERNAL MEDICINE CLINIC | Facility: CLINIC | Age: 56
End: 2021-03-10

## 2021-03-10 DIAGNOSIS — E78.2 MIXED HYPERLIPIDEMIA: ICD-10-CM

## 2021-03-10 DIAGNOSIS — J43.9 PULMONARY EMPHYSEMA, UNSPECIFIED EMPHYSEMA TYPE (HCC): Primary | ICD-10-CM

## 2021-03-10 RX ORDER — ATORVASTATIN CALCIUM 20 MG/1
TABLET, FILM COATED ORAL
Qty: 90 TABLET | Refills: 1 | Status: SHIPPED | OUTPATIENT
Start: 2021-03-10 | End: 2021-07-19

## 2021-03-10 NOTE — TELEPHONE ENCOUNTER
Pt called she cannot take the combivent inhaler because she already has an albuterol inhlaler  And the albuterol makers her anxious  Can she have another inhaler, her insurance does not cover trelegy inhaler

## 2021-03-10 NOTE — TELEPHONE ENCOUNTER
Sent in spiriva, but according to the pt last phone message, only albuterol and combivent are covered

## 2021-03-11 ENCOUNTER — APPOINTMENT (OUTPATIENT)
Dept: OCCUPATIONAL THERAPY | Facility: CLINIC | Age: 56
End: 2021-03-11
Payer: COMMERCIAL

## 2021-03-17 ENCOUNTER — APPOINTMENT (OUTPATIENT)
Dept: OCCUPATIONAL THERAPY | Facility: CLINIC | Age: 56
End: 2021-03-17
Payer: COMMERCIAL

## 2021-03-17 NOTE — PROGRESS NOTES
OT Re-Evaluation     Today's date: 3/17/2021  Patient name: Lore Riley  : 1965  MRN: [id number]  Referring provider: Jack Justin DO  Dx:   No diagnosis found  Assessment  Assessment details: Patient presenting to OP OT services with a dx of bilateral hand pain  Patient reports symptoms began ten months ago  Patient reports right is worse then left  Patient reports sensation deficits throughout palmar aspect of bilateral hands and extending into forearms and elbow  Patient reports difficulty manipulating objects and dropping objects  Patient is RHD  Re-assessment completed on ***  Patient has consistently attended OP OT services since start of care  Patient has made steady progress towards established goals  Patient demonstrating with increases in *** and decreases in ***  Patient has not met all established goals and will benefit from continued OT services to maximize level of function  Impairments: abnormal coordination, abnormal or restricted ROM and impaired physical strength  Other impairment: decreased functional use    Symptom irritability: moderateBarriers to therapy: Past Medical History:  No date: Anxiety  No date: Bell's palsy  No date: COPD (chronic obstructive pulmonary disease) (HCC)  No date: Depression  No date: Menopause  10/9/2018: Mild persistent asthma without complication  No date: Obesity  10/9/2018: Primary osteoarthritis involving multiple joints  Understanding of Dx/Px/POC: good   Prognosis: good    Goals  STGs    Pt will increase  strength by 5-10#  Pt will increase wrist strength by 1/2 grade  Pt will report a decrease in sensation deficits by 25%    Independent with HEP      LTGs     Pt will increase  strength by an additional 5-10#  Pt will increase wrist strength by 1-2 grade  Pt will increase pinch strength by 3-5#      Pt will report an increase in ADL/IADL participation    Pt will report a decrease in sensation deficits by 50%      Plan  Plan details: Patient has presenting to OP OT services with a dx of bilateral hand numbness  Patient demonstrating increased numbness, decreased strength, decreased ROM and decreased activity  Pt would benefit from continued Occupational Therapy services two times per week for 4 weeks to return to prior level of function and achieve all established goals   Thank you for the referral!    Patient would benefit from: OT eval and skilled occupational therapy  Referral necessary: Yes  Planned modality interventions: ultrasound, unattended electrical stimulation, thermotherapy: hydrocollator packs, cryotherapy and thermotherapy: paraffin bath  Planned therapy interventions: massage, manual therapy, joint mobilization, patient education, strengthening, stretching, fine motor coordination training, home exercise program, neuromuscular re-education, self care, therapeutic exercise and therapeutic activities  Frequency: 2x week  Duration in visits: 8  Duration in weeks: 4  Treatment plan discussed with: patient        Subjective Evaluation    History of Present Illness  Mechanism of injury: Bilateral Hand Numbness          Recurrent probem    Quality of life: good    Pain  Current pain ratin  At best pain ratin  At worst pain ratin    Hand dominance: right    Treatments  Current treatment: occupational therapy  Patient Goals  Patient goals for therapy: increased motion, increased strength and independence with ADLs/IADLs  Patient goal: Improve sensation deficits        Objective     Active Range of Motion     Left Elbow   Normal active range of motion    Right Elbow   Normal active range of motion    Left Wrist   Normal active range of motion    Right Wrist   Normal active range of motion    Left Thumb   Opposition: WNL    Right Thumb   Opposition: WNL    Additional Active Range of Motion Details  Patient presenting with within normal limits of bilateral elbow/forearm  Patient demonstrating WNL bilaterally  Composite fist noted    Strength/Myotome Testing     Left Elbow   Normal strength    Right Elbow   Normal strength    Left Wrist/Hand   Wrist extension: 4-  Wrist flexion: 4-  Radial deviation: 4-  Ulnar deviation: 4-     (2nd hand position)     Trial 1: 42    Thumb Strength  Key/Lateral Pinch     Trial 1: 18    Right Wrist/Hand   Wrist extension: 4-  Wrist flexion: 4-  Radial deviation: 4-  Ulnar deviation: 4-     (2nd hand position)     Trial 1: 40    Thumb Strength   Key/Lateral Pinch     Trial 1: 18    Additional Strength Details  Patient demonstrating with decreased wrist,  and pinch strength bilaterally  Tests     Left Wrist/Hand   Positive Phalen's sign  Right Wrist/Hand   Positive Phalen's sign       Additional Tests Details  Positive Phalen's Bilaterally

## 2021-03-24 ENCOUNTER — TELEPHONE (OUTPATIENT)
Dept: INTERNAL MEDICINE CLINIC | Facility: CLINIC | Age: 56
End: 2021-03-24

## 2021-03-24 NOTE — TELEPHONE ENCOUNTER
Pt called, went to PT for bi-lateral wrist pain in January, only went 1 time because the pain was bad, pt asking if she should go back now, still has the pain , still dropping objects, called to schedule an appt with them and they told her to get your opinion, pls advise

## 2021-03-30 DIAGNOSIS — G56.00 CARPAL TUNNEL SYNDROME, UNSPECIFIED LATERALITY: Primary | ICD-10-CM

## 2021-03-31 ENCOUNTER — APPOINTMENT (OUTPATIENT)
Dept: OCCUPATIONAL THERAPY | Facility: CLINIC | Age: 56
End: 2021-03-31
Payer: COMMERCIAL

## 2021-03-31 NOTE — PROGRESS NOTES
OT Evaluation     Today's date: 3/31/2021  Patient name: Ash Sabillon  : 1965  MRN: [id number]  Referring provider: Юлия Zavala DO  Dx: No diagnosis found  Assessment  Assessment details: Patient presenting with OP OT services with a diagnosis of CTS  Patient had previously attended OP OT services on 2021 and attended one session on 02/10/2021  Patient no showed or cancelled 14 appointments during last treatment cycle  Patient reports symptoms started ***  Patient's next follow-up appointment is on ***  Impairments: abnormal coordination, abnormal or restricted ROM and impaired physical strength    Symptom irritability: moderateUnderstanding of Dx/Px/POC: good   Prognosis: good    Goals  STGs    Pt will increase  strength by 5-10#  Pt will increase wrist strength by 1/2 grade  Pt will increase digit ROM by 50%  Pt will increase wrist ROM by 50%     Pt will demonstrate decrease in edema by 25%    Pt will report decrease in morning stiffness by 25%    Pt will report a decrease in sensation deficits by 25%    Independent with HEP    LTGs     Pt will increase  strength by an additional 5-10#  Pt will increase wrist strength by 1-2 grade  Pt will increase digit ROM to WNL    Pt will increase wrist ROM to [facility]  Pt will demonstrate decrease in edema by 50%    Pt will increase pinch strength by 3-5#  Pt will report decrease in morning stiffness by 50%    Pt will report an increase in ADL/IADL participation    Pt will report a decrease in sensation deficits by 50%      Plan  Plan details: Patient has presenting to OP OT services with a dx of CTR  Patient demonstrating increased pain, decreased strength, decreased ROM and decreased activity  Pt would benefit from continued Occupational Therapy services two times per week for 4 weeks to return to prior level of function and achieve all established goals   Thank you for the referral!    Patient would benefit from: OT eval, skilled occupational therapy and custom splinting  Referral necessary: Yes  Planned modality interventions: ultrasound, unattended electrical stimulation, thermotherapy: hydrocollator packs, cryotherapy and thermotherapy: paraffin bath  Planned therapy interventions: massage, manual therapy, joint mobilization, patient education, strengthening, stretching, fine motor coordination training, home exercise program, neuromuscular re-education, self care, therapeutic exercise and therapeutic activities  Frequency: 2x week  Duration in visits: 8  Duration in weeks: 4  Treatment plan discussed with: patient        Subjective    Objective              Precautions CTS       Manuals 01/28/2021 2/10/21      Manual Massage  Performed        Wrist Flexor Stretch  performed                      Neuro Re-Ed  01/28/2021 2/10/21                                                              Ther Ex 01/28/2021 2/10/21      TGE        Median Nerve Glide        Prayer Stretch  20 sec x 3      Wrist AROM   Flex/Ext  RD/UD    1# x 10  1# x 10      Digi-Flex        Wrist Maze  x4 b/l      Hand Power Pro        Wrist Iso  Flexion  Extension  RD  UD        Lateral Neck Stretch        Levator Scapulae Stretch        Flex Bar  Twist   Bend  Cream  X 10  X 10      Power web    Red  10/2      Ther Activity 01/28/2021  2/10/21      Tension Pin w/ Pom Pom  Red pin b/l hands                                      Modalities 01/28/2021 2/10/21      Ultrasound B/L Wrists  3 3 MHz  50%  0 8 Intensity B/l wrists  3 3 MHz  50%  0 8 intensity      MH B/L Hands at end of session       CP

## 2021-04-08 ENCOUNTER — APPOINTMENT (OUTPATIENT)
Dept: OCCUPATIONAL THERAPY | Facility: CLINIC | Age: 56
End: 2021-04-08
Payer: COMMERCIAL

## 2021-04-12 ENCOUNTER — TELEPHONE (OUTPATIENT)
Dept: OBGYN CLINIC | Facility: MEDICAL CENTER | Age: 56
End: 2021-04-12

## 2021-04-12 NOTE — TELEPHONE ENCOUNTER
Pt wanted to cancel appt for 04/12/2021 at 3:00pm in [facility] office due to emegency  Left pt message that we canceled appt and she can call back to reschedule

## 2021-04-14 ENCOUNTER — EVALUATION (OUTPATIENT)
Dept: OCCUPATIONAL THERAPY | Facility: CLINIC | Age: 56
End: 2021-04-14
Payer: COMMERCIAL

## 2021-04-14 DIAGNOSIS — G56.03 CARPAL TUNNEL SYNDROME, BILATERAL: Primary | ICD-10-CM

## 2021-04-14 DIAGNOSIS — J30.2 SEASONAL ALLERGIES: ICD-10-CM

## 2021-04-14 PROCEDURE — 97035 APP MDLTY 1+ULTRASOUND EA 15: CPT

## 2021-04-14 PROCEDURE — 97166 OT EVAL MOD COMPLEX 45 MIN: CPT

## 2021-04-14 PROCEDURE — 97110 THERAPEUTIC EXERCISES: CPT

## 2021-04-14 RX ORDER — MONTELUKAST SODIUM 10 MG/1
TABLET ORAL
Qty: 90 TABLET | Refills: 0 | Status: SHIPPED | OUTPATIENT
Start: 2021-04-14 | End: 2021-07-19

## 2021-04-14 NOTE — PROGRESS NOTES
OT Evaluation     Today's date: 2021  Patient name: Angel Luis Horn  : 1965  MRN: [id number]  Referring provider: Jason Steele DO  Dx:   Encounter Diagnosis     ICD-10-CM    1  Carpal tunnel syndrome, bilateral  G56 03                   Assessment  Assessment details: Patient presenting to OP OT services with a dx of bilateral hand pain  Patient reports symptoms began a year ago  Patient reports right is worse then left  Patient reports sensation deficits throughout palmar aspect of bilateral hands and extending into forearms through ulnar distribution and elbow  Patient reports difficulty manipulating objects and dropping objects  Patient is RHD  Patient does not have EMG scheduled  Patient does have night time braces that she uses when active during the day  Impairments: abnormal coordination, abnormal or restricted ROM and impaired physical strength  Other impairment: decreased functional use    Symptom irritability: moderateBarriers to therapy: Past Medical History:  No date: Anxiety  No date: Bell's palsy  No date: COPD (chronic obstructive pulmonary disease) (HCC)  No date: Depression  No date: Menopause  10/9/2018: Mild persistent asthma without complication  No date: Obesity  10/9/2018: Primary osteoarthritis involving multiple joints  Understanding of Dx/Px/POC: good   Prognosis: good    Goals  STGs    Pt will increase  strength by 5-10#  Pt will increase wrist strength by 1/2 grade  Pt will report a decrease in sensation deficits by 25%    Independent with HEP      LTGs     Pt will increase  strength by an additional 5-10#  Pt will increase wrist strength by 1-2 grade  Pt will increase pinch strength by 3-5#  Pt will report an increase in ADL/IADL participation    Pt will report a decrease in sensation deficits by 50%      Plan  Plan details: Patient has presenting to OP OT services with a dx of bilateral hand numbness   Patient demonstrating increased numbness, decreased strength, decreased ROM and decreased activity  Pt would benefit from continued Occupational Therapy services two times per week for 4 weeks to return to prior level of function and achieve all established goals  Thank you for the referral!    Patient would benefit from: OT eval and skilled occupational therapy  Referral necessary: Yes  Planned modality interventions: ultrasound, unattended electrical stimulation, thermotherapy: hydrocollator packs, cryotherapy and thermotherapy: paraffin bath  Planned therapy interventions: massage, manual therapy, joint mobilization, patient education, strengthening, stretching, fine motor coordination training, home exercise program, neuromuscular re-education, self care, therapeutic exercise and therapeutic activities  Frequency: 2x week  Duration in visits: 8  Duration in weeks: 4  Treatment plan discussed with: patient        Subjective Evaluation    History of Present Illness  Onset date: Approx a year ago  Mechanism of injury: Bilateral Hand Numbness          Recurrent probem    Quality of life: good    Pain  Current pain ratin  At best pain ratin  At worst pain ratin (Right is worse than left)  Location: B/L Hands  Quality: radiating and sharp (Arthritic)  Relieving factors: medications  Aggravating factors: lifting (carrying)    Hand dominance: right      Diagnostic Tests  Abnormal x-ray: 2 years Ago  Treatments  Previous treatment: immobilization  Current treatment: immobilization and occupational therapy  Patient Goals  Patient goals for therapy: increased motion, increased strength and independence with ADLs/IADLs  Patient goal: Improve sensation deficits        Objective     Neurological Testing     Sensation     Wrist/Hand   Left   Diminished: light touch    Right   Diminished: light touch    Additional Neurological Details  Patient reports sensation deficits along palmar aspects of bilateral hands  Patient reports right is worse than left  Active Range of Motion     Left Elbow   Normal active range of motion    Right Elbow   Normal active range of motion    Left Wrist   Normal active range of motion    Right Wrist   Normal active range of motion  Wrist extension: with pain    Left Thumb   Opposition: WNL    Right Thumb   Opposition: WNL    Additional Active Range of Motion Details  Patient presenting with within normal limits of bilateral elbow/forearm  Patient demonstrating WNL bilaterally  Composite fist noted    Strength/Myotome Testing     Left Elbow   Normal strength    Right Elbow   Normal strength    Left Wrist/Hand   Wrist extension: 4-  Wrist flexion: 4-  Radial deviation: 4-  Ulnar deviation: 4-     (2nd hand position)     Trial 1: 44    Right Wrist/Hand   Wrist extension: 4-  Wrist flexion: 4-  Radial deviation: 4-  Ulnar deviation: 4-     (2nd hand position)     Trial 1: 35    Additional Strength Details  Patient demonstrating with decreased  strength of right hand as compared to left  Tests     Left Wrist/Hand   Negative Phalen's sign and Tinel's sign (medial nerve)  Right Wrist/Hand   Negative Phalen's sign and Tinel's sign (medial nerve)     Neuro Exam:     Functional outcomes   Left 9 peg hole test: 20 66 (seconds)  Right 9 hole peg test: 22 95 (seconds)              Precautions B/L CTS       Manuals 04/14/2021       Manual Massage        Wrist Flexor Stretch        IASTM                Neuro Re-Ed  04/14/2021                                                               Ther Ex 04/14/2021       TGE        Median Nerve Glide        Prayer Stretch        Wrist AROM   Flex/Ext  RD/UD        Digi-Flex        Wrist Maze        Hand Power Pro        Wrist Iso  Flexion  Extension  RD  UD        Lateral Neck Stretch 20 sec x 2       Levator Scapulae Stretch 20 sec x 2       Chair Scalene Stretch 20 sec x 2       Rhomboid Stretch                Ther Activity 04/14/2021       Tension Pin w/ Pom Pom Modalities 04/14/2021       Ultrasound B/L Wrists  3 3 MHz  50%  0 8 Intensity       MH B/L Hands at end of session       CP

## 2021-04-21 ENCOUNTER — APPOINTMENT (OUTPATIENT)
Dept: OCCUPATIONAL THERAPY | Facility: CLINIC | Age: 56
End: 2021-04-21
Payer: COMMERCIAL

## 2021-04-21 ENCOUNTER — CONSULT (OUTPATIENT)
Dept: GASTROENTEROLOGY | Facility: CLINIC | Age: 56
End: 2021-04-21
Payer: COMMERCIAL

## 2021-04-21 VITALS
DIASTOLIC BLOOD PRESSURE: 58 MMHG | HEIGHT: 65 IN | BODY MASS INDEX: 22.76 KG/M2 | HEART RATE: 88 BPM | SYSTOLIC BLOOD PRESSURE: 102 MMHG | TEMPERATURE: 97.5 F | RESPIRATION RATE: 16 BRPM | WEIGHT: 136.6 LBS

## 2021-04-21 DIAGNOSIS — E55.9 VITAMIN D DEFICIENCY: ICD-10-CM

## 2021-04-21 DIAGNOSIS — D50.9 IRON DEFICIENCY ANEMIA, UNSPECIFIED IRON DEFICIENCY ANEMIA TYPE: ICD-10-CM

## 2021-04-21 DIAGNOSIS — Z12.11 ENCOUNTER FOR SCREENING COLONOSCOPY: ICD-10-CM

## 2021-04-21 DIAGNOSIS — K63.5 HYPERPLASTIC COLONIC POLYP, UNSPECIFIED PART OF COLON: ICD-10-CM

## 2021-04-21 DIAGNOSIS — Z12.11 SCREENING FOR COLON CANCER: ICD-10-CM

## 2021-04-21 DIAGNOSIS — K59.00 CONSTIPATION, UNSPECIFIED CONSTIPATION TYPE: Primary | ICD-10-CM

## 2021-04-21 PROCEDURE — 4004F PT TOBACCO SCREEN RCVD TLK: CPT | Performed by: INTERNAL MEDICINE

## 2021-04-21 PROCEDURE — 3008F BODY MASS INDEX DOCD: CPT | Performed by: INTERNAL MEDICINE

## 2021-04-21 PROCEDURE — 99203 OFFICE O/P NEW LOW 30 MIN: CPT | Performed by: INTERNAL MEDICINE

## 2021-04-21 RX ORDER — POLYETHYLENE GLYCOL 3350 17 G/17G
17 POWDER, FOR SOLUTION ORAL DAILY
Qty: 72 EACH | Refills: 3 | Status: SHIPPED | OUTPATIENT
Start: 2021-04-21 | End: 2021-06-22 | Stop reason: ALTCHOICE

## 2021-04-21 RX ORDER — SIMETHICONE 125 MG
125 CAPSULE ORAL ONCE
Qty: 4 CAPSULE | Refills: 0 | Status: SHIPPED | OUTPATIENT
Start: 2021-04-21 | End: 2021-04-21

## 2021-04-21 RX ORDER — POLYETHYLENE GLYCOL 3350 17 G/17G
POWDER, FOR SOLUTION ORAL
Qty: 238 G | Refills: 0 | Status: SHIPPED | OUTPATIENT
Start: 2021-04-21 | End: 2021-05-25 | Stop reason: ALTCHOICE

## 2021-04-21 RX ORDER — DULOXETIN HYDROCHLORIDE 20 MG/1
40 CAPSULE, DELAYED RELEASE ORAL 2 TIMES DAILY
COMMUNITY
End: 2021-06-16 | Stop reason: SDUPTHER

## 2021-04-21 RX ORDER — PNV NO.95/FERROUS FUM/FOLIC AC 28MG-0.8MG
TABLET ORAL DAILY
COMMUNITY
End: 2021-06-22 | Stop reason: ALTCHOICE

## 2021-04-21 RX ORDER — CHOLECALCIFEROL (VITAMIN D3) 1250 MCG
CAPSULE ORAL WEEKLY
COMMUNITY
End: 2021-06-22 | Stop reason: ALTCHOICE

## 2021-04-21 RX ORDER — LORATADINE 10 MG/1
10 TABLET ORAL DAILY
COMMUNITY
End: 2021-12-17

## 2021-04-21 RX ORDER — ALBUTEROL SULFATE 90 UG/1
2 AEROSOL, METERED RESPIRATORY (INHALATION) EVERY 6 HOURS PRN
COMMUNITY
End: 2021-07-26

## 2021-04-21 NOTE — PROGRESS NOTES
[facility] - Outpatient Consultation  Sebastián Nichole 54 y o  female MRN: [id number]  Encounter: [id number]    HPI:    Sebastián Nichole is a 54 y o  female who presents with complaints of constipation and to schedule screening colonoscopy  She had colonoscopy 10 years ago because of constipation and she had polyps  The reports are not available  She is here because she want to schedule her next colonoscopy  She says that she becomes constipated several times per month and is unable to have a BM for several days  The rest of the time, she has daily BMs  She does not take any laxatives and uses just prune juice and grapes  She also thinks she has a hemorrhoid which bleeds occasionally when she is constipated  Otherwise, there is no hematochezia  She has lost 45 lbs since last year, intentionally, by eating small frequent meals  There is no FH of colon cancer  She report history of MORGAN in the past but recent Hgb and MCV in the chart are normal   She is on iron supplementation  No recent iron labs  She is also on high dose vitamin D supplements  She smokes 1 5 ppd  She has h/o alcohol abuse but has not had any alcohol for 13 months  She has COPD and uses an inhaler  Not on blood thinners  REVIEW OF SYSTEMS:  CONSTITUTIONAL: Denies any fever, chills, rigors, and weight loss  HEENT: No earache or tinnitus  Denies hearing loss or visual disturbances  CARDIOVASCULAR: No chest pain or palpitations  RESPIRATORY: Denies any cough, hemoptysis, shortness of breath or dyspnea on exertion  GASTROINTESTINAL: As noted in the History of Present Illness  GENITOURINARY: No problems with urination  Denies any hematuria or dysuria  NEUROLOGIC: No dizziness or vertigo, denies headaches  MUSCULOSKELETAL: Denies any muscle or joint pain  SKIN: Denies skin rashes or itching  ENDOCRINE: Denies excessive thirst  Denies intolerance to heat or cold    PSYCHOSOCIAL: Denies depression or anxiety  Denies any recent memory loss       Historical Information   Past Medical History:   Diagnosis Date    Anxiety     Bell's palsy     COPD (chronic obstructive pulmonary disease) ([facility] [address] )     Depression     Menopause     Mild persistent asthma without complication 98/3/0838    Obesity     Primary osteoarthritis involving multiple joints 10/9/2018     Past Surgical History:   Procedure Laterality Date    ANKLE SURGERY      BREAST BIOPSY  12 yrs ago     SECTION      COLONOSCOPY      COLONOSCOPY W/ POLYPECTOMY      DILATION AND CURETTAGE, DIAGNOSTIC / THERAPEUTIC      NOSE SURGERY      TOTAL HIP ARTHROPLASTY Left     TUBAL LIGATION       Social History   Social History     Substance and Sexual Activity   Alcohol Use Yes    Alcohol/week: 3 0 - 5 0 standard drinks    Types: 3 - 5 Glasses of wine per week    Frequency: 2-3 times a week    Drinks per session: 3 or 4    Comment: several times weekly     Social History     Substance and Sexual Activity   Drug Use Not Currently    Types: Marijuana, Cocaine     Social History     Tobacco Use   Smoking Status Current Every Day Smoker    Packs/day: 2 00    Years: 40 00    Pack years: 80 00    Types: Cigarettes   Smokeless Tobacco Never Used     Family History   Problem Relation Age of Onset    Mental illness Mother     Suicidality Mother     No Known Problems Father     Mental illness Brother     Suicidality Brother     No Known Problems Maternal Grandmother     No Known Problems Paternal Grandmother     No Known Problems Maternal Aunt     No Known Problems Paternal Aunt     No Known Problems Paternal Aunt     No Known Problems Paternal Aunt     No Known Problems Paternal Aunt        Meds/Allergies       Current Outpatient Medications:     albuterol (PROVENTIL HFA,VENTOLIN HFA) 90 mcg/act inhaler    Ascorbic Acid (C-1000/ALIX HIPS PO)    atorvastatin (LIPITOR) 20 mg tablet    BIOTIN 5000 PO   Cholecalciferol (Vitamin D3) 1 25 MG (08295 UT) CAPS    Coenzyme Q10 (COQ10) 100 MG CAPS    DULoxetine (CYMBALTA) 20 mg capsule    Ferrous Sulfate (Iron) 325 (65 Fe) MG TABS    folic acid (FOLVITE) 1 mg tablet    gabapentin (NEURONTIN) 300 mg capsule    loratadine (CLARITIN) 10 mg tablet    MAGNESIUM CITRATE PO    meloxicam (MOBIC) 15 mg tablet    Menthol, Topical Analgesic, (Biofreeze Roll-On) 4 % GEL    montelukast (SINGULAIR) 10 mg tablet    pramipexole (MIRAPEX) 0 5 mg tablet    tiotropium (SPIRIVA RESPIMAT) 1 25 MCG/ACT AERS inhaler    vitamin E, tocopherol, 400 units capsule    ARIPiprazole (ABILIFY) 30 mg tablet    fluticasone (FLONASE) 50 mcg/act nasal spray    guaiFENesin (Mucinex) 600 mg 12 hr tablet    polyethylene glycol (MIRALAX) 17 g packet    thiamine 100 MG tablet    Allergies   Allergen Reactions    Pollen Extract        Objective   Blood pressure 102/58, pulse 88, temperature 97 5 °F (36 4 °C), temperature source Tympanic, resp  rate 16, height 5' 5" (1 651 m), weight 62 kg (136 lb 9 6 oz)  Body mass index is 22 73 kg/m²  PHYSICAL EXAM:    General Appearance:   Alert, cooperative, no distress   HEENT:   Normocephalic, atraumatic, anicteric  Neck:  Supple, symmetrical, trachea midline   Lungs:   Clear to auscultation bilaterally; no rales, rhonchi or wheezing; respirations unlabored    Heart[de-identified]   Regular rate and rhythm; no murmur, rub, or gallop  Abdomen:   Soft, non-tender, non-distended; normal bowel sounds; no masses, no organomegaly    Genitalia:   Deferred    Rectal:   Deferred    Extremities:  No cyanosis, clubbing or edema    Pulses:  2+ and symmetric    Skin:  No jaundice, rashes, or lesions    Lymph nodes:  No palpable cervical lymphadenopathy        Lab Results:   No visits with results within 1 Day(s) from this visit     Latest known visit with results is:   Admission on 02/14/2020, Discharged on 02/14/2020   Component Date Value    Amph/Meth UR 02/14/2020 Negative     Barbiturate Ur 02/14/2020 Negative     Benzodiazepine Urine 02/14/2020 Negative     Cocaine Urine 02/14/2020 Negative     Methadone Urine 02/14/2020 Negative     Opiate Urine 02/14/2020 Negative     PCP Ur 02/14/2020 Negative     THC Urine 02/14/2020 Negative     EXTBreath Alcohol 02/14/2020      WBC 02/14/2020 11 20*    RBC 02/14/2020 4 50     Hemoglobin 02/14/2020 13 3     Hematocrit 02/14/2020 42 1     MCV 02/14/2020 93     MCH 02/14/2020 29 6     MCHC 02/14/2020 31 6     RDW 02/14/2020 15 2*    MPV 02/14/2020 7 5*    Platelets 94/34/8919 352     Neutrophils Relative 02/14/2020 62     Lymphocytes Relative 02/14/2020 26     Monocytes Relative 02/14/2020 5     Eosinophils Relative 02/14/2020 5     Basophils Relative 02/14/2020 1     Neutrophils Absolute 02/14/2020 6 90*    Lymphocytes Absolute 02/14/2020 2 90     Monocytes Absolute 02/14/2020 0 60     Eosinophils Absolute 02/14/2020 0 60     Basophils Absolute 02/14/2020 0 10     Sodium 02/14/2020 143     Potassium 02/14/2020 3 6     Chloride 02/14/2020 109*    CO2 02/14/2020 23     ANION GAP 02/14/2020 11     BUN 02/14/2020 4*    Creatinine 02/14/2020 0 77     Glucose 02/14/2020 136*    Calcium 02/14/2020 9 1     AST 02/14/2020 9*    ALT 02/14/2020 7     Alkaline Phosphatase 02/14/2020 72     Total Protein 02/14/2020 7 3     Albumin 02/14/2020 4 5     Total Bilirubin 02/14/2020 0 30     eGFR 02/14/2020 88     TSH 3RD GENERATON 02/14/2020 0 410*    Color, UA 02/14/2020 Yellow     Clarity, UA 02/14/2020 Clear     Specific Gravity, UA 02/14/2020 <=1 005*    pH, UA 02/14/2020 6 5     Leukocytes, UA 02/14/2020 Negative     Nitrite, UA 02/14/2020 Negative     Protein, UA 02/14/2020 Negative     Glucose, UA 02/14/2020 Negative     Ketones, UA 02/14/2020 Negative     Urobilinogen, UA 02/14/2020 0 2     Bilirubin, UA 02/14/2020 Negative     Blood, UA 02/14/2020 Negative     EXTBreath Alcohol 02/14/2020 0 051     Ventricular Rate 02/14/2020 78     Atrial Rate 02/14/2020 78     FL Interval 02/14/2020 132     QRSD Interval 02/14/2020 68     QT Interval 02/14/2020 436     QTC Interval 02/14/2020 497     P Axis 02/14/2020 71     QRS Axis 02/14/2020 54     T Wave Axis 02/14/2020 70        Lab Results   Component Value Date    WBC 11 20 (H) 02/14/2020    HGB 13 3 02/14/2020    HCT 42 1 02/14/2020    MCV 93 02/14/2020     02/14/2020       Lab Results   Component Value Date    SODIUM 143 02/14/2020    K 3 6 02/14/2020     (H) 02/14/2020    CO2 23 02/14/2020    AGAP 11 02/14/2020    BUN 4 (L) 02/14/2020    CREATININE 0 77 02/14/2020    GLUC 136 (H) 02/14/2020    CALCIUM 9 1 02/14/2020    AST 9 (L) 02/14/2020    ALT 7 02/14/2020    ALKPHOS 72 02/14/2020    TP 7 3 02/14/2020    TBILI 0 30 02/14/2020    EGFR 88 02/14/2020       No results found for: CRP    Lab Results   Component Value Date    XGY3PTXGJVLG 0 410 (L) 02/14/2020       No results found for: IRON, TIBC, FERRITIN    Radiology Results:   No results found  ______________________________________________________________________  ASSESSMENT AND PLAN:    Eleonora Carlisle is a [age] [address] y o  female who presents with complaint of intermittent constipation and distant history of iron deficiency anemia  She has intermittent rectal bleeding likely form hemorrhoids  She is not currently anemic and MCV is normal   - We will schedule colonoscopy now  I discussed the risks of bleeding, infection, and perforation associated with endoscopic procedures  - We will check iron and vitamin D levels  If she is still iron deficient, we will schedule EGD as well  - I asked her to stop iron prior to the colonoscopy  - She will start taking Miralax 1-2 times per day and increase the dose as needed to achieve regular BMs      1  Constipation, unspecified constipation type    2  Hyperplastic colonic polyp, unspecified part of colon    3   Iron deficiency anemia, unspecified iron deficiency anemia type    4  Vitamin D deficiency    5   Encounter for screening colonoscopy        Orders Placed This Encounter   Procedures    Ferritin    Iron Saturation %    Vitamin D 25 hydroxy    Colonoscopy

## 2021-04-21 NOTE — PATIENT INSTRUCTIONS
(1) You should stop taking iron supplements  We will check the iron level now  (2) You should also stop vitamin D and we will check the level  (3) You should start taking Miralax 1 capful twice daily  (You can adjust this dose as needed)  (4) We are scheduling a colonoscopy  If the iron level is low, we will also do an endoscopy  Pt is scheduled with dr Brenna Wynne at [facility] on 6/1/21 for a colon, danis went over miralax prep in room with pt and she has blue folder  Patient is aware she will need a  to and from   She will get a call the day before with an exact time for arrival

## 2021-05-10 NOTE — PROGRESS NOTES
OT DISCHARGE    Patient has Inconsistent attended OP OT services since start of care  Patient has attended 1 visits since start of care  Patient last missed visit/visit was on 04/14/2021  Patient has not returned to clinic for further treatment  Thank you for the referral  Please refer to patient's last assessment for most recent objective measurements

## 2021-05-25 ENCOUNTER — OFFICE VISIT (OUTPATIENT)
Dept: FAMILY MEDICINE CLINIC | Facility: CLINIC | Age: 56
End: 2021-05-25
Payer: COMMERCIAL

## 2021-05-25 VITALS
DIASTOLIC BLOOD PRESSURE: 64 MMHG | BODY MASS INDEX: 24.36 KG/M2 | TEMPERATURE: 96.9 F | HEIGHT: 65 IN | HEART RATE: 70 BPM | WEIGHT: 146.2 LBS | OXYGEN SATURATION: 95 % | SYSTOLIC BLOOD PRESSURE: 110 MMHG

## 2021-05-25 DIAGNOSIS — Z12.11 SCREENING FOR COLORECTAL CANCER: ICD-10-CM

## 2021-05-25 DIAGNOSIS — Z01.411 ENCOUNTER FOR GYNECOLOGICAL EXAMINATION WITH ABNORMAL FINDING: Primary | ICD-10-CM

## 2021-05-25 DIAGNOSIS — Z11.4 SCREENING FOR HIV (HUMAN IMMUNODEFICIENCY VIRUS): ICD-10-CM

## 2021-05-25 DIAGNOSIS — Z12.31 BREAST CANCER SCREENING BY MAMMOGRAM: ICD-10-CM

## 2021-05-25 DIAGNOSIS — R87.611 ATYPICAL SQUAMOUS CELLS CANNOT EXCLUDE HIGH GRADE SQUAMOUS INTRAEPITHELIAL LESION ON CYTOLOGIC SMEAR OF CERVIX (ASC-H): ICD-10-CM

## 2021-05-25 DIAGNOSIS — Z12.2 ENCOUNTER FOR SCREENING FOR LUNG CANCER: ICD-10-CM

## 2021-05-25 DIAGNOSIS — Z12.12 SCREENING FOR COLORECTAL CANCER: ICD-10-CM

## 2021-05-25 DIAGNOSIS — Z20.2 POSSIBLE EXPOSURE TO STD: ICD-10-CM

## 2021-05-25 DIAGNOSIS — N89.8 VAGINAL DISCHARGE: ICD-10-CM

## 2021-05-25 LAB
BACTERIA UR QL AUTO: ABNORMAL /HPF
BILIRUB UR QL STRIP: NEGATIVE
CLARITY UR: CLEAR
COLOR UR: YELLOW
GLUCOSE UR STRIP-MCNC: NEGATIVE MG/DL
HGB UR QL STRIP.AUTO: NEGATIVE
HYALINE CASTS #/AREA URNS LPF: ABNORMAL /LPF
KETONES UR STRIP-MCNC: NEGATIVE MG/DL
LEUKOCYTE ESTERASE UR QL STRIP: ABNORMAL
NITRITE UR QL STRIP: NEGATIVE
NON-SQ EPI CELLS URNS QL MICRO: ABNORMAL /HPF
PH UR STRIP.AUTO: 6 [PH]
PROT UR STRIP-MCNC: NEGATIVE MG/DL
RBC #/AREA URNS AUTO: ABNORMAL /HPF
SL AMB  POCT GLUCOSE, UA: ABNORMAL
SL AMB LEUKOCYTE ESTERASE,UA: ABNORMAL
SL AMB POCT BILIRUBIN,UA: ABNORMAL
SL AMB POCT BLOOD,UA: ABNORMAL
SL AMB POCT CLARITY,UA: CLEAR
SL AMB POCT COLOR,UA: ABNORMAL
SL AMB POCT KETONES,UA: ABNORMAL
SL AMB POCT NITRITE,UA: ABNORMAL
SL AMB POCT PH,UA: 5
SL AMB POCT SPECIFIC GRAVITY,UA: 1
SL AMB POCT URINE PROTEIN: ABNORMAL
SL AMB POCT UROBILINOGEN: 0.2
SP GR UR STRIP.AUTO: 1.01 (ref 1–1.03)
UROBILINOGEN UR QL STRIP.AUTO: 0.2 E.U./DL
WBC #/AREA URNS AUTO: ABNORMAL /HPF

## 2021-05-25 PROCEDURE — 87660 TRICHOMONAS VAGIN DIR PROBE: CPT | Performed by: FAMILY MEDICINE

## 2021-05-25 PROCEDURE — 3725F SCREEN DEPRESSION PERFORMED: CPT | Performed by: FAMILY MEDICINE

## 2021-05-25 PROCEDURE — 87510 GARDNER VAG DNA DIR PROBE: CPT | Performed by: FAMILY MEDICINE

## 2021-05-25 PROCEDURE — 81001 URINALYSIS AUTO W/SCOPE: CPT | Performed by: FAMILY MEDICINE

## 2021-05-25 PROCEDURE — 87480 CANDIDA DNA DIR PROBE: CPT | Performed by: FAMILY MEDICINE

## 2021-05-25 PROCEDURE — 99204 OFFICE O/P NEW MOD 45 MIN: CPT | Performed by: FAMILY MEDICINE

## 2021-05-25 PROCEDURE — 0503F POSTPARTUM CARE VISIT: CPT | Performed by: FAMILY MEDICINE

## 2021-05-25 PROCEDURE — 4004F PT TOBACCO SCREEN RCVD TLK: CPT | Performed by: FAMILY MEDICINE

## 2021-05-25 PROCEDURE — 81002 URINALYSIS NONAUTO W/O SCOPE: CPT | Performed by: FAMILY MEDICINE

## 2021-05-25 PROCEDURE — G0124 SCREEN C/V THIN LAYER BY MD: HCPCS | Performed by: PATHOLOGY

## 2021-05-25 PROCEDURE — 87086 URINE CULTURE/COLONY COUNT: CPT | Performed by: FAMILY MEDICINE

## 2021-05-25 PROCEDURE — G0145 SCR C/V CYTO,THINLAYER,RESCR: HCPCS | Performed by: PATHOLOGY

## 2021-05-25 PROCEDURE — 3008F BODY MASS INDEX DOCD: CPT | Performed by: FAMILY MEDICINE

## 2021-05-25 NOTE — PROGRESS NOTES
Annual GYN Note     Subjective  Guru Gee is a 54 y o  [id number] female who presents today for her Annual GYN exam  She is with complaints  She is sexually active  The patient has not had a new sexual partner(s)  She did have genital warts when she was a teenager but nothing since then  Last sexual encounter was in march  She reports her boyfriend cheated on her and now she is having itching and burning at the vaginal os and reports foul odor to vaginal discharge  Started with symptoms a couple months ago but didn't think anything of it until she realized her boyfriend had cheated on her  Contraceptive Method: none  Menses are stopped since ablation 9 years ago  Did still have night sweats and mood swings  Denies abdominal, pelvic pain, problems with her bladder or bowels, vaginal bleeding  Screenings / HCM  Vaccines: Indicated today: none  Immunization History   Administered Date(s) Administered    INFLUENZA 11/19/2020    Influenza, injectable, quadrivalent, preservative free 0 5 mL 11/19/2020    Pneumococcal Polysaccharide PPV23 01/15/2020    Tdap 11/01/2016       Osteoporosis: No history of pathologic fractures  No steroid use, smoking, risk of falls, excessive alcohol use  Will start at age 72 unless otherwise indicated  Current indication: no     Cervical Cancer screening:    History of Abnormal Pap: no    Last Pap: 3 years ago  Colon Cancer screening:    Family history of colon cancer: no    Last colonoscopy: last was 9 years ago but reports she has another one scheduled on tuesday  Breast Cancer screening:    Family history of breast cancer: no    Last mammogram: 3/4/2020  Patient does not have a dedicated exercise regimen  Patient does not have a balanced diet  Patient does meet criteria for lung cancer screening  1 5 ppd x 41 years   No interest in quitting currently      Objective    /64 (BP Location: Left arm, Patient Position: Sitting)   Pulse 70   Temp (!) 96 9 °F (36 1 °C) (Tympanic)   Ht 5' 5" (1 651 m)   Wt 66 3 kg (146 lb 3 2 oz)   SpO2 95%   BMI 24 33 kg/m²     General  Well developed, well nourished   Neck  Supple, no thyromegaly  Cardio  Regular rate and rhythm  Lungs  Clear to auscultation bilaterally  Abd  Soft, non-tender, non-distended, no hepatosplenomegaly, no hernia  Breast  Not indicated (USPSTF Guidelines)    External genitalia is normal without lesions  Vagina is pink and vault without discharge  Non-tender cervix, without lesion or discharge  Uterus is mobile, non-tender, and normal in size / shape  Adnexa: Without masses or tenderness B/L  Assessment / Plan    54 y o  [id number] for GYN exam     - Pap smear was performed  - Gary Risk HPV was performed  - Mammogram was ordered  - Colonoscopy was not ordered  - DEXA Scan was not ordered  - Contraception: none  - Recommend multivitamin, calcium and vitamin D supplementation     - Pending test results will give treatment  - Return in 5 years unless otherwise indicated  tea Rizo was seen today for gynecologic exam     Diagnoses and all orders for this visit:    Encounter for gynecological examination with abnormal finding  -     Thinprep Pap and HR HPV DNA; Future    Screening for HIV (human immunodeficiency virus)  -     HIV 1/2 Antigen/Antibody (4th Generation) w Reflex [facility]; Future    Encounter for screening for lung cancer  Comments:  1 5 ppd x 41 years  No interest in quitting at this time  Orders:  -     CT lung screening program; Future    Screening for colorectal cancer  -     Cologuard; Future    Breast cancer screening by mammogram  -     Mammo screening bilateral w cad; Future    Vaginal discharge  -     POCT urine dip  -     Chlamydia/GC amplified DNA by PCR; Future  -     VAGINOSIS DNA PROBE (AFFIRM);  Future  -     UA w Reflex to Microscopic w Reflex to Culture - Clinic Collect    Return in about 4 weeks (around 6/22/2021) for Annual physical

## 2021-05-26 ENCOUNTER — APPOINTMENT (OUTPATIENT)
Dept: LAB | Facility: CLINIC | Age: 56
End: 2021-05-26
Payer: COMMERCIAL

## 2021-05-26 DIAGNOSIS — Z11.4 SCREENING FOR HIV (HUMAN IMMUNODEFICIENCY VIRUS): ICD-10-CM

## 2021-05-26 DIAGNOSIS — Z20.2 POSSIBLE EXPOSURE TO STD: ICD-10-CM

## 2021-05-26 DIAGNOSIS — N89.8 VAGINAL DISCHARGE: Primary | ICD-10-CM

## 2021-05-26 DIAGNOSIS — D50.9 IRON DEFICIENCY ANEMIA, UNSPECIFIED IRON DEFICIENCY ANEMIA TYPE: ICD-10-CM

## 2021-05-26 DIAGNOSIS — E55.9 VITAMIN D DEFICIENCY: ICD-10-CM

## 2021-05-26 LAB
25(OH)D3 SERPL-MCNC: 23.5 NG/ML (ref 30–100)
BACTERIA UR CULT: NORMAL
FERRITIN SERPL-MCNC: 400 NG/ML (ref 8–388)
IRON SATN MFR SERPL: 18 %
IRON SERPL-MCNC: 47 UG/DL (ref 50–170)
TIBC SERPL-MCNC: 260 UG/DL (ref 250–450)

## 2021-05-26 PROCEDURE — 83550 IRON BINDING TEST: CPT

## 2021-05-26 PROCEDURE — 82306 VITAMIN D 25 HYDROXY: CPT

## 2021-05-26 PROCEDURE — 87798 DETECT AGENT NOS DNA AMP: CPT

## 2021-05-26 PROCEDURE — 87491 CHLMYD TRACH DNA AMP PROBE: CPT

## 2021-05-26 PROCEDURE — 87389 HIV-1 AG W/HIV-1&-2 AB AG IA: CPT

## 2021-05-26 PROCEDURE — 87591 N.GONORRHOEAE DNA AMP PROB: CPT

## 2021-05-26 PROCEDURE — 36415 COLL VENOUS BLD VENIPUNCTURE: CPT

## 2021-05-26 PROCEDURE — 83540 ASSAY OF IRON: CPT

## 2021-05-26 PROCEDURE — 82728 ASSAY OF FERRITIN: CPT

## 2021-05-27 LAB
CANDIDA RRNA VAG QL PROBE: NEGATIVE
G VAGINALIS RRNA GENITAL QL PROBE: POSITIVE
HIV 1+2 AB+HIV1 P24 AG SERPL QL IA: NORMAL
T VAGINALIS RRNA GENITAL QL PROBE: NEGATIVE

## 2021-05-28 ENCOUNTER — TELEPHONE (OUTPATIENT)
Dept: FAMILY MEDICINE CLINIC | Facility: CLINIC | Age: 56
End: 2021-05-28

## 2021-05-28 ENCOUNTER — TELEPHONE (OUTPATIENT)
Dept: SURGERY | Facility: HOSPITAL | Age: 56
End: 2021-05-28

## 2021-05-28 DIAGNOSIS — N76.0 BACTERIAL VAGINOSIS: Primary | ICD-10-CM

## 2021-05-28 DIAGNOSIS — B96.89 BACTERIAL VAGINOSIS: Primary | ICD-10-CM

## 2021-05-28 RX ORDER — METRONIDAZOLE 500 MG/1
500 TABLET ORAL EVERY 12 HOURS SCHEDULED
Qty: 14 TABLET | Refills: 0 | Status: SHIPPED | OUTPATIENT
Start: 2021-05-28 | End: 2021-06-04

## 2021-05-28 NOTE — TELEPHONE ENCOUNTER
Please review patient's labs she stated she feels like something is wrong she wants to know if she has an STD or not  Please advise    Carlo Sousa

## 2021-06-01 ENCOUNTER — HOSPITAL ENCOUNTER (OUTPATIENT)
Dept: GASTROENTEROLOGY | Facility: HOSPITAL | Age: 56
Setting detail: OUTPATIENT SURGERY
Discharge: HOME | End: 2021-06-01
Attending: INTERNAL MEDICINE | Admitting: INTERNAL MEDICINE
Payer: COMMERCIAL

## 2021-06-01 ENCOUNTER — ANESTHESIA EVENT (OUTPATIENT)
Dept: GASTROENTEROLOGY | Facility: HOSPITAL | Age: 56
End: 2021-06-01

## 2021-06-01 ENCOUNTER — TELEPHONE (OUTPATIENT)
Dept: SURGERY | Facility: HOSPITAL | Age: 56
End: 2021-06-01

## 2021-06-01 ENCOUNTER — ANESTHESIA (OUTPATIENT)
Dept: GASTROENTEROLOGY | Facility: HOSPITAL | Age: 56
End: 2021-06-01

## 2021-06-01 VITALS
OXYGEN SATURATION: 99 % | HEART RATE: 71 BPM | DIASTOLIC BLOOD PRESSURE: 67 MMHG | WEIGHT: 146 LBS | BODY MASS INDEX: 24.32 KG/M2 | HEIGHT: 65 IN | SYSTOLIC BLOOD PRESSURE: 124 MMHG | RESPIRATION RATE: 16 BRPM

## 2021-06-01 DIAGNOSIS — K59.00 CONSTIPATION, UNSPECIFIED CONSTIPATION TYPE: ICD-10-CM

## 2021-06-01 DIAGNOSIS — Z12.11 ENCOUNTER FOR SCREENING COLONOSCOPY: ICD-10-CM

## 2021-06-01 DIAGNOSIS — D50.9 IRON DEFICIENCY ANEMIA, UNSPECIFIED IRON DEFICIENCY ANEMIA TYPE: ICD-10-CM

## 2021-06-01 DIAGNOSIS — K63.5 HYPERPLASTIC COLONIC POLYP, UNSPECIFIED PART OF COLON: ICD-10-CM

## 2021-06-01 PROCEDURE — 88305 TISSUE EXAM BY PATHOLOGIST: CPT | Performed by: PATHOLOGY

## 2021-06-01 PROCEDURE — 45385 COLONOSCOPY W/LESION REMOVAL: CPT | Performed by: INTERNAL MEDICINE

## 2021-06-01 RX ORDER — SODIUM CHLORIDE, SODIUM LACTATE, POTASSIUM CHLORIDE, CALCIUM CHLORIDE 600; 310; 30; 20 MG/100ML; MG/100ML; MG/100ML; MG/100ML
125 INJECTION, SOLUTION INTRAVENOUS CONTINUOUS
Status: DISCONTINUED | OUTPATIENT
Start: 2021-06-01 | End: 2021-06-05 | Stop reason: HOSPADM

## 2021-06-01 RX ORDER — PROPOFOL 10 MG/ML
INJECTION, EMULSION INTRAVENOUS AS NEEDED
Status: DISCONTINUED | OUTPATIENT
Start: 2021-06-01 | End: 2021-06-01

## 2021-06-01 RX ADMIN — PROPOFOL 30 MG: 10 INJECTION, EMULSION INTRAVENOUS at 13:02

## 2021-06-01 RX ADMIN — PROPOFOL 120 MG: 10 INJECTION, EMULSION INTRAVENOUS at 13:00

## 2021-06-01 RX ADMIN — SODIUM CHLORIDE, SODIUM LACTATE, POTASSIUM CHLORIDE, AND CALCIUM CHLORIDE 125 ML/HR: .6; .31; .03; .02 INJECTION, SOLUTION INTRAVENOUS at 12:35

## 2021-06-01 RX ADMIN — PROPOFOL 80 MG: 10 INJECTION, EMULSION INTRAVENOUS at 13:09

## 2021-06-01 RX ADMIN — PROPOFOL 50 MG: 10 INJECTION, EMULSION INTRAVENOUS at 13:13

## 2021-06-01 NOTE — H&P
History and Physical -  [facility]  Kirstin Dunn 54 y o  female MRN: [id number]                  HPI: Kirstin Dunn is a 54y o  year old female who presents for CRC screening  REVIEW OF SYSTEMS: Per the HPI, and otherwise unremarkable      Historical Information   Past Medical History:   Diagnosis Date    Anxiety     Bell's palsy     COPD (chronic obstructive pulmonary disease) ([facility] [address] )     Depression     Menopause     Mild persistent asthma without complication     Obesity     Primary osteoarthritis involving multiple joints 10/9/2018     Past Surgical History:   Procedure Laterality Date    ANKLE SURGERY      BREAST BIOPSY  12 yrs ago     SECTION      COLONOSCOPY      COLONOSCOPY W/ POLYPECTOMY      DILATION AND CURETTAGE, DIAGNOSTIC / THERAPEUTIC      NOSE SURGERY      TOTAL HIP ARTHROPLASTY Left     TUBAL LIGATION       Social History   Social History     Substance and Sexual Activity   Alcohol Use Yes    Alcohol/week: 3 0 - 5 0 standard drinks    Types: 3 - 5 Glasses of wine per week    Frequency: 2-3 times a week    Drinks per session: 3 or 4    Comment: several times weekly     Social History     Substance and Sexual Activity   Drug Use Not Currently    Types: Marijuana, Cocaine     Social History     Tobacco Use   Smoking Status Current Every Day Smoker    Packs/day: 2 00    Years: 40 00    Pack years: 80 00    Types: Cigarettes   Smokeless Tobacco Never Used     Family History   Problem Relation Age of Onset    Mental illness Mother     Suicidality Mother     No Known Problems Father     Mental illness Brother     Suicidality Brother     No Known Problems Maternal Grandmother     No Known Problems Paternal Grandmother     No Known Problems Maternal Aunt     No Known Problems Paternal Aunt     No Known Problems Paternal Aunt     No Known Problems Paternal Aunt     No Known Problems Paternal Aunt        Meds/Allergies (Not in a hospital admission)      Allergies   Allergen Reactions    Pollen Extract        Objective     Blood pressure 127/63, pulse 69, resp  rate 18, height 5' 5" (1 651 m), weight 66 2 kg (146 lb), SpO2 99 %  PHYSICAL EXAMINATION:    General Appearance:   Alert, cooperative, no distress   HEENT:  Normocephalic, atraumatic, anicteric  Neck supple, symmetrical, trachea midline  Lungs:   Equal chest rise and unlabored breathing, normal effort, no coughing  Cardiovascular:   No visualized JVD  Abdomen:   No abdominal distension  Skin:   No jaundice, rashes, or lesions  Musculoskeletal:   Normal range of motion visualized  Psych:  Normal affect and normal insight  Neuro:  Alert and appropriate  ASSESSMENT/PLAN:  This is a 54y o  year old female here for colonoscopy, and she is stable and optimized for her procedure

## 2021-06-01 NOTE — ANESTHESIA PREPROCEDURE EVALUATION
Procedure:  COLONOSCOPY    Relevant Problems   CARDIO   (+) Mixed hyperlipidemia      MUSCULOSKELETAL   (+) Primary osteoarthritis involving multiple joints      NEURO/PSYCH   (+) History of suicidal ideation   (+) Major depressive disorder, recurrent, severe without psychotic features (HCC)      PULMONARY   (+) Pulmonary emphysema (HCC)      Other   (+) Alcohol use disorder, moderate, dependence (HCC)   (+) Tobacco abuse        Physical Exam    Airway    Mallampati score: III  TM Distance: >3 FB  Neck ROM: full     Dental   No notable dental hx     Cardiovascular  Rhythm: regular, Rate: normal,     Pulmonary  Breath sounds clear to auscultation,     Other Findings        Anesthesia Plan  ASA Score- 3     Anesthesia Type- IV sedation with anesthesia with ASA Monitors  Additional Monitors:   Airway Plan:           Plan Factors-Exercise tolerance (METS): >4 METS  Chart reviewed  EKG reviewed  Imaging results reviewed  Existing labs reviewed  Patient summary reviewed  Patient is a current smoker  Patient instructed to abstain from smoking on day of procedure  Patient smoked on day of surgery  Induction- intravenous  Postoperative Plan-     Informed Consent- Anesthetic plan and risks discussed with patient  I personally reviewed this patient with the CRNA  Discussed and agreed on the Anesthesia Plan with the CRNA             Lab Results   Component Value Date    WBC 11 20 (H) 02/14/2020    HGB 13 3 02/14/2020    HCT 42 1 02/14/2020    MCV 93 02/14/2020     02/14/2020     Lab Results   Component Value Date    CALCIUM 9 1 02/14/2020    K 3 6 02/14/2020    CO2 23 02/14/2020     (H) 02/14/2020    BUN 4 (L) 02/14/2020    CREATININE 0 77 02/14/2020     Lab Results   Component Value Date    INR 1 01 12/30/2018    PROTIME 11 7 12/30/2018     Lab Results   Component Value Date    PTT 36 12/30/2018

## 2021-06-02 PROBLEM — K57.30 DIVERTICULOSIS OF COLON: Status: ACTIVE | Noted: 2021-06-02

## 2021-06-02 LAB
LAB AP GYN PRIMARY INTERPRETATION: ABNORMAL
Lab: ABNORMAL
PATH INTERP SPEC-IMP: ABNORMAL

## 2021-06-03 LAB — MISCELLANEOUS LAB TEST RESULT: NORMAL

## 2021-06-16 DIAGNOSIS — F32.A DEPRESSION, UNSPECIFIED DEPRESSION TYPE: Primary | ICD-10-CM

## 2021-06-16 NOTE — TELEPHONE ENCOUNTER
Patient has been out of cymbalta for 3 days  Refill request submitted, please advise if any side effects from lack of meds for this length of time and call patient  Thank you!

## 2021-06-17 RX ORDER — DULOXETIN HYDROCHLORIDE 20 MG/1
40 CAPSULE, DELAYED RELEASE ORAL 2 TIMES DAILY
Qty: 120 CAPSULE | Refills: 0 | Status: SHIPPED | OUTPATIENT
Start: 2021-06-17 | End: 2021-06-17 | Stop reason: SDUPTHER

## 2021-06-17 RX ORDER — DULOXETIN HYDROCHLORIDE 20 MG/1
40 CAPSULE, DELAYED RELEASE ORAL 2 TIMES DAILY
Qty: 120 CAPSULE | Refills: 0 | Status: SHIPPED | OUTPATIENT
Start: 2021-06-17 | End: 2021-07-11

## 2021-06-17 NOTE — TELEPHONE ENCOUNTER
Patient is taking medication for depression, she saw dr Drake Harding previously so all records from his visits should be linked in epic

## 2021-06-20 ENCOUNTER — APPOINTMENT (OUTPATIENT)
Dept: RADIOLOGY | Facility: CLINIC | Age: 56
End: 2021-06-20
Payer: COMMERCIAL

## 2021-06-20 ENCOUNTER — OFFICE VISIT (OUTPATIENT)
Dept: URGENT CARE | Facility: CLINIC | Age: 56
End: 2021-06-20
Payer: COMMERCIAL

## 2021-06-20 VITALS
SYSTOLIC BLOOD PRESSURE: 102 MMHG | DIASTOLIC BLOOD PRESSURE: 58 MMHG | TEMPERATURE: 97.7 F | OXYGEN SATURATION: 97 % | RESPIRATION RATE: 18 BRPM | HEART RATE: 76 BPM

## 2021-06-20 DIAGNOSIS — S69.91XA RIGHT WRIST INJURY, INITIAL ENCOUNTER: ICD-10-CM

## 2021-06-20 DIAGNOSIS — S60.211A CONTUSION OF RIGHT WRIST, INITIAL ENCOUNTER: Primary | ICD-10-CM

## 2021-06-20 PROCEDURE — 99213 OFFICE O/P EST LOW 20 MIN: CPT | Performed by: NURSE PRACTITIONER

## 2021-06-20 PROCEDURE — 73110 X-RAY EXAM OF WRIST: CPT

## 2021-06-20 NOTE — PROGRESS NOTES
[facility]        NAME: Sebastián Nichole is a 54 y o  female  : 1965    MRN: [id number]  DATE: 2021  TIME: 6:25 PM      Assessment and Plan     Contusion of right wrist, initial encounter [S60 211A]  1  Contusion of right wrist, initial encounter     2  Right wrist injury, initial encounter  XR wrist 3+ vw right     Patient fitted for WTO wrist splint by nursing, tolerated well  Patient Instructions     Patient Instructions   No fractures seen on x-ray  Multiple arthritic changes  Radiology does the final read; if they see anything I did not, we will call you  Rest, ice often, wear the wrist splint for comfort, elevate at rest     Contusion in Adults, Ambulatory Care   GENERAL INFORMATION:   A contusion  is a bruise that appears on your skin after an injury  A bruise happens when small blood vessels tear but skin does not  When blood vessels tear, blood leaks into nearby tissue, such as soft tissue or muscle  Common symptoms include the following:   · Pain that increases when you touch the bruise, walk, or use the area around the bruise    · Swelling or a lump at the site of the bruise or near it    · Red, blue, or black skin that may change to green or yellow after a few days    · Stiffness or problems moving the bruised area of your body  Seek immediate care for the following symptoms:   · New difficulty moving your injured area    · Tingling or numbness in or near the injured area    · Hand or foot below the bruise gets cold or turns pale  Treatment for a contusion  may include any of the following:  · NSAIDs  help decrease swelling and pain or fever  This medicine is available with or without a doctor's order  NSAIDs can cause stomach bleeding or kidney problems in certain people  If you take blood thinner medicine, always ask your healthcare provider if NSAIDs are safe for you  Always read the medicine label and follow directions      · Pain medicine  to decrease or take away pain  Do not wait until the pain is severe before you take your medicine  · Aspiration  to drain pooled blood in your muscle may be done to help prevent increased pressure in the muscle  · Surgery  may be done to repair a tear in the muscle or relieve pressure in the muscle caused by swelling  Care for a contusion:   · Rest the injured area  or use it less than usual  If you bruised your leg or foot, you may need crutches or a cane to help you walk  This will help you keep weight off your injured body part  Use crutches or a cane as directed  · Use ice  to decrease swelling and pain  Ice may also help prevent tissue damage  Use an ice pack, or put crushed ice in a plastic bag  Cover it with a towel and place it on your bruise for 15 to 20 minutes every hour or as directed  · Use Compression  An elastic bandage may be wrapped around a bruised muscle to support the area and decrease swelling  Make sure the bandage is not too tight  You should be able to fit 1 finger between the bandage and your skin  · Elevate (raise) your injured body part  above the level of your heart to help decrease pain and swelling  Use pillows, blankets, or rolled towels to elevate the area as often as you can  · Do not massage or use heat  Heat and massage may slow healing of the area  · Do not drink alcohol  Alcohol may slow healing of your injury  · Do not stretch injured muscles  Ask your healthcare provider when and how you may safely stretch after your injury  Prevent a contusion:   · Stretch and warm up before you play sports or exercise  · Wear protective gear when you play sports  Examples are shin guards and padding  · If you begin a new physical activity, start slowly to give your body a chance to adjust   Follow up with your healthcare provider as directed:  Write down your questions so you remember to ask them during your visits  CARE AGREEMENT:   You have the right to help plan your care  Learn about your health condition and how it may be treated  Discuss treatment options with your caregivers to decide what care you want to receive  You always have the right to refuse treatment  The above information is an  only  It is not intended as medical advice for individual conditions or treatments  Talk to your doctor, nurse or pharmacist before following any medical regimen to see if it is safe and effective for you  © 2014 [address] is for End User's use only and may not be sold, redistributed or otherwise used for commercial purposes  All illustrations and images included in CareNotes® are the copyrighted property of A D A M [employer] Inc  or Blair Johansen  Follow up with PCP in 3-5 days  Proceed to  ER if symptoms worsen  Chief Complaint     Chief Complaint   Patient presents with    Hand Pain     Pt c/o right hand/wrist pain after slamming it in a door last week  History of Present Illness     Patient reports that she accidentally slammed her right wrist in a door 1 week ago  The pain has persisted, specially over the distal radial aspect where she gestures, so she presents to be seen to rule out fracture  She demonstrates normal range of motion, and states that since she can move normally, she did not initially have any concerns about fracture  Review of Systems     Review of Systems   Musculoskeletal: Positive for arthralgias  All other systems reviewed and are negative          Current Medications       Current Outpatient Medications:     albuterol (PROVENTIL HFA,VENTOLIN HFA) 90 mcg/act inhaler, Inhale 2 puffs every 6 (six) hours as needed for wheezing, Disp: , Rfl:     ARIPiprazole (ABILIFY) 30 mg tablet, Take 30 mg by mouth 2 (two) times a day , Disp: , Rfl:     Ascorbic Acid (C-1000/ALIX HIPS PO), Take by mouth, Disp: , Rfl:     atorvastatin (LIPITOR) 20 mg tablet, Take 1 tablet by mouth once daily, Disp: 90 tablet, Rfl: 1    BIOTIN 5000 PO, Take by mouth, Disp: , Rfl:     Cholecalciferol (Vitamin D3) 1 25 MG (07992 UT) CAPS, Take by mouth once a week, Disp: , Rfl:     Coenzyme Q10 (COQ10) 100 MG CAPS, Take by mouth, Disp: , Rfl:     DULoxetine (CYMBALTA) 20 mg capsule, Take 2 capsules (40 mg total) by mouth 2 (two) times a day, Disp: 120 capsule, Rfl: 0    Ferrous Sulfate (Iron) 325 (65 Fe) MG TABS, Take by mouth daily, Disp: , Rfl:     folic acid (FOLVITE) 1 mg tablet, Take 1 tablet (1 mg total) by mouth daily, Disp: 30 tablet, Rfl: 0    gabapentin (NEURONTIN) 300 mg capsule, Take 1 capsule (300 mg total) by mouth 3 (three) times a day, Disp: 90 capsule, Rfl: 5    loratadine (CLARITIN) 10 mg tablet, Take 10 mg by mouth daily, Disp: , Rfl:     MAGNESIUM CITRATE PO, Take 400 mg by mouth, Disp: , Rfl:     meloxicam (MOBIC) 15 mg tablet, Take 1 tablet by mouth once daily, Disp: 30 tablet, Rfl: 5    Menthol, Topical Analgesic, (Biofreeze Roll-On) 4 % GEL, Apply 1 application topically 3 (three) times a day as needed (pain), Disp: 150 Tube, Rfl: 1    montelukast (SINGULAIR) 10 mg tablet, TAKE 1 TABLET BY MOUTH ONCE DAILY AT BEDTIME, Disp: 90 tablet, Rfl: 0    polyethylene glycol (MIRALAX) 17 g packet, Take 17 g by mouth daily, Disp: 72 each, Rfl: 3    pramipexole (MIRAPEX) 0 5 mg tablet, Take 1 tablet (0 5 mg total) by mouth daily, Disp: 90 tablet, Rfl: 1    tiotropium (SPIRIVA RESPIMAT) 1 25 MCG/ACT AERS inhaler, Inhale 2 puffs daily, Disp: 3 Inhaler, Rfl: 3    vitamin E, tocopherol, 400 units capsule, Take 400 Units by mouth daily, Disp: , Rfl:     Current Allergies     Allergies as of 06/20/2021 - Reviewed 06/20/2021   Allergen Reaction Noted    Pollen extract  12/02/2019              The following portions of the patient's history were reviewed and updated as appropriate: allergies, current medications, past family history, past medical history, past social history, past surgical history and problem list  Past Medical History:   Diagnosis Date    Anxiety     Bell's palsy     COPD [facility]chronic obstructive pulmonary disease) ([facility] [address] )     Depression     Menopause     Mild persistent asthma without complication     Obesity     Primary osteoarthritis involving multiple joints 10/9/2018       Past Surgical History:   Procedure Laterality Date    ANKLE SURGERY      BREAST BIOPSY  12 yrs ago     SECTION      COLONOSCOPY      COLONOSCOPY W/ POLYPECTOMY      DILATION AND CURETTAGE, DIAGNOSTIC / THERAPEUTIC      NOSE SURGERY      TOTAL HIP ARTHROPLASTY Left     TUBAL LIGATION         Family History   Problem Relation Age of Onset    Mental illness Mother     Suicidality Mother     No Known Problems Father     Mental illness Brother     Suicidality Brother     No Known Problems Maternal Grandmother     No Known Problems Paternal Grandmother     No Known Problems Maternal Aunt     No Known Problems Paternal Aunt     No Known Problems Paternal Aunt     No Known Problems Paternal Aunt     No Known Problems Paternal Aunt          Medications have been verified  Objective     /58   Pulse 76   Temp 97 7 °F (36 5 °C)   Resp 18   SpO2 97%   No LMP recorded  Patient is postmenopausal          Physical Exam     Physical Exam  Vitals and nursing note reviewed  Constitutional:       General: She is not in acute distress  Appearance: Normal appearance  She is well-developed  She is not toxic-appearing or diaphoretic  HENT:      Head: Normocephalic and atraumatic  Eyes:      Pupils: Pupils are equal, round, and reactive to light  Pulmonary:      Effort: Pulmonary effort is normal  No respiratory distress  Abdominal:      General: There is no distension  Palpations: Abdomen is soft  Musculoskeletal:         General: Normal range of motion  Right wrist: Bony tenderness present   No swelling, deformity, effusion, lacerations, tenderness, snuff box tenderness or crepitus  Normal range of motion  Normal pulse  Right hand: Normal  No tenderness or bony tenderness  Normal range of motion  Cervical back: Normal range of motion and neck supple  Skin:     General: Skin is warm and dry  Capillary Refill: Capillary refill takes less than 2 seconds  Neurological:      General: No focal deficit present  Mental Status: She is alert and oriented to person, place, and time  Psychiatric:         Mood and Affect: Mood normal          Behavior: Behavior normal  Behavior is cooperative  Thought Content:  Thought content normal          Judgment: Judgment normal

## 2021-06-20 NOTE — PATIENT INSTRUCTIONS
No fractures seen on x-ray  Multiple arthritic changes  Radiology does the final read; if they see anything I did not, we will call you  Rest, ice often, wear the wrist splint for comfort, elevate at rest     Contusion in Adults, Ambulatory Care   GENERAL INFORMATION:   A contusion  is a bruise that appears on your skin after an injury  A bruise happens when small blood vessels tear but skin does not  When blood vessels tear, blood leaks into nearby tissue, such as soft tissue or muscle  Common symptoms include the following:   · Pain that increases when you touch the bruise, walk, or use the area around the bruise    · Swelling or a lump at the site of the bruise or near it    · Red, blue, or black skin that may change to green or yellow after a few days    · Stiffness or problems moving the bruised area of your body  Seek immediate care for the following symptoms:   · New difficulty moving your injured area    · Tingling or numbness in or near the injured area    · Hand or foot below the bruise gets cold or turns pale  Treatment for a contusion  may include any of the following:  · NSAIDs  help decrease swelling and pain or fever  This medicine is available with or without a doctor's order  NSAIDs can cause stomach bleeding or kidney problems in certain people  If you take blood thinner medicine, always ask your healthcare provider if NSAIDs are safe for you  Always read the medicine label and follow directions  · Pain medicine  to decrease or take away pain  Do not wait until the pain is severe before you take your medicine  · Aspiration  to drain pooled blood in your muscle may be done to help prevent increased pressure in the muscle  · Surgery  may be done to repair a tear in the muscle or relieve pressure in the muscle caused by swelling  Care for a contusion:   · Rest the injured area  or use it less than usual  If you bruised your leg or foot, you may need crutches or a cane to help you walk  This will help you keep weight off your injured body part  Use crutches or a cane as directed  · Use ice  to decrease swelling and pain  Ice may also help prevent tissue damage  Use an ice pack, or put crushed ice in a plastic bag  Cover it with a towel and place it on your bruise for 15 to 20 minutes every hour or as directed  · Use Compression  An elastic bandage may be wrapped around a bruised muscle to support the area and decrease swelling  Make sure the bandage is not too tight  You should be able to fit 1 finger between the bandage and your skin  · Elevate (raise) your injured body part  above the level of your heart to help decrease pain and swelling  Use pillows, blankets, or rolled towels to elevate the area as often as you can  · Do not massage or use heat  Heat and massage may slow healing of the area  · Do not drink alcohol  Alcohol may slow healing of your injury  · Do not stretch injured muscles  Ask your healthcare provider when and how you may safely stretch after your injury  Prevent a contusion:   · Stretch and warm up before you play sports or exercise  · Wear protective gear when you play sports  Examples are shin guards and padding  · If you begin a new physical activity, start slowly to give your body a chance to adjust   Follow up with your healthcare provider as directed:  Write down your questions so you remember to ask them during your visits  CARE AGREEMENT:   You have the right to help plan your care  Learn about your health condition and how it may be treated  Discuss treatment options with your caregivers to decide what care you want to receive  You always have the right to refuse treatment  The above information is an  only  It is not intended as medical advice for individual conditions or treatments  Talk to your doctor, nurse or pharmacist before following any medical regimen to see if it is safe and effective for you    © 2014 [employer] [address] is for End User's use only and may not be sold, redistributed or otherwise used for commercial purposes  All illustrations and images included in CareNotes® are the copyrighted property of A [employer] M , Inc  or Blair Johansen

## 2021-06-22 ENCOUNTER — OFFICE VISIT (OUTPATIENT)
Dept: FAMILY MEDICINE CLINIC | Facility: CLINIC | Age: 56
End: 2021-06-22
Payer: COMMERCIAL

## 2021-06-22 VITALS
SYSTOLIC BLOOD PRESSURE: 108 MMHG | WEIGHT: 144.6 LBS | HEART RATE: 85 BPM | TEMPERATURE: 97.6 F | HEIGHT: 65 IN | DIASTOLIC BLOOD PRESSURE: 60 MMHG | OXYGEN SATURATION: 99 % | BODY MASS INDEX: 24.09 KG/M2

## 2021-06-22 DIAGNOSIS — R87.611 ATYPICAL SQUAMOUS CELLS CANNOT EXCLUDE HIGH GRADE SQUAMOUS INTRAEPITHELIAL LESION ON CYTOLOGIC SMEAR OF CERVIX (ASC-H): ICD-10-CM

## 2021-06-22 DIAGNOSIS — K57.30 DIVERTICULOSIS OF COLON: ICD-10-CM

## 2021-06-22 DIAGNOSIS — Z00.00 ANNUAL PHYSICAL EXAM: Primary | ICD-10-CM

## 2021-06-22 DIAGNOSIS — Z63.4 BEREAVEMENT: ICD-10-CM

## 2021-06-22 DIAGNOSIS — Z72.0 TOBACCO ABUSE: ICD-10-CM

## 2021-06-22 PROCEDURE — 3008F BODY MASS INDEX DOCD: CPT | Performed by: FAMILY MEDICINE

## 2021-06-22 PROCEDURE — 3725F SCREEN DEPRESSION PERFORMED: CPT | Performed by: FAMILY MEDICINE

## 2021-06-22 PROCEDURE — 99214 OFFICE O/P EST MOD 30 MIN: CPT | Performed by: FAMILY MEDICINE

## 2021-06-22 PROCEDURE — 99396 PREV VISIT EST AGE 40-64: CPT | Performed by: FAMILY MEDICINE

## 2021-06-22 PROCEDURE — 4004F PT TOBACCO SCREEN RCVD TLK: CPT | Performed by: FAMILY MEDICINE

## 2021-06-22 SDOH — SOCIAL STABILITY - SOCIAL INSECURITY: DISSAPEARANCE AND DEATH OF FAMILY MEMBER: Z63.4

## 2021-06-22 NOTE — PATIENT INSTRUCTIONS
Make sure you schedule the mammogram and the lung screening test      Diverticulosis Diet   WHAT YOU NEED TO KNOW:   What is a diverticulosis diet? A diverticulosis diet includes high-fiber foods  High-fiber foods help you have regular bowel movements  Extra fiber may decrease your risk of forming new diverticula (small pockets) in your intestine  A high-fiber diet may also help prevent diverticulitis  Diverticulitis is a painful condition that occurs when diverticula become inflamed or infected  You do not need to avoid nuts, seeds, corn, or popcorn while you are on a diverticulosis diet  How much fiber do I need? You may need 25 to 35 grams of fiber each day  Ask your dietitian or healthcare provider how much fiber you should have  Increase your intake of fiber slowly  When you eat more fiber, you may have gas and feel bloated  You may need to take a fiber supplement if you do not get enough fiber from food  Drink plenty of liquids as you increase the fiber in your diet  Your dietitian or healthcare provider may recommend 8 eight-ounce cups or more each day  Ask which liquids are best for you  Which foods are high in fiber? · Foods with at least 4 grams of fiber per serving:      ? ? to ½ cup of high-fiber cereal (check the nutrition label on the box)    ? ½ cup of blackberries or raspberries    ? 4 dried prunes    ? 1 cooked artichoke    ? ½ cup of cooked legumes, such as lentils, or red, kidney, and arias beans    · Foods with 1 to 3 grams of fiber per serving:      ? 1 slice of whole-wheat, pumpernickel, or rye bread    ? 4 whole-wheat crackers    ? ½ cup of cereal with 1 to 3 grams of fiber per serving (check the nutrition label on the box)    ? 1 piece of fruit, such as an apple, banana, pear, kiwi, or orange    ? 3 dates    ? ½ cup of canned apricots, fruit cocktail, peaches, or pears    ?  ½ cup of raw or cooked vegetables, such as carrots, cauliflower, cabbage, spinach, squash, or corn    When should I contact my healthcare provider? · You have questions about a high-fiber diet  · You have a change in your bowel movements  · You have an upset stomach  · You have a fever  · You have pain in your lower abdomen on the left side  · You have questions about your condition or care  CARE AGREEMENT:   You have the right to help plan your care  Learn about your health condition and how it may be treated  Discuss treatment options with your healthcare providers to decide what care you want to receive  You always have the right to refuse treatment  The above information is an  only  It is not intended as medical advice for individual conditions or treatments  Talk to your doctor, nurse or pharmacist before following any medical regimen to see if it is safe and effective for you  © Copyright [address] Information is for End User's use only and may not be sold, redistributed or otherwise used for commercial purposes  All illustrations and images included in CareNotes® are the copyrighted property of [employer]  or [employer]  Visit for Adults   AMBULATORY CARE:   A wellness visit  is when you see your healthcare provider to get screened for health problems  Your healthcare provider will also give you advice on how to stay healthy  Write down your questions so you remember to ask them  Ask your healthcare provider how often you should have a wellness visit  What happens at a wellness visit:  Your healthcare provider will ask about your health, and your family history of health problems  This includes high blood pressure, heart disease, and cancer  He or she will ask if you have symptoms that concern you, if you smoke, and about your mood  You may also be asked about your intake of medicines, supplements, food, and alcohol  Any of the following may be done:  · Your weight  will be checked   Your height may also be checked so your body mass index (BMI) can be calculated  Your BMI shows if you are at a healthy weight  · Your blood pressure  and heart rate will be checked  Your temperature may also be checked  · Blood and urine tests  may be done  Blood tests may be done to check your cholesterol levels  Abnormal cholesterol levels increase your risk for heart disease and stroke  You may also need a blood or urine test to check for diabetes if you are at increased risk  Urine tests may be done to look for signs of an infection or kidney disease  · A physical exam  includes checking your heartbeat and lungs with a stethoscope  Your healthcare provider may also check your skin to look for sun damage  · Screening tests  may be recommended  A screening test is done to check for diseases that may not cause symptoms  The screening tests you may need depend on your age, gender, family history, and lifestyle habits  For example, colorectal screening may be recommended if you are 48years old or older  Screening tests you need if you are a woman:   · A Pap smear  is used to screen for cervical cancer  Pap smears are usually done every 3 to 5 years depending on your age  You may need them more often if you have had abnormal Pap smear test results in the past  Ask your healthcare provider how often you should have a Pap smear  · A mammogram  is an x-ray of your breasts to screen for breast cancer  Experts recommend mammograms every 2 years starting at age 48 years  You may need a mammogram at age 52 years or younger if you have an increased risk for breast cancer  Talk to your healthcare provider about when you should start having mammograms and how often you need them  Vaccines you may need:   · Get an influenza vaccine  every year  The influenza vaccine protects you from the flu  Several types of viruses cause the flu  The viruses change over time, so new vaccines are made each year  · Get a tetanus-diphtheria (Td) booster vaccine  every 10 years   This vaccine protects you against tetanus and diphtheria  Tetanus is a severe infection that may cause painful muscle spasms and lockjaw  Diphtheria is a severe bacterial infection that causes a thick covering in the back of your mouth and throat  · Get a human papillomavirus (HPV) vaccine  if you are female and aged 23 to 32 or male 23 to 24 and never received it  This vaccine protects you from HPV infection  HPV is the most common infection spread by sexual contact  HPV may also cause vaginal, penile, and anal cancers  · Get a pneumococcal vaccine  if you are aged 72 years or older  The pneumococcal vaccine is an injection given to protect you from pneumococcal disease  Pneumococcal disease is an infection caused by pneumococcal bacteria  The infection may cause pneumonia, meningitis, or an ear infection  · Get a shingles vaccine  if you are 60 or older, even if you have had shingles before  The shingles vaccine is an injection to protect you from the varicella-zoster virus  This is the same virus that causes chickenpox  Shingles is a painful rash that develops in people who had chickenpox or have been exposed to the virus  How to eat healthy:  My Plate is a model for planning healthy meals  It shows the types and amounts of foods that should go on your plate  Fruits and vegetables make up about half of your plate, and grains and protein make up the other half  A serving of dairy is included on the side of your plate  The amount of calories and serving sizes you need depends on your age, gender, weight, and height  Examples of healthy foods are listed below:  · Eat a variety of vegetables  such as dark green, red, and orange vegetables  You can also include canned vegetables low in sodium (salt) and frozen vegetables without added butter or sauces  · Eat a variety of fresh fruits , canned fruit in 100% juice, frozen fruit, and dried fruit  · Include whole grains    At least half of the grains you eat should be whole grains  Examples include whole-wheat bread, wheat pasta, brown rice, and whole-grain cereals such as oatmeal     · Eat a variety of protein foods such as seafood (fish and shellfish), lean meat, and poultry without skin (turkey and chicken)  Examples of lean meats include pork leg, shoulder, or tenderloin, and beef round, sirloin, tenderloin, and extra lean ground beef  Other protein foods include eggs and egg substitutes, beans, peas, soy products, nuts, and seeds  · Choose low-fat dairy products such as skim or 1% milk or low-fat yogurt, cheese, and cottage cheese  · Limit unhealthy fats  such as butter, hard margarine, and shortening  Exercise:  Exercise at least 30 minutes per day on most days of the week  Some examples of exercise include walking, biking, dancing, and swimming  You can also fit in more physical activity by taking the stairs instead of the elevator or parking farther away from stores  Include muscle strengthening activities 2 days each week  Regular exercise provides many health benefits  It helps you manage your weight, and decreases your risk for type 2 diabetes, heart disease, stroke, and high blood pressure  Exercise can also help improve your mood  Ask your healthcare provider about the best exercise plan for you  General health and safety guidelines:   · Do not smoke  Nicotine and other chemicals in cigarettes and cigars can cause lung damage  Ask your healthcare provider for information if you currently smoke and need help to quit  E-cigarettes or smokeless tobacco still contain nicotine  Talk to your healthcare provider before you use these products  · Limit alcohol  A drink of alcohol is 12 ounces of beer, 5 ounces of wine, or 1½ ounces of liquor  · Lose weight, if needed  Being overweight increases your risk of certain health conditions  These include heart disease, high blood pressure, type 2 diabetes, and certain types of cancer      · Protect your skin  Do not sunbathe or use tanning beds  Use sunscreen with a SPF 15 or higher  Apply sunscreen at least 15 minutes before you go outside  Reapply sunscreen every 2 hours  Wear protective clothing, hats, and sunglasses when you are outside  · Drive safely  Always wear your seatbelt  Make sure everyone in your car wears a seatbelt  A seatbelt can save your life if you are in an accident  Do not use your cell phone when you are driving  This could distract you and cause an accident  Pull over if you need to make a call or send a text message  · Practice safe sex  Use latex condoms if are sexually active and have more than one partner  Your healthcare provider may recommend screening tests for sexually transmitted infections (STIs)  · Wear helmets, lifejackets, and protective gear  Always wear a helmet when you ride a bike or motorcycle, go skiing, or play sports that could cause a head injury  Wear protective equipment when you play sports  Wear a lifejacket when you are on a boat or doing water sports  © Copyright [address] Information is for End User's use only and may not be sold, redistributed or otherwise used for commercial purposes  All illustrations and images included in CareNotes® are the copyrighted property of [employer] M [employer]  or [address] [place], [place] [address]   The above information is an  only  It is not intended as medical advice for individual conditions or treatments  Talk to your doctor, nurse or pharmacist before following any medical regimen to see if it is safe and effective for you  Cigarette Smoking and Your Health   AMBULATORY CARE:   Risks to your health if you smoke:  Nicotine and other chemicals found in tobacco and e-cigarettes can damage every cell in your body  Even if you are a light smoker, you have an increased risk for cancer, heart disease, and lung disease  If you are pregnant or have diabetes, smoking increases your risk for complications  Nicotine can affect an adolescent's developing brain  This can lead to trouble thinking, learning, or paying attention  Benefits to your health if you stop smoking:   · You decrease respiratory symptoms such as coughing, wheezing, and shortness of breath  · You reduce your risk for cancers of the lung, mouth, throat, kidney, bladder, pancreas, stomach, and cervix  If you already have cancer, you increase the benefits of chemotherapy  You also reduce your risk for cancer returning or a second cancer from developing  · You reduce your risk for heart disease, blood clots, heart attack, and stroke  · You reduce your risk for lung infections, and diseases such as pneumonia, asthma, chronic bronchitis, and emphysema  · Your circulation improves  More oxygen can be delivered to your body  If you have diabetes, you lower your risk for complications, such as kidney, artery, and eye diseases  You also lower your risk for nerve damage  Nerve damage can lead to amputations, poor vision, and blindness  · You improve your body's ability to heal and to fight infections  · An adolescent can help his or her brain and body develop in a healthy way  Talk to your adolescent about all the health risks of nicotine  If you can, start talking about nicotine when your child is younger than 12 years  This may make it easier for him or her not to start using nicotine as a teenager or adult  Explain to him or her that it is best never to start  It can be hard to try to quit later  Benefits to the health of others if you stop smoking:  Tobacco is harmful to nonsmokers who breathe in your secondhand smoke  The following are ways the health of others around you may improve when you stop smoking:  · You lower the risks for lung cancer and heart disease in nonsmoking adults  · If you are pregnant, you lower the risk for miscarriage, early delivery, low birth weight, and stillbirth   You also lower your baby's risk for SIDS, obesity, developmental delay, and neurobehavioral problems, such as ADHD  · If you have children, you lower their risk for ear infections, colds, pneumonia, bronchitis, and asthma  Follow up with your doctor as directed:  Write down your questions so you remember to ask them during your visits  For more information and support to stop smoking:   · [employer]  Phone: 7- 777 - 065-7297  Web Address: [employer]  [address] Information is for End User's use only and may not be sold, redistributed or otherwise used for commercial purposes  All illustrations and images included in CareNotes® are the copyrighted property of [employer] M [employer]  or [address] [place] [address]  The above information is an  only  It is not intended as medical advice for individual conditions or treatments  Talk to your doctor, nurse or pharmacist before following any medical regimen to see if it is safe and effective for you        VITAMIN D RECS  [place] Preventative Task Force 2013 Recommendations    Population Recommended Daily Dose     Vitamin D    Calcium  Women  Aged 19-50 y  600 IU      1000 mg  Aged 51-70 y   600 IU      1200 mg  Aged >70 y  800 IU      1200 mg

## 2021-06-22 NOTE — PROGRESS NOTES
ADULT ANNUAL [address]  FAMILY PRACTICE    NAME: Jennifer Eugene  AGE: 54 y o  SEX: female  : 1965     DATE: 2021     Assessment and Plan:     Problem List Items Addressed This Visit        Digestive    Diverticulosis of colon       Other    Tobacco abuse    Relevant Orders    Ambulatory referral to Smoking Cessation Program      Other Visit Diagnoses     Annual physical exam    -  Primary    Atypical squamous cells cannot exclude high grade squamous intraepithelial lesion on cytologic smear of cervix (ASC-H)        Relevant Orders    Ambulatory referral to Gynecology    Bereavement        Called local vet and got information for Donald Tanner [phone]  I called her and she is expecting call from patient  Reports will help patient w/no cost          Immunizations and preventive care screenings were discussed with patient today  Appropriate education was printed on patient's after visit summary  Counseling:  Alcohol/drug use: discussed moderation in alcohol intake, the recommendations for healthy alcohol use, and avoidance of illicit drug use  Dental Health: discussed importance of regular tooth brushing, flossing, and dental visits  Injury prevention: discussed safety/seat belts, safety helmets, smoke detectors, carbon dioxide detectors, and smoking near bedding or upholstery  Sexual health: discussed sexually transmitted diseases, partner selection, use of condoms, avoidance of unintended pregnancy, and contraceptive alternatives  · Exercise: the importance of regular exercise/physical activity was discussed  Recommend exercise 3-5 times per week for at least 30 minutes            Return in 1 year (on 2022) for Annual physical      Chief Complaint:     Chief Complaint   Patient presents with    Physical Exam      History of Present Illness:     Adult Annual Physical   Patient here for a comprehensive physical exam  The patient reports no problems  Diet and Physical Activity  · Diet/Nutrition: poor diet, frequent junk food and limited fruits/vegetables  · Exercise: no formal exercise  Depression Screening  PHQ-9 Depression Screening    PHQ-9:   Frequency of the following problems over the past two weeks:      Little interest or pleasure in doing things: 2 - more than half the days  Feeling down, depressed, or hopeless: 2 - more than half the days  Trouble falling or staying asleep, or sleeping too much: 2 - more than half the days  Feeling tired or having little energy: 2 - more than half the days  Poor appetite or overeatin - more than half the days  Feeling bad about yourself - or that you are a failure or have let yourself or your family down: 2 - more than half the days  Trouble concentrating on things, such as reading the newspaper or watching television: 2 - more than half the days  Moving or speaking so slowly that other people could have noticed  Or the opposite - being so fidgety or restless that you have been moving around a lot more than usual: 2 - more than half the days  Thoughts that you would be better off dead, or of hurting yourself in some way: 0 - not at all  PHQ-2 Score: 4  PHQ-9 Score: 16       General Health  · Sleep: sleeps well and gets 7-8 hours of sleep on average  · Hearing: normal - bilateral   · Vision: most recent eye exam <1 year ago and wears glasses  · Dental: no dental visits for >1 year and has fitting for second set of dentures  /GYN Health  · Patient is: postmenopausal  · Last menstrual period: 8 years ago  · Contraceptive method: none       Review of Systems:     Review of Systems   Past Medical History:     Past Medical History:   Diagnosis Date    Anxiety     Bell's palsy     COPD (chronic obstructive pulmonary disease) ([facility] [address] )     Depression     Menopause     Mild persistent asthma without complication     Obesity     Primary osteoarthritis involving multiple joints 10/9/2018      Past Surgical History:     Past Surgical History:   Procedure Laterality Date    ANKLE SURGERY      BREAST BIOPSY  12 yrs ago     SECTION      COLONOSCOPY      COLONOSCOPY W/ POLYPECTOMY      DILATION AND CURETTAGE, DIAGNOSTIC / THERAPEUTIC      NOSE SURGERY      TOTAL HIP ARTHROPLASTY Left     TUBAL LIGATION        Social History:     Social History     Socioeconomic History    Marital status: Single     Spouse name: None    Number of children: 2    Years of education: None    Highest education level: Associate degree: academic program   Occupational History    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: [employer]     Comment: PT / Waiver Services   Tobacco Use    Smoking status: Current Every Day Smoker     Packs/day: 2 00     Years: 40 00     Pack years: 80 00     Types: Cigarettes    Smokeless tobacco: Never Used   Vaping Use    Vaping Use: Some days   Substance and Sexual Activity    Alcohol use: Yes     Alcohol/week: 3 0 - 5 0 standard drinks     Types: 3 - 5 Glasses of wine per week     Comment: several times weekly    Drug use: Not Currently     Types: Marijuana, Cocaine    Sexual activity: Not Currently     Partners: Male     Birth control/protection: None   Other Topics Concern    None   Social History Narrative      Has 3 children    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  Lives with son and two roommates  Social Determinants of Health     Financial Resource Strain: Medium Risk    Difficulty of Paying Living Expenses: Somewhat hard   Food Insecurity: No Food Insecurity    Worried About Running Out of Food in the Last Year: Never true    Anny of Food in the Last Year: Never true   Transportation Needs: No Transportation Needs    Lack of Transportation (Medical): No    Lack of Transportation (Non-Medical):  No   Physical Activity:     Days of Exercise per Week:     Minutes of Exercise per Session:    Stress:     Feeling of Stress : Social Connections:     Frequency of Communication with Friends and Family:     Frequency of Social Gatherings with Friends and Family:     Attends Druze Services:     Active Member of Clubs or Organizations:     Attends Club or Organization Meetings:     Marital Status:    Intimate Partner Violence:     Fear of Current or Ex-Partner:     Emotionally Abused:     Physically Abused:     Sexually Abused:       Family History:     Family History   Problem Relation Age of Onset    Mental illness Mother     Suicidality Mother     No Known Problems Father     Mental illness Brother     Suicidality Brother     No Known Problems Maternal Grandmother     No Known Problems Paternal Grandmother     No Known Problems Maternal Aunt     No Known Problems Paternal Aunt     No Known Problems Paternal Aunt     No Known Problems Paternal Aunt     No Known Problems Paternal Aunt       Current Medications:     Current Outpatient Medications   Medication Sig Dispense Refill    albuterol (PROVENTIL HFA,VENTOLIN HFA) 90 mcg/act inhaler Inhale 2 puffs every 6 (six) hours as needed for wheezing      Ascorbic Acid (C-1000/ALIX HIPS PO) Take by mouth      atorvastatin (LIPITOR) 20 mg tablet Take 1 tablet by mouth once daily 90 tablet 1    BIOTIN 5000 PO Take by mouth      Coenzyme Q10 (COQ10) 100 MG CAPS Take by mouth      DULoxetine (CYMBALTA) 20 mg capsule Take 2 capsules (40 mg total) by mouth 2 (two) times a day 120 capsule 0    folic acid (FOLVITE) 1 mg tablet Take 1 tablet (1 mg total) by mouth daily 30 tablet 0    gabapentin (NEURONTIN) 300 mg capsule Take 1 capsule (300 mg total) by mouth 3 (three) times a day 90 capsule 5    loratadine (CLARITIN) 10 mg tablet Take 10 mg by mouth daily      MAGNESIUM CITRATE PO Take 400 mg by mouth      meloxicam (MOBIC) 15 mg tablet Take 1 tablet by mouth once daily 30 tablet 5    Menthol, Topical Analgesic, (Biofreeze Roll-On) 4 % GEL Apply 1 application topically 3 (three) times a day as needed (pain) 150 Tube 1    montelukast (SINGULAIR) 10 mg tablet TAKE 1 TABLET BY MOUTH ONCE DAILY AT BEDTIME 90 tablet 0    pramipexole (MIRAPEX) 0 5 mg tablet Take 1 tablet (0 5 mg total) by mouth daily 90 tablet 1    tiotropium (SPIRIVA RESPIMAT) 1 25 MCG/ACT AERS inhaler Inhale 2 puffs daily 3 Inhaler 3    vitamin E, tocopherol, 400 units capsule Take 400 Units by mouth daily       No current facility-administered medications for this visit  Allergies: Allergies   Allergen Reactions    Pollen Extract       Physical Exam:     /60 (BP Location: Left arm, Patient Position: Sitting)   Pulse 85   Temp 97 6 °F (36 4 °C) (Tympanic)   Ht 5' 5" (1 651 m)   Wt 65 6 kg (144 lb 9 6 oz)   SpO2 99%   BMI 24 06 kg/m²     Physical Exam  Vitals and nursing note reviewed  Constitutional:       General: She is not in acute distress  Appearance: Normal appearance  She is well-developed  She is not ill-appearing, toxic-appearing or diaphoretic  HENT:      Head: Normocephalic and atraumatic  Right Ear: Tympanic membrane, ear canal and external ear normal       Left Ear: Tympanic membrane, ear canal and external ear normal       Nose: Nose normal  No congestion or rhinorrhea  Mouth/Throat:      Mouth: Mucous membranes are moist       Pharynx: Oropharynx is clear  No oropharyngeal exudate or posterior oropharyngeal erythema  Eyes:      Extraocular Movements: Extraocular movements intact  Conjunctiva/sclera: Conjunctivae normal       Pupils: Pupils are equal, round, and reactive to light  Neck:      Vascular: No carotid bruit  Cardiovascular:      Rate and Rhythm: Normal rate and regular rhythm  Pulses: Normal pulses  Heart sounds: Normal heart sounds  No murmur heard  No friction rub  No gallop  Pulmonary:      Effort: Pulmonary effort is normal  No respiratory distress  Breath sounds: Normal breath sounds   No wheezing or rales    Abdominal:      General: Bowel sounds are normal       Palpations: Abdomen is soft  Tenderness: There is no abdominal tenderness  There is no guarding  Musculoskeletal:         General: Normal range of motion  Cervical back: Neck supple  No muscular tenderness  Right lower leg: No edema  Left lower leg: No edema  Lymphadenopathy:      Cervical: No cervical adenopathy  Skin:     General: Skin is warm and dry  Findings: No lesion  Neurological:      General: No focal deficit present  Mental Status: She is alert  Cranial Nerves: No cranial nerve deficit  Sensory: No sensory deficit  Motor: No weakness            Chica Cooper MD  [id number] [facility]

## 2021-06-23 ENCOUNTER — TELEPHONE (OUTPATIENT)
Dept: FAMILY MEDICINE CLINIC | Facility: CLINIC | Age: 56
End: 2021-06-23

## 2021-06-23 NOTE — PROGRESS NOTES
Assessment/Plan:      Diagnoses and all orders for this visit:    Annual physical exam    Atypical squamous cells cannot exclude high grade squamous intraepithelial lesion on cytologic smear of cervix (ASC-H)  -     Ambulatory referral to Gynecology; Future  We discussed again the importance of need for colposcopy given abnormal pap  She verbalized understanding  Tobacco abuse  -     Ambulatory referral to Smoking Cessation Program; Future  We discussed different methods of cessation  She reports she is worries about chantix causing worsening of MH  We discussed risk of that and she was amenable to trial; however, she felt she would need more consistent follow up and wanted to do the program  She tried wellbutrin     Diverticulosis of colon  High fiber diet education provided    Bereavement  Comments:  I reached out to [facility] who recommended [facility] [phone]  They gave me information on [facility] [phone]  Spoke with Hardeep Farah about bereavement counseling for pet loss for patient  She reports she will be happy to help at no cost to patient  She is not holding in person appointments unless they are vaccinated  She lives in [place] if patient willing to drive there or otherwise telephone counseling  Return in 1 year (on 6/22/2022) for Annual physical      The following portions of the patient's history were reviewed and updated as appropriate: allergies, current medications, past family history, past medical history, past social history, past surgical history, and problem list      Subjective:     Patient ID: Grace Rosario is a 54 y o  female  HPI    Diverticula: Reports she was told she has pockets in her colon  I explained what diverticulosis is  Recommended high fiber diet  We went over foods that would be considered higher in fiber intake  Bereavement: Patient reports she lost a pet recently  was asking about a support group for ppl that lost a pet   She says that she feels very sad  Denies Si/HI  I reached out to [facility] who recommended [facility] [phone]  They gave me information on Luis Manuel Mckeon [phone]  Spoke with Kirk Beasley about bereavement counseling for pet loss for patient  She reports she will be happy to help at no cost to patient  She is not holding in person appointments unless they are vaccinated  She lives in [place] if patient willing to drive there or otherwise telephone counseling  Has a history of mental health concerns that she uses cymbalta and gabapentin for per patient  She reports doing well overall until recently  She sees a regular therapist weekly for her depression/anxiety  PHQ-9 Follow-up    Little interest or pleasure in doing things: 2 - more than half the days  Feeling down, depressed, or hopeless: 2 - more than half the days  Trouble falling or staying asleep, or sleeping too much: 2 - more than half the days  Feeling tired or having little energy: 2 - more than half the days  Poor appetite or overeatin - more than half the days  Feeling bad about yourself - or that you are a failure or have let yourself or your family down: 2 - more than half the days  Trouble concentrating on things, such as reading the newspaper or watching television: 2 - more than half the days  Moving or speaking so slowly that other people could have noticed   Or the opposite - being so fidgety or restless that you have been moving around a lot more than usual: 2 - more than half the days  Thoughts that you would be better off dead, or of hurting yourself in some way: 0 - not at all  PHQ-2 Score: 4  PHQ-9 Score: 16           Current Outpatient Medications on File Prior to Visit   Medication Sig Dispense Refill    albuterol (PROVENTIL HFA,VENTOLIN HFA) 90 mcg/act inhaler Inhale 2 puffs every 6 (six) hours as needed for wheezing      Ascorbic Acid (C-1000/ALIX HIPS PO) Take by mouth      atorvastatin (LIPITOR) 20 mg tablet Take 1 tablet by mouth once daily 90 tablet 1    BIOTIN 5000 PO Take by mouth      Coenzyme Q10 (COQ10) 100 MG CAPS Take by mouth      DULoxetine (CYMBALTA) 20 mg capsule Take 2 capsules (40 mg total) by mouth 2 (two) times a day 120 capsule 0    folic acid (FOLVITE) 1 mg tablet Take 1 tablet (1 mg total) by mouth daily 30 tablet 0    gabapentin (NEURONTIN) 300 mg capsule Take 1 capsule (300 mg total) by mouth 3 (three) times a day 90 capsule 5    loratadine (CLARITIN) 10 mg tablet Take 10 mg by mouth daily      MAGNESIUM CITRATE PO Take 400 mg by mouth      meloxicam (MOBIC) 15 mg tablet Take 1 tablet by mouth once daily 30 tablet 5    Menthol, Topical Analgesic, (Biofreeze Roll-On) 4 % GEL Apply 1 application topically 3 (three) times a day as needed (pain) 150 Tube 1    montelukast (SINGULAIR) 10 mg tablet TAKE 1 TABLET BY MOUTH ONCE DAILY AT BEDTIME 90 tablet 0    pramipexole (MIRAPEX) 0 5 mg tablet Take 1 tablet (0 5 mg total) by mouth daily 90 tablet 1    tiotropium (SPIRIVA RESPIMAT) 1 25 MCG/ACT AERS inhaler Inhale 2 puffs daily 3 Inhaler 3    vitamin E, tocopherol, 400 units capsule Take 400 Units by mouth daily       No current facility-administered medications on file prior to visit  Review of Systems      Objective:     Physical Exam  Vitals and nursing note reviewed  Constitutional:       General: She is not in acute distress  Appearance: Normal appearance  She is not ill-appearing  HENT:      Head: Normocephalic and atraumatic  Right Ear: Tympanic membrane, ear canal and external ear normal       Left Ear: Tympanic membrane, ear canal and external ear normal       Nose: Nose normal       Mouth/Throat:      Mouth: Mucous membranes are moist       Pharynx: Oropharynx is clear  No oropharyngeal exudate or posterior oropharyngeal erythema  Eyes:      Extraocular Movements: Extraocular movements intact        Conjunctiva/sclera: Conjunctivae normal       Pupils: Pupils are equal, round, and reactive to light  Cardiovascular:      Rate and Rhythm: Normal rate and regular rhythm  Pulses: Normal pulses  Heart sounds: Normal heart sounds  No murmur heard  No friction rub  No gallop  Pulmonary:      Effort: Pulmonary effort is normal  No respiratory distress  Breath sounds: Normal breath sounds  No wheezing or rales  Abdominal:      General: Bowel sounds are normal  There is no distension  Palpations: Abdomen is soft  Tenderness: There is no abdominal tenderness  There is no guarding  Musculoskeletal:         General: Normal range of motion  Cervical back: Neck supple  No muscular tenderness  Right lower leg: No edema  Left lower leg: No edema  Lymphadenopathy:      Cervical: No cervical adenopathy  Skin:     General: Skin is warm  Neurological:      General: No focal deficit present  Mental Status: She is alert and oriented to person, place, and time  Psychiatric:         Attention and Perception: Attention normal          Mood and Affect: Affect normal  Mood is depressed  Speech: Speech normal  She is communicative  Speech is not rapid and pressured  Behavior: Behavior normal          Thought Content:  Thought content normal          Cognition and Memory: Cognition and memory normal          Judgment: Judgment normal

## 2021-06-23 NOTE — TELEPHONE ENCOUNTER
Patient called as she had inquired about a support group for the pet she recently lost  She was seeing a regular therapist already but wanted someone specific  See below      I reached out to [facility] who recommended [facility] [phone]  They gave me information on [facility] [phone]  Spoke with Linh Delgado about bereavement counseling for pet loss for patient  She reports she will be happy to help at no cost to patient  She is not holding in person appointments unless they are vaccinated  She lives in [place] if patient willing to drive there or otherwise telephone counseling

## 2021-06-29 ENCOUNTER — TELEPHONE (OUTPATIENT)
Dept: FAMILY MEDICINE CLINIC | Facility: CLINIC | Age: 56
End: 2021-06-29

## 2021-06-29 NOTE — TELEPHONE ENCOUNTER
Allergies are really bad what else can you take     loratindine is not helping      Please advise  Dara Soulier Dara Soulier Dara Soulier Dara Soulier

## 2021-07-10 DIAGNOSIS — F32.A DEPRESSION, UNSPECIFIED DEPRESSION TYPE: ICD-10-CM

## 2021-07-11 RX ORDER — DULOXETIN HYDROCHLORIDE 20 MG/1
CAPSULE, DELAYED RELEASE ORAL
Qty: 120 CAPSULE | Refills: 0 | Status: SHIPPED | OUTPATIENT
Start: 2021-07-11 | End: 2021-07-21 | Stop reason: SDUPTHER

## 2021-07-13 DIAGNOSIS — G25.81 RESTLESS LEG SYNDROME: ICD-10-CM

## 2021-07-14 RX ORDER — PRAMIPEXOLE DIHYDROCHLORIDE 0.5 MG/1
0.5 TABLET ORAL DAILY
Qty: 90 TABLET | Refills: 1 | Status: SHIPPED | OUTPATIENT
Start: 2021-07-14 | End: 2022-03-25

## 2021-07-17 ENCOUNTER — HOSPITAL ENCOUNTER (OUTPATIENT)
Dept: CT IMAGING | Facility: HOSPITAL | Age: 56
Discharge: HOME | End: 2021-07-17
Attending: FAMILY MEDICINE
Payer: COMMERCIAL

## 2021-07-17 ENCOUNTER — HOSPITAL ENCOUNTER (OUTPATIENT)
Dept: MAMMOGRAPHY | Facility: HOSPITAL | Age: 56
Discharge: HOME | End: 2021-07-17
Attending: FAMILY MEDICINE
Payer: COMMERCIAL

## 2021-07-17 VITALS — WEIGHT: 145 LBS | HEIGHT: 64 IN | BODY MASS INDEX: 24.75 KG/M2

## 2021-07-17 DIAGNOSIS — Z12.2 ENCOUNTER FOR SCREENING FOR LUNG CANCER: ICD-10-CM

## 2021-07-17 DIAGNOSIS — Z12.31 BREAST CANCER SCREENING BY MAMMOGRAM: ICD-10-CM

## 2021-07-17 PROCEDURE — 77067 SCR MAMMO BI INCL CAD: CPT

## 2021-07-17 PROCEDURE — 71271 CT THORAX LUNG CANCER SCR C-: CPT

## 2021-07-17 PROCEDURE — 77063 BREAST TOMOSYNTHESIS BI: CPT

## 2021-07-21 ENCOUNTER — OFFICE VISIT (OUTPATIENT)
Dept: FAMILY MEDICINE CLINIC | Facility: CLINIC | Age: 56
End: 2021-07-21
Payer: COMMERCIAL

## 2021-07-21 VITALS
OXYGEN SATURATION: 97 % | HEIGHT: 65 IN | SYSTOLIC BLOOD PRESSURE: 100 MMHG | HEART RATE: 88 BPM | TEMPERATURE: 98.6 F | WEIGHT: 145 LBS | DIASTOLIC BLOOD PRESSURE: 60 MMHG | BODY MASS INDEX: 24.16 KG/M2

## 2021-07-21 DIAGNOSIS — R20.2 NUMBNESS AND TINGLING IN RIGHT HAND: Primary | ICD-10-CM

## 2021-07-21 DIAGNOSIS — M25.531 RIGHT WRIST PAIN: ICD-10-CM

## 2021-07-21 DIAGNOSIS — G89.29 CHRONIC LEFT-SIDED LOW BACK PAIN WITHOUT SCIATICA: ICD-10-CM

## 2021-07-21 DIAGNOSIS — M54.50 CHRONIC LEFT-SIDED LOW BACK PAIN WITHOUT SCIATICA: ICD-10-CM

## 2021-07-21 DIAGNOSIS — F32.A DEPRESSION, UNSPECIFIED DEPRESSION TYPE: ICD-10-CM

## 2021-07-21 DIAGNOSIS — R20.0 NUMBNESS AND TINGLING IN RIGHT HAND: Primary | ICD-10-CM

## 2021-07-21 DIAGNOSIS — L98.9 SKIN LESION OF FACE: ICD-10-CM

## 2021-07-21 PROCEDURE — 99214 OFFICE O/P EST MOD 30 MIN: CPT | Performed by: FAMILY MEDICINE

## 2021-07-21 PROCEDURE — 3008F BODY MASS INDEX DOCD: CPT | Performed by: FAMILY MEDICINE

## 2021-07-21 PROCEDURE — 4004F PT TOBACCO SCREEN RCVD TLK: CPT | Performed by: FAMILY MEDICINE

## 2021-07-21 RX ORDER — DULOXETIN HYDROCHLORIDE 20 MG/1
40 CAPSULE, DELAYED RELEASE ORAL 2 TIMES DAILY
Qty: 120 CAPSULE | Refills: 0 | Status: SHIPPED | OUTPATIENT
Start: 2021-07-21 | End: 2021-08-23 | Stop reason: SDUPTHER

## 2021-07-21 NOTE — PROGRESS NOTES
Assessment/Plan:      Diagnoses and all orders for this visit:    Numbness and tingling in right hand  -     Nerve conduction test; Future    Depression, unspecified depression type  -     DULoxetine (CYMBALTA) 20 mg capsule; Take 2 capsules (40 mg total) by mouth 2 (two) times a day    Right wrist pain  -     Nerve conduction test; Future    Chronic left-sided low back pain without sciatica  -     Ambulatory referral to Physical Therapy; Future    Skin lesion of face  -     Ambulatory referral to Dermatology; Future          Return in about 4 weeks (around 8/18/2021) for back pain and wrist pain  The following portions of the patient's history were reviewed and updated as appropriate: allergies, current medications, past family history, past medical history, past social history, past surgical history, and problem list      Subjective:     Patient ID: Manuel Crandall is a 54 y o  female  HPI     Skin lesion on left side of face: high sun exposure as child  Does not go out in sun now but wears sunscreen when she does  Lesion on the face for 20-30 years  Thinks it has increased in size  No drainage  Wrist pain: right side  Slammed her wrist on the door and it has been hurting since then  On radial side on the forearm  Wears a brace she got from urgent care but reports it is not helping with the pain  Taking motrin prn and ice packs  Reports moderate relief but as soon as she takes them off pain returns  She has pain at baseline in the wrist  She also drops things in the hand  Bilateral pain  Reports numbness and tingling in the morning and sometimes during the day when she wakes up in the entire hand no specific distribution  Has done physical therapy before and reports that it was hurting so she stopped  Never had EMG  Depression: takes Cymbalta  Needs refill  Doing well  Denies SI/HI  Back pain: always has back pain for 25-30 years ago   Saw a doctor previously who gave her a diagnosis of pinched nerve and recommended surgery  She reports she did not want to do surgery  Now she reports she has to take a break when standing for 10-20 minutes  Started getting worse 5-6 years ago  Back pain on left paraspinal region  Denies midline pain  Denies red flag symptoms  Never done physical therapy for the back  Never tried steroid injections or other treatment modalities      Current Outpatient Medications on File Prior to Visit   Medication Sig Dispense Refill    albuterol (PROVENTIL HFA,VENTOLIN HFA) 90 mcg/act inhaler Inhale 2 puffs every 6 (six) hours as needed for wheezing      Ascorbic Acid (C-1000/ALIX HIPS PO) Take by mouth      atorvastatin (LIPITOR) 20 mg tablet Take 1 tablet by mouth once daily 90 tablet 1    BIOTIN 5000 PO Take by mouth      Coenzyme Q10 (COQ10) 100 MG CAPS Take by mouth      folic acid (FOLVITE) 1 mg tablet Take 1 tablet (1 mg total) by mouth daily 30 tablet 0    gabapentin (NEURONTIN) 300 mg capsule Take 1 capsule (300 mg total) by mouth 3 (three) times a day 90 capsule 5    loratadine (CLARITIN) 10 mg tablet Take 10 mg by mouth daily      MAGNESIUM CITRATE PO Take 400 mg by mouth      meloxicam (MOBIC) 15 mg tablet Take 1 tablet by mouth once daily 30 tablet 5    meloxicam (MOBIC) 15 mg tablet Take 1 tablet by mouth once daily 30 tablet 5    Menthol, Topical Analgesic, (Biofreeze Roll-On) 4 % GEL Apply 1 application topically 3 (three) times a day as needed (pain) 150 Tube 1    montelukast (SINGULAIR) 10 mg tablet TAKE 1 TABLET BY MOUTH ONCE DAILY AT BEDTIME 90 tablet 1    pramipexole (MIRAPEX) 0 5 mg tablet Take 1 tablet (0 5 mg total) by mouth daily 90 tablet 1    tiotropium (SPIRIVA RESPIMAT) 1 25 MCG/ACT AERS inhaler Inhale 2 puffs daily 3 Inhaler 3    vitamin E, tocopherol, 400 units capsule Take 400 Units by mouth daily      [DISCONTINUED] DULoxetine (CYMBALTA) 20 mg capsule take 2 capsules by mouth twice a day 120 capsule 0    meloxicam (MOBIC) 15 mg tablet Take 1 tablet by mouth once daily (Patient not taking: Reported on 7/21/2021) 30 tablet 5     No current facility-administered medications on file prior to visit  Review of Systems      Objective:     Physical Exam  Vitals and nursing note reviewed  Constitutional:       General: She is not in acute distress  Appearance: Normal appearance  She is not ill-appearing or toxic-appearing  HENT:      Head: Normocephalic and atraumatic  Right Ear: External ear normal       Left Ear: External ear normal    Eyes:      General: No scleral icterus  Right eye: No discharge  Left eye: No discharge  Extraocular Movements: Extraocular movements intact  Conjunctiva/sclera: Conjunctivae normal    Cardiovascular:      Rate and Rhythm: Normal rate and regular rhythm  Heart sounds: Normal heart sounds  No murmur heard  No friction rub  No gallop  Pulmonary:      Effort: Pulmonary effort is normal  No respiratory distress  Breath sounds: Normal breath sounds  No wheezing or rales  Musculoskeletal:      Right forearm: Normal  No tenderness or bony tenderness  Left forearm: Normal  No tenderness or bony tenderness  Right wrist: Normal  No swelling, effusion, tenderness, bony tenderness or snuff box tenderness  Left wrist: Normal  No swelling, effusion, tenderness or bony tenderness  Cervical back: Normal, normal range of motion and neck supple  No rigidity, tenderness or bony tenderness  No pain with movement  Normal range of motion  Lumbar back: Tenderness present  No spasms or bony tenderness  Normal range of motion  Negative right straight leg raise test and negative left straight leg raise test         Back:       Comments: spurling negative   Lymphadenopathy:      Cervical: No cervical adenopathy  Neurological:      General: No focal deficit present  Mental Status: She is alert  Mental status is at baseline        Cranial Nerves: No cranial nerve deficit  Sensory: No sensory deficit  Motor: No weakness  Comments: Carpel/cubital testing negative   spurling negative

## 2021-07-23 ENCOUNTER — TELEPHONE (OUTPATIENT)
Dept: FAMILY MEDICINE CLINIC | Facility: CLINIC | Age: 56
End: 2021-07-23

## 2021-07-23 DIAGNOSIS — G62.9 NEUROPATHY: Primary | ICD-10-CM

## 2021-07-23 NOTE — TELEPHONE ENCOUNTER
Patient is unable to get EMG scheduled until January, she is wondering if there is any other course of treatment to try or advise in the meantime

## 2021-07-28 RX ORDER — AMITRIPTYLINE HYDROCHLORIDE 10 MG/1
10 TABLET, FILM COATED ORAL
Qty: 30 TABLET | Refills: 0 | Status: SHIPPED | OUTPATIENT
Start: 2021-07-28 | End: 2021-09-07 | Stop reason: SDUPTHER

## 2021-08-10 ENCOUNTER — OFFICE VISIT (OUTPATIENT)
Dept: URGENT CARE | Facility: CLINIC | Age: 56
End: 2021-08-10
Payer: COMMERCIAL

## 2021-08-10 ENCOUNTER — TELEPHONE (OUTPATIENT)
Dept: FAMILY MEDICINE CLINIC | Facility: CLINIC | Age: 56
End: 2021-08-10

## 2021-08-10 VITALS
TEMPERATURE: 98.2 F | HEART RATE: 104 BPM | BODY MASS INDEX: 24.16 KG/M2 | SYSTOLIC BLOOD PRESSURE: 116 MMHG | OXYGEN SATURATION: 98 % | DIASTOLIC BLOOD PRESSURE: 66 MMHG | RESPIRATION RATE: 18 BRPM | HEIGHT: 65 IN | WEIGHT: 145 LBS

## 2021-08-10 DIAGNOSIS — M54.42 ACUTE LEFT-SIDED LOW BACK PAIN WITH LEFT-SIDED SCIATICA: Primary | ICD-10-CM

## 2021-08-10 DIAGNOSIS — M62.838 MUSCLE SPASM: ICD-10-CM

## 2021-08-10 PROCEDURE — 99213 OFFICE O/P EST LOW 20 MIN: CPT | Performed by: PHYSICIAN ASSISTANT

## 2021-08-10 RX ORDER — CYCLOBENZAPRINE HCL 10 MG
10 TABLET ORAL 3 TIMES DAILY PRN
Qty: 20 TABLET | Refills: 0 | Status: SHIPPED | OUTPATIENT
Start: 2021-08-10 | End: 2021-11-02

## 2021-08-10 NOTE — PROGRESS NOTES
[address] [facility] [employer]          NAME: Nargis Garcia is a 54 y o  female  : 1965    MRN: [id number]  DATE: August 10, 2021  TIME: 7:48 PM    Assessment and Plan   Acute left-sided low back pain with left-sided sciatica [M54 42]  1  Acute left-sided low back pain with left-sided sciatica     2  Muscle spasm  cyclobenzaprine (FLEXERIL) 10 mg tablet       Patient Instructions   Flexeril as needed for muscle spasm when not driving  To alternate ice/heat  PT as already ordered  To follow up with PCP in next 5-7 days if no improvement in symptoms  Patient agreeable to plan and did verbalize understanding of plan  To present to the ER if symptoms worsen  Chief Complaint     Chief Complaint   Patient presents with    Back Pain     today         History of Present Illness   Nargis Garcia presents to the clinic c/o    No UTI symptoms  Denies any trauma to the back  Was lifting things up the steps today and has since had pain  Chronic back issues, was told needs surgery but does not want to proceed with lumbar spine surgery  Back Pain  This is a chronic (over 20 years ago) problem  The current episode started today (around an hour ago lower left > right)  The problem occurs constantly  The problem is unchanged  The pain is present in the lumbar spine  The quality of the pain is described as aching  Radiates to: left buttock pain to calf  The pain is at a severity of 9/10  The pain is moderate  The symptoms are aggravated by bending and twisting  Pertinent negatives include no abdominal pain, chest pain, dysuria, fever, headaches or pelvic pain  She has tried NSAIDs for the symptoms  The treatment provided no relief  Review of Systems   Review of Systems   Constitutional: Negative for chills, diaphoresis, fatigue and fever  HENT: Negative for congestion, ear discharge, ear pain and facial swelling  Eyes: Negative for photophobia, pain, discharge, redness, itching and visual disturbance  Respiratory: Negative for apnea, cough, chest tightness, shortness of breath and wheezing  Cardiovascular: Negative for chest pain and palpitations  Gastrointestinal: Negative for abdominal pain  Genitourinary: Negative for dysuria, frequency, pelvic pain and urgency  Musculoskeletal: Positive for back pain  Skin: Negative for color change, rash and wound  Neurological: Negative for dizziness and headaches  Hematological: Negative for adenopathy           Current Medications     Long-Term Medications   Medication Sig Dispense Refill    amitriptyline (ELAVIL) 10 mg tablet Take 1 tablet (10 mg total) by mouth daily at bedtime 30 tablet 0    atorvastatin (LIPITOR) 20 mg tablet Take 1 tablet by mouth once daily 90 tablet 1    DULoxetine (CYMBALTA) 20 mg capsule Take 2 capsules (40 mg total) by mouth 2 (two) times a day 120 capsule 0    folic acid (FOLVITE) 1 mg tablet Take 1 tablet (1 mg total) by mouth daily 30 tablet 0    gabapentin (NEURONTIN) 300 mg capsule Take 1 capsule (300 mg total) by mouth 3 (three) times a day 90 capsule 5    meloxicam (MOBIC) 15 mg tablet Take 1 tablet by mouth once daily 30 tablet 5    Menthol, Topical Analgesic, (Biofreeze Roll-On) 4 % GEL Apply 1 application topically 3 (three) times a day as needed (pain) 150 Tube 1    montelukast (SINGULAIR) 10 mg tablet TAKE 1 TABLET BY MOUTH ONCE DAILY AT BEDTIME 90 tablet 1    pramipexole (MIRAPEX) 0 5 mg tablet Take 1 tablet (0 5 mg total) by mouth daily 90 tablet 1    tiotropium (SPIRIVA RESPIMAT) 1 25 MCG/ACT AERS inhaler Inhale 2 puffs daily 3 Inhaler 3    vitamin E, tocopherol, 400 units capsule Take 400 Units by mouth daily      cyclobenzaprine (FLEXERIL) 10 mg tablet Take 1 tablet (10 mg total) by mouth 3 (three) times a day as needed for muscle spasms 20 tablet 0    meloxicam (MOBIC) 15 mg tablet Take 1 tablet by mouth once daily (Patient not taking: Reported on 7/21/2021) 30 tablet 5    meloxicam (MOBIC) 15 mg tablet Take 1 tablet by mouth once daily (Patient not taking: Reported on 8/10/2021) 30 tablet 5       Current Allergies     Allergies as of 08/10/2021 - Reviewed 08/10/2021   Allergen Reaction Noted    Pollen extract  2019            The following portions of the patient's history were reviewed and updated as appropriate: allergies, current medications, past family history, past medical history, past social history, past surgical history and problem list   Past Medical History:   Diagnosis Date    Anxiety     Bell's palsy     COPD (chronic obstructive pulmonary disease) ([address] )     Depression     Menopause     Mild persistent asthma without complication     Obesity     Primary osteoarthritis involving multiple joints 10/9/2018     Past Surgical History:   Procedure Laterality Date    ANKLE SURGERY      BREAST BIOPSY  12 yrs ago     SECTION      COLONOSCOPY      COLONOSCOPY W/ POLYPECTOMY      DILATION AND CURETTAGE, DIAGNOSTIC / THERAPEUTIC      NOSE SURGERY      TOTAL HIP ARTHROPLASTY Left     TUBAL LIGATION       Social History     Socioeconomic History    Marital status: Single     Spouse name: Not on file    Number of children: 2    Years of education: Not on file    Highest education level: Associate degree: academic program   Occupational History    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: [employer]     Comment: PT / Waiver Services   Tobacco Use    Smoking status: Current Every Day Smoker     Packs/day: 2 00     Years: 40 00     Pack years: 80 00     Types: Cigarettes    Smokeless tobacco: Never Used   Vaping Use    Vaping Use: Some days   Substance and Sexual Activity    Alcohol use:  Yes     Alcohol/week: 3 0 - 5 0 standard drinks     Types: 3 - 5 Glasses of wine per week     Comment: several times weekly    Drug use: Not Currently     Types: Marijuana, Cocaine    Sexual activity: Not Currently     Partners: Male     Birth control/protection: None Other Topics Concern    Not on file   Social History Narrative      Has 3 children    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  Lives with son and two roommates  Social Determinants of Health     Financial Resource Strain: Medium Risk    Difficulty of Paying Living Expenses: Somewhat hard   Food Insecurity: No Food Insecurity    Worried About Running Out of Food in the Last Year: Never true    Anny of Food in the Last Year: Never true   Transportation Needs: No Transportation Needs    Lack of Transportation (Medical): No    Lack of Transportation (Non-Medical): No   Physical Activity:     Days of Exercise per Week:     Minutes of Exercise per Session:    Stress:     Feeling of Stress :    Social Connections:     Frequency of Communication with Friends and Family:     Frequency of Social Gatherings with Friends and Family:     Attends [employer] Services:     Active Member of Clubs or Organizations:     Attends Club or Organization Meetings:     Marital Status:    Intimate Partner Violence:     Fear of Current or Ex-Partner:     Emotionally Abused:     Physically Abused:     Sexually Abused:        Objective   /66   Pulse 104   Temp 98 2 °F (36 8 °C)   Resp 18   Ht 5' 5" (1 651 m)   Wt 65 8 kg (145 lb)   SpO2 98%   BMI 24 13 kg/m²      Physical Exam     Physical Exam  Vitals and nursing note reviewed  Constitutional:       General: She is not in acute distress  Appearance: She is well-developed  She is not diaphoretic  HENT:      Head: Normocephalic and atraumatic  Right Ear: External ear normal       Left Ear: External ear normal       Nose: Nose normal    Eyes:      General: No scleral icterus  Right eye: No discharge  Left eye: No discharge  Conjunctiva/sclera: Conjunctivae normal    Cardiovascular:      Rate and Rhythm: Normal rate and regular rhythm  Heart sounds: Normal heart sounds  No murmur heard  No friction rub  No gallop  Pulmonary:      Effort: Pulmonary effort is normal  No respiratory distress  Breath sounds: Normal breath sounds  No decreased breath sounds, wheezing, rhonchi or rales  Abdominal:      General: Bowel sounds are normal       Palpations: Abdomen is soft  There is no mass  Tenderness: There is no abdominal tenderness  There is no right CVA tenderness, left CVA tenderness, guarding or rebound  Hernia: No hernia is present  Musculoskeletal:      Lumbar back: Spasms and tenderness (left lower lumbar spine) present  No swelling  Normal range of motion (pain improves with lumbar flexion )  Negative right straight leg raise test and negative left straight leg raise test    Skin:     General: Skin is warm and dry  Coloration: Skin is not pale  Findings: No erythema or rash  Neurological:      Mental Status: She is alert and oriented to person, place, and time  Psychiatric:         Behavior: Behavior normal          Thought Content:  Thought content normal          Judgment: Judgment normal          Patricia Blair PA-C

## 2021-08-10 NOTE — TELEPHONE ENCOUNTER
Patient called in and asked how much vitamin D she should be taking  and is there a specific brand? Patient also asked for a phone number for dermatology  She stated there is no number on her referral  I looked up the number and gave it to her  Please advise

## 2021-08-12 ENCOUNTER — TELEPHONE (OUTPATIENT)
Dept: DERMATOLOGY | Facility: CLINIC | Age: 56
End: 2021-08-12

## 2021-08-12 NOTE — TELEPHONE ENCOUNTER
Pt left a vm wanting a call back to make an appt  I called back but no answer and left her a vm with our call back number

## 2021-08-18 ENCOUNTER — OFFICE VISIT (OUTPATIENT)
Dept: OBGYN CLINIC | Facility: CLINIC | Age: 56
End: 2021-08-18
Payer: COMMERCIAL

## 2021-08-18 VITALS
DIASTOLIC BLOOD PRESSURE: 64 MMHG | SYSTOLIC BLOOD PRESSURE: 110 MMHG | WEIGHT: 143.2 LBS | HEIGHT: 65 IN | BODY MASS INDEX: 23.86 KG/M2

## 2021-08-18 DIAGNOSIS — R87.611 ATYPICAL SQUAMOUS CELLS CANNOT EXCLUDE HIGH GRADE SQUAMOUS INTRAEPITHELIAL LESION ON CYTOLOGIC SMEAR OF CERVIX (ASC-H): ICD-10-CM

## 2021-08-18 DIAGNOSIS — Z12.4 SCREENING FOR CERVICAL CANCER: Primary | ICD-10-CM

## 2021-08-18 PROCEDURE — G0124 SCREEN C/V THIN LAYER BY MD: HCPCS | Performed by: PATHOLOGY

## 2021-08-18 PROCEDURE — 57456 ENDOCERV CURETTAGE W/SCOPE: CPT | Performed by: ADVANCED PRACTICE MIDWIFE

## 2021-08-18 PROCEDURE — 88344 IMHCHEM/IMCYTCHM EA MLT ANTB: CPT | Performed by: PATHOLOGY

## 2021-08-18 PROCEDURE — 88305 TISSUE EXAM BY PATHOLOGIST: CPT | Performed by: PATHOLOGY

## 2021-08-18 PROCEDURE — 99202 OFFICE O/P NEW SF 15 MIN: CPT | Performed by: ADVANCED PRACTICE MIDWIFE

## 2021-08-18 PROCEDURE — G0145 SCR C/V CYTO,THINLAYER,RESCR: HCPCS | Performed by: PATHOLOGY

## 2021-08-18 PROCEDURE — G0476 HPV COMBO ASSAY CA SCREEN: HCPCS | Performed by: ADVANCED PRACTICE MIDWIFE

## 2021-08-18 NOTE — PROGRESS NOTES
[facility] [address], [address]    Assessment/Plan:  No problem-specific Assessment & Plan notes found for this encounter  Diagnoses and all orders for this visit:    Screening for cervical cancer  -     Liquid-based pap, screening    Atypical squamous cells cannot exclude high grade squamous intraepithelial lesion on cytologic smear of cervix (ASC-H)  -     Ambulatory referral to Gynecology  -     Liquid-based pap, screening  -     Tissue Exam  -     Colposcopy    - will follow-up in 1-2 weeks ECC done during colposcopy     Subjective:   Aida Menchaca is a 54 y o  [id number] female  CC: abnormal pap smear    HPI: Dimitris Blackwood presents today for follow-up of an abnormal pap smear done in May 2021  Dimitris Blackwood states that she had an abnormal pap smear when she was young and believes that she had a colpo and possible cyro- genital warts  She had an ablation 10 yrs ago and has not had any bleeding since then  She has had a new partner in the past year or 2, but is no longer with partner  Pap smear was positive for ASCUS- cannot rule out HSIL 5/2021, but there was no screening for high risk HPV       ROS: Review of Systems   Constitutional: Negative for activity change, appetite change, fatigue and fever  Respiratory: Negative for cough and shortness of breath  Cardiovascular: Negative for chest pain, palpitations and leg swelling  Gastrointestinal: Negative for constipation and diarrhea  Genitourinary: Negative for difficulty urinating, frequency, menstrual problem, pelvic pain, vaginal bleeding, vaginal discharge and vaginal pain  Neurological: Negative for light-headedness and headaches  Psychiatric/Behavioral: The patient is not nervous/anxious          PFSH: The following portions of the patient's history were reviewed and updated as appropriate: allergies, current medications, past family history, past medical history, obstetric history, gynecologic history, past social history, past surgical history and problem list        Objective:  /64 (BP Location: Left arm, Patient Position: Sitting, Cuff Size: Standard)   Ht 5' 5" (1 651 m)   Wt 65 kg (143 lb 3 2 oz)   LMP  (LMP Unknown)   BMI 23 83 kg/m²    Physical Exam  Vitals reviewed  Constitutional:       Appearance: Normal appearance  Cardiovascular:      Rate and Rhythm: Normal rate and regular rhythm  Heart sounds: Normal heart sounds  Pulmonary:      Effort: Pulmonary effort is normal       Breath sounds: Normal breath sounds  Genitourinary:     General: Normal vulva  Exam position: Lithotomy position  Labia:         Right: No tenderness or lesion  Left: No tenderness or lesion  Vagina: No vaginal discharge, erythema, tenderness or bleeding  Cervix: No discharge, friability, lesion, erythema or cervical bleeding  Comments: See colposcopy procedure  Musculoskeletal:         General: Normal range of motion  Cervical back: Normal range of motion  Neurological:      Mental Status: She is alert and oriented to person, place, and time     Psychiatric:         Mood and Affect: Mood normal          Behavior: Behavior normal          Judgment: Judgment normal

## 2021-08-18 NOTE — PROGRESS NOTES
Colposcopy    Date/Time: 8/18/2021 2:30 PM  Performed by: Jaciel Harrison CNM  Authorized by: Jaciel Harrison CNM     Consent:     Consent obtained:  Written    Consent given by:  Patient    Procedural risks discussed:  Bleeding, failure rate, repeat procedure, possible continued pain, damage to other organs and infection    Patient questions answered: yes      Patient agrees, verbalizes understanding, and wants to proceed: yes      Instructions and paperwork completed: yes    Pre-procedure:     Pre-procedure timeout performed: yes      Prepped with: acetic acid    Indication:     Indication:  ASC-H  Procedure:     Procedure: Colposcopy w/ endocervical curettage      Under satisfactory analgesia the patient was prepped and draped in the dorsal lithotomy position: yes      Jameson speculum was placed in the vagina: yes      Under colposcopic examination the transition zone was seen in entirety: yes      Endocervix was curetted using a Kevorkian curette: yes      Monsel's solution was applied: no      Biopsy(s): no      Specimen to pathology: yes    Post-procedure:     Impression comment:  Normal tissue visualized on colposcopy exam, ECC done    Patient tolerance of procedure: Tolerated well, no immediate complications  Comments:      Deanna Townsend states that she had an abnormal pap smear when she was young and believes that she had a colpo and possible cyro- genital warts  She had an ablation 10 yrs ago and has not had any bleeding since then  She has had a new partner in the past year or 2, but is no longer with partner  Pap smear was positive for ASCUS- cannot rule out HSIL 5/2021, but there was no screening for high risk HPV  A pap smear with HPV will be sent today  Reviewed findings with Deanna Townsend  Positive atrophy, no areas of aceto-white noted, green filter no changes noted  Ecc done, cervical stenosis noted  Minimal to no bleeding  Reviewed post colpo instructions  To follow-up in 1-2 weeks

## 2021-08-20 LAB
HPV HR 12 DNA CVX QL NAA+PROBE: POSITIVE
HPV16 DNA CVX QL NAA+PROBE: NEGATIVE
HPV18 DNA CVX QL NAA+PROBE: NEGATIVE

## 2021-08-23 DIAGNOSIS — F32.A DEPRESSION, UNSPECIFIED DEPRESSION TYPE: ICD-10-CM

## 2021-08-24 LAB
LAB AP GYN PRIMARY INTERPRETATION: ABNORMAL
Lab: ABNORMAL
PATH INTERP SPEC-IMP: ABNORMAL

## 2021-08-24 RX ORDER — DULOXETIN HYDROCHLORIDE 20 MG/1
40 CAPSULE, DELAYED RELEASE ORAL 2 TIMES DAILY
Qty: 120 CAPSULE | Refills: 0 | Status: SHIPPED | OUTPATIENT
Start: 2021-08-24 | End: 2021-10-04

## 2021-08-25 DIAGNOSIS — G62.9 NEUROPATHY: ICD-10-CM

## 2021-08-27 ENCOUNTER — PROCEDURE VISIT (OUTPATIENT)
Dept: OBGYN CLINIC | Facility: CLINIC | Age: 56
End: 2021-08-27
Payer: COMMERCIAL

## 2021-08-27 VITALS
DIASTOLIC BLOOD PRESSURE: 76 MMHG | SYSTOLIC BLOOD PRESSURE: 122 MMHG | WEIGHT: 147 LBS | HEIGHT: 65 IN | BODY MASS INDEX: 24.49 KG/M2

## 2021-08-27 DIAGNOSIS — R87.612 LGSIL ON PAP SMEAR OF CERVIX: ICD-10-CM

## 2021-08-27 DIAGNOSIS — B97.7 HIGH RISK HUMAN PAPILLOMA VIRUS (HPV) INFECTION OF CERVIX: Primary | ICD-10-CM

## 2021-08-27 DIAGNOSIS — N72 HIGH RISK HUMAN PAPILLOMA VIRUS (HPV) INFECTION OF CERVIX: Primary | ICD-10-CM

## 2021-08-27 PROCEDURE — 99212 OFFICE O/P EST SF 10 MIN: CPT | Performed by: ADVANCED PRACTICE MIDWIFE

## 2021-08-27 NOTE — PROGRESS NOTES
[facility] [address] [facility], [place]    Assessment/Plan:  No problem-specific Assessment & Plan notes found for this encounter  Diagnoses and all orders for this visit:    High risk human papilloma virus (HPV) infection of cervix    LGSIL on Pap smear of cervix      -reviewed results and recommendations  - based on hx and results  recommend 6 month follow-up colpo with pap smear   -verbalizes understanding, Questions asked and answered  - will return in 6 months for colpo and repeat pap smear  Subjective:   Manuel Crandall is a 54 y o  [id number] female  CC: follow-up Colpo    HPI: Liza Vences presents today for follow-up post colpo  She had an abnormal pap smear done in May 2021  Liza Vences states that she had an abnormal pap smear when she was young and believes that she had a colpo and possible cyro- genital warts  She had an ablation 10 yrs ago and has not had any bleeding since then  She has had a new partner in the past year or 2, but is no longer with partner  Pap smear was positive for ASCUS- cannot rule out HSIL 5/2021, but there was no screening for high risk HPV  Pap smear at time of colpo came back LGSIL and Positive High Risk HPV other  Entire cervix was visualized on Colpo and ECC done  No cervical changes noted  Colpo results     - Final Diagnosis  A  Endocervix, curettage:  - Minute superficial squamous epithelium with no pathologic abnormality  - No endocervical mucosa present      Comment: Immunoperoxidase stain for p16/Ki-67 was performed, p16 is negative and Ki-67 shows surface maturation  supporting the above diagnosis  ROS: Review of Systems   Constitutional: Negative for activity change, appetite change, fatigue and fever  Respiratory: Negative for cough and shortness of breath  Cardiovascular: Negative for chest pain, palpitations and leg swelling  Gastrointestinal: Negative for constipation and diarrhea     Genitourinary: Negative for difficulty urinating, dyspareunia, frequency, menstrual problem, urgency, vaginal bleeding, vaginal discharge and vaginal pain  Neurological: Negative for light-headedness and headaches  Psychiatric/Behavioral: The patient is not nervous/anxious  PFSH: The following portions of the patient's history were reviewed and updated as appropriate: allergies, current medications, past family history, past medical history, obstetric history, gynecologic history, past social history, past surgical history and problem list        Objective:  /76 (BP Location: Right arm, Patient Position: Sitting, Cuff Size: Standard)   Ht 5' 5" (1 651 m)   Wt 66 7 kg (147 lb)   LMP  (LMP Unknown)   BMI 24 46 kg/m²    Physical Exam  Vitals reviewed  Constitutional:       Appearance: Normal appearance  Cardiovascular:      Rate and Rhythm: Normal rate and regular rhythm  Heart sounds: Normal heart sounds  Pulmonary:      Effort: Pulmonary effort is normal       Breath sounds: Normal breath sounds  Musculoskeletal:         General: Normal range of motion  Cervical back: Normal range of motion  Neurological:      Mental Status: She is alert and oriented to person, place, and time     Psychiatric:         Mood and Affect: Mood normal          Behavior: Behavior normal          Judgment: Judgment normal

## 2021-09-02 RX ORDER — AMITRIPTYLINE HYDROCHLORIDE 10 MG/1
10 TABLET, FILM COATED ORAL
Qty: 30 TABLET | Refills: 0 | OUTPATIENT
Start: 2021-09-02 | End: 2021-10-02

## 2021-09-07 ENCOUNTER — OFFICE VISIT (OUTPATIENT)
Dept: FAMILY MEDICINE CLINIC | Facility: CLINIC | Age: 56
End: 2021-09-07
Payer: COMMERCIAL

## 2021-09-07 VITALS
SYSTOLIC BLOOD PRESSURE: 138 MMHG | HEART RATE: 102 BPM | WEIGHT: 139.25 LBS | DIASTOLIC BLOOD PRESSURE: 78 MMHG | BODY MASS INDEX: 23.2 KG/M2 | HEIGHT: 65 IN | TEMPERATURE: 97.2 F | OXYGEN SATURATION: 97 %

## 2021-09-07 DIAGNOSIS — M54.50 CHRONIC LEFT-SIDED LOW BACK PAIN WITHOUT SCIATICA: ICD-10-CM

## 2021-09-07 DIAGNOSIS — Z72.0 TOBACCO ABUSE: ICD-10-CM

## 2021-09-07 DIAGNOSIS — R20.0 NUMBNESS AND TINGLING OF RIGHT ARM: Primary | ICD-10-CM

## 2021-09-07 DIAGNOSIS — G89.29 CHRONIC LEFT-SIDED LOW BACK PAIN WITHOUT SCIATICA: ICD-10-CM

## 2021-09-07 DIAGNOSIS — R20.2 NUMBNESS AND TINGLING OF RIGHT ARM: Primary | ICD-10-CM

## 2021-09-07 DIAGNOSIS — F33.2 MAJOR DEPRESSIVE DISORDER, RECURRENT, SEVERE WITHOUT PSYCHOTIC FEATURES (HCC): Chronic | ICD-10-CM

## 2021-09-07 DIAGNOSIS — J43.9 PULMONARY EMPHYSEMA, UNSPECIFIED EMPHYSEMA TYPE (HCC): ICD-10-CM

## 2021-09-07 PROCEDURE — 99214 OFFICE O/P EST MOD 30 MIN: CPT | Performed by: FAMILY MEDICINE

## 2021-09-07 RX ORDER — GABAPENTIN 300 MG/1
300 CAPSULE ORAL 2 TIMES DAILY
Qty: 90 CAPSULE | Refills: 0
Start: 2021-09-07 | End: 2021-11-02

## 2021-09-07 RX ORDER — AMITRIPTYLINE HYDROCHLORIDE 10 MG/1
10 TABLET, FILM COATED ORAL
Qty: 30 TABLET | Refills: 1 | Status: SHIPPED | OUTPATIENT
Start: 2021-09-07 | End: 2021-11-02

## 2021-09-07 NOTE — PATIENT INSTRUCTIONS
Starting tomorrow take gabapentin only twice daily, 300 mg  Once in the morning and once at night  - do this for 2 weeks of the twice daily dosing  - after two weeks, so around September 21st go down to only one tablet nightly 300 mg  - Continue this for another 2 weeks then stop the gabapentin all together    I will send a refill of the elavil 10 mg to the pharmacy    Deep breathing- inhale for 4 seconds, hold for 4 seconds and then release or exhale for 4 seconds, repeat this for a set of 10   Do this once daily and lets see if this helps with the shortness of breath as well
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
## 2021-09-13 ENCOUNTER — NURSE TRIAGE (OUTPATIENT)
Dept: OTHER | Facility: OTHER | Age: 56
End: 2021-09-13

## 2021-09-13 NOTE — TELEPHONE ENCOUNTER
Regarding: Really bad lower back pain   ----- Message from Danyelle Rodriguez sent at 9/13/2021  6:24 PM EDT -----  " I am having really bad lower back pain on the left side "

## 2021-09-13 NOTE — TELEPHONE ENCOUNTER
Reason for Disposition   Caused by overuse from recent vigorous activity (e g , exercise, gardening, lifting and carrying, sports)    Answer Assessment - Initial Assessment Questions  1  ONSET: "When did the pain begin?"       "been going on for years" Last two days "I over did it"  2  LOCATION: "Where does it hurt?" (upper, mid or lower back)      Left side  3  SEVERITY: "How bad is the pain?"  (e g , Scale 1-10; mild, moderate, or severe)    - MILD (1-3): doesn't interfere with normal activities     - MODERATE (4-7): interferes with normal activities or awakens from sleep     - SEVERE (8-10): excruciating pain, unable to do any normal activities       10/10  4  PATTERN: "Is the pain constant?" (e g , yes, no; constant, intermittent)       Constant  5  RADIATION: "Does the pain shoot into your legs or elsewhere?"      Left side  6  CAUSE:  "What do you think is causing the back pain?"       Injury  7  BACK OVERUSE:  "Any recent lifting of heavy objects, strenuous work or exercise?"      "I over did it"  8  MEDICATIONS: "What have you taken so far for the pain?" (e g , nothing, acetaminophen, NSAIDS)      Meloxicam  9  NEUROLOGIC SYMPTOMS: "Do you have any weakness, numbness, or problems with bowel/bladder control?"      NO   10  OTHER SYMPTOMS: "Do you have any other symptoms?" (e g , fever, abdominal pain, burning with urination, blood in urine)        No  11   PREGNANCY: "Is there any chance you are pregnant?" (e g , yes, no; LMP)        No    Protocols used: BACK PAIN-ADULT-

## 2021-09-29 ENCOUNTER — APPOINTMENT (OUTPATIENT)
Dept: RADIOLOGY | Facility: CLINIC | Age: 56
End: 2021-09-29
Payer: COMMERCIAL

## 2021-09-29 ENCOUNTER — OFFICE VISIT (OUTPATIENT)
Dept: URGENT CARE | Facility: CLINIC | Age: 56
End: 2021-09-29
Payer: COMMERCIAL

## 2021-09-29 VITALS
TEMPERATURE: 98.2 F | RESPIRATION RATE: 18 BRPM | DIASTOLIC BLOOD PRESSURE: 71 MMHG | OXYGEN SATURATION: 98 % | SYSTOLIC BLOOD PRESSURE: 145 MMHG | HEART RATE: 99 BPM
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
## 2021-09-29 DIAGNOSIS — S52.124A CLOSED NONDISPLACED FRACTURE OF HEAD OF RIGHT RADIUS, INITIAL ENCOUNTER: Primary | ICD-10-CM

## 2021-09-29 PROCEDURE — 99214 OFFICE O/P EST MOD 30 MIN: CPT | Performed by: PHYSICIAN ASSISTANT

## 2021-09-29 PROCEDURE — 29125 APPL SHORT ARM SPLINT STATIC: CPT | Performed by: PHYSICIAN ASSISTANT

## 2021-09-29 PROCEDURE — 73110 X-RAY EXAM OF WRIST: CPT

## 2021-09-29 PROCEDURE — 73090 X-RAY EXAM OF FOREARM: CPT

## 2021-09-29 PROCEDURE — 73130 X-RAY EXAM OF HAND: CPT

## 2021-09-29 NOTE — PATIENT INSTRUCTIONS
Mobic for pain  Splint for support  Ice 20 minutes 3-4 times per day for 3 days  Insulate the skin from the ice to prevent frostbite  Rest and Elevate  Follow up with orthopedic  Follow up with PCP in 3-5 days  Go to ED if symptoms worsen  Discussed risk of immobilization with patient  Remove splint/brace and go straight to ER if you experience sudden increase in pain, swelling, change in color/temperature/sensation, chest pain, shortness or breath, or if you start coughing up blood  Patient verbalized understanding  Wrist Fracture in Adults   WHAT YOU NEED TO KNOW:   A wrist fracture is a break in one or more of the bones in your wrist       DISCHARGE INSTRUCTIONS:   Return to the emergency department if:   · Your pain gets worse or does not get better after you take pain medicine  · Your cast or splint breaks, gets wet, or is damaged  · Your hand or fingers feel numb or cold  · Your hand or fingers turn white or blue  · Your splint or cast feels too tight  · You have more pain or swelling after the cast or splint is put on  Call your doctor if:   · You have a fever  · There is a foul smell or blood coming from under the cast     · You have questions or concerns about your condition or care  Medicines: You may need any of the following:  · Prescription pain medicine  may be given  Ask your healthcare provider how to take this medicine safely  Some prescription pain medicines contain acetaminophen  Do not take other medicines that contain acetaminophen without talking to your healthcare provider  Too much acetaminophen may cause liver damage  Prescription pain medicine may cause constipation  Ask your healthcare provider how to prevent or treat constipation  · NSAIDs , such as ibuprofen, help decrease swelling, pain, and fever  NSAIDs can cause stomach bleeding or kidney problems in certain people   If you take blood thinner medicine, always ask your healthcare provider if NSAIDs are safe for you  Always read the medicine label and follow directions  · Acetaminophen  decreases pain and fever  It is available without a doctor's order  Ask how much to take and how often to take it  Follow directions  Read the labels of all other medicines you are using to see if they also contain acetaminophen, or ask your doctor or pharmacist  Acetaminophen can cause liver damage if not taken correctly  Do not use more than 4 grams (4,000 milligrams) total of acetaminophen in one day  · Take your medicine as directed  Contact your healthcare provider if you think your medicine is not helping or if you have side effects  Tell him or her if you are allergic to any medicine  Keep a list of the medicines, vitamins, and herbs you take  Include the amounts, and when and why you take them  Bring the list or the pill bottles to follow-up visits  Carry your medicine list with you in case of an emergency  Self-care:   · Rest  as much as possible  Do not play contact sports until the healthcare provider says it is okay  · Apply ice  on your wrist for 15 to 20 minutes every hour or as directed  Use an ice pack, or put crushed ice in a plastic bag  Cover it with a towel before you place it on your skin  Ice helps prevent tissue damage and decreases swelling and pain  · Elevate  your wrist above the level of your heart as often as possible  This will help decrease swelling and pain  Prop your wrist on pillows or blankets to keep it elevated comfortably  Cast or splint care:   · You may take a bath or shower as directed  Do not let your cast or splint get wet  Before bathing, cover the cast or splint with 2 plastic trash bags  Tape the bags to your skin above the cast or splint to seal out the water  Keep your arm out of the water in case the bag breaks  If a plaster cast gets wet and soft, call your healthcare provider  · Check the skin around the cast or splint every day   You may put lotion on any red or sore areas  · Do not push down or lean on the cast or brace because it may break  · Do not  scratch the skin under the cast by putting a sharp or pointed object inside the cast     Go to physical therapy as directed: You may need physical therapy after your wrist heals and the cast is removed  A physical therapist can teach you exercises to help improve movement and strength and to decrease pain  Follow up with your doctor or bone specialist as directed: You may need to return to have your cast removed  You may also need an x-ray to check how well the bone has healed  Write down your questions so you remember to ask them during your visits  © Copyright [employer] 2021 Information is for End User's use only and may not be sold, redistributed or otherwise used for commercial purposes  All illustrations and images included in CareNotes® are the copyrighted property of [employer]  or Yodit Dowling  The above information is an  only  It is not intended as medical advice for individual conditions or treatments  Talk to your doctor, nurse or pharmacist before following any medical regimen to see if it is safe and effective for you

## 2021-09-29 NOTE — PROGRESS NOTES
[facility]        NAME: Enio Summers is a 54 y o  female  : 1965    MRN: [id number]  DATE: 2021  TIME: 7:58 PM    Assessment and Plan   Closed nondisplaced fracture of head of right radius, initial encounter [S52 124A]  1  Closed nondisplaced fracture of head of right radius, initial encounter  Ambulatory referral to Orthopedic Surgery    Orthopedic injury treatment   2  Fall, initial encounter  XR hand 3+ vw right    XR wrist 3+ vw right    XR forearm 2 vw right     XR: fracture of distal R radius    Patient Instructions     Mobic for pain  Splint for support  Ice 20 minutes 3-4 times per day for 3 days  Insulate the skin from the ice to prevent frostbite  Rest and Elevate  Follow up with orthopedic  Follow up with PCP in 3-5 days  Go to ED if symptoms worsen  Discussed risk of immobilization with patient  Remove splint/brace and go straight to ER if you experience sudden increase in pain, swelling, change in color/temperature/sensation, chest pain, shortness or breath, or if you start coughing up blood  Patient verbalized understanding  Chief Complaint     Chief Complaint   Patient presents with    Hand Pain     Right hand pain after falling down the stairs today  History of Present Illness       Hand Injury   Incident onset: 10:30am  The injury mechanism was a fall ([address])  Pain location: R hand, wrist, forearm  The quality of the pain is described as aching  The pain is moderate  Associated symptoms include tingling  Pertinent negatives include no numbness  The symptoms are aggravated by palpation and movement  She has tried ice (Mobic) for the symptoms  Review of Systems   Review of Systems   Genitourinary: Negative for hematuria  Musculoskeletal: Positive for joint swelling  Skin: Negative for color change  Neurological: Positive for tingling  Negative for weakness and numbness           Current Medications       Current Outpatient Medications:     albuterol (PROVENTIL HFA,VENTOLIN HFA) 90 mcg/act inhaler, INHALE 2 PUFFS BY MOUTH EVERY 6 HOURS AS NEEDED FOR WHEEZING FOR SHORTNESS OF BREATH, Disp: 18 g, Rfl: 5    amitriptyline (ELAVIL) 10 mg tablet, Take 1 tablet (10 mg total) by mouth daily at bedtime, Disp: 30 tablet, Rfl: 1    Ascorbic Acid (C-1000/ALIX HIPS PO), Take by mouth, Disp: , Rfl:     atorvastatin (LIPITOR) 20 mg tablet, Take 1 tablet by mouth once daily, Disp: 90 tablet, Rfl: 1    BIOTIN 5000 PO, Take by mouth, Disp: , Rfl:     Coenzyme Q10 (COQ10) 100 MG CAPS, Take by mouth, Disp: , Rfl:     cyclobenzaprine (FLEXERIL) 10 mg tablet, Take 1 tablet (10 mg total) by mouth 3 (three) times a day as needed for muscle spasms, Disp: 20 tablet, Rfl: 0    DULoxetine (CYMBALTA) 20 mg capsule, Take 2 capsules (40 mg total) by mouth 2 (two) times a day, Disp: 120 capsule, Rfl: 0    folic acid (FOLVITE) 1 mg tablet, Take 1 tablet (1 mg total) by mouth daily, Disp: 30 tablet, Rfl: 0    gabapentin (NEURONTIN) 300 mg capsule, Take 1 capsule (300 mg total) by mouth 2 (two) times a day, Disp: 90 capsule, Rfl: 0    loratadine (CLARITIN) 10 mg tablet, Take 10 mg by mouth daily, Disp: , Rfl:     MAGNESIUM CITRATE PO, Take 400 mg by mouth, Disp: , Rfl:     meloxicam (MOBIC) 15 mg tablet, Take 1 tablet by mouth once daily, Disp: 30 tablet, Rfl: 5    meloxicam (MOBIC) 15 mg tablet, Take 1 tablet by mouth once daily (Patient not taking: Reported on 7/21/2021), Disp: 30 tablet, Rfl: 5    meloxicam (MOBIC) 15 mg tablet, Take 1 tablet by mouth once daily (Patient not taking: Reported on 8/10/2021), Disp: 30 tablet, Rfl: 5    Menthol, Topical Analgesic, (Biofreeze Roll-On) 4 % GEL, Apply 1 application topically 3 (three) times a day as needed (pain), Disp: 150 Tube, Rfl: 1    montelukast (SINGULAIR) 10 mg tablet, TAKE 1 TABLET BY MOUTH ONCE DAILY AT BEDTIME, Disp: 90 tablet, Rfl: 1    pramipexole (MIRAPEX) 0 5 mg tablet, Take 1 tablet (0 5 mg total) by mouth daily, Disp: 90 tablet, Rfl: 1    tiotropium (SPIRIVA RESPIMAT) 1 25 MCG/ACT AERS inhaler, Inhale 2 puffs daily, Disp: 3 Inhaler, Rfl: 3    vitamin E, tocopherol, 400 units capsule, Take 400 Units by mouth daily, Disp: , Rfl:     Current Allergies     Allergies as of 2021 - Reviewed 2021   Allergen Reaction Noted    Pollen extract  2019            The following portions of the patient's history were reviewed and updated as appropriate: allergies, current medications, past family history, past medical history, past social history, past surgical history and problem list      Past Medical History:   Diagnosis Date    Abnormal Pap smear of cervix     Anxiety     Bell's palsy     COPD (chronic obstructive pulmonary disease) ([facility] [address] )     Depression     Menopause     Mild persistent asthma without complication     Obesity     Primary osteoarthritis involving multiple joints 10/9/2018       Past Surgical History:   Procedure Laterality Date    ANKLE SURGERY      BREAST BIOPSY  12 yrs ago     SECTION      COLONOSCOPY      COLONOSCOPY W/ POLYPECTOMY      DILATION AND CURETTAGE, DIAGNOSTIC / THERAPEUTIC      NOSE SURGERY      TOTAL HIP ARTHROPLASTY Left     TUBAL LIGATION         Family History   Problem Relation Age of Onset    Mental illness Mother     Suicidality Mother     No Known Problems Father     Mental illness Brother     Suicidality Brother     No Known Problems Maternal Grandmother     No Known Problems Paternal Grandmother     No Known Problems Maternal Aunt     No Known Problems Paternal Aunt     No Known Problems Paternal Aunt     No Known Problems Paternal Aunt     No Known Problems Paternal Aunt          Medications have been verified  Objective   /71   Pulse 99   Temp 98 2 °F (36 8 °C)   Resp 18   LMP  (LMP Unknown)   SpO2 98%   No LMP recorded (lmp unknown)   Patient is postmenopausal          Physical Exam Physical Exam  Vitals reviewed  Constitutional:       General: She is not in acute distress  Appearance: She is well-developed  Cardiovascular:      Rate and Rhythm: Normal rate and regular rhythm  Heart sounds: Normal heart sounds  No murmur heard  No friction rub  No gallop  Pulmonary:      Effort: Pulmonary effort is normal  No respiratory distress  Breath sounds: Normal breath sounds  No wheezing or rales  Chest:      Chest wall: No tenderness  Abdominal:      Tenderness: There is no right CVA tenderness or left CVA tenderness  Musculoskeletal:         General: Swelling and tenderness present  No deformity  Comments: R 1-5th metatarsal, distal radius/ulna, and proximal to mid radius TTP  Swelling dorsal aspect of R hand and distal wrist     Skin:     General: Skin is warm  Capillary Refill: Capillary refill takes less than 2 seconds  Neurological:      Mental Status: She is alert and oriented to person, place, and time  Sensory: No sensory deficit  Deep Tendon Reflexes: Reflexes are normal and symmetric  Psychiatric:         Behavior: Behavior normal          Thought Content: Thought content normal          Judgment: Judgment normal          Orthopedic injury treatment    Date/Time: 9/29/2021 7:58 PM  Performed by: Karen Lake PA-C  Authorized by: Karen Lake PA-C     Verbal consent obtained?: Yes    Risks and benefits: Risks, benefits and alternatives were discussed    Consent given by:  Patient  Patient identity confirmed:  Verbally with patient  Injury location: R wrist   Neurovascular status: Neurovascularly intact    Distal perfusion: normal    Neurological function: normal    Range of motion: reduced    Immobilization:  Splint  Splint type: volar    Supplies used:  Ortho-Glass  Neurovascular status: Neurovascularly intact    Distal perfusion: normal    Neurological function: normal    Range of motion: unchanged    Patient tolerance:  Patient tolerated the procedure well with no immediate complications

## 2021-10-04 ENCOUNTER — OFFICE VISIT (OUTPATIENT)
Dept: OBGYN CLINIC | Facility: CLINIC | Age: 56
End: 2021-10-04
Payer: COMMERCIAL

## 2021-10-04 VITALS
HEART RATE: 96 BPM | HEIGHT: 65 IN | WEIGHT: 138 LBS | BODY MASS INDEX: 22.99 KG/M2 | SYSTOLIC BLOOD PRESSURE: 123 MMHG | DIASTOLIC BLOOD PRESSURE: 79 MMHG

## 2021-10-04 DIAGNOSIS — S52.124A CLOSED NONDISPLACED FRACTURE OF HEAD OF RIGHT RADIUS, INITIAL ENCOUNTER: Primary | ICD-10-CM

## 2021-10-04 DIAGNOSIS — S52.501A CLOSED FRACTURE DISTAL RADIUS AND ULNA, RIGHT, INITIAL ENCOUNTER: ICD-10-CM

## 2021-10-04 DIAGNOSIS — S52.601A CLOSED FRACTURE DISTAL RADIUS AND ULNA, RIGHT, INITIAL ENCOUNTER: ICD-10-CM

## 2021-10-04 PROCEDURE — 99203 OFFICE O/P NEW LOW 30 MIN: CPT | Performed by: ORTHOPAEDIC SURGERY

## 2021-10-04 PROCEDURE — 25600 CLTX DST RDL FX/EPHYS SEP WO: CPT | Performed by: ORTHOPAEDIC SURGERY

## 2021-10-07 ENCOUNTER — TELEPHONE (OUTPATIENT)
Dept: OBGYN CLINIC | Facility: HOSPITAL | Age: 56
End: 2021-10-07

## 2021-10-14 ENCOUNTER — TELEPHONE (OUTPATIENT)
Dept: OBGYN CLINIC | Facility: OTHER | Age: 56
End: 2021-10-14

## 2021-10-27 DIAGNOSIS — J43.9 PULMONARY EMPHYSEMA, UNSPECIFIED EMPHYSEMA TYPE (HCC): ICD-10-CM

## 2021-11-02 ENCOUNTER — APPOINTMENT (OUTPATIENT)
Dept: RADIOLOGY | Facility: CLINIC | Age: 56
End: 2021-11-02
Payer: COMMERCIAL

## 2021-11-02 ENCOUNTER — OFFICE VISIT (OUTPATIENT)
Dept: OBGYN CLINIC | Facility: CLINIC | Age: 56
End: 2021-11-02

## 2021-11-02 ENCOUNTER — OFFICE VISIT (OUTPATIENT)
Dept: FAMILY MEDICINE CLINIC | Facility: CLINIC | Age: 56
End: 2021-11-02
Payer: COMMERCIAL

## 2021-11-02 VITALS
HEIGHT: 65 IN | DIASTOLIC BLOOD PRESSURE: 90 MMHG | SYSTOLIC BLOOD PRESSURE: 151 MMHG | WEIGHT: 138 LBS | BODY MASS INDEX: 22.99 KG/M2 | HEART RATE: 101 BPM

## 2021-11-02 VITALS
HEIGHT: 65 IN | BODY MASS INDEX: 22.99 KG/M2 | WEIGHT: 138 LBS | HEART RATE: 103 BPM | TEMPERATURE: 98.3 F | DIASTOLIC BLOOD PRESSURE: 90 MMHG | OXYGEN SATURATION: 98 % | SYSTOLIC BLOOD PRESSURE: 132 MMHG

## 2021-11-02 DIAGNOSIS — R20.0 NUMBNESS AND TINGLING OF RIGHT ARM: Primary | ICD-10-CM

## 2021-11-02 DIAGNOSIS — S52.501D CLOSED FRACTURE OF DISTAL ENDS OF RIGHT RADIUS AND ULNA WITH ROUTINE HEALING, SUBSEQUENT ENCOUNTER: ICD-10-CM

## 2021-11-02 DIAGNOSIS — Z23 ENCOUNTER FOR IMMUNIZATION: ICD-10-CM

## 2021-11-02 DIAGNOSIS — S52.601D CLOSED FRACTURE OF DISTAL ENDS OF RIGHT RADIUS AND ULNA WITH ROUTINE HEALING, SUBSEQUENT ENCOUNTER: ICD-10-CM

## 2021-11-02 DIAGNOSIS — S52.601A CLOSED FRACTURE DISTAL RADIUS AND ULNA, RIGHT, INITIAL ENCOUNTER: ICD-10-CM

## 2021-11-02 DIAGNOSIS — M54.50 CHRONIC LEFT-SIDED LOW BACK PAIN WITHOUT SCIATICA: ICD-10-CM

## 2021-11-02 DIAGNOSIS — S52.501A CLOSED FRACTURE DISTAL RADIUS AND ULNA, RIGHT, INITIAL ENCOUNTER: ICD-10-CM

## 2021-11-02 DIAGNOSIS — Z72.0 TOBACCO ABUSE: ICD-10-CM

## 2021-11-02 DIAGNOSIS — S52.124A CLOSED NONDISPLACED FRACTURE OF HEAD OF RIGHT RADIUS, INITIAL ENCOUNTER: Primary | ICD-10-CM

## 2021-11-02 DIAGNOSIS — R20.2 NUMBNESS AND TINGLING OF RIGHT ARM: Primary | ICD-10-CM

## 2021-11-02 DIAGNOSIS — G89.29 CHRONIC LEFT-SIDED LOW BACK PAIN WITHOUT SCIATICA: ICD-10-CM

## 2021-11-02 PROCEDURE — 73100 X-RAY EXAM OF WRIST: CPT

## 2021-11-02 PROCEDURE — 90472 IMMUNIZATION ADMIN EACH ADD: CPT

## 2021-11-02 PROCEDURE — 99024 POSTOP FOLLOW-UP VISIT: CPT | Performed by: ORTHOPAEDIC SURGERY

## 2021-11-02 PROCEDURE — 90750 HZV VACC RECOMBINANT IM: CPT

## 2021-11-02 PROCEDURE — 90471 IMMUNIZATION ADMIN: CPT

## 2021-11-02 PROCEDURE — 99214 OFFICE O/P EST MOD 30 MIN: CPT | Performed by: FAMILY MEDICINE

## 2021-11-02 PROCEDURE — 90682 RIV4 VACC RECOMBINANT DNA IM: CPT

## 2021-11-02 RX ORDER — POLYETHYLENE GLYCOL 3350 17 G
2 POWDER IN PACKET (EA) ORAL AS NEEDED
Qty: 100 EACH | Refills: 0 | Status: SHIPPED | OUTPATIENT
Start: 2021-11-02 | End: 2022-06-23

## 2021-11-05 ENCOUNTER — NURSE TRIAGE (OUTPATIENT)
Dept: PHYSICAL THERAPY | Facility: OTHER | Age: 56
End: 2021-11-05

## 2021-11-05 DIAGNOSIS — G89.29 ACUTE EXACERBATION OF CHRONIC LOW BACK PAIN: Primary | ICD-10-CM

## 2021-11-05 DIAGNOSIS — M54.50 ACUTE EXACERBATION OF CHRONIC LOW BACK PAIN: Primary | ICD-10-CM

## 2021-11-08 DIAGNOSIS — M54.50 CHRONIC LEFT-SIDED LOW BACK PAIN WITHOUT SCIATICA: Primary | ICD-10-CM

## 2021-11-08 DIAGNOSIS — G89.29 CHRONIC LEFT-SIDED LOW BACK PAIN WITHOUT SCIATICA: Primary | ICD-10-CM

## 2021-11-22 ENCOUNTER — APPOINTMENT (OUTPATIENT)
Dept: RADIOLOGY | Facility: CLINIC | Age: 56
End: 2021-11-22
Payer: COMMERCIAL

## 2021-11-22 ENCOUNTER — OFFICE VISIT (OUTPATIENT)
Dept: URGENT CARE | Facility: CLINIC | Age: 56
End: 2021-11-22
Payer: COMMERCIAL

## 2021-11-22 VITALS
OXYGEN SATURATION: 97 % | HEART RATE: 82 BPM | DIASTOLIC BLOOD PRESSURE: 70 MMHG | SYSTOLIC BLOOD PRESSURE: 123 MMHG | RESPIRATION RATE: 18 BRPM | TEMPERATURE: 97.8 F

## 2021-11-22 DIAGNOSIS — M25.531 RIGHT WRIST PAIN: ICD-10-CM

## 2021-11-22 DIAGNOSIS — M25.531 RIGHT WRIST PAIN: Primary | ICD-10-CM

## 2021-11-22 PROCEDURE — 99214 OFFICE O/P EST MOD 30 MIN: CPT | Performed by: PHYSICIAN ASSISTANT

## 2021-11-22 PROCEDURE — 73110 X-RAY EXAM OF WRIST: CPT

## 2021-11-30 ENCOUNTER — OFFICE VISIT (OUTPATIENT)
Dept: OBGYN CLINIC | Facility: CLINIC | Age: 56
End: 2021-11-30
Payer: COMMERCIAL

## 2021-11-30 VITALS
HEIGHT: 65 IN | SYSTOLIC BLOOD PRESSURE: 124 MMHG | DIASTOLIC BLOOD PRESSURE: 67 MMHG | HEART RATE: 96 BPM | WEIGHT: 137 LBS | BODY MASS INDEX: 22.82 KG/M2

## 2021-11-30 DIAGNOSIS — S52.124D CLOSED NONDISPLACED FRACTURE OF HEAD OF RIGHT RADIUS WITH ROUTINE HEALING, SUBSEQUENT ENCOUNTER: ICD-10-CM

## 2021-11-30 DIAGNOSIS — M65.4 DE QUERVAIN'S TENOSYNOVITIS, RIGHT: Primary | ICD-10-CM

## 2021-11-30 PROCEDURE — 20550 NJX 1 TENDON SHEATH/LIGAMENT: CPT | Performed by: ORTHOPAEDIC SURGERY

## 2021-11-30 PROCEDURE — 99213 OFFICE O/P EST LOW 20 MIN: CPT | Performed by: ORTHOPAEDIC SURGERY

## 2021-11-30 RX ORDER — BETAMETHASONE SODIUM PHOSPHATE AND BETAMETHASONE ACETATE 3; 3 MG/ML; MG/ML
3 INJECTION, SUSPENSION INTRA-ARTICULAR; INTRALESIONAL; INTRAMUSCULAR; SOFT TISSUE
Status: COMPLETED | OUTPATIENT
Start: 2021-11-30 | End: 2021-11-30

## 2021-11-30 RX ORDER — BUPIVACAINE HYDROCHLORIDE 2.5 MG/ML
0.5 INJECTION, SOLUTION EPIDURAL; INFILTRATION; INTRACAUDAL
Status: COMPLETED | OUTPATIENT
Start: 2021-11-30 | End: 2021-11-30

## 2021-11-30 RX ADMIN — BUPIVACAINE HYDROCHLORIDE 0.5 ML: 2.5 INJECTION, SOLUTION EPIDURAL; INFILTRATION; INTRACAUDAL at 10:23

## 2021-11-30 RX ADMIN — BETAMETHASONE SODIUM PHOSPHATE AND BETAMETHASONE ACETATE 3 MG: 3; 3 INJECTION, SUSPENSION INTRA-ARTICULAR; INTRALESIONAL; INTRAMUSCULAR; SOFT TISSUE at 10:23

## 2021-12-06 ENCOUNTER — CONSULT (OUTPATIENT)
Dept: PAIN MEDICINE | Facility: CLINIC | Age: 56
End: 2021-12-06
Payer: COMMERCIAL

## 2021-12-06 VITALS
WEIGHT: 136 LBS | HEIGHT: 65 IN | SYSTOLIC BLOOD PRESSURE: 109 MMHG | DIASTOLIC BLOOD PRESSURE: 72 MMHG | BODY MASS INDEX: 22.66 KG/M2 | HEART RATE: 102 BPM

## 2021-12-06 DIAGNOSIS — M47.816 LUMBAR SPONDYLOSIS: ICD-10-CM

## 2021-12-06 DIAGNOSIS — M54.16 LUMBAR RADICULOPATHY: Primary | ICD-10-CM

## 2021-12-06 DIAGNOSIS — M79.18 MYOFASCIAL PAIN SYNDROME: ICD-10-CM

## 2021-12-06 PROCEDURE — 99204 OFFICE O/P NEW MOD 45 MIN: CPT | Performed by: ANESTHESIOLOGY

## 2021-12-06 RX ORDER — DULOXETINE 40 MG/1
CAPSULE, DELAYED RELEASE ORAL DAILY
COMMUNITY
Start: 2021-11-08

## 2021-12-06 RX ORDER — TIZANIDINE 4 MG/1
4 TABLET ORAL 2 TIMES DAILY
Qty: 90 TABLET | Refills: 0 | Status: SHIPPED | OUTPATIENT
Start: 2021-12-06 | End: 2022-01-07 | Stop reason: SDUPTHER

## 2021-12-08 ENCOUNTER — TELEPHONE (OUTPATIENT)
Dept: PAIN MEDICINE | Facility: CLINIC | Age: 56
End: 2021-12-08

## 2021-12-10 ENCOUNTER — APPOINTMENT (OUTPATIENT)
Dept: RADIOLOGY | Facility: CLINIC | Age: 56
End: 2021-12-10
Payer: COMMERCIAL

## 2021-12-10 DIAGNOSIS — M47.816 LUMBAR SPONDYLOSIS: ICD-10-CM

## 2021-12-10 DIAGNOSIS — M54.16 LUMBAR RADICULOPATHY: ICD-10-CM

## 2021-12-10 DIAGNOSIS — M79.18 MYOFASCIAL PAIN SYNDROME: ICD-10-CM

## 2021-12-10 PROCEDURE — 72110 X-RAY EXAM L-2 SPINE 4/>VWS: CPT

## 2021-12-17 ENCOUNTER — OFFICE VISIT (OUTPATIENT)
Dept: URGENT CARE | Facility: CLINIC | Age: 56
End: 2021-12-17
Payer: COMMERCIAL

## 2021-12-17 VITALS
SYSTOLIC BLOOD PRESSURE: 145 MMHG | RESPIRATION RATE: 18 BRPM | TEMPERATURE: 97.3 F | HEART RATE: 79 BPM | DIASTOLIC BLOOD PRESSURE: 58 MMHG | OXYGEN SATURATION: 97 %

## 2021-12-17 DIAGNOSIS — L24.81 IRRITANT CONTACT DERMATITIS DUE TO METALS: Primary | ICD-10-CM

## 2021-12-17 PROCEDURE — 99213 OFFICE O/P EST LOW 20 MIN: CPT

## 2021-12-17 RX ORDER — LORATADINE 10 MG/1
CAPSULE, LIQUID FILLED ORAL AS NEEDED
COMMUNITY

## 2021-12-21 ENCOUNTER — CONSULT (OUTPATIENT)
Dept: NEUROSURGERY | Facility: CLINIC | Age: 56
End: 2021-12-21
Payer: COMMERCIAL

## 2021-12-21 VITALS
HEIGHT: 65 IN | RESPIRATION RATE: 16 BRPM | BODY MASS INDEX: 23.66 KG/M2 | TEMPERATURE: 98.1 F | HEART RATE: 74 BPM | DIASTOLIC BLOOD PRESSURE: 71 MMHG | SYSTOLIC BLOOD PRESSURE: 117 MMHG | WEIGHT: 142 LBS

## 2021-12-21 DIAGNOSIS — M47.817 FACET HYPERTROPHY OF LUMBOSACRAL REGION: ICD-10-CM

## 2021-12-21 DIAGNOSIS — M47.26 OTHER SPONDYLOSIS WITH RADICULOPATHY, LUMBAR REGION: ICD-10-CM

## 2021-12-21 DIAGNOSIS — M54.17 LUMBOSACRAL RADICULOPATHY: ICD-10-CM

## 2021-12-21 DIAGNOSIS — M54.50 CHRONIC LEFT-SIDED LOW BACK PAIN WITHOUT SCIATICA: ICD-10-CM

## 2021-12-21 DIAGNOSIS — G89.4 CHRONIC PAIN SYNDROME: Primary | ICD-10-CM

## 2021-12-21 DIAGNOSIS — G89.29 CHRONIC LEFT-SIDED LOW BACK PAIN WITHOUT SCIATICA: ICD-10-CM

## 2021-12-21 PROCEDURE — 99204 OFFICE O/P NEW MOD 45 MIN: CPT | Performed by: NURSE PRACTITIONER

## 2021-12-23 DIAGNOSIS — R50.9 FEVER, UNSPECIFIED FEVER CAUSE: Primary | ICD-10-CM

## 2021-12-23 PROCEDURE — 0241U HB NFCT DS VIR RESP RNA 4 TRGT: CPT | Performed by: FAMILY MEDICINE

## 2021-12-24 LAB
FLUAV RNA RESP QL NAA+PROBE: NEGATIVE
FLUBV RNA RESP QL NAA+PROBE: NEGATIVE
RSV RNA RESP QL NAA+PROBE: NEGATIVE
SARS-COV-2 RNA RESP QL NAA+PROBE: POSITIVE

## 2022-01-02 NOTE — PROGRESS NOTES
PT Evaluation     Today's date: 1/3/22  Patient name: Johana Aguirre  : 1965  MRN: [id number]  Referring provider: Betty Inman MD  Dx:   Encounter Diagnosis     ICD-10-CM    1  Lumbar radiculopathy  M54 16    2  Lumbar spondylosis  M47 816    3  Diffuse myofascial pain syndrome  M79 18                   Assessment  Assessment details: Johana Aguirre is a 64 y o  female presenting to outpatient physical therapy with noted impairments including pain, impaired soft tissue mobility, reduced range of motion, reduced strength, reduced postural awareness, and reduced activity tolerance  Signs and symptoms at present are consistent with referring diagnosis of lumbar radic, spondylosis and myofascial pain syndrome  Due to noted impairments, the patient's present functional limitations include difficulty with ADLs with increased need for assistance, reliance on medication and/or modalities for pain relief, reduced tolerance for functional mobility and activity, and difficulty completing HH and work responsibilities  Patient to benefit from skilled outpatient physical therapy 2x/week for 6  weeks in order to reduce pain, maximize pain free range of motion, increase strength and stability, and improve functional mobility/functional activity in order to maximize return to prior level of function with reduced limitations  Home exercise program will be  provided and all questions answered to patient's level of satisfaction  Thank you for your referral       Impairments: abnormal or restricted ROM, activity intolerance, impaired physical strength, lacks appropriate home exercise program and pain with function  Barriers to therapy: Chronicity of pain  Understanding of Dx/Px/POC: good   Prognosis: fair    Goals  STGs to be achieved in 4 weeks:  1   Pt to demonstrate reduced subjective pain rating "at worst" by at least 2-3 points from Initial Eval in order to allow for reduced pain with ADLs and improved functional activity tolerance  2  Pt to demonstrate increased AROM of lumbar spine to net  least restrictive level by at least 5-10 degrees in order to allow for greater ease and independence with ADLs and functional mobility  3  Pt to demonstrate increased strength of abdominal and lumbar musculature  in order to improve safety and stability with ADLs and functional mobility  LTGs to be achieved in 6-8 weeks:  1  Pt will be I with HEP in order to continue to improve quality of life and independence and reduce risk for re-injury  2  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  3  Pt to demonstrate improved function as noted by achieving or exceeding predicted score on FOTO outcomes assessment tool  Plan  Patient would benefit from: skilled physical therapy  Planned therapy interventions: manual therapy, neuromuscular re-education, self care, strengthening, stretching, therapeutic exercise and home exercise program  Frequency: 2x week  Duration in weeks: 6  Plan of Care beginning date: 1/3/2022  Plan of Care expiration date: 2022  Treatment plan discussed with: patient and PTA        Subjective Evaluation    History of Present Illness  Mechanism of injury: Pt notes pain in back for 25-30 years with worsening over past couple of years  Had L THR in her 40's  Can only stand for 10 min at a time, and then must sit down  Pt states if she overworks, she pays for it the next day  Must bend fwd significantly for pain relief  Pt has diff climbing stairs, vacuuming, standing to wash dishes and normal HH chores  Pt states she is hyper and has diff sitting in one spot  No diff entering/exiting car  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             Recurrent probem    Quality of life: fair    Pain  Current pain ratin  At best pain ratin  At worst pain rating: 10  Location:  L low back w/occ radiation of pain down LLE  Quality: pressure and radiating  Relieving factors: heat and rest (forward flexed poosition, flexion stretching)  Aggravating factors: stair climbing, standing and walking (extended activity)  Progression: worsening    Social Support  Steps to enter house: yes  Stairs in house: yes   Lives in: multiple-level home  Lives with: 2 roommates  Employment status: working (home health aide)  Hand dominance: right      Diagnostic Tests  Abnormal x-ray: see results under imaging, is ordered for more views  Abnormal MRI: scheduled  Treatments  Previous treatment: medication and chiropractic  Current treatment: medication and physical therapy  Patient Goals  Patient goals for therapy: decreased pain, increased motion, increased strength, independence with ADLs/IADLs and return to sport/leisure activities  Patient goal: walk dog        Objective     Concurrent Complaints  Negative for night pain and disturbed sleep    Postural Observations    Additional Postural Observation Details  L shldr elevated, excessive mid thoracic kyphosis, excessive lumbar lordosis     Palpation   Left   Tenderness of the quadratus lumborum  Active Range of Motion     Lumbar   Flexion:  WFL  Extension:  Restriction level: minimal  Left lateral flexion:  Restriction level: minimal  Right lateral flexion:  Restriction level: minimal  Left rotation:  Restriction level: minimal  Right rotation:  Restriction level: minimal    Strength/Myotome Testing     Left Hip   Planes of Motion   Flexion: 4  Extension: 4-  Abduction: 4  Adduction: 4    Right Hip   Planes of Motion   Flexion: 4  Extension: 4-  Abduction: 4  Adduction: 4    Left Knee   Flexion: 5  Extension: 5    Right Knee   Flexion: 5    Left Ankle/Foot   Dorsiflexion: 4+    Right Ankle/Foot   Dorsiflexion: 4+    Tests     Lumbar     Left   Negative passive SLR  Right   Negative passive SLR  Left Hip   Negative STEFFEN  Right Hip   Negative STEFFEN       Additional Tests Details  LLE shorter than R by 1/2 inch in supine  Piriformis tight L only, L hip musculature tighter L than R     General Comments:    Lower quarter screen   Hips: unremarkable  Knees: unremarkable  Foot/ankle: unremarkable    Hip Comments   L THR    Ankle/Foot Comments   Plate and screws R ankle             Precautions: ADHD    Re-eval Date: 2/3/22    Date 1/3/22       Visit Count 1       FOTO Completed 1/3       Pain In See IE       Pain Out See IE               Manuals 1/3/22                                       Neuro Re-Ed                                                                Ther Ex        Nustep        LTRs        Bridges w/add        clamshells        90/90 abd        AH        AH w/SLRs        AH w/ball lift        Prone hip ext        swimmer        QP hip ext        QP UE lift        Cat/camel        Ham stretch        pirif stretch        Modalities

## 2022-01-03 ENCOUNTER — EVALUATION (OUTPATIENT)
Dept: PHYSICAL THERAPY | Facility: CLINIC | Age: 57
End: 2022-01-03
Payer: COMMERCIAL

## 2022-01-03 DIAGNOSIS — M54.16 LUMBAR RADICULOPATHY: Primary | ICD-10-CM

## 2022-01-03 DIAGNOSIS — M79.18 DIFFUSE MYOFASCIAL PAIN SYNDROME: ICD-10-CM

## 2022-01-03 DIAGNOSIS — M47.816 LUMBAR SPONDYLOSIS: ICD-10-CM

## 2022-01-03 DIAGNOSIS — M79.18 MYOFASCIAL PAIN SYNDROME: ICD-10-CM

## 2022-01-03 PROCEDURE — 97162 PT EVAL MOD COMPLEX 30 MIN: CPT

## 2022-01-04 DIAGNOSIS — E78.2 MIXED HYPERLIPIDEMIA: ICD-10-CM

## 2022-01-04 DIAGNOSIS — J30.2 SEASONAL ALLERGIES: ICD-10-CM

## 2022-01-04 RX ORDER — ATORVASTATIN CALCIUM 20 MG/1
TABLET, FILM COATED ORAL
Qty: 90 TABLET | Refills: 0 | Status: SHIPPED | OUTPATIENT
Start: 2022-01-04 | End: 2022-01-11

## 2022-01-04 RX ORDER — MONTELUKAST SODIUM 10 MG/1
TABLET ORAL
Qty: 90 TABLET | Refills: 0 | Status: SHIPPED | OUTPATIENT
Start: 2022-01-04 | End: 2022-01-11 | Stop reason: SDUPTHER

## 2022-01-04 RX ORDER — MONTELUKAST SODIUM 10 MG/1
TABLET ORAL
Qty: 90 TABLET | Refills: 0 | Status: SHIPPED | OUTPATIENT
Start: 2022-01-04 | End: 2022-06-01

## 2022-01-05 ENCOUNTER — APPOINTMENT (OUTPATIENT)
Dept: PHYSICAL THERAPY | Facility: CLINIC | Age: 57
End: 2022-01-05
Payer: COMMERCIAL

## 2022-01-07 ENCOUNTER — TELEPHONE (OUTPATIENT)
Dept: PAIN MEDICINE | Facility: MEDICAL CENTER | Age: 57
End: 2022-01-07

## 2022-01-07 ENCOUNTER — DOCUMENTATION (OUTPATIENT)
Dept: NEUROSURGERY | Facility: CLINIC | Age: 57
End: 2022-01-07

## 2022-01-07 NOTE — TELEPHONE ENCOUNTER
Spoke to pt regarding pain in lower back  Pt stated that pain is in the left lower back traveling to right side since yesterday  Pt sated that she has taken Diclofenac, Tizanidine and Tylenol as well as using heat and doing stretches with no relief  Pt stated the she starts physical therapy on 1/10  Pt scheduled for OV on 1/17  Any further recommendations?

## 2022-01-07 NOTE — TELEPHONE ENCOUNTER
Left voice message and call back number       Please advise pt that script for medrol dosepak sent to pharmacy

## 2022-01-07 NOTE — TELEPHONE ENCOUNTER
Pt called stating that her mediation is not helping with her pain an would like to know what else she can do for her pain     Pt can be reached at [phone]

## 2022-01-07 NOTE — PROGRESS NOTES
Peer to peer completed for MRI lumbar spine - DENIED    Patient has not yet completed 6 weeks of conservative management  She is to continue seeing pain management  She is to continue with PT, first session 1/3/2021  Will have patient return to the office in about 6-8 weeks upon completion of therapy course to see how she is doing  If not better, will resubmit information to insurance and obtain MRI at that time  If patient develops worsening pain with therapy she should call the office, updated information regarding failed conservative management can be submitted and MRI can be ordered at that time  Will alert office staff to call patient and make necessary changes

## 2022-01-10 ENCOUNTER — OFFICE VISIT (OUTPATIENT)
Dept: PHYSICAL THERAPY | Facility: CLINIC | Age: 57
End: 2022-01-10
Payer: COMMERCIAL

## 2022-01-10 DIAGNOSIS — M54.16 LUMBAR RADICULOPATHY: Primary | ICD-10-CM

## 2022-01-10 PROCEDURE — 97110 THERAPEUTIC EXERCISES: CPT

## 2022-01-10 NOTE — PROGRESS NOTES
Daily Note     Today's date: 1/10/2022  Patient name: Eleonora Carlisle  : 1965  MRN: [id number]  Referring provider: Courtney Rivera MD  Dx:   Encounter Diagnosis     ICD-10-CM    1  Lumbar radiculopathy  M54 16                   Subjective:  Pt  states her LB is still persistent with pain/sx  Rates level to = "7"/10 currently  Objective: See treatment diary below      Assessment: Tolerated treatment Fair+ overall with performance of ther exer  Received MHP applic Well for LB while on NuStep  No adverse reaction  Plan: Con't services 2x/week as per POC/Goals          Precautions: ADHD    Re-eval Date: 2/3/22    Date 1/3/22 1 10 22      Visit Count 1 2      FOTO Completed 1/3       Pain In See IE 7/10 LB      Pain Out See IE 5/10              Manuals 1/3/22 1 10 22                                      Neuro Re-Ed                                                                Ther Ex  1 10 22      Nustep  **L1 x 8 min        LTRs  **1x10/5"        Bridges w/add  **ADD only w/ball 1x20/5"      clamshells  **20x B        90/90 abd        AH  **1x/10/5"        AH w/SLRs  **1x/10/5"        AH w/ball lift        Prone hip ext        swimmer        QP hip ext        QP UE lift        Cat/camel        Ham stretch  **4x/30" B  Long-seated      pirif stretch  **Manual  B 3x/20"      Modalities  1 10 22      MHP  **To LB while on NuStep  No adverse reaction

## 2022-01-11 ENCOUNTER — OFFICE VISIT (OUTPATIENT)
Dept: FAMILY MEDICINE CLINIC | Facility: CLINIC | Age: 57
End: 2022-01-11
Payer: COMMERCIAL

## 2022-01-11 VITALS
WEIGHT: 135.8 LBS | BODY MASS INDEX: 22.63 KG/M2 | DIASTOLIC BLOOD PRESSURE: 74 MMHG | HEART RATE: 90 BPM | OXYGEN SATURATION: 99 % | SYSTOLIC BLOOD PRESSURE: 128 MMHG | TEMPERATURE: 98 F | HEIGHT: 65 IN

## 2022-01-11 DIAGNOSIS — Z72.0 TOBACCO ABUSE: ICD-10-CM

## 2022-01-11 DIAGNOSIS — U07.1 COVID-19: Primary | ICD-10-CM

## 2022-01-11 DIAGNOSIS — S62.101S CLOSED FRACTURE OF RIGHT WRIST, SEQUELA: ICD-10-CM

## 2022-01-11 PROCEDURE — 99214 OFFICE O/P EST MOD 30 MIN: CPT | Performed by: FAMILY MEDICINE

## 2022-01-11 RX ORDER — NICOTINE 21 MG/24HR
1 PATCH, TRANSDERMAL 24 HOURS TRANSDERMAL EVERY 24 HOURS
Qty: 28 PATCH | Refills: 0 | Status: SHIPPED | OUTPATIENT
Start: 2022-01-11 | End: 2022-06-23

## 2022-01-11 RX ORDER — BENZONATATE 200 MG/1
200 CAPSULE ORAL 3 TIMES DAILY PRN
Qty: 20 CAPSULE | Refills: 0 | Status: SHIPPED | OUTPATIENT
Start: 2022-01-11 | End: 2022-06-23

## 2022-01-11 NOTE — PATIENT INSTRUCTIONS
Start using the nicotine patch tomorrow morning  - use the lozenges every 2 hours scheduled for the next 1-2 days  - stop smoking tonight  DO not use cigarettes with the nicotine products together because that just increases the amount of nicotine you are getting    VITAMIN D RECS    Population Recommended Daily Dose     Vitamin D    Calcium  Women  Aged 53-79 y   600 IU      1200 mg  Aged >70 y  800 IU      1200 mg      Health benefits of quitting    As your clinician, I need you to know that quitting smoking is the most important thing you can do to protect your current and future health  Cigarettes are responsible for one in every five deaths in the [place]  Smokers die 5-8 years earlier than a never smoker  Twice the risk of fatal heart disease  Ten times the risk of lung cancer  Several times the risk for cancer of the mouth, throat, esophagus, pancreas, kidney, bladder, and cervix    The benefits of quitting start right away and keep improving the longer you go without smoking  These benefits occur at any age  So whether you are 17 or 70, quitting is a good decision  Some of the benefits include:  1  20 minutes: Blood pressure and pulse return to normal   2  8 hours: Oxygen levels return to normal   3  2 days: Ability to smell and taste begin to improve as damaged nerves regrow  4  2 to 3 weeks: Circulation and lung function improve  5  1 to 9 months: Coughing, congestion, and shortness of breath decrease; tiredness decreases  6  1 year: Risk of heart attack decreases by half  7  5 years: Risk of lung cancer decreases by half; risk of stroke becomes the same as a nonsmoker's  For more on how to quit smoking, try these online resources:   · Smokefree  gov  · "Clearing the Air" booklet from the [employer]: smokefree gov/sites/default/files/pdf/clearing-the-air-accessible  pdf    Keep trying  Most smokers make many attempts at quitting before they are successful   It's important not to give up  Get support  Support programs can be a big help, especially for heavy smokers  These groups offer lectures, ways to change behavior, and peer support  Here are some ways to find a support program:  · Free national quitline: 800-QUIT-NOW (151-578-9211)  · Hospital quit-smoking programs  · American Lung Association: (915.105.9445)  · Remy Ren (856-912-9090)  Over-the-counter medicines  Nicotine replacement therapy may make quitting easier  Certain aids, such as the nicotine patch, gum, and lozenges, are available without a prescription  It is best to use these under a doctor's care, though  The skin patch provides a steady supply of nicotine  Nicotine gum and lozenges give temporary bursts of low levels of nicotine  Both methods reduce the craving for cigarettes  Warning: If you have nausea, vomiting, dizziness, weakness, or a fast heartbeat, stop using these products and see your doctor

## 2022-01-11 NOTE — PROGRESS NOTES
COVID-19 Outpatient Progress Note    Assessment/Plan:    Problem List Items Addressed This Visit        Other    Tobacco abuse    Relevant Medications    nicotine (NICODERM CQ) 21 mg/24 hr TD 24 hr patch      Other Visit Diagnoses     COVID-19    -  Primary    lingering headache- advise to alternate tylenol and motrin  cough use tessalon perles  CTAB, O2 99%    Relevant Medications    benzonatate (TESSALON) 200 MG capsule    Closed fracture of right wrist, sequela        osteoporotic fracture  DEXA scan indicated despite being <66 yo    Relevant Orders    DXA bone density spine hip and pelvis         Return in about 4 weeks (around 2/8/2022) for tobacco cessation  Disposition:     Risks and benefits of COVID-19 vaccination was discussed with patient  I have spent 20 minutes directly with the patient  Greater than 50% of this time was spent in counseling/coordination of care regarding: instructions for management and impressions  Encounter provider Jeff Cannon MD    Provider located at [address] [place] [address],First Floor  [facility] [address] [phone]    Recent Visits  No visits were found meeting these conditions  Showing recent visits within past 7 days and meeting all other requirements  Today's Visits  Date Type Provider Dept   01/11/22 Office Visit Jeff Cannon MD [facility]   Showing today's visits and meeting all other requirements  Future Appointments  No visits were found meeting these conditions  Showing future appointments within next 150 days and meeting all other requirements     Subjective:   Rene Heaton is a 64 y o  female who has been screened for COVID-19  Symptom change since last report: improving  Patient's symptoms include cough, diarrhea and headache      - Date of symptom onset: 12/21/2021  - Date of positive COVID-19 PCR: 12/23/2021   Type of test: PCR    COVID-19 vaccination status: Fully vaccinated (primary series)    Carmen has been staying home and has isolated themselves in her home  She is taking care to not share personal items and is cleaning all surfaces that are touched often, like counters, tabletops, and doorknobs using household cleaning sprays or wipes  She is wearing a mask when she leaves her room       Lab Results   Component Value Date    SARSCOV2 Positive (A) 2021     Past Medical History:   Diagnosis Date    Abnormal Pap smear of cervix     Anxiety     Bell's palsy     COPD (chronic obstructive pulmonary disease) (HCC)     Depression     Menopause     Mild persistent asthma without complication 5257    Obesity     Primary osteoarthritis involving multiple joints 10/9/2018     Past Surgical History:   Procedure Laterality Date    ANKLE SURGERY      BREAST BIOPSY  12 yrs ago     SECTION      COLONOSCOPY      COLONOSCOPY W/ POLYPECTOMY      DILATION AND CURETTAGE, DIAGNOSTIC / THERAPEUTIC      NOSE SURGERY      TOTAL HIP ARTHROPLASTY Left     TUBAL LIGATION       Current Outpatient Medications   Medication Sig Dispense Refill    albuterol (PROVENTIL HFA,VENTOLIN HFA) 90 mcg/act inhaler INHALE 2 PUFFS BY MOUTH EVERY 6 HOURS AS NEEDED FOR WHEEZING FOR SHORTNESS OF BREATH 18 g 5    Ascorbic Acid (C-1000/ALIX HIPS PO) Take by mouth daily        BIOTIN 5000 PO Take by mouth daily        Coenzyme Q10 (COQ10) 100 MG CAPS Take by mouth daily        diclofenac sodium (VOLTAREN) 50 mg EC tablet Take 1 tablet (50 mg total) by mouth 3 (three) times a day 90 tablet 0    DULoxetine HCl 40 MG CPEP daily        folic acid (FOLVITE) 1 mg tablet Take 1 tablet (1 mg total) by mouth daily 30 tablet 0    Loratadine 10 MG CAPS Take by mouth as needed        MAGNESIUM CITRATE PO Take 400 mg by mouth daily        Menthol, Topical Analgesic, (Biofreeze Roll-On) 4 % GEL Apply 1 application topically 3 (three) times a day as needed (pain) 150 Tube 1    montelukast (SINGULAIR) 10 mg tablet TAKE 1 TABLET BY MOUTH ONCE DAILY AT BEDTIME 90 tablet 0    nicotine polacrilex (COMMIT) 2 MG lozenge Apply 1 lozenge (2 mg total) to the mouth or throat as needed for smoking cessation 100 each 0    pramipexole (MIRAPEX) 0 5 mg tablet Take 1 tablet (0 5 mg total) by mouth daily 90 tablet 1    tiotropium (SPIRIVA RESPIMAT) 1 25 MCG/ACT AERS inhaler Inhale 2 puffs daily 4 g 1    tiZANidine (ZANAFLEX) 4 mg tablet Take 1 tablet (4 mg total) by mouth 2 (two) times a day 90 tablet 0    vitamin E, tocopherol, 400 units capsule Take 400 Units by mouth daily      benzonatate (TESSALON) 200 MG capsule Take 1 capsule (200 mg total) by mouth 3 (three) times a day as needed for cough 20 capsule 0    nicotine (NICODERM CQ) 21 mg/24 hr TD 24 hr patch Place 1 patch on the skin every 24 hours 28 patch 0     No current facility-administered medications for this visit  Allergies   Allergen Reactions    Pollen Extract        Review of Systems   Respiratory: Positive for cough  Gastrointestinal: Positive for diarrhea  Neurological: Positive for headaches  Objective:    Vitals:    01/11/22 1021   BP: 128/74   BP Location: Left arm   Patient Position: Sitting   Pulse: 90   Temp: 98 °F (36 7 °C)   TempSrc: Tympanic   SpO2: 99%   Weight: 61 6 kg (135 lb 12 8 oz)   Height: 5' 5" (1 651 m)       Physical Exam  Vitals and nursing note reviewed  Constitutional:       General: She is not in acute distress  Appearance: Normal appearance  She is not ill-appearing, toxic-appearing or diaphoretic  HENT:      Head: Normocephalic and atraumatic  Right Ear: Ear canal and external ear normal  A middle ear effusion is present  Left Ear: Ear canal and external ear normal  A middle ear effusion is present  Nose: Congestion present  No rhinorrhea  Right Sinus: No maxillary sinus tenderness or frontal sinus tenderness  Left Sinus: No maxillary sinus tenderness or frontal sinus tenderness        Mouth/Throat:      Lips: Pink       Mouth: Mucous membranes are moist       Pharynx: Oropharynx is clear  Posterior oropharyngeal erythema present  No oropharyngeal exudate or uvula swelling  Cardiovascular:      Rate and Rhythm: Normal rate and regular rhythm  Pulses: Normal pulses  Heart sounds: Normal heart sounds  No murmur heard  No friction rub  No gallop  Pulmonary:      Effort: Pulmonary effort is normal  No respiratory distress  Breath sounds: Normal breath sounds  No wheezing or rales  Lymphadenopathy:      Cervical: No cervical adenopathy  Neurological:      Mental Status: She is alert

## 2022-01-12 ENCOUNTER — APPOINTMENT (OUTPATIENT)
Dept: PHYSICAL THERAPY | Facility: CLINIC | Age: 57
End: 2022-01-12
Payer: COMMERCIAL

## 2022-01-17 ENCOUNTER — APPOINTMENT (OUTPATIENT)
Dept: PHYSICAL THERAPY | Facility: CLINIC | Age: 57
End: 2022-01-17
Payer: COMMERCIAL

## 2022-01-19 ENCOUNTER — TELEPHONE (OUTPATIENT)
Dept: FAMILY MEDICINE CLINIC | Facility: CLINIC | Age: 57
End: 2022-01-19

## 2022-01-19 ENCOUNTER — HOSPITAL ENCOUNTER (OUTPATIENT)
Dept: BONE DENSITY | Facility: HOSPITAL | Age: 57
Discharge: HOME | End: 2022-01-19
Attending: FAMILY MEDICINE
Payer: COMMERCIAL

## 2022-01-19 DIAGNOSIS — S62.101S CLOSED FRACTURE OF RIGHT WRIST, SEQUELA: ICD-10-CM

## 2022-01-19 PROCEDURE — 77080 DXA BONE DENSITY AXIAL: CPT

## 2022-01-19 NOTE — TELEPHONE ENCOUNTER
Patient called and stated she had her DXA testing done this morning  Wanted to know what she should do for some of the results or if you had any recommendations

## 2022-01-19 NOTE — TELEPHONE ENCOUNTER
Pt called back and was informed of the recommendations  Said she is still smoking the same and will be trying to lessen how much she smokes in a day

## 2022-01-20 ENCOUNTER — APPOINTMENT (OUTPATIENT)
Dept: PHYSICAL THERAPY | Facility: CLINIC | Age: 57
End: 2022-01-20
Payer: COMMERCIAL

## 2022-01-24 ENCOUNTER — APPOINTMENT (OUTPATIENT)
Dept: PHYSICAL THERAPY | Facility: CLINIC | Age: 57
End: 2022-01-24
Payer: COMMERCIAL

## 2022-01-26 ENCOUNTER — APPOINTMENT (OUTPATIENT)
Dept: PHYSICAL THERAPY | Facility: CLINIC | Age: 57
End: 2022-01-26
Payer: COMMERCIAL

## 2022-01-26 ENCOUNTER — TELEPHONE (OUTPATIENT)
Dept: RADIOLOGY | Facility: CLINIC | Age: 57
End: 2022-01-26

## 2022-01-26 ENCOUNTER — APPOINTMENT (OUTPATIENT)
Dept: RADIOLOGY | Facility: CLINIC | Age: 57
End: 2022-01-26
Payer: COMMERCIAL

## 2022-01-26 ENCOUNTER — OFFICE VISIT (OUTPATIENT)
Dept: PAIN MEDICINE | Facility: CLINIC | Age: 57
End: 2022-01-26
Payer: COMMERCIAL

## 2022-01-26 VITALS
BODY MASS INDEX: 22.82 KG/M2 | DIASTOLIC BLOOD PRESSURE: 69 MMHG | WEIGHT: 137 LBS | HEIGHT: 65 IN | HEART RATE: 90 BPM | SYSTOLIC BLOOD PRESSURE: 112 MMHG

## 2022-01-26 DIAGNOSIS — G89.29 ACUTE EXACERBATION OF CHRONIC LOW BACK PAIN: ICD-10-CM

## 2022-01-26 DIAGNOSIS — M54.50 ACUTE EXACERBATION OF CHRONIC LOW BACK PAIN: Primary | ICD-10-CM

## 2022-01-26 DIAGNOSIS — M54.50 ACUTE EXACERBATION OF CHRONIC LOW BACK PAIN: ICD-10-CM

## 2022-01-26 DIAGNOSIS — G89.29 ACUTE EXACERBATION OF CHRONIC LOW BACK PAIN: Primary | ICD-10-CM

## 2022-01-26 PROCEDURE — 99214 OFFICE O/P EST MOD 30 MIN: CPT | Performed by: NURSE PRACTITIONER

## 2022-01-26 PROCEDURE — 72100 X-RAY EXAM L-S SPINE 2/3 VWS: CPT

## 2022-01-26 RX ORDER — PREDNISONE 10 MG/1
TABLET ORAL
Qty: 15 TABLET | Refills: 0 | Status: SHIPPED | OUTPATIENT
Start: 2022-01-26 | End: 2022-02-15 | Stop reason: ALTCHOICE

## 2022-01-26 RX ORDER — RISPERIDONE 1 MG/1
1 TABLET, ORALLY DISINTEGRATING ORAL
COMMUNITY
Start: 2022-01-26 | End: 2022-06-23

## 2022-01-26 RX ORDER — GABAPENTIN 600 MG/1
600 TABLET ORAL 3 TIMES DAILY PRN
COMMUNITY
Start: 2022-01-26 | End: 2022-03-08

## 2022-01-26 NOTE — TELEPHONE ENCOUNTER
----- Message from Francoise Hollingsworth sent at 1/26/2022 11:25 AM EST -----  Please call patient and let her know that the x-ray done during today's visit is negative for fracture  Please advise her to schedule physical therapy discussed during today's visit  We will follow-up with her in 6 weeks

## 2022-01-26 NOTE — TELEPHONE ENCOUNTER
S/W pt and advised of results and plan  Pt states she will go to PT if she feels better after taking Prednisone    Pt also wanted BK to know that she has been started on 2 medication from her Psychiatrist:  Gabapentin 600mg 3 x daily as needed for anxiety  Risperidone 1mg as HS  Added to med list    Advised pt to f/u at [facility] 3/6/22

## 2022-01-27 ENCOUNTER — APPOINTMENT (OUTPATIENT)
Dept: PHYSICAL THERAPY | Facility: CLINIC | Age: 57
End: 2022-01-27
Payer: COMMERCIAL

## 2022-01-31 ENCOUNTER — OFFICE VISIT (OUTPATIENT)
Dept: PHYSICAL THERAPY | Facility: CLINIC | Age: 57
End: 2022-01-31
Payer: COMMERCIAL

## 2022-01-31 DIAGNOSIS — M54.16 LUMBAR RADICULOPATHY: Primary | ICD-10-CM

## 2022-01-31 PROCEDURE — 97110 THERAPEUTIC EXERCISES: CPT

## 2022-01-31 NOTE — PROGRESS NOTES
[facility] [address], [place]    Assessment/Plan:  No problem-specific Assessment & Plan notes found for this encounter  Diagnoses and all orders for this visit:    Vaginal pain    - continue to monitor symptoms  Appear to be musculoskeletal from fall    - can call if desires PT for pelvic health  - Continue exercises for lower back/ and physical therapy  - to call if no improvement       Subjective:   Eleonora Carlisle is a 64 y o  [id number] female  CC: vaginal pain    HPI: Ellis Marino fell in the snow a week ago and has had tailbone pain and radiation into vaginal area  She states that she landed on her buttocks  Currently seeing PT for low back and sciatic nerve pain  Concerned that she may have done some sort of damage to vaginal area  Notes pressure with voiding and feels like there is urgency but voiding in smaller amounts  Voiding 6-8 times per day  No change in bowel habits  States that the heated seats of car feels good, notes that firm surfaces are difficult for her to sit on  Was on steroids and will restart NSAIDS tomorrow  No vaginal bleeding  ROS: Review of Systems   Constitutional: Negative for activity change, appetite change, diaphoresis and fever  Cardiovascular: Negative for leg swelling  Genitourinary: Positive for vaginal pain  Negative for difficulty urinating, frequency, pelvic pain, vaginal bleeding and vaginal discharge  Musculoskeletal: Positive for back pain         PFSH: The following portions of the patient's history were reviewed and updated as appropriate: allergies, current medications, past family history, past medical history, obstetric history, gynecologic history, past social history, past surgical history and problem list        Objective:  /72 (BP Location: Right arm, Patient Position: Sitting, Cuff Size: Standard)   Temp 98 3 °F (36 8 °C)   Ht 5' 5" (1 651 m)   Wt 63 3 kg (139 lb 9 6 oz)   LMP  (LMP Unknown)   BMI 23 23 kg/m²    Physical Exam  Vitals reviewed  Constitutional:       Appearance: Normal appearance  Pulmonary:      Effort: Pulmonary effort is normal    Genitourinary:     General: Normal vulva  Exam position: Lithotomy position  Labia:         Right: No tenderness, lesion or injury  Left: No tenderness, lesion or injury  Vagina: No vaginal discharge, erythema, tenderness, bleeding or prolapsed vaginal walls  Cervix: No cervical motion tenderness  Uterus: Not enlarged and not tender  Comments: Denies pain with bimanual exam      Musculoskeletal:        Legs:       Comments: Pain noted bilaterally, buttock area with palpation  Denies radiation down leg  Localized to tailbone  No bruising noted  Neurological:      Mental Status: She is alert

## 2022-01-31 NOTE — PROGRESS NOTES
Daily Note     Today's date: 2022  Patient name: Eleonora Carlisle  : 1965  MRN: [id number]  Referring provider: Courtney Rivera MD  Dx:   Encounter Diagnosis     ICD-10-CM    1  Lumbar radiculopathy  M54 16                   Subjective:  Pt  States she's been dealing with a bunch of issues these past several weeks  Says she had COVID, had an incident where she fell on her tailbone, and has just been feeling "junky" overall everyday  And also notes her LB problem has been getting worse, with nothing seeming to help, including meds or therapy  Says she wants to get an MRI so she can find out exactly what the issue is with her LB, and will contact Pain Mgmnt  MD to request said testing  Also planning on scheduling an appt with OB/GYN to get pelvic floor assessed to make sure nothing was injured during the fall on her coccyx  Objective: See treatment diary below  *SKILLED CONVERSATION*    Assessment: Tolerated treatment Poor overall due to signif pain level     Pt  Requested and decided to only complete NuStep exercise and fore-go the rest of ther exer due to current sx/pain level  Plan: Will return for  therapy services on 22 for re-eval     Con't as per POC          Precautions: ADHD    Re-eval Date: 2/3/22    Date 1/3/22 1 10 22 1 31 22     Visit Count 1 2 3     FOTO Completed 1/3       Pain In See  LB 10/10     Pain Out See IE 5/10 No change             Manuals 1/3/22 1 10 22 1 31 22                                     Neuro Re-Ed                                                                Ther Ex  1 10 22 1 31 22     Nustep  **L1 x 8 min   L1 x 8 min     LTRs  **1x10/5"   *Held as per pt request/  decision     Bridges w/add  **ADD only w/ball 1x20/5" *Held as per pt request/  decision     clamshells  **20x B   *Held as per pt request/  decision      abd        AH  **1x/10/5"   *Held as per pt request/  decision     AH w/SLRs  **1x/10/5"   *Held as per pt request/  decision     AH w/ball lift        Prone hip ext        swimmer        QP hip ext        QP UE lift        Cat/camel        Ham stretch  **4x/30" B  Long-seated *Held as per pt request/  decision     pirif stretch  **Manual  B 3x/20" *Held as per pt request/  decision             SKILLED CONVERSATIO   **20 min     Modalities  1 10 22 1 31 22     MHP  **To LB while on NuStep  No adverse reaction To LB while on NuStep  No adverse reaction

## 2022-02-01 ENCOUNTER — OFFICE VISIT (OUTPATIENT)
Dept: OBGYN CLINIC | Facility: CLINIC | Age: 57
End: 2022-02-01
Payer: COMMERCIAL

## 2022-02-01 VITALS
TEMPERATURE: 98.3 F | SYSTOLIC BLOOD PRESSURE: 126 MMHG | BODY MASS INDEX: 23.26 KG/M2 | DIASTOLIC BLOOD PRESSURE: 72 MMHG | WEIGHT: 139.6 LBS | HEIGHT: 65 IN

## 2022-02-01 VITALS
SYSTOLIC BLOOD PRESSURE: 124 MMHG | DIASTOLIC BLOOD PRESSURE: 74 MMHG | WEIGHT: 139 LBS | BODY MASS INDEX: 23.16 KG/M2 | HEART RATE: 90 BPM | HEIGHT: 65 IN

## 2022-02-01 DIAGNOSIS — S52.124D CLOSED NONDISPLACED FRACTURE OF HEAD OF RIGHT RADIUS WITH ROUTINE HEALING, SUBSEQUENT ENCOUNTER: Primary | ICD-10-CM

## 2022-02-01 DIAGNOSIS — R10.2 VAGINAL PAIN: Primary | ICD-10-CM

## 2022-02-01 PROCEDURE — 99213 OFFICE O/P EST LOW 20 MIN: CPT | Performed by: ORTHOPAEDIC SURGERY

## 2022-02-01 PROCEDURE — 99213 OFFICE O/P EST LOW 20 MIN: CPT | Performed by: ADVANCED PRACTICE MIDWIFE

## 2022-02-01 NOTE — PROGRESS NOTES
Assessment:   Diagnosis ICD-10-CM Associated Orders   1  Closed nondisplaced fracture of head of right radius with routine healing, subsequent encounter  [id number] [id number]        Plan:  · Explained to the patient x-rays today reveal a well-healed Colles fracture of the right wrist   · OTC meds and ice p r n  for pain relief   · May perform activities as tolerated with modifications to avoid pain   · She will follow up on an as-needed basis  · Also instructed patient that it may take up to 12-18 months for the swelling to subside  She understood and all questions were answered  To do next visit:  Return if symptoms worsen or fail to improve  The above stated was discussed in layman's terms and the patient expressed understanding  All questions were answered to the patient's satisfaction  The patient states she has minimal pain and sometimes swelling along her right wrist   Physical examination shows no obvious swelling  Range of motion is full  No deformity  Strength and motion intact  The patient was instructed to continue home exercise program   Continue stretching  Follow up on an as-needed basis  If her condition changes, she will not hesitate to let us know      Scribe Attestation    I,:  Anastasiia Steiner am acting as a scribe while in the presence of the attending physician :       I,:  Patricio Sanchez, DO personally performed the services described in this documentation    as scribed in my presence :             Subjective:   Sara Fowler is a 64 y o  female who presents today for follow-up visit for her right wrist   Patient suffered a Colles fracture about 4 months ago after a fall on outstretched right upper extremity  Today, patient notes continued intermittent swelling about the wrist but she states that the pain has overall improved  She denies numbness and tingling  No fevers or chills         Review of systems negative unless otherwise specified in HPI  Review of Systems Constitutional: Negative for chills, fever and unexpected weight change  HENT: Negative for hearing loss, nosebleeds and sore throat  Eyes: Negative for pain, redness and visual disturbance  Respiratory: Negative for cough, shortness of breath and wheezing  Cardiovascular: Negative for chest pain, palpitations and leg swelling  Gastrointestinal: Negative for abdominal distention, nausea and vomiting  Endocrine: Negative for polydipsia and polyuria  Genitourinary: Negative for dysuria and hematuria  Skin: Negative for rash and wound  Neurological: Negative for dizziness, numbness and headaches  Psychiatric/Behavioral: Negative for decreased concentration and suicidal ideas         Past Medical History:   Diagnosis Date    Abnormal Pap smear of cervix     Anxiety     Bell's palsy     [facility]chronic obstructive [facility]     Depression     Menopause     Mild persistent asthma without complication 6677    Obesity     Primary osteoarthritis involving multiple joints 10/9/2018       Past Surgical History:   Procedure Laterality Date    ANKLE SURGERY      BREAST BIOPSY  12 yrs ago     SECTION      COLONOSCOPY      COLONOSCOPY W/ POLYPECTOMY      DILATION AND CURETTAGE, DIAGNOSTIC / THERAPEUTIC      NOSE SURGERY      TOTAL HIP ARTHROPLASTY Left     TUBAL LIGATION         Family History   Problem Relation Age of Onset    Mental illness Mother     Suicidality Mother     No Known Problems Father     Mental illness Brother     Suicidality Brother     No Known Problems Maternal Grandmother     No Known Problems Paternal Grandmother     No Known Problems Maternal Aunt     No Known Problems Paternal Aunt     No Known Problems Paternal Aunt     No Known Problems Paternal Aunt     No Known Problems Paternal Aunt     Cancer Family        Social History     Occupational History    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: [address]     Comment: [employer]   Tobacco Use    Smoking status: Current Every Day Smoker     Packs/day: 1 50     Years: 42 00     Pack years: 63 00     Types: Cigarettes     Start date: 1979    Smokeless tobacco: Never Used    Tobacco comment: cut down to 1 ppd  and using lozenges   Vaping Use    Vaping Use: Never used   Substance and Sexual Activity    Alcohol use: Not Currently    Drug use: Not Currently     Types: Marijuana, Cocaine    Sexual activity: Not Currently     Partners: Male     Birth control/protection: None         Current Outpatient Medications:     albuterol (PROVENTIL HFA,VENTOLIN HFA) 90 mcg/act inhaler, INHALE 2 PUFFS BY MOUTH EVERY 6 HOURS AS NEEDED FOR WHEEZING FOR SHORTNESS OF BREATH, Disp: 18 g, Rfl: 5    Ascorbic Acid (C-1000/ALIX HIPS PO), Take by mouth daily  , Disp: , Rfl:     benzonatate (TESSALON) 200 MG capsule, Take 1 capsule (200 mg total) by mouth 3 (three) times a day as needed for cough, Disp: 20 capsule, Rfl: 0    BIOTIN 5000 PO, Take by mouth daily  , Disp: , Rfl:     Coenzyme Q10 (COQ10) 100 MG CAPS, Take by mouth daily  , Disp: , Rfl:     diclofenac sodium (VOLTAREN) 50 mg EC tablet, Take 1 tablet (50 mg total) by mouth 3 (three) times a day, Disp: 90 tablet, Rfl: 0    DULoxetine HCl 40 MG CPEP, daily  , Disp: , Rfl:     folic acid (FOLVITE) 1 mg tablet, Take 1 tablet (1 mg total) by mouth daily, Disp: 30 tablet, Rfl: 0    gabapentin (NEURONTIN) 600 MG tablet, Take 600 mg by mouth 3 (three) times a day as needed for anxiety, Disp: , Rfl:     Loratadine 10 MG CAPS, Take by mouth as needed  , Disp: , Rfl:     MAGNESIUM CITRATE PO, Take 400 mg by mouth daily  , Disp: , Rfl:     Menthol, Topical Analgesic, (Biofreeze Roll-On) 4 % GEL, Apply 1 application topically 3 (three) times a day as needed (pain), Disp: 150 Tube, Rfl: 1    montelukast (SINGULAIR) 10 mg tablet, TAKE 1 TABLET BY MOUTH ONCE DAILY AT BEDTIME, Disp: 90 tablet, Rfl: 0    nicotine (NICODERM CQ) 21 mg/24 hr TD 24 hr patch, Place 1 patch on the skin every 24 hours (Patient not taking: Reported on 2/1/2022 ), Disp: 28 patch, Rfl: 0    nicotine polacrilex (COMMIT) 2 MG lozenge, Apply 1 lozenge (2 mg total) to the mouth or throat as needed for smoking cessation (Patient not taking: Reported on 2/1/2022 ), Disp: 100 each, Rfl: 0    pramipexole (MIRAPEX) 0 5 mg tablet, Take 1 tablet (0 5 mg total) by mouth daily, Disp: 90 tablet, Rfl: 1    predniSONE 10 mg tablet, 3 tabs once daily x 3 days; 2 tabs once daily x 2 days; 1 tab daily x 2 days, Disp: 15 tablet, Rfl: 0    risperiDONE (RisperDAL M-TAB) 1 mg disintegrating tablet, Take 1 mg by mouth daily at bedtime, Disp: , Rfl:     Spiriva Respimat 1 25 MCG/ACT AERS inhaler, INHALE 2 SPRAY(S) BY MOUTH ONCE DAILY, Disp: 4 g, Rfl: 2    tiZANidine (ZANAFLEX) 4 mg tablet, Take 1 tablet (4 mg total) by mouth 2 (two) times a day, Disp: 90 tablet, Rfl: 0    vitamin E, tocopherol, 400 units capsule, Take 400 Units by mouth daily, Disp: , Rfl:     Allergies   Allergen Reactions    Pollen Extract             Vitals:    02/01/22 1345   BP: 124/74   Pulse: 90       Objective:                    Ortho Exam   Right wrist:  Skin is intact  No erythema or ecchymosis  No tenderness to palpation about the wrist   Minimal swelling noted  Range of motion is intact without pain  Brisk capillary refill  2+ radial pulse  Sensation is intact distally    Diagnostics, reviewed and taken today if performed as documented:        No new imaging performed today      Procedures, if performed today:    Procedures    None performed      Portions of the record may have been created with voice recognition software  Occasional wrong word or "sound a like" substitutions may have occurred due to the inherent limitations of voice recognition software  Read the chart carefully and recognize, using context, where substitutions have occurred

## 2022-02-01 NOTE — PATIENT INSTRUCTIONS
Interstitial Cystitis   AMBULATORY CARE:   Interstitial cystitis  (IC) is also called painful bladder syndrome  IC is a condition that causes pain in your bladder and pelvic area  You may also have ulcers in your bladder  The cause of IC is not known  Common signs and symptoms include the following:   · Pressure in your bladder and pelvic area    · Urgent need to urinate    · Urinating more often or waking to urinate    · Increased pain during menstruation in women    · Pain in the penis or scrotum in men    · Pain during sex    Call your doctor if:   · Your symptoms get worse, or you develop new symptoms  · You have questions or concerns about your condition or care  Treatment:  The goal of treatment is to control your symptoms  Your healthcare provider may recommend any of the following:  · Nutrition changes  may be needed  Foods such as oranges, julieth, tomatoes, chocolate, alcohol, spicy foods, soft drinks, and coffee may worsen your symptoms  · Medicines  may be given to decrease symptoms such as pain or the need to urinate urgently or often  These medicines may be taken by mouth or placed directly into your bladder  Antibiotics may be given for 1 to 5 days to treat a bacterial infection  · Bladder distension  is a procedure to stretch the walls of your bladder using gas or fluid  · Electrical stimulation  helps increase blood flow to your bladder and strengthens the muscles that control your bladder  Mild electrical pulses are sent to the nerves in your bladder  The impulses may also help release hormones that block pain  · Surgery  may be done to remove ulcers in your bladder or make your bladder larger  Damaged areas of your bladder may be removed and replaced with tissue from your large intestine  Manage your symptoms:   · Do Kegel exercises as directed  Kegel exercises will help strengthen the muscles that control bowel movements and urination   Ask your healthcare provider for more information on Kegel exercises  Tighten your pelvic muscles slowly  It may feel like you are trying to hold back urine or gas  Hold these muscles and count to 3  Relax, tighten them quickly, and release  Repeat the cycle 10 times  Do 10 sets of Kegel exercises, 5 times a day  Do not hold your breath when you do Kegel exercises  Keep your stomach, back, and leg muscles relaxed  · Do not smoke  Smoking may worsen your symptoms  Nicotine and other chemicals in cigarettes and cigars can also cause lung damage  Ask your healthcare provider for information if you currently smoke and need help to quit  E-cigarettes or smokeless tobacco still contain nicotine  Talk to your healthcare provider before you use these products  · Train your bladder to urinate less often  This can be done by going to the bathroom at scheduled times  Try to hold your urine when you feel the urge to go  For example, hold your urine for 5 minutes when you feel the urge to go  As that becomes easier, hold your urine for 10 minutes  · Manage stress  Stress may worsen symptoms  Your healthcare provider may recommend you find ways to manage stress, such as relaxation techniques  Follow up with your doctor as directed:  Write down your questions so you remember to ask them during your visits  © Copyright [employer] 2021 Information is for End User's use only and may not be sold, redistributed or otherwise used for commercial purposes  All illustrations and images included in CareNotes® are the copyrighted property of [employer]  or Yodit Horne   The above information is an  only  It is not intended as medical advice for individual conditions or treatments  Talk to your doctor, nurse or pharmacist before following any medical regimen to see if it is safe and effective for you  AZO urinary pain relief

## 2022-02-04 ENCOUNTER — TELEPHONE (OUTPATIENT)
Dept: PAIN MEDICINE | Facility: CLINIC | Age: 57
End: 2022-02-04

## 2022-02-04 NOTE — TELEPHONE ENCOUNTER
Pt called in to let the doctor know that the physical therapy is on hold due to her fall  Please be advised thank you    Pt can be reached @ [phone]

## 2022-02-04 NOTE — TELEPHONE ENCOUNTER
Lm for pt to cb  Pt has 3/9 f/u ov with BK  Pt can move up ov ifshe wants    Per epic, pt has been evaluated for fall by ob/gyn

## 2022-02-09 ENCOUNTER — OFFICE VISIT (OUTPATIENT)
Dept: PHYSICAL THERAPY | Facility: CLINIC | Age: 57
End: 2022-02-09
Payer: COMMERCIAL

## 2022-02-09 DIAGNOSIS — M54.16 LUMBAR RADICULOPATHY: Primary | ICD-10-CM

## 2022-02-09 PROCEDURE — 97110 THERAPEUTIC EXERCISES: CPT

## 2022-02-09 NOTE — PROGRESS NOTES
Daily Note     Today's date: 2022  Patient name: Terri Valverde  : 1965  MRN: [id number]  Referring provider: Kesha Quiñonez MD  Dx:   Encounter Diagnosis     ICD-10-CM    1  Lumbar radiculopathy  M54 16                   Subjective: Pt states she fell on her tailbone several weeks ago  Notes pain has improved over the past couple of days  Saw her gynecologist around    who recommended cont'd PT to include abdominal strengthening  Objective: See treatment diary below      Assessment: Tolerated treatment well  Patient exhibited good technique with therapeutic exercises and would benefit from continued PT  Pt noted tightness in L quad during SLR's  Burning inner thighs during clamshells  Pt requires occ seated rest       Plan: Continue per plan of care        Precautions: ADHD    Re-eval Date: 2/3/22    Date 1/3/22 1 10 22 1 31 22   22    Visit Count 1 2 3 4    FOTO Completed 1/3       Pain In See IE 7/10 LB 10/10   7/10 tailbone to L lumbar area    Pain Out See  No change             Manuals 1/3/22 1 10 22 1 31 22 22                                    Neuro Re-Ed                                                                Ther Ex  1 10 22 1 31 22         Nustep  **L1 x 8 min   L1 x 8 min L2  10'  w/HP    LTRs  **1x10/5"   *Held as per pt request/  decision 10 x 5"    Bridges w/add  **ADD only w/ball 1x20/5" *Held as per pt request/  decision 20 x 5"    clamshells  **20x B   *Held as per pt request/  decision 20 x 5"  B    90/90 abd        AH  **1x/10/5"   *Held as per pt request/  decision 10 x 5"    AH w/SLRs  **1x/10/5"   *Held as per pt request/  decision 10x B    AH w/ball lift        Prone hip ext        swimmer        QP hip ext        QP UE lift        Cat/camel        Ham stretch  **4x/30" B  Long-seated *Held as per pt request/  decision 4 x 20" B  Long seated    pirif stretch  **Manual  B 3x/20" *Held as per pt request/  decision             SKILLED CONVERSATIO   **20 min     Modalities  1 10 22 1 31 22    2/9    MHP  **To LB while on NuStep  No adverse reaction To LB while on NuStep  No adverse reaction To LB while on NuStep  No adverse reaction

## 2022-02-10 ENCOUNTER — TELEPHONE (OUTPATIENT)
Dept: NEUROSURGERY | Facility: CLINIC | Age: 57
End: 2022-02-10

## 2022-02-10 NOTE — TELEPHONE ENCOUNTER
Received a vm from the patient on [phone]  She reports this is her new phone number  She reports how she was not able to participate much with PT  She inquired if she should reschedule her appointment until she completes more PT  She requested for a call back on [phone]  Returned the call twice to the patient  The phone rang and then it said the call could not be completed  No option to leave a vm

## 2022-02-11 ENCOUNTER — TELEPHONE (OUTPATIENT)
Dept: FAMILY MEDICINE CLINIC | Facility: CLINIC | Age: 57
End: 2022-02-11

## 2022-02-11 ENCOUNTER — OFFICE VISIT (OUTPATIENT)
Dept: PHYSICAL THERAPY | Facility: CLINIC | Age: 57
End: 2022-02-11
Payer: COMMERCIAL

## 2022-02-11 DIAGNOSIS — M54.16 LUMBAR RADICULOPATHY: Primary | ICD-10-CM

## 2022-02-11 PROCEDURE — 97110 THERAPEUTIC EXERCISES: CPT

## 2022-02-11 PROCEDURE — 97140 MANUAL THERAPY 1/> REGIONS: CPT

## 2022-02-11 NOTE — TELEPHONE ENCOUNTER
Patient called back  She reports she has only completed two weeks of PT so far since it was postponed twice  She would like to postpone her appointment on 2/16/22 until she has completed PT  Rescheduled patient to 3/14/22  Patient will call if she develops more pain during PT or if her PT plan changes  Patient was appreciative

## 2022-02-11 NOTE — TELEPHONE ENCOUNTER
PT called and stated she went to  Vitamin D at the Pharmacy but they don't carry the Vitamins in milligrams, she wasn't sure which one to get or what she should get  [FreeTextEntry1] : 18 yo male with FH of thrombophilia had unprovoked acute DVT in the left common femoral vein and in the left external iliac vein, s/p thrombectomy, on Eliquis.. \par Low Protein S activity and antigen.\par  \par Recommendation:\par -- Reviewed hypercoagulable workup results. Factor V Leiden and prothrombin gene mutation were negative. 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 Homocystine level was normal. ATIII and protein C activity were normal. Protein S activity and Ag were found low. Anticardiolipin antibodies and beta 2 glycoprotein I antibodies are not elevated.\par -- Continue Eliquis 5 mg q12h. \par -- Discussed the duration of anticoagulation. Given he had unprovoked DVT at very young age and has protein S deficiency, he will need to be on long term AC. Again referred him to see Dr. Le for consultation.\par -- Labs ordered today: CBC, BMP, LFT, TSH, Free T4. \par -- RTO for followup in 6 months. \par \par Case was seen and discussed with Dr. Aldana who agreed with the assessment and plan.\par \par

## 2022-02-11 NOTE — PROGRESS NOTES
Daily Note     Today's date: 2022  Patient name: Rene Heaton  : 1965  MRN: [id number]  Referring provider: Sandra Ervin MD  Dx:   Encounter Diagnosis     ICD-10-CM    1  Lumbar radiculopathy  M54 16                   Subjective: Pt has concern of non painful click L hip with ext LLE  Pt notes pain level 8/10 and feels work aggravates it but she is able to do her job  Pt states she can tell she's using muscles she normally doesn't  Objective: See treatment diary below      Assessment: Tolerated treatment well  Patient exhibited good technique with therapeutic exercises and would benefit from continued PT  Pt is anxious and has diff with carryover of HEP  Pt is agreeable during TE and is motivated to improve        Plan: Cont PT 2x/week     Precautions: ADHD    Re-eval Date: 2/3/22    Date 1/3/22 1 10 22 1 31 22   22   Visit Count 1 2 3 4 5   FOTO Completed 1/3    Due NV   Pain In See IE 7/10 LB 10/10   7/10 tailbone to L lumbar area 8/10   Pain Out See  No change             Manuals 1/3/22 1 10 22 1 31 22 22                                   Neuro Re-Ed                                                                Ther Ex  1 10 22 1 31 22        Nustep  **L1 x 8 min   L1 x 8 min L2  10'  w/HP L3 10'   LTRs  **1x10/5"   *Held as per pt request/  decision 10 x 5" 10 x 5"   Bridges w/add  **ADD only w/ball 1x20/5" *Held as per pt request/  decision 20 x 5" 20 x 5"   clamshells  **20x B   *Held as per pt request/  decision 20 x 5"  B 20 x 5"  B   90/90 abd        AH  **1x/10/5"   *Held as per pt request/  decision 10 x 5" 10 x 5"   AH w/SLRs  **1x/10/5"   *Held as per pt request/  decision 10x B 10x B   AH w/ball lift        Prone hip ext        swimmer        QP hip ext        QP UE lift        Cat/camel        Ham stretch  **4x/30" B  Long-seated *Held as per pt request/  decision 4 x 20" B  Long seated 4 x 20" B   pirif stretch  **Manual  B 3x/20" *Held as per pt request/  decision             SKILLED CONVERSATIO   **20 min     Modalities  1 10 22 1 31 22    2/9    MHP  **To LB while on NuStep  No adverse reaction To LB while on NuStep  No adverse reaction To LB while on NuStep  No adverse reaction

## 2022-02-16 ENCOUNTER — APPOINTMENT (OUTPATIENT)
Dept: PHYSICAL THERAPY | Facility: CLINIC | Age: 57
End: 2022-02-16
Payer: COMMERCIAL

## 2022-02-18 ENCOUNTER — APPOINTMENT (OUTPATIENT)
Dept: PHYSICAL THERAPY | Facility: CLINIC | Age: 57
End: 2022-02-18
Payer: COMMERCIAL

## 2022-02-23 ENCOUNTER — APPOINTMENT (OUTPATIENT)
Dept: PHYSICAL THERAPY | Facility: CLINIC | Age: 57
End: 2022-02-23
Payer: COMMERCIAL

## 2022-02-24 ENCOUNTER — TELEPHONE (OUTPATIENT)
Dept: OBGYN CLINIC | Facility: HOSPITAL | Age: 57
End: 2022-02-24

## 2022-02-24 NOTE — TELEPHONE ENCOUNTER
Patient sees Dr Redd Weller    Patient is calling because she woke up today and her wrist is tingling and she has no range of motion  She would like to advice on what she can do and will a cortisone injection help?         CB: [phone]

## 2022-02-24 NOTE — TELEPHONE ENCOUNTER
I would encourage patient to move her wrist as much as possible  She had no numbness on her last visit    If it continues, would recommend following up the office on a routine visit and nerve conduction studies will be ordered

## 2022-02-24 NOTE — TELEPHONE ENCOUNTER
Spoke to patient  She stated the numbness goes from her wrist/ hand up the arm to the elbow  Denies redness or heat to touch of the wrist  Stated it is swollen  She is icing and elevating  She takes an NSAID on a normal basis for her back  She stated she has movement, normal color, normal temp tot he fingers  Reports she woke up with this numbness and it is not resolving  Advised continued ice 20 mins on 20 mins off and elevation  ROM as much as she can to try and loosen it up  Did schedule an appointment for tomorrow at patient's request in an open time slot       Thank you

## 2022-02-25 ENCOUNTER — TELEPHONE (OUTPATIENT)
Dept: OBGYN CLINIC | Facility: HOSPITAL | Age: 57
End: 2022-02-25

## 2022-02-25 ENCOUNTER — APPOINTMENT (OUTPATIENT)
Dept: PHYSICAL THERAPY | Facility: CLINIC | Age: 57
End: 2022-02-25
Payer: COMMERCIAL

## 2022-02-25 NOTE — TELEPHONE ENCOUNTER
Patient asked about the status of a note possibly being written for her job  Advised as soon as nurse has response from team, patient will be made aware but doctor is seeing patients so she would need to give time for response  Understanding verbalized  Please advise

## 2022-02-25 NOTE — TELEPHONE ENCOUNTER
Spoke to patient  She stated she missed her appointment and doesn't know what to do  ROM is still not what it used to be and she is concerned about working with her wrist this way and having to miss work even more  Did schedule her next available on 3/8  Please advise

## 2022-02-25 NOTE — TELEPHONE ENCOUNTER
Patient overslept and missed her appointment today  Patient would like to speak with someone in reference to her wrist   Patient disconnected  Please advise

## 2022-03-02 ENCOUNTER — APPOINTMENT (OUTPATIENT)
Dept: PHYSICAL THERAPY | Facility: CLINIC | Age: 57
End: 2022-03-02
Payer: COMMERCIAL

## 2022-03-08 ENCOUNTER — OFFICE VISIT (OUTPATIENT)
Dept: OBGYN CLINIC | Facility: CLINIC | Age: 57
End: 2022-03-08
Payer: COMMERCIAL

## 2022-03-08 VITALS
HEIGHT: 65 IN | HEART RATE: 106 BPM | BODY MASS INDEX: 23.16 KG/M2 | DIASTOLIC BLOOD PRESSURE: 67 MMHG | SYSTOLIC BLOOD PRESSURE: 106 MMHG | WEIGHT: 139 LBS

## 2022-03-08 DIAGNOSIS — G56.31 NEUROPATHY OF RIGHT RADIAL NERVE: ICD-10-CM

## 2022-03-08 DIAGNOSIS — S52.124D CLOSED NONDISPLACED FRACTURE OF HEAD OF RIGHT RADIUS WITH ROUTINE HEALING, SUBSEQUENT ENCOUNTER: Primary | ICD-10-CM

## 2022-03-08 PROCEDURE — 99213 OFFICE O/P EST LOW 20 MIN: CPT | Performed by: ORTHOPAEDIC SURGERY

## 2022-03-08 RX ORDER — SILVER SULFADIAZINE 1 %
CREAM (GRAM) TOPICAL
COMMUNITY
Start: 2022-03-03

## 2022-03-08 RX ORDER — RISPERIDONE 2 MG/1
2 TABLET, FILM COATED ORAL
COMMUNITY
Start: 2022-03-02 | End: 2022-06-23

## 2022-03-08 NOTE — PROGRESS NOTES
Assessment/Plan:    No problem-specific Assessment & Plan notes found for this encounter  Diagnoses and all orders for this visit:    Closed nondisplaced fracture of head of right radius with routine healing, subsequent encounter  -     EMG 1 Limb; Future    Neuropathy of right radial nerve  -     EMG 1 Limb; Future    Other orders  -     SSD 1 % cream; APPLY CREAM EXTERNALLY ONCE DAILY  -     risperiDONE (RisperDAL) 2 mg tablet; Take 2 mg by mouth daily at bedtime          The patient is complaining of subjective numbness along the dorsum of her thumb and radial forearm  Nerve conduction test were ordered  Return back once they are complete  Physical examination shows dysesthesia, as far as motor of the radial nerve, this is completely intact  No evidence of any carpal cubital tunnel syndrome    Subjective:      Patient ID: Eleonora Carlisle is a 64 y o  female  HPI    The patient fractured her right wrist approximately 9 months ago  She had a nondisplaced fracture of her distal radius which was treated conservatively and has healed uneventfully  She states over the past week and a half, she notes numbness along the dorsum of her thumb and radial side of her forearm  She denies any recent trauma  She denies any volar pain or numbness    The following portions of the patient's history were reviewed and updated as appropriate: allergies, current medications, past family history, past medical history, past social history, past surgical history and problem list     Review of Systems   Constitutional: Negative for chills, fever and unexpected weight change  HENT: Negative for hearing loss, nosebleeds and sore throat  Eyes: Negative for pain, redness and visual disturbance  Respiratory: Negative for cough, shortness of breath and wheezing  Cardiovascular: Negative for chest pain, palpitations and leg swelling  Gastrointestinal: Negative for abdominal pain, nausea and vomiting     Endocrine: Negative for polydipsia and polyuria  Genitourinary: Negative for dysuria and hematuria  Musculoskeletal: Positive for arthralgias, joint swelling and myalgias  Negative for back pain, gait problem, neck pain and neck stiffness  As noted in HPI   Skin: Negative for rash and wound  Neurological: Negative for dizziness, numbness and headaches  Psychiatric/Behavioral: Negative for decreased concentration and suicidal ideas  The patient is not nervous/anxious  Objective:      /67   Pulse (!) 106   Ht 5' 5" (1 651 m)   Wt 63 kg (139 lb)   LMP  (LMP Unknown)   BMI 23 13 kg/m²          Physical Exam      Right upper extremity was neurovascular intact  Fingers are pink and mobile  Compartments are soft  No obvious trauma along the wrist   Range of motion and strength are intact  Negative Tinel's along the wrist and elbow  There is subjective complaints of dysesthesia along the dorsum of thumb and radial forearm  No trophic changes    Radial nerve function intact

## 2022-03-09 ENCOUNTER — OFFICE VISIT (OUTPATIENT)
Dept: PAIN MEDICINE | Facility: CLINIC | Age: 57
End: 2022-03-09
Payer: COMMERCIAL

## 2022-03-09 ENCOUNTER — OFFICE VISIT (OUTPATIENT)
Dept: PHYSICAL THERAPY | Facility: CLINIC | Age: 57
End: 2022-03-09
Payer: COMMERCIAL

## 2022-03-09 VITALS
WEIGHT: 139 LBS | SYSTOLIC BLOOD PRESSURE: 106 MMHG | BODY MASS INDEX: 23.16 KG/M2 | HEART RATE: 98 BPM | DIASTOLIC BLOOD PRESSURE: 67 MMHG | HEIGHT: 65 IN

## 2022-03-09 DIAGNOSIS — G89.4 CHRONIC PAIN SYNDROME: Primary | ICD-10-CM

## 2022-03-09 DIAGNOSIS — M47.817 FACET HYPERTROPHY OF LUMBOSACRAL REGION: ICD-10-CM

## 2022-03-09 DIAGNOSIS — M54.16 LUMBAR RADICULOPATHY: Primary | ICD-10-CM

## 2022-03-09 DIAGNOSIS — M54.16 LUMBAR RADICULOPATHY: ICD-10-CM

## 2022-03-09 DIAGNOSIS — M54.50 CHRONIC LEFT-SIDED LOW BACK PAIN WITHOUT SCIATICA: ICD-10-CM

## 2022-03-09 DIAGNOSIS — M79.18 MYOFASCIAL PAIN SYNDROME: ICD-10-CM

## 2022-03-09 DIAGNOSIS — M47.816 LUMBAR SPONDYLOSIS: ICD-10-CM

## 2022-03-09 DIAGNOSIS — G89.29 CHRONIC LEFT-SIDED LOW BACK PAIN WITHOUT SCIATICA: ICD-10-CM

## 2022-03-09 PROCEDURE — 97110 THERAPEUTIC EXERCISES: CPT

## 2022-03-09 PROCEDURE — 99214 OFFICE O/P EST MOD 30 MIN: CPT | Performed by: NURSE PRACTITIONER

## 2022-03-09 NOTE — PROGRESS NOTES
Assessment:  1  Chronic pain syndrome    2  Chronic left-sided low back pain without sciatica    3  Lumbar radiculopathy    4  Facet hypertrophy of lumbosacral region    5  Lumbar spondylosis    6  Myofascial pain syndrome        Plan:  While the patient was in the office today, I did have a thorough conversation regarding their chronic pain syndrome, medication management, and treatment plan options  Patient is being seen for follow-up visit  She was last seen here on 01/26/2021 at which time she was complaining of increased pain following a recent fall  X-rays were performed in the office and did not reveal any acute findings  She was placed on a prednisone taper which seemed to help the pain around her tailbone area  Today, she returns the office complaining of her typical low back pain that can radiate down the posterior aspect of her left leg at times  She has been participating in physical therapy on and off since 01/03/2022  She tells me that she is doing the exercises that she learned at physical therapy at home on a regular basis  Unfortunately, she denies any significant improvement in her symptoms  Will order an MRI of her lumbar spine to rule out intraspinal pathology that would contribute to her current symptoms  She understands that she will be called with the results of the MRI soon as they are available for my review  Continue diclofenac 50 mg twice daily with food to address the inflammatory component of her pain  Prescription was sent to her pharmacy  Continue tizanidine 4 mg which she uses on a strictly as needed basis for muscle spasms  She did not require refill this medication during today's visit  The patient will follow-up in 1 month for medication prescription refill and reevaluation  The patient was advised to contact the office should their symptoms worsen in the interim  The patient was agreeable and verbalized an understanding          History of Present Illness: The patient is a 64 y o  female who presents for a follow up office visit in regards to Back Pain  The patients current symptoms include complaints of low back and left leg pain  Current pain level is an 8/10  Quality pain is described as sharp, throbbing, cramping, pressure-like, shooting, pins and needles  She reports intermittent numbness and tingling in the left leg  She denies left lower extremity weakness  Current pain medications includes:  Tizanidine 4 mg twice daily if needed for spasms, diclofenac 50 mg twice daily   The patient reports that this regimen is providing 10-15 % pain relief  The patient is reporting no side effects from this pain medication regimen  I have personally reviewed and/or updated the patient's past medical history, past surgical history, family history, social history, current medications, allergies, and vital signs today  Review of Systems  Review of Systems   Musculoskeletal: Positive for gait problem and joint swelling (back )  Decreased range of motion  Joint stiffness  Pain in extremity- sometimes shoots down leg      All other systems reviewed and are negative          Past Medical History:   Diagnosis Date    Abnormal Pap smear of cervix     Anxiety     Bell's palsy     [facility]ch[facility] obstructive [facility]     Depression     Menopause     Mild persistent asthma without complication     Obesity     Primary osteoarthritis involving multiple joints 10/9/2018       Past Surgical History:   Procedure Laterality Date    ANKLE SURGERY      BREAST BIOPSY  12 yrs ago     SECTION      COLONOSCOPY      COLONOSCOPY W/ POLYPECTOMY      DILATION AND CURETTAGE, DIAGNOSTIC / THERAPEUTIC      NOSE SURGERY      TOTAL HIP ARTHROPLASTY Left     TUBAL LIGATION         Family History   Problem Relation Age of Onset    Mental illness Mother     Suicidality Mother     No Known Problems Father     Mental illness Brother     Suicidality Brother     No Known Problems Maternal Grandmother     No Known Problems Paternal Grandmother     No Known Problems Maternal Aunt     No Known Problems Paternal Aunt     No Known Problems Paternal Aunt     No Known Problems Paternal Aunt     No Known Problems Paternal [de-identified] Cancer Family        Social History     Occupational History    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: [employer]     Comment: PT / Waiver Services   Tobacco Use    Smoking status: Current Every Day Smoker     Packs/day: 1 50     Years: 42 00     Pack years: 63 00     Types: Cigarettes     Start date: 1979    Smokeless tobacco: Never Used    Tobacco comment: cut down to 1 ppd  and using lozenges   Vaping Use    Vaping Use: Never used   Substance and Sexual Activity    Alcohol use: Not Currently    Drug use: Not Currently     Types: Marijuana, Cocaine    Sexual activity: Not Currently     Partners: Male     Birth control/protection: None         Current Outpatient Medications:     albuterol (PROVENTIL HFA,VENTOLIN HFA) 90 mcg/act inhaler, INHALE 2 PUFFS BY MOUTH EVERY 6 HOURS AS NEEDED FOR WHEEZING FOR SHORTNESS OF BREATH, Disp: 18 g, Rfl: 5    Ascorbic Acid (C-1000/ALIX HIPS PO), Take by mouth daily  , Disp: , Rfl:     benzonatate (TESSALON) 200 MG capsule, Take 1 capsule (200 mg total) by mouth 3 (three) times a day as needed for cough, Disp: 20 capsule, Rfl: 0    BIOTIN 5000 PO, Take by mouth daily  , Disp: , Rfl:     Coenzyme Q10 (COQ10) 100 MG CAPS, Take by mouth daily  , Disp: , Rfl:     diclofenac sodium (VOLTAREN) 50 mg EC tablet, Take 1 tablet (50 mg total) by mouth 2 (two) times a day With food, Disp: 60 tablet, Rfl: 2    DULoxetine HCl 40 MG CPEP, daily  , Disp: , Rfl:     folic acid (FOLVITE) 1 mg tablet, Take 1 tablet (1 mg total) by mouth daily, Disp: 30 tablet, Rfl: 0    Loratadine 10 MG CAPS, Take by mouth as needed  , Disp: , Rfl:     MAGNESIUM CITRATE PO, Take 400 mg by mouth daily  , Disp: , Rfl:     Menthol, Topical Analgesic, (Biofreeze Roll-On) 4 % GEL, Apply 1 application topically 3 (three) times a day as needed (pain), Disp: 150 Tube, Rfl: 1    montelukast (SINGULAIR) 10 mg tablet, TAKE 1 TABLET BY MOUTH ONCE DAILY AT BEDTIME, Disp: 90 tablet, Rfl: 0    pramipexole (MIRAPEX) 0 5 mg tablet, Take 1 tablet (0 5 mg total) by mouth daily, Disp: 90 tablet, Rfl: 1    risperiDONE (RisperDAL) 2 mg tablet, Take 2 mg by mouth daily at bedtime, Disp: , Rfl:     Spiriva Respimat 1 25 MCG/ACT AERS inhaler, INHALE 2 SPRAY(S) BY MOUTH ONCE DAILY, Disp: 4 g, Rfl: 2    SSD 1 % cream, APPLY CREAM EXTERNALLY ONCE DAILY, Disp: , Rfl:     tiZANidine (ZANAFLEX) 4 mg tablet, Take 1 tablet (4 mg total) by mouth 2 (two) times a day, Disp: 90 tablet, Rfl: 0    vitamin E, tocopherol, 400 units capsule, Take 400 Units by mouth daily, Disp: , Rfl:     nicotine (NICODERM CQ) 21 mg/24 hr TD 24 hr patch, Place 1 patch on the skin every 24 hours (Patient not taking: Reported on 2/1/2022 ), Disp: 28 patch, Rfl: 0    nicotine polacrilex (COMMIT) 2 MG lozenge, Apply 1 lozenge (2 mg total) to the mouth or throat as needed for smoking cessation (Patient not taking: Reported on 2/1/2022 ), Disp: 100 each, Rfl: 0    risperiDONE (RisperDAL M-TAB) 1 mg disintegrating tablet, Take 1 mg by mouth daily at bedtime (Patient not taking: Reported on 3/8/2022 ), Disp: , Rfl:     Allergies   Allergen Reactions    Pollen Extract        Physical Exam:    /67   Pulse 98   Ht 5' 5" (1 651 m)   Wt 63 kg (139 lb)   LMP  (LMP Unknown)   BMI 23 13 kg/m²     Constitutional:normal, well developed, well nourished, alert, in no distress and non-toxic and no overt pain behavior    Eyes:anicteric  HEENT:grossly intact  Neck:supple, symmetric, trachea midline and no masses   Pulmonary:even and unlabored  Cardiovascular:No edema or pitting edema present  Skin:Normal without rashes or lesions and well hydrated  Psychiatric:Mood and affect appropriate  Neurologic:Cranial Nerves II-XII grossly intact  Musculoskeletal:normal    Imaging  MRI lumbar spine without contrast    (Results Pending)       Orders Placed This Encounter   Procedures    MRI lumbar spine without contrast

## 2022-03-09 NOTE — PROGRESS NOTES
Daily Note     Today's date: 3/9/2022  Patient name: Sue Garza  : 1965  MRN: [id number]  Referring provider: Devin Butler MD  Dx:   Encounter Diagnosis     ICD-10-CM    1  Lumbar radiculopathy  M54 16                   Subjective: Seeing PM today  PT exercises feel ok but not feeling any real benefit  Do HEP when I have time  Objective: See treatment diary below      Assessment: Tolerated treatment well  Patient would benefit from continued PT      Plan: Continue per plan of care        Precautions: ADHD    Re-eval Date: 2/3/22    Date 3/9       Visit Count 6       FOTO Completed       Pain In 8/10 LBP       Pain Out            Manuals Rx modified 2* MD apt                                       Neuro Re-Ed                                                                Ther Ex Poor yesi prone 2* asthma       Nustep L3 10 min  W/ MH       LTRs 20x 5"       Bridges w/add 20x 5"       clamshells 20x 5" B       90/90 abd 10x BL       AH Abd iso  Supine   w/pball       AH w/SLRs 10x 5"  Cues AH       Supine Alt UE/LE w/ AH 20x        AH w/ball lift        Prone hip ext        swimmer        QP hip ext        QP UE lift        Cat/camel        Ham stretch        pirif stretch                SKILLED CONVERSATIO        Modalities        MHP

## 2022-03-15 NOTE — PROGRESS NOTES
Daily Note     Today's date: 3/16/2022  Patient name: Nikia Roca  : 1965  MRN: [id number]  Referring provider: Manjeet Martínez MD  Dx:   Encounter Diagnosis     ICD-10-CM    1  Lumbar radiculopathy  M54 16                   Subjective: Increase LBP today 2* cleaning house yesterday    Objective: See treatment diary below      Assessment: Tolerated treatment well  Patient would benefit from continued PT      Plan: Continue per plan of care        Precautions: ADHD    Re-eval Date: 2/3/22    Date 3/9 3/16      Visit Count 6 7      FOTO Completed       Pain In 8/10 LBP 8/10 LBP      Pain Out            Manuals Rx modified 2* MD apt                                       Neuro Re-Ed                                                                Ther Ex Poor yesi prone 2* asthma       Nustep L3 10 min  W/ MH L3 10 min  W/ MH      LTRs 20x 5" 20x 5"      Bridges w/add 20x 5" 20x 5"      clamshells 20x 5" B 20x 5" B      90/90 abd 10x BL 2x10 BL        AH Abd iso  Supine   W/pball  10x 5" Abd iso  Supine   W/pball  10x 10"      AH w/SLRs 10x 5"  Cues AH 15x 5"  Cues AH      Supine Alt UE/LE w/ AH 20x  20x       AH w/ball lift        Prone hip ext        swimmer        QP hip ext        QP UE lift        Cat/camel        Ham stretch  Seated  BL LE  3x30" ea to yesi      pirif stretch  Supine  BL LE  3x 30" ea to yesi                              HEP  Access Code: [email or web address]              SKILLED CONVERSATIO        Modalities        MHP

## 2022-03-16 ENCOUNTER — OFFICE VISIT (OUTPATIENT)
Dept: PHYSICAL THERAPY | Facility: CLINIC | Age: 57
End: 2022-03-16
Payer: COMMERCIAL

## 2022-03-16 DIAGNOSIS — M54.16 LUMBAR RADICULOPATHY: Primary | ICD-10-CM

## 2022-03-16 PROCEDURE — 97110 THERAPEUTIC EXERCISES: CPT

## 2022-03-16 NOTE — PATIENT INSTRUCTIONS
Access Code: [email or web address]  URL: [email or web address]  Date: 03/16/2022  Prepared by: Alix Salazar    Exercises  · Supine Lower Trunk Rotation - 1 x daily - 7 x weekly - 3 sets - 10 reps  · Supine 90/90 Lower Trunk Rotation - 1 x daily - 7 x weekly - 3 sets - 10 reps  · Supine Figure 4 Piriformis Stretch - 1 x daily - 7 x weekly - 3 sets - 30 hold  · Supine Piriformis Stretch - 1 x daily - 7 x weekly - 3 sets - 30 hold  · Supine Hamstring Stretch with Strap - 1 x daily - 7 x weekly - 3 sets - 30 hold  · Seated Hamstring Stretch - 1 x daily - 7 x weekly - 3 sets - 30 hold

## 2022-03-17 NOTE — PROGRESS NOTES
Daily Note     Today's date: 3/17/2022  Patient name: Stanley Sampson  : 1965  MRN: [id number]  Referring provider: Ryann Dubois MD  Dx: No diagnosis found  Subjective: ***      Objective: See treatment diary below      Assessment: Tolerated treatment {Tolerated treatment :}   Patient {assessment:[id number]}      Plan: {PLAN:}     Precautions: ADHD    Re-eval Date: 2/3/22    Date 3/9 3/16      Visit Count 6 7      FOTO Completed       Pain In 8/10 LBP 8/10 LBP      Pain Out            Manuals Rx modified 2* MD apt                                       Neuro Re-Ed                                                                Ther Ex Poor yesi prone 2* asthma       Nustep L3 10 min  W/ MH L3 10 min  W/ MH      LTRs 20x 5" 20x 5"      Bridges w/add 20x 5" 20x 5"      clamshells 20x 5" B 20x 5" B      90/90 abd 10x BL 2x10 BL        AH Abd iso  Supine   W/pball  10x 5" Abd iso  Supine   W/pball  10x 10"      AH w/SLRs 10x 5"  Cues AH 15x 5"  Cues AH      Supine Alt UE/LE w/ AH 20x  20x       AH w/ball lift        Prone hip ext        swimmer        QP hip ext        QP UE lift        Cat/camel        Ham stretch  Seated  BL LE  3x30" ea to yesi      pirif stretch  Supine  BL LE  3x 30" ea to yesi                              HEP  Access Code: [email or web address]              SKILLED CONVERSATIO        Modalities        P

## 2022-03-18 ENCOUNTER — APPOINTMENT (OUTPATIENT)
Dept: PHYSICAL THERAPY | Facility: CLINIC | Age: 57
End: 2022-03-18
Payer: COMMERCIAL

## 2022-03-22 ENCOUNTER — TELEPHONE (OUTPATIENT)
Dept: PAIN MEDICINE | Facility: CLINIC | Age: 57
End: 2022-03-22

## 2022-03-22 NOTE — TELEPHONE ENCOUNTER
Pt called in to see if she should still go to the Physical Therapy before and after the MRI on 03/25/2022  Please be advised thank you    Pt can be reached @    [phone]

## 2022-03-22 NOTE — TELEPHONE ENCOUNTER
Attempted to call the patient and left a detailed mom in regards that as long as she is tolerating the PT then she may continue to go

## 2022-03-23 ENCOUNTER — APPOINTMENT (OUTPATIENT)
Dept: PHYSICAL THERAPY | Facility: CLINIC | Age: 57
End: 2022-03-23
Payer: COMMERCIAL

## 2022-03-23 NOTE — PROGRESS NOTES
Daily Note     Today's date: 3/23/2022  Patient name: Harinder Johnson  : 1965  MRN: [id number]  Referring provider: Heather Kunz MD  Dx: No diagnosis found  Subjective: ***      Objective: See treatment diary below      Assessment: Tolerated treatment {Tolerated treatment :}   Patient {assessment:}      Plan: {PLAN:}     Precautions: ADHD    Re-eval Date: 2/3/22    Date 3/9 3/16      Visit Count 6 7      FOTO Completed       Pain In 8/10 LBP 8/10 LBP      Pain Out            Manuals Rx modified 2* MD apt                                       Neuro Re-Ed                                                                Ther Ex Poor yesi prone 2* asthma       Nustep L3 10 min  W/ MH L3 10 min  W/ MH      LTRs 20x 5" 20x 5"      Bridges w/add 20x 5" 20x 5"      clamshells 20x 5" B 20x 5" B      90/90 abd 10x BL 2x10 BL        AH Abd iso  Supine   W/pball  10x 5" Abd iso  Supine   W/pball  10x 10"      AH w/SLRs 10x 5"  Cues AH 15x 5"  Cues AH      Supine Alt UE/LE w/ AH 20x  20x       AH w/ball lift        Prone hip ext        swimmer        QP hip ext        QP UE lift        Cat/camel        Ham stretch  Seated  BL LE  3x30" ea to yesi      pirif stretch  Supine  BL LE  3x 30" ea to yesi                              HEP  Access Code: [email or web address]              SKILLED CONVERSATIO        Modalities        P

## 2022-03-25 ENCOUNTER — HOSPITAL ENCOUNTER (OUTPATIENT)
Dept: MRI IMAGING | Facility: HOSPITAL | Age: 57
Discharge: HOME | End: 2022-03-25
Payer: COMMERCIAL

## 2022-03-25 ENCOUNTER — APPOINTMENT (OUTPATIENT)
Dept: PHYSICAL THERAPY | Facility: CLINIC | Age: 57
End: 2022-03-25
Payer: COMMERCIAL

## 2022-03-25 DIAGNOSIS — M54.16 LUMBAR RADICULOPATHY: ICD-10-CM

## 2022-03-25 DIAGNOSIS — G25.81 RESTLESS LEG SYNDROME: ICD-10-CM

## 2022-03-25 DIAGNOSIS — G89.4 CHRONIC PAIN SYNDROME: ICD-10-CM

## 2022-03-25 PROCEDURE — G1004 CDSM NDSC: HCPCS

## 2022-03-25 PROCEDURE — 72148 MRI LUMBAR SPINE W/O DYE: CPT

## 2022-03-25 RX ORDER — PRAMIPEXOLE DIHYDROCHLORIDE 0.5 MG/1
TABLET ORAL
Qty: 30 TABLET | Refills: 0 | Status: SHIPPED | OUTPATIENT
Start: 2022-03-25

## 2022-03-28 ENCOUNTER — TELEPHONE (OUTPATIENT)
Dept: PAIN MEDICINE | Facility: CLINIC | Age: 57
End: 2022-03-28

## 2022-04-05 ENCOUNTER — APPOINTMENT (OUTPATIENT)
Dept: RADIOLOGY | Facility: CLINIC | Age: 57
End: 2022-04-05
Payer: COMMERCIAL

## 2022-04-05 DIAGNOSIS — G89.4 CHRONIC PAIN SYNDROME: ICD-10-CM

## 2022-04-05 DIAGNOSIS — M47.817 FACET HYPERTROPHY OF LUMBOSACRAL REGION: ICD-10-CM

## 2022-04-05 PROCEDURE — 72114 X-RAY EXAM L-S SPINE BENDING: CPT

## 2022-04-06 ENCOUNTER — TELEPHONE (OUTPATIENT)
Dept: PAIN MEDICINE | Facility: CLINIC | Age: 57
End: 2022-04-06

## 2022-04-06 ENCOUNTER — OFFICE VISIT (OUTPATIENT)
Dept: PAIN MEDICINE | Facility: CLINIC | Age: 57
End: 2022-04-06
Payer: COMMERCIAL

## 2022-04-06 VITALS
BODY MASS INDEX: 24.49 KG/M2 | SYSTOLIC BLOOD PRESSURE: 118 MMHG | DIASTOLIC BLOOD PRESSURE: 78 MMHG | HEART RATE: 102 BPM | WEIGHT: 147 LBS | HEIGHT: 65 IN

## 2022-04-06 DIAGNOSIS — M47.816 LUMBAR SPONDYLOSIS: ICD-10-CM

## 2022-04-06 DIAGNOSIS — G89.4 CHRONIC PAIN SYNDROME: Primary | ICD-10-CM

## 2022-04-06 DIAGNOSIS — G89.29 CHRONIC BILATERAL LOW BACK PAIN WITH LEFT-SIDED SCIATICA: ICD-10-CM

## 2022-04-06 DIAGNOSIS — M54.42 CHRONIC BILATERAL LOW BACK PAIN WITH LEFT-SIDED SCIATICA: ICD-10-CM

## 2022-04-06 DIAGNOSIS — M54.16 LUMBAR RADICULOPATHY: ICD-10-CM

## 2022-04-06 PROCEDURE — 99214 OFFICE O/P EST MOD 30 MIN: CPT | Performed by: NURSE PRACTITIONER

## 2022-04-06 RX ORDER — PREGABALIN 50 MG/1
CAPSULE ORAL
Qty: 90 CAPSULE | Refills: 1 | Status: SHIPPED | OUTPATIENT
Start: 2022-04-06 | End: 2022-05-20 | Stop reason: SDUPTHER

## 2022-04-06 NOTE — TELEPHONE ENCOUNTER
[address] received your script for Pregnable but states patient takes Gabapentin from another physician     Please advise

## 2022-04-06 NOTE — H&P (VIEW-ONLY)
Assessment:  1  Chronic pain syndrome    2  Chronic bilateral low back pain with left-sided sciatica    3  Lumbar radiculopathy    4  Lumbar spondylosis        Plan:  While the patient was in the office today, I did have a thorough conversation regarding their chronic pain syndrome, medication management, and treatment plan options  Patient is being seen for follow-up visit  She was last seen here on 03/09/2022 at which time an MRI of her lumbar spine was ordered  MRI reveals multilevel lumbar spondylosis most pronounced on the right at L4-5  There are multilevel bulging discs  MRI results were reviewed with patient during today's visit  Will schedule patient for an L5-S1 interlaminar epidural steroid injection in the near future  Complete risks and benefits including bleeding, infection, tissue reaction, nerve injury and allergic reaction were discussed  The approach was demonstrated using models and literature was provided  Verbal and written consent was obtained  I discussed with the patient that I feel a medication such as Lyrica would be helpful in treating her pain  I discussed with patient the type of medication it is, however works, and that it requires a titration process that is specific to each individual   I reviewed with the patient that it may take 3-4 weeks for the medications side effects to be noticed and it should never be abruptly stop  Possible side effects include, but are not limited to:  Vertigo, lethargy, nausea, and edema of the extremities  Advised the patient to call our office if he experience any side effects  The patient verbalized an understanding  Start Lyrica, titrating to 50 mg 3 times daily  Prescription was sent to her pharmacy  Continue diclofenac 50 mg twice daily to address the inflammatory component of her pain  She did not require refill this medication during today's visit        Continue tizanidine 4 mg which she uses on an as-needed basis for muscle spasms  She did not require refill this medication during today's visit  Follow-up 1 month after the injection  History of Present Illness: The patient is a 64 y o  female who presents for a follow up office visit in regards to Back Pain  The patients current symptoms include complaints of low back pain that radiates down the posterior aspect of the left leg  Current pain level is a 10/10  Quality pain is described as dull, aching, sharp, throbbing, shooting  Current pain medications includes:  Diclofenac 50 mg twice daily, tizanidine 4 mg as needed    The patient reports that this regimen is providing 10 % pain relief  The patient is reporting no side effects from this pain medication regimen  I have personally reviewed and/or updated the patient's past medical history, past surgical history, family history, social history, current medications, allergies, and vital signs today  Review of Systems  Review of Systems   Constitutional: Negative for chills and fever  HENT: Negative for ear pain and sore throat  Eyes: Negative for pain and visual disturbance  Respiratory: Negative for cough and shortness of breath  Cardiovascular: Negative for chest pain and palpitations  Gastrointestinal: Negative for abdominal pain and vomiting  Genitourinary: Negative for dysuria and hematuria  Musculoskeletal: Positive for back pain, gait problem, joint swelling and myalgias  Negative for arthralgias  Decreased range of motion  Joint stiffness  Pain in extremity- leg sometimes     Skin: Negative for color change and rash  Neurological: Negative for seizures and syncope  All other systems reviewed and are negative          Past Medical History:   Diagnosis Date    Abnormal Pap smear of cervix     Anxiety     Bell's palsy     COPD (chronic obstructive pulmonary disease) (HCC)     Depression     Menopause     Mild persistent asthma without complication 04/2/1658  Obesity     Primary osteoarthritis involving multiple joints 10/9/2018       Past Surgical History:   Procedure Laterality Date    ANKLE SURGERY      BREAST BIOPSY  12 yrs ago     SECTION      COLONOSCOPY      COLONOSCOPY W/ POLYPECTOMY      DILATION AND CURETTAGE, DIAGNOSTIC / THERAPEUTIC      NOSE SURGERY      TOTAL HIP ARTHROPLASTY Left     TUBAL LIGATION         Family History   Problem Relation Age of Onset    Mental illness Mother     Suicidality Mother     No Known Problems Father     Mental illness Brother     Suicidality Brother     No Known Problems Maternal Grandmother     No Known Problems Paternal Grandmother     No Known Problems Maternal Aunt     No Known Problems Paternal Aunt     No Known Problems Paternal Aunt     No Known Problems Paternal Aunt     No Known Problems Paternal Aunt     Cancer Family        Social History     Occupational History    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: [employer]     Comment: PT / Waiver Services   Tobacco Use    Smoking status: Current Every Day Smoker     Packs/day: 1 50     Years: 42 00     Pack years: 63 00     Types: Cigarettes     Start date:     Smokeless tobacco: Never Used    Tobacco comment: cut down to 1 ppd  and using lozenges   Vaping Use    Vaping Use: Never used   Substance and Sexual Activity    Alcohol use: Not Currently    Drug use: Not Currently     Types: Marijuana, Cocaine    Sexual activity: Not Currently     Partners: Male     Birth control/protection: None         Current Outpatient Medications:     albuterol (PROVENTIL HFA,VENTOLIN HFA) 90 mcg/act inhaler, INHALE 2 PUFFS BY MOUTH EVERY 6 HOURS AS NEEDED FOR WHEEZING FOR SHORTNESS OF BREATH, Disp: 18 g, Rfl: 5    Ascorbic Acid (C-1000/ALIX HIPS PO), Take by mouth daily  , Disp: , Rfl:     benzonatate (TESSALON) 200 MG capsule, Take 1 capsule (200 mg total) by mouth 3 (three) times a day as needed for cough, Disp: 20 capsule, Rfl: 0    BIOTIN 5000 PO, Take by mouth daily  , Disp: , Rfl:     Coenzyme Q10 (COQ10) 100 MG CAPS, Take by mouth daily  , Disp: , Rfl:     diclofenac sodium (VOLTAREN) 50 mg EC tablet, Take 1 tablet (50 mg total) by mouth 2 (two) times a day With food, Disp: 60 tablet, Rfl: 2    DULoxetine HCl 40 MG CPEP, daily  , Disp: , Rfl:     folic acid (FOLVITE) 1 mg tablet, Take 1 tablet (1 mg total) by mouth daily, Disp: 30 tablet, Rfl: 0    Loratadine 10 MG CAPS, Take by mouth as needed  , Disp: , Rfl:     MAGNESIUM CITRATE PO, Take 400 mg by mouth daily  , Disp: , Rfl:     Menthol, Topical Analgesic, (Biofreeze Roll-On) 4 % GEL, Apply 1 application topically 3 (three) times a day as needed (pain), Disp: 150 Tube, Rfl: 1    montelukast (SINGULAIR) 10 mg tablet, TAKE 1 TABLET BY MOUTH ONCE DAILY AT BEDTIME, Disp: 90 tablet, Rfl: 0    pramipexole (MIRAPEX) 0 5 mg tablet, Take 1 tablet by mouth once daily, Disp: 30 tablet, Rfl: 0    risperiDONE (RisperDAL) 2 mg tablet, Take 2 mg by mouth daily at bedtime, Disp: , Rfl:     Spiriva Respimat 1 25 MCG/ACT AERS inhaler, INHALE 2 SPRAY(S) BY MOUTH ONCE DAILY, Disp: 4 g, Rfl: 2    SSD 1 % cream, APPLY CREAM EXTERNALLY ONCE DAILY, Disp: , Rfl:     tiZANidine (ZANAFLEX) 4 mg tablet, Take 1 tablet (4 mg total) by mouth 2 (two) times a day, Disp: 90 tablet, Rfl: 0    vitamin E, tocopherol, 400 units capsule, Take 400 Units by mouth daily, Disp: , Rfl:     nicotine (NICODERM CQ) 21 mg/24 hr TD 24 hr patch, Place 1 patch on the skin every 24 hours (Patient not taking: Reported on 2/1/2022 ), Disp: 28 patch, Rfl: 0    nicotine polacrilex (COMMIT) 2 MG lozenge, Apply 1 lozenge (2 mg total) to the mouth or throat as needed for smoking cessation (Patient not taking: Reported on 2/1/2022 ), Disp: 100 each, Rfl: 0    pregabalin (LYRICA) 50 mg capsule, 1 PO QHS x 1 day, then 1 PO BID x 1 day, then 1 PO TID, Disp: 90 capsule, Rfl: 1    risperiDONE (RisperDAL M-TAB) 1 mg disintegrating tablet, Take 1 mg by mouth daily at bedtime (Patient not taking: Reported on 3/8/2022 ), Disp: , Rfl:     Allergies   Allergen Reactions    Pollen Extract        Physical Exam:    /78   Pulse 102   Ht 5' 5" (1 651 m)   Wt 66 7 kg (147 lb)   LMP  (LMP Unknown)   BMI 24 46 kg/m²     Constitutional:normal, well developed, well nourished, alert, in no distress and non-toxic and no overt pain behavior  Eyes:anicteric  HEENT:grossly intact  Neck:supple, symmetric, trachea midline and no masses   Pulmonary:even and unlabored  Cardiovascular:No edema or pitting edema present  Skin:Normal without rashes or lesions and well hydrated  Psychiatric:Mood and affect appropriate  Neurologic:Cranial Nerves II-XII grossly intact  Musculoskeletal:Gait is slow and guarded      Imaging  FL spine and pain procedure    (Results Pending)       Orders Placed This Encounter   Procedures    FL spine and pain procedure

## 2022-04-06 NOTE — TELEPHONE ENCOUNTER
S/W pt  Advised pt of the same  She stated she is not taking the gabapentin b/c it doesn't help  Her psychologist wanted her to try it  Her last dose was over 1 month ago and she took it for 4 days  Ok to take the lyrica, correct?

## 2022-04-06 NOTE — TELEPHONE ENCOUNTER
Abilio Mao from [facility] called stating that patient is at their facility and wants to know if pt has contract with [facility]- transferred to clinical

## 2022-04-06 NOTE — PROGRESS NOTES
Assessment:  1  Chronic pain syndrome    2  Chronic bilateral low back pain with left-sided sciatica    3  Lumbar radiculopathy    4  Lumbar spondylosis        Plan:  While the patient was in the office today, I did have a thorough conversation regarding their chronic pain syndrome, medication management, and treatment plan options  Patient is being seen for follow-up visit  She was last seen here on 03/09/2022 at which time an MRI of her lumbar spine was ordered  MRI reveals multilevel lumbar spondylosis most pronounced on the right at L4-5  There are multilevel bulging discs  MRI results were reviewed with patient during today's visit  Will schedule patient for an L5-S1 interlaminar epidural steroid injection in the near future  Complete risks and benefits including bleeding, infection, tissue reaction, nerve injury and allergic reaction were discussed  The approach was demonstrated using models and literature was provided  Verbal and written consent was obtained  I discussed with the patient that I feel a medication such as Lyrica would be helpful in treating her pain  I discussed with patient the type of medication it is, however works, and that it requires a titration process that is specific to each individual   I reviewed with the patient that it may take 3-4 weeks for the medications side effects to be noticed and it should never be abruptly stop  Possible side effects include, but are not limited to:  Vertigo, lethargy, nausea, and edema of the extremities  Advised the patient to call our office if he experience any side effects  The patient verbalized an understanding  Start Lyrica, titrating to 50 mg 3 times daily  Prescription was sent to her pharmacy  Continue diclofenac 50 mg twice daily to address the inflammatory component of her pain  She did not require refill this medication during today's visit        Continue tizanidine 4 mg which she uses on an as-needed basis for muscle spasms  She did not require refill this medication during today's visit  Follow-up 1 month after the injection  History of Present Illness: The patient is a 64 y o  female who presents for a follow up office visit in regards to Back Pain  The patients current symptoms include complaints of low back pain that radiates down the posterior aspect of the left leg  Current pain level is a 10/10  Quality pain is described as dull, aching, sharp, throbbing, shooting  Current pain medications includes:  Diclofenac 50 mg twice daily, tizanidine 4 mg as needed    The patient reports that this regimen is providing 10 % pain relief  The patient is reporting no side effects from this pain medication regimen  I have personally reviewed and/or updated the patient's past medical history, past surgical history, family history, social history, current medications, allergies, and vital signs today  Review of Systems  Review of Systems   Constitutional: Negative for chills and fever  HENT: Negative for ear pain and sore throat  Eyes: Negative for pain and visual disturbance  Respiratory: Negative for cough and shortness of breath  Cardiovascular: Negative for chest pain and palpitations  Gastrointestinal: Negative for abdominal pain and vomiting  Genitourinary: Negative for dysuria and hematuria  Musculoskeletal: Positive for back pain, gait problem, joint swelling and myalgias  Negative for arthralgias  Decreased range of motion  Joint stiffness  Pain in extremity- leg sometimes     Skin: Negative for color change and rash  Neurological: Negative for seizures and syncope  All other systems reviewed and are negative          Past Medical History:   Diagnosis Date    Abnormal Pap smear of cervix     Anxiety     Bell's palsy     COPD (chronic obstructive pulmonary disease) (HCC)     Depression     Menopause     Mild persistent asthma without complication 25/4/6767  Obesity     Primary osteoarthritis involving multiple joints 10/9/2018       Past Surgical History:   Procedure Laterality Date    ANKLE SURGERY      BREAST BIOPSY  12 yrs ago     SECTION      COLONOSCOPY      COLONOSCOPY W/ POLYPECTOMY      DILATION AND CURETTAGE, DIAGNOSTIC / THERAPEUTIC      NOSE SURGERY      TOTAL HIP ARTHROPLASTY Left     TUBAL LIGATION         Family History   Problem Relation Age of Onset    Mental illness Mother     Suicidality Mother     No Known Problems Father     Mental illness Brother     Suicidality Brother     No Known Problems Maternal Grandmother     No Known Problems Paternal Grandmother     No Known Problems Maternal Aunt     No Known Problems Paternal Aunt     No Known Problems Paternal Aunt     No Known Problems Paternal Aunt     No Known Problems Paternal Aunt     Cancer Family        Social History     Occupational History    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: [employer]     Comment: PT / Waiver Services   Tobacco Use    Smoking status: Current Every Day Smoker     Packs/day: 1 50     Years: 42 00     Pack years: 63 00     Types: Cigarettes     Start date:     Smokeless tobacco: Never Used    Tobacco comment: cut down to 1 ppd  and using lozenges   Vaping Use    Vaping Use: Never used   Substance and Sexual Activity    Alcohol use: Not Currently    Drug use: Not Currently     Types: Marijuana, Cocaine    Sexual activity: Not Currently     Partners: Male     Birth control/protection: None         Current Outpatient Medications:     albuterol (PROVENTIL HFA,VENTOLIN HFA) 90 mcg/act inhaler, INHALE 2 PUFFS BY MOUTH EVERY 6 HOURS AS NEEDED FOR WHEEZING FOR SHORTNESS OF BREATH, Disp: 18 g, Rfl: 5    Ascorbic Acid (C-1000/ALIX HIPS PO), Take by mouth daily  , Disp: , Rfl:     benzonatate (TESSALON) 200 MG capsule, Take 1 capsule (200 mg total) by mouth 3 (three) times a day as needed for cough, Disp: 20 capsule, Rfl: 0    BIOTIN 5000 PO, Take by mouth daily  , Disp: , Rfl:     Coenzyme Q10 (COQ10) 100 MG CAPS, Take by mouth daily  , Disp: , Rfl:     diclofenac sodium (VOLTAREN) 50 mg EC tablet, Take 1 tablet (50 mg total) by mouth 2 (two) times a day With food, Disp: 60 tablet, Rfl: 2    DULoxetine HCl 40 MG CPEP, daily  , Disp: , Rfl:     folic acid (FOLVITE) 1 mg tablet, Take 1 tablet (1 mg total) by mouth daily, Disp: 30 tablet, Rfl: 0    Loratadine 10 MG CAPS, Take by mouth as needed  , Disp: , Rfl:     MAGNESIUM CITRATE PO, Take 400 mg by mouth daily  , Disp: , Rfl:     Menthol, Topical Analgesic, (Biofreeze Roll-On) 4 % GEL, Apply 1 application topically 3 (three) times a day as needed (pain), Disp: 150 Tube, Rfl: 1    montelukast (SINGULAIR) 10 mg tablet, TAKE 1 TABLET BY MOUTH ONCE DAILY AT BEDTIME, Disp: 90 tablet, Rfl: 0    pramipexole (MIRAPEX) 0 5 mg tablet, Take 1 tablet by mouth once daily, Disp: 30 tablet, Rfl: 0    risperiDONE (RisperDAL) 2 mg tablet, Take 2 mg by mouth daily at bedtime, Disp: , Rfl:     Spiriva Respimat 1 25 MCG/ACT AERS inhaler, INHALE 2 SPRAY(S) BY MOUTH ONCE DAILY, Disp: 4 g, Rfl: 2    SSD 1 % cream, APPLY CREAM EXTERNALLY ONCE DAILY, Disp: , Rfl:     tiZANidine (ZANAFLEX) 4 mg tablet, Take 1 tablet (4 mg total) by mouth 2 (two) times a day, Disp: 90 tablet, Rfl: 0    vitamin E, tocopherol, 400 units capsule, Take 400 Units by mouth daily, Disp: , Rfl:     nicotine (NICODERM CQ) 21 mg/24 hr TD 24 hr patch, Place 1 patch on the skin every 24 hours (Patient not taking: Reported on 2/1/2022 ), Disp: 28 patch, Rfl: 0    nicotine polacrilex (COMMIT) 2 MG lozenge, Apply 1 lozenge (2 mg total) to the mouth or throat as needed for smoking cessation (Patient not taking: Reported on 2/1/2022 ), Disp: 100 each, Rfl: 0    pregabalin (LYRICA) 50 mg capsule, 1 PO QHS x 1 day, then 1 PO BID x 1 day, then 1 PO TID, Disp: 90 capsule, Rfl: 1    risperiDONE (RisperDAL M-TAB) 1 mg disintegrating tablet, Take 1 mg by mouth daily at bedtime (Patient not taking: Reported on 3/8/2022 ), Disp: , Rfl:     Allergies   Allergen Reactions    Pollen Extract        Physical Exam:    /78   Pulse 102   Ht 5' 5" (1 651 m)   Wt 66 7 kg (147 lb)   LMP  (LMP Unknown)   BMI 24 46 kg/m²     Constitutional:normal, well developed, well nourished, alert, in no distress and non-toxic and no overt pain behavior  Eyes:anicteric  HEENT:grossly intact  Neck:supple, symmetric, trachea midline and no masses   Pulmonary:even and unlabored  Cardiovascular:No edema or pitting edema present  Skin:Normal without rashes or lesions and well hydrated  Psychiatric:Mood and affect appropriate  Neurologic:Cranial Nerves II-XII grossly intact  Musculoskeletal:Gait is slow and guarded      Imaging  FL spine and pain procedure    (Results Pending)       Orders Placed This Encounter   Procedures    [place] spine and pain procedure

## 2022-04-15 ENCOUNTER — HOSPITAL ENCOUNTER (OUTPATIENT)
Dept: RADIOLOGY | Facility: HOSPITAL | Age: 57
Discharge: HOME | End: 2022-04-15
Payer: COMMERCIAL

## 2022-04-15 VITALS
OXYGEN SATURATION: 100 % | SYSTOLIC BLOOD PRESSURE: 116 MMHG | TEMPERATURE: 97.6 F | DIASTOLIC BLOOD PRESSURE: 72 MMHG | RESPIRATION RATE: 18 BRPM | HEART RATE: 83 BPM

## 2022-04-15 DIAGNOSIS — G89.4 CHRONIC PAIN SYNDROME: ICD-10-CM

## 2022-04-15 DIAGNOSIS — M54.16 LUMBAR RADICULOPATHY: ICD-10-CM

## 2022-04-15 PROCEDURE — 62323 NJX INTERLAMINAR LMBR/SAC: CPT | Performed by: ANESTHESIOLOGY

## 2022-04-15 RX ORDER — LIDOCAINE HYDROCHLORIDE 10 MG/ML
2 INJECTION, SOLUTION EPIDURAL; INFILTRATION; INTRACAUDAL; PERINEURAL ONCE
Status: COMPLETED | OUTPATIENT
Start: 2022-04-15 | End: 2022-04-15

## 2022-04-15 RX ORDER — METHYLPREDNISOLONE ACETATE 80 MG/ML
80 INJECTION, SUSPENSION INTRA-ARTICULAR; INTRALESIONAL; INTRAMUSCULAR; PARENTERAL; SOFT TISSUE ONCE
Status: COMPLETED | OUTPATIENT
Start: 2022-04-15 | End: 2022-04-15

## 2022-04-15 RX ADMIN — LIDOCAINE HYDROCHLORIDE 2 ML: 10 INJECTION, SOLUTION EPIDURAL; INFILTRATION; INTRACAUDAL; PERINEURAL at 14:07

## 2022-04-15 RX ADMIN — METHYLPREDNISOLONE ACETATE 80 MG: 80 INJECTION, SUSPENSION INTRA-ARTICULAR; INTRALESIONAL; INTRAMUSCULAR; PARENTERAL; SOFT TISSUE at 14:11

## 2022-04-15 NOTE — DISCHARGE INSTRUCTIONS
Epidural Steroid Injection   WHAT YOU NEED TO KNOW:   An epidural steroid injection (RYANN) is a procedure to inject steroid medicine into the epidural space  The epidural space is between your spinal cord and vertebrae  Steroids reduce inflammation and fluid buildup in your spine that may be causing pain  You may be given pain medicine along with the steroids  ACTIVITY  · Do not drive or operate machinery today  · No strenuous activity today - bending, lifting, etc   · You may resume normal activites starting tomorrow - start slowly and as tolerated  · You may shower today, but no tub baths or hot tubs  · You may have numbness for several hours from the local anesthetic  Please use caution and common sense, especially with weight-bearing activities  CARE OF THE INJECTION SITE  · If you have soreness or pain, apply ice to the area today (20 minutes on/20 minutes off)  · Starting tomorrow, you may use warm, moist heat or ice if needed  · You may have an increase or change in your discomfort for 36-48 hours after your treatment  · Apply ice and continue with any pain medication you have been prescribed  · Notify [facility] if you have any of the following: redness, drainage, swelling, headache, stiff neck or fever above 100°F     SPECIAL INSTRUCTIONS  · Our office will contact you in approximately 7 days for a progress report  MEDICATIONS  · Continue to take all routine medications  · Our office may have instructed you to hold some medications  As no general anesthesia was used in today's procedure, you should not experience any side effects related to anesthesia  If you have a problem specifically related to your procedure, please call our office at [phone]  Problems not related to your procedure should be directed to your primary care physician

## 2022-04-15 NOTE — INTERVAL H&P NOTE
Update: (This section must be completed if the H&P was completed greater than 24 hrs to procedure or admission)    H&P reviewed  After examining the patient, I find no changed to the H&P since it had been written  Patient re-evaluated   Accept as history and physical     Lynn Mcdonough MD/April 15, 2022/1:58 PM

## 2022-04-22 ENCOUNTER — TELEPHONE (OUTPATIENT)
Dept: PAIN MEDICINE | Facility: CLINIC | Age: 57
End: 2022-04-22

## 2022-04-26 ENCOUNTER — TELEPHONE (OUTPATIENT)
Dept: NEUROSURGERY | Facility: CLINIC | Age: 57
End: 2022-04-26

## 2022-05-09 ENCOUNTER — TELEPHONE (OUTPATIENT)
Dept: DERMATOLOGY | Facility: CLINIC | Age: 57
End: 2022-05-09

## 2022-05-09 NOTE — TELEPHONE ENCOUNTER
Pt left vm requested cb to cancel and reschedule her appt, I cb but n/a I left our cb info and canceled her appt, advised to cb to get a new appt   kaz

## 2022-05-10 NOTE — OP NOTE
[facility] SPINE AND PAIN PROCEDURE Procedure Note    PATIENT NAME: Savannah Quick  : 1965  MRN: [id number]    Pre-op Diagnosis:  1  Lumbar radiculopathy    2  Chronic pain syndrome        Post-op Diagnosis:   1  Lumbar radiculopathy    2  Chronic pain syndrome        Surgeon:   Gabrielle Jean MD    PROCEDURE DESCRIPTION:  Fluoroscopically-guided  L5-S1 interlaminar epidural steroid injection under fluoroscopy      After discussing the risks, benefits, and alternatives to the procedure, the patient expressed understanding and wished to proceed  The patient was brought to the fluoroscopy suite and placed in the prone position  A procedural pause was conducted to verify:  correct patient identity, procedure to be performed and as applicable, correct side and site, correct patient position, and availability of implants, special equipment and special requirements  After identifying the L5-S1 space fluoroscopically, the skin was sterilely prepped and draped in the usual fashion using Chloraprep skin prep  The skin and subcutaneous tissue were anesthetized with 0 5 % lidocaine  Utilizing a loss of resistance technique and intermittent fluoroscopic guidance, a 3 5 20 gauge Tuohy needle was advanced into the epidural space  Proper needle positioning was confirmed using multiple fluoroscopic views  After negative aspiration, Omnipaque 300 contrast was injected confirming epidural spread without evidence of intravascular or intrathecal spread  A 4 ml solution consisting of 80 mg Depo-Medrol in sterile saline was injected slowly and incrementally into the epidural space  Following the injection the needle was withdrawn slightly and flushed with 0 5 % lidocaine as it was fully extracted  The patient tolerated the procedure well and there were no apparent complications  After appropriate observation, the patient was dismissed from the clinic in good condition under their own power      Estimated Blood Loss: none    Findings: NONE    Specimens: NONE    Complications:  NONE    Anesthesia: NONE     I was present throughout the entire procedure    DISPOSITION:  Patient tolerated the procedure well and sent to recovery room awake, alert, and hemodynamically stable

## 2022-05-20 ENCOUNTER — OFFICE VISIT (OUTPATIENT)
Dept: PAIN MEDICINE | Facility: CLINIC | Age: 57
End: 2022-05-20
Payer: COMMERCIAL

## 2022-05-20 VITALS
HEIGHT: 65 IN | DIASTOLIC BLOOD PRESSURE: 81 MMHG | SYSTOLIC BLOOD PRESSURE: 124 MMHG | HEART RATE: 93 BPM | WEIGHT: 149 LBS | BODY MASS INDEX: 24.83 KG/M2

## 2022-05-20 DIAGNOSIS — M54.16 LUMBAR RADICULOPATHY: ICD-10-CM

## 2022-05-20 DIAGNOSIS — M47.816 LUMBAR SPONDYLOSIS: ICD-10-CM

## 2022-05-20 DIAGNOSIS — M79.18 MYOFASCIAL PAIN SYNDROME: ICD-10-CM

## 2022-05-20 PROCEDURE — 99214 OFFICE O/P EST MOD 30 MIN: CPT | Performed by: NURSE PRACTITIONER

## 2022-05-20 RX ORDER — PREGABALIN 75 MG/1
75 CAPSULE ORAL 3 TIMES DAILY
Qty: 90 CAPSULE | Refills: 1 | Status: SHIPPED | OUTPATIENT
Start: 2022-05-20 | End: 2022-07-18

## 2022-05-20 NOTE — PROGRESS NOTES
Assessment:  1  Lumbar radiculopathy    2  Lumbar spondylosis    3  Myofascial pain syndrome        Plan:  While the patient was in the office today, I did have a thorough conversation regarding their chronic pain syndrome, medication management, and treatment plan options  Patient is being seen for follow-up visit  She recently underwent an L5-S1 interlaminar epidural steroid injection on 04/15/2022  She reports that her pain was 80% improved for 2 weeks after the injection  At this point, we will plan to repeat the injection in the near future  Complete risks and benefits including bleeding, infection, tissue reaction, nerve injury and allergic reaction were discussed  The approach was demonstrated using models and literature was provided  Verbal and written consent was obtained  Increase Lyrica 75 mg 3 times daily to address the neuropathic component of her pain  A prescription was sent to her pharmacy with refills  Continue diclofenac 50 mg twice daily to address the inflammatory component of her pain  A prescription was sent to her pharmacy with refills  Continue tizanidine 4 mg which she uses as needed  She did not require refill this medication during today's visit  Follow up one month after the injection  History of Present Illness: The patient is a 64 y o  female who presents for a follow up office visit in regards to Back Pain  The patients current symptoms include complaints of low back pain, intermittent pain down the posterior left thigh  Current pain level is an 8/10  Quality pain is described as burning, dull, aching, throbbing  Current pain medications includes:  Lyrica 50 mg 3 times daily, diclofenac 50 mg twice daily, tizanidine 4 mg as needed   The patient reports that this regimen is providing up to 50 % pain relief  The patient is reporting no side effects from this pain medication regimen      I have personally reviewed and/or updated the patient's past medical history, past surgical history, family history, social history, current medications, allergies, and vital signs today  Review of Systems  Review of Systems   Constitutional: Negative for chills and fever  HENT: Negative for ear pain and sore throat  Eyes: Negative for pain and visual disturbance  Respiratory: Negative for cough and shortness of breath  Cardiovascular: Negative for chest pain and palpitations  Gastrointestinal: Negative for abdominal pain and vomiting  Genitourinary: Negative for dysuria and hematuria  Musculoskeletal: Positive for gait problem and joint swelling (lump on back)  Negative for arthralgias and back pain  Decreased range of motion  Joint stiffness  Pain in extremity- sometimes down my leg      Skin: Negative for color change and rash  Neurological: Negative for seizures and syncope  All other systems reviewed and are negative          Past Medical History:   Diagnosis Date    Abnormal Pap smear of cervix     Anxiety     Bell's palsy     [facility]     Depression     Menopause     Mild persistent asthma without complication     Obesity     Primary osteoarthritis involving multiple joints 10/9/2018       Past Surgical History:   Procedure Laterality Date    ANKLE SURGERY      BREAST BIOPSY  12 yrs ago     SECTION      COLONOSCOPY      COLONOSCOPY W/ POLYPECTOMY      DILATION AND CURETTAGE, DIAGNOSTIC / THERAPEUTIC      NOSE SURGERY      TOTAL HIP ARTHROPLASTY Left     TUBAL LIGATION         Family History   Problem Relation Age of Onset    Mental illness Mother     Suicidality Mother     No Known Problems Father     Mental illness Brother     Suicidality Brother     No Known Problems Maternal Grandmother     No Known Problems Paternal Grandmother     No Known Problems Maternal Aunt     No Known Problems Paternal Aunt     No Known Problems Paternal Aunt     No Known Problems Paternal Aunt     No Known Problems Paternal Aunt     Cancer Family        Social History     Occupational History    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: [employer]     Comment: PT / Waiver Services   Tobacco Use    Smoking status: Current Every Day Smoker     Packs/day: 1 50     Years: 42 00     Pack years: 63 00     Types: Cigarettes     Start date: 1979    Smokeless tobacco: Never Used    Tobacco comment: cut down to 1 ppd  and using lozenges   Vaping Use    Vaping Use: Never used   Substance and Sexual Activity    Alcohol use: Not Currently    Drug use: Not Currently     Types: Marijuana, Cocaine    Sexual activity: Not Currently     Partners: Male     Birth control/protection: None         Current Outpatient Medications:     albuterol (PROVENTIL HFA,VENTOLIN HFA) 90 mcg/act inhaler, INHALE 2 PUFFS BY MOUTH EVERY 6 HOURS AS NEEDED FOR WHEEZING FOR SHORTNESS OF BREATH, Disp: 18 g, Rfl: 5    Ascorbic Acid (C-1000/ALIX HIPS PO), Take by mouth daily  , Disp: , Rfl:     benzonatate (TESSALON) 200 MG capsule, Take 1 capsule (200 mg total) by mouth 3 (three) times a day as needed for cough, Disp: 20 capsule, Rfl: 0    BIOTIN 5000 PO, Take by mouth daily  , Disp: , Rfl:     Coenzyme Q10 (COQ10) 100 MG CAPS, Take by mouth daily  , Disp: , Rfl:     diclofenac sodium (VOLTAREN) 50 mg EC tablet, Take 1 tablet (50 mg total) by mouth in the morning and 1 tablet (50 mg total) in the evening  With food  , Disp: 60 tablet, Rfl: 2    DULoxetine HCl 40 MG CPEP, daily  , Disp: , Rfl:     folic acid (FOLVITE) 1 mg tablet, Take 1 tablet (1 mg total) by mouth daily, Disp: 30 tablet, Rfl: 0    Loratadine 10 MG CAPS, Take by mouth as needed  , Disp: , Rfl:     MAGNESIUM CITRATE PO, Take 400 mg by mouth daily  , Disp: , Rfl:     Menthol, Topical Analgesic, (Biofreeze Roll-On) 4 % GEL, Apply 1 application topically 3 (three) times a day as needed (pain), Disp: 150 Tube, Rfl: 1   montelukast (SINGULAIR) 10 mg tablet, TAKE 1 TABLET BY MOUTH ONCE DAILY AT BEDTIME, Disp: 90 tablet, Rfl: 0    pramipexole (MIRAPEX) 0 5 mg tablet, Take 1 tablet by mouth once daily, Disp: 30 tablet, Rfl: 0    pregabalin (LYRICA) 75 mg capsule, Take 1 capsule (75 mg total) by mouth in the morning and 1 capsule (75 mg total) in the evening and 1 capsule (75 mg total) before bedtime  , Disp: 90 capsule, Rfl: 1    risperiDONE (RisperDAL) 2 mg tablet, Take 2 mg by mouth daily at bedtime, Disp: , Rfl:     Spiriva Respimat 1 25 MCG/ACT AERS inhaler, INHALE 2 SPRAY(S) BY MOUTH ONCE DAILY, Disp: 4 g, Rfl: 2    SSD 1 % cream, APPLY CREAM EXTERNALLY ONCE DAILY, Disp: , Rfl:     tiZANidine (ZANAFLEX) 4 mg tablet, Take 1 tablet (4 mg total) by mouth 2 (two) times a day, Disp: 90 tablet, Rfl: 0    vitamin E, tocopherol, 400 units capsule, Take 400 Units by mouth daily, Disp: , Rfl:     nicotine (NICODERM CQ) 21 mg/24 hr TD 24 hr patch, Place 1 patch on the skin every 24 hours (Patient not taking: No sig reported), Disp: 28 patch, Rfl: 0    nicotine polacrilex (COMMIT) 2 MG lozenge, Apply 1 lozenge (2 mg total) to the mouth or throat as needed for smoking cessation (Patient not taking: No sig reported), Disp: 100 each, Rfl: 0    risperiDONE (RisperDAL M-TAB) 1 mg disintegrating tablet, Take 1 mg by mouth daily at bedtime (Patient not taking: No sig reported), Disp: , Rfl:     Allergies   Allergen Reactions    Pollen Extract        Physical Exam:    /81   Pulse 93   Ht 5' 5" (1 651 m)   Wt 67 6 kg (149 lb)   LMP  (LMP Unknown)   BMI 24 79 kg/m²     Constitutional:normal, well developed, well nourished, alert, in no distress and non-toxic and no overt pain behavior    Eyes:anicteric  HEENT:grossly intact  Neck:supple, symmetric, trachea midline and no masses   Pulmonary:even and unlabored  Cardiovascular:No edema or pitting edema present  Skin:Normal without rashes or lesions and well hydrated  Psychiatric:Mood and affect appropriate  Neurologic:Cranial Nerves II-XII grossly intact  Musculoskeletal:normal    Imaging  No orders to display       No orders of the defined types were placed in this encounter

## 2022-05-20 NOTE — H&P (VIEW-ONLY)
Assessment:  1  Lumbar radiculopathy    2  Lumbar spondylosis    3  Myofascial pain syndrome        Plan:  While the patient was in the office today, I did have a thorough conversation regarding their chronic pain syndrome, medication management, and treatment plan options  Patient is being seen for follow-up visit  She recently underwent an L5-S1 interlaminar epidural steroid injection on 04/15/2022  She reports that her pain was 80% improved for 2 weeks after the injection  At this point, we will plan to repeat the injection in the near future  Complete risks and benefits including bleeding, infection, tissue reaction, nerve injury and allergic reaction were discussed  The approach was demonstrated using models and literature was provided  Verbal and written consent was obtained  Increase Lyrica 75 mg 3 times daily to address the neuropathic component of her pain  A prescription was sent to her pharmacy with refills  Continue diclofenac 50 mg twice daily to address the inflammatory component of her pain  A prescription was sent to her pharmacy with refills  Continue tizanidine 4 mg which she uses as needed  She did not require refill this medication during today's visit  Follow up one month after the injection  History of Present Illness: The patient is a 64 y o  female who presents for a follow up office visit in regards to Back Pain  The patients current symptoms include complaints of low back pain, intermittent pain down the posterior left thigh  Current pain level is an 8/10  Quality pain is described as burning, dull, aching, throbbing  Current pain medications includes:  Lyrica 50 mg 3 times daily, diclofenac 50 mg twice daily, tizanidine 4 mg as needed   The patient reports that this regimen is providing up to 50 % pain relief  The patient is reporting no side effects from this pain medication regimen      I have personally reviewed and/or updated the patient's past medical history, past surgical history, family history, social history, current medications, allergies, and vital signs today  Review of Systems  Review of Systems   Constitutional: Negative for chills and fever  HENT: Negative for ear pain and sore throat  Eyes: Negative for pain and visual disturbance  Respiratory: Negative for cough and shortness of breath  Cardiovascular: Negative for chest pain and palpitations  Gastrointestinal: Negative for abdominal pain and vomiting  Genitourinary: Negative for dysuria and hematuria  Musculoskeletal: Positive for gait problem and joint swelling (lump on back)  Negative for arthralgias and back pain  Decreased range of motion  Joint stiffness  Pain in extremity- sometimes down my leg      Skin: Negative for color change and rash  Neurological: Negative for seizures and syncope  All other systems reviewed and are negative          Past Medical History:   Diagnosis Date    Abnormal Pap smear of cervix     Anxiety     Bell's palsy     [facility]     Depression     Menopause     Mild persistent asthma without complication     Obesity     Primary osteoarthritis involving multiple joints 10/9/2018       Past Surgical History:   Procedure Laterality Date    ANKLE SURGERY      BREAST BIOPSY  12 yrs ago     SECTION      COLONOSCOPY      COLONOSCOPY W/ POLYPECTOMY      DILATION AND CURETTAGE, DIAGNOSTIC / THERAPEUTIC      NOSE SURGERY      TOTAL HIP ARTHROPLASTY Left     TUBAL LIGATION         Family History   Problem Relation Age of Onset    Mental illness Mother     Suicidality Mother     No Known Problems Father     Mental illness Brother     Suicidality Brother     No Known Problems Maternal Grandmother     No Known Problems Paternal Grandmother     No Known Problems Maternal Aunt     No Known Problems Paternal Aunt     No Known Problems Paternal Aunt     No Known Problems Paternal Aunt     No Known Problems Paternal Aunt     Cancer Family        Social History     Occupational History    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: [employer]     Comment: PT / Waiver Services   Tobacco Use    Smoking status: Current Every Day Smoker     Packs/day: 1 50     Years: 42 00     Pack years: 63 00     Types: Cigarettes     Start date: 1979    Smokeless tobacco: Never Used    Tobacco comment: cut down to 1 ppd  and using lozenges   Vaping Use    Vaping Use: Never used   Substance and Sexual Activity    Alcohol use: Not Currently    Drug use: Not Currently     Types: Marijuana, Cocaine    Sexual activity: Not Currently     Partners: Male     Birth control/protection: None         Current Outpatient Medications:     albuterol (PROVENTIL HFA,VENTOLIN HFA) 90 mcg/act inhaler, INHALE 2 PUFFS BY MOUTH EVERY 6 HOURS AS NEEDED FOR WHEEZING FOR SHORTNESS OF BREATH, Disp: 18 g, Rfl: 5    Ascorbic Acid (C-1000/ALIX HIPS PO), Take by mouth daily  , Disp: , Rfl:     benzonatate (TESSALON) 200 MG capsule, Take 1 capsule (200 mg total) by mouth 3 (three) times a day as needed for cough, Disp: 20 capsule, Rfl: 0    BIOTIN 5000 PO, Take by mouth daily  , Disp: , Rfl:     Coenzyme Q10 (COQ10) 100 MG CAPS, Take by mouth daily  , Disp: , Rfl:     diclofenac sodium (VOLTAREN) 50 mg EC tablet, Take 1 tablet (50 mg total) by mouth in the morning and 1 tablet (50 mg total) in the evening  With food  , Disp: 60 tablet, Rfl: 2    DULoxetine HCl 40 MG CPEP, daily  , Disp: , Rfl:     folic acid (FOLVITE) 1 mg tablet, Take 1 tablet (1 mg total) by mouth daily, Disp: 30 tablet, Rfl: 0    Loratadine 10 MG CAPS, Take by mouth as needed  , Disp: , Rfl:     MAGNESIUM CITRATE PO, Take 400 mg by mouth daily  , Disp: , Rfl:     Menthol, Topical Analgesic, (Biofreeze Roll-On) 4 % GEL, Apply 1 application topically 3 (three) times a day as needed (pain), Disp: 150 Tube, Rfl: 1   montelukast (SINGULAIR) 10 mg tablet, TAKE 1 TABLET BY MOUTH ONCE DAILY AT BEDTIME, Disp: 90 tablet, Rfl: 0    pramipexole (MIRAPEX) 0 5 mg tablet, Take 1 tablet by mouth once daily, Disp: 30 tablet, Rfl: 0    pregabalin (LYRICA) 75 mg capsule, Take 1 capsule (75 mg total) by mouth in the morning and 1 capsule (75 mg total) in the evening and 1 capsule (75 mg total) before bedtime  , Disp: 90 capsule, Rfl: 1    risperiDONE (RisperDAL) 2 mg tablet, Take 2 mg by mouth daily at bedtime, Disp: , Rfl:     Spiriva Respimat 1 25 MCG/ACT AERS inhaler, INHALE 2 SPRAY(S) BY MOUTH ONCE DAILY, Disp: 4 g, Rfl: 2    SSD 1 % cream, APPLY CREAM EXTERNALLY ONCE DAILY, Disp: , Rfl:     tiZANidine (ZANAFLEX) 4 mg tablet, Take 1 tablet (4 mg total) by mouth 2 (two) times a day, Disp: 90 tablet, Rfl: 0    vitamin E, tocopherol, 400 units capsule, Take 400 Units by mouth daily, Disp: , Rfl:     nicotine (NICODERM CQ) 21 mg/24 hr TD 24 hr patch, Place 1 patch on the skin every 24 hours (Patient not taking: No sig reported), Disp: 28 patch, Rfl: 0    nicotine polacrilex (COMMIT) 2 MG lozenge, Apply 1 lozenge (2 mg total) to the mouth or throat as needed for smoking cessation (Patient not taking: No sig reported), Disp: 100 each, Rfl: 0    risperiDONE (RisperDAL M-TAB) 1 mg disintegrating tablet, Take 1 mg by mouth daily at bedtime (Patient not taking: No sig reported), Disp: , Rfl:     Allergies   Allergen Reactions    Pollen Extract        Physical Exam:    /81   Pulse 93   Ht 5' 5" (1 651 m)   Wt 67 6 kg (149 lb)   LMP  (LMP Unknown)   BMI 24 79 kg/m²     Constitutional:normal, well developed, well nourished, alert, in no distress and non-toxic and no overt pain behavior    Eyes:anicteric  HEENT:grossly intact  Neck:supple, symmetric, trachea midline and no masses   Pulmonary:even and unlabored  Cardiovascular:No edema or pitting edema present  Skin:Normal without rashes or lesions and well hydrated  Psychiatric:Mood and affect appropriate  Neurologic:Cranial Nerves II-XII grossly intact  Musculoskeletal:normal    Imaging  No orders to display       No orders of the defined types were placed in this encounter

## 2022-05-23 ENCOUNTER — TELEPHONE (OUTPATIENT)
Dept: PAIN MEDICINE | Facility: CLINIC | Age: 57
End: 2022-05-23

## 2022-06-01 ENCOUNTER — TELEPHONE (OUTPATIENT)
Dept: OBGYN CLINIC | Facility: CLINIC | Age: 57
End: 2022-06-01

## 2022-06-01 ENCOUNTER — TELEPHONE (OUTPATIENT)
Dept: OBGYN CLINIC | Facility: MEDICAL CENTER | Age: 57
End: 2022-06-01

## 2022-06-01 DIAGNOSIS — J30.2 SEASONAL ALLERGIES: ICD-10-CM

## 2022-06-01 RX ORDER — MONTELUKAST SODIUM 10 MG/1
TABLET ORAL
Qty: 90 TABLET | Refills: 0 | Status: SHIPPED | OUTPATIENT
Start: 2022-06-01

## 2022-06-01 NOTE — TELEPHONE ENCOUNTER
Pt called in set up an appt for a copo in [place]  Pt would also like to know if there is anything she can take to stop sweating from menopause  Please review when available thank you

## 2022-06-01 NOTE — TELEPHONE ENCOUNTER
There are treatment options available for menopause, but they are not universally appropriate for all patients and I recommend evaluation and discussion of risks to better guide recommendations for her

## 2022-06-16 ENCOUNTER — PROCEDURE VISIT (OUTPATIENT)
Dept: OBGYN CLINIC | Facility: CLINIC | Age: 57
End: 2022-06-16
Payer: COMMERCIAL

## 2022-06-16 VITALS
DIASTOLIC BLOOD PRESSURE: 74 MMHG | BODY MASS INDEX: 22.99 KG/M2 | WEIGHT: 138 LBS | SYSTOLIC BLOOD PRESSURE: 124 MMHG | HEIGHT: 65 IN

## 2022-06-16 DIAGNOSIS — B97.7 HIGH RISK HPV INFECTION: ICD-10-CM

## 2022-06-16 DIAGNOSIS — R87.612 LGSIL ON PAP SMEAR OF CERVIX: ICD-10-CM

## 2022-06-16 PROCEDURE — 88305 TISSUE EXAM BY PATHOLOGIST: CPT | Performed by: PATHOLOGY

## 2022-06-16 PROCEDURE — G0145 SCR C/V CYTO,THINLAYER,RESCR: HCPCS | Performed by: OBSTETRICS & GYNECOLOGY

## 2022-06-16 PROCEDURE — 57454 BX/CURETT OF CERVIX W/SCOPE: CPT | Performed by: OBSTETRICS & GYNECOLOGY

## 2022-06-16 RX ORDER — METHYLPREDNISOLONE 4 MG/1
TABLET ORAL
COMMUNITY
Start: 2022-04-06 | End: 2022-06-23

## 2022-06-16 NOTE — PROGRESS NOTES
Colposcopy     Date/Time 6/16/2022 11:43 AM     Universal Protocol   Consent: Verbal consent obtained  Written consent obtained  Risks and benefits: risks, benefits and alternatives were discussed  Consent given by: patient  Patient understanding: patient states understanding of the procedure being performed  Patient consent: the patient's understanding of the procedure matches consent given  Procedure consent: procedure consent matches procedure scheduled  Required items: required blood products, implants, devices, and special equipment available       Performed by  Bry Ferguson MD     Authorized by Bry Ferguson MD        Pre-procedure details     Prepped with: acetic acid       Indication    LSIL (HPV+)     Procedure Details   Procedure: Colposcopy w/ cervical biopsy and ECC      Under satisfactory analgesia the patient was prepped and draped in the dorsal lithotomy position: yes      [place] speculum was placed in the vagina: yes      Under colposcopic examination the transition zone was seen in entirety: yes      Endocervix was curetted using a Kevorkian curette: yes      Cervical biopsy performed with a cervical biopsy punch: yes (tischler)      Tampon inserted: no      Monsel's solution was applied: yes      Biopsy(s): yes      Location:  12 and 6 oclock    Specimen to pathology: yes       Post-procedure      Findings: Punctation and White epithelium      Impression: Low grade cervical dysplasia      Patient tolerance of procedure: Tolerated well, no immediate complications     Comments       Visualizing TZ was possible during coloscopic exam, but sampling this area was difficult due to stenosis  6 oclock biopsy taken first  On manipulation of cervix to obtain 12 oclock, the 6 oclock site did not appear to sample TZ and thus a 2nd sample at this location was taken  ECC also performed          Physical Exam  Genitourinary:           Comments: acetowhite changes and punctation noted with acetic acid application

## 2022-06-17 ENCOUNTER — HOSPITAL ENCOUNTER (OUTPATIENT)
Dept: RADIOLOGY | Facility: HOSPITAL | Age: 57
Discharge: HOME | End: 2022-06-17
Admitting: ANESTHESIOLOGY
Payer: COMMERCIAL

## 2022-06-17 VITALS
DIASTOLIC BLOOD PRESSURE: 73 MMHG | SYSTOLIC BLOOD PRESSURE: 112 MMHG | OXYGEN SATURATION: 98 % | HEART RATE: 93 BPM | RESPIRATION RATE: 24 BRPM | TEMPERATURE: 95 F

## 2022-06-17 DIAGNOSIS — M47.816 LUMBAR SPONDYLOSIS: ICD-10-CM

## 2022-06-17 DIAGNOSIS — M54.16 LUMBAR RADICULOPATHY: ICD-10-CM

## 2022-06-17 DIAGNOSIS — M79.18 MYOFASCIAL PAIN SYNDROME: ICD-10-CM

## 2022-06-17 PROCEDURE — 62323 NJX INTERLAMINAR LMBR/SAC: CPT | Performed by: ANESTHESIOLOGY

## 2022-06-17 RX ORDER — PAPAVERINE HCL 150 MG
15 CAPSULE, EXTENDED RELEASE ORAL ONCE
Status: COMPLETED | OUTPATIENT
Start: 2022-06-17 | End: 2022-06-17

## 2022-06-17 RX ORDER — LIDOCAINE HYDROCHLORIDE 10 MG/ML
2 INJECTION, SOLUTION EPIDURAL; INFILTRATION; INTRACAUDAL; PERINEURAL ONCE
Status: COMPLETED | OUTPATIENT
Start: 2022-06-17 | End: 2022-06-17

## 2022-06-17 RX ADMIN — Medication 15 MG: at 15:05

## 2022-06-17 RX ADMIN — LIDOCAINE HYDROCHLORIDE 2 ML: 10 INJECTION, SOLUTION EPIDURAL; INFILTRATION; INTRACAUDAL; PERINEURAL at 15:02

## 2022-06-17 NOTE — INTERVAL H&P NOTE
Update: (This section must be completed if the H&P was completed greater than 24 hrs to procedure or admission)    H&P reviewed  After examining the patient, I find no changed to the H&P since it had been written  Patient re-evaluated   Accept as history and physical     Lynn Mcdonough MD/June 17, 2022/2:56 PM

## 2022-06-17 NOTE — DISCHARGE INSTRUCTIONS
Epidural Steroid Injection   WHAT YOU NEED TO KNOW:   An epidural steroid injection (RYANN) is a procedure to inject steroid medicine into the epidural space  The epidural space is between your spinal cord and vertebrae  Steroids reduce inflammation and fluid buildup in your spine that may be causing pain  You may be given pain medicine along with the steroids  ACTIVITY  Do not drive or operate machinery today  No strenuous activity today - bending, lifting, etc   You may resume normal activites starting tomorrow - start slowly and as tolerated  You may shower today, but no tub baths or hot tubs  You may have numbness for several hours from the local anesthetic  Please use caution and common sense, especially with weight-bearing activities  CARE OF THE INJECTION SITE  If you have soreness or pain, apply ice to the area today (20 minutes on/20 minutes off)  Starting tomorrow, you may use warm, moist heat or ice if needed  You may have an increase or change in your discomfort for 36-48 hours after your treatment  Apply ice and continue with any pain medication you have been prescribed  Notify the [facility] if you have any of the following: redness, drainage, swelling, headache, stiff neck or fever above 100°F     SPECIAL INSTRUCTIONS  Our office will contact you in approximately 7 days for a progress report  MEDICATIONS  Continue to take all routine medications  Our office may have instructed you to hold some medications  As no general anesthesia was used in today's procedure, you should not experience any side effects related to anesthesia  If you have a problem specifically related to your procedure, please call our office at [phone]  Problems not related to your procedure should be directed to your primary care physician

## 2022-06-22 LAB
LAB AP GYN PRIMARY INTERPRETATION: NORMAL
Lab: NORMAL

## 2022-06-23 ENCOUNTER — OFFICE VISIT (OUTPATIENT)
Dept: FAMILY MEDICINE CLINIC | Facility: CLINIC | Age: 57
End: 2022-06-23
Payer: COMMERCIAL

## 2022-06-23 VITALS
TEMPERATURE: 97.9 F | WEIGHT: 139 LBS | HEART RATE: 103 BPM | DIASTOLIC BLOOD PRESSURE: 74 MMHG | BODY MASS INDEX: 23.16 KG/M2 | OXYGEN SATURATION: 98 % | HEIGHT: 65 IN | SYSTOLIC BLOOD PRESSURE: 122 MMHG

## 2022-06-23 DIAGNOSIS — Z12.2 ENCOUNTER FOR SCREENING FOR LUNG CANCER: ICD-10-CM

## 2022-06-23 DIAGNOSIS — Z13.220 SCREENING CHOLESTEROL LEVEL: ICD-10-CM

## 2022-06-23 DIAGNOSIS — Z23 ENCOUNTER FOR IMMUNIZATION: ICD-10-CM

## 2022-06-23 DIAGNOSIS — R79.89 ABNORMAL TSH: ICD-10-CM

## 2022-06-23 DIAGNOSIS — Z12.12 SCREENING FOR COLORECTAL CANCER: ICD-10-CM

## 2022-06-23 DIAGNOSIS — Z12.31 ENCOUNTER FOR SCREENING MAMMOGRAM FOR BREAST CANCER: ICD-10-CM

## 2022-06-23 DIAGNOSIS — Z12.11 SCREENING FOR COLORECTAL CANCER: ICD-10-CM

## 2022-06-23 DIAGNOSIS — D72.829 LEUKOCYTOSIS, UNSPECIFIED TYPE: ICD-10-CM

## 2022-06-23 DIAGNOSIS — Z00.00 ANNUAL PHYSICAL EXAM: Primary | ICD-10-CM

## 2022-06-23 DIAGNOSIS — F43.10 PTSD (POST-TRAUMATIC STRESS DISORDER): ICD-10-CM

## 2022-06-23 DIAGNOSIS — Z72.0 TOBACCO ABUSE: ICD-10-CM

## 2022-06-23 DIAGNOSIS — Z12.4 SCREENING FOR CERVICAL CANCER: ICD-10-CM

## 2022-06-23 DIAGNOSIS — F33.0 MILD EPISODE OF RECURRENT MAJOR DEPRESSIVE DISORDER (HCC): ICD-10-CM

## 2022-06-23 PROCEDURE — 90472 IMMUNIZATION ADMIN EACH ADD: CPT | Performed by: FAMILY MEDICINE

## 2022-06-23 PROCEDURE — 90750 HZV VACC RECOMBINANT IM: CPT | Performed by: FAMILY MEDICINE

## 2022-06-23 PROCEDURE — 90677 PCV20 VACCINE IM: CPT | Performed by: FAMILY MEDICINE

## 2022-06-23 PROCEDURE — 90471 IMMUNIZATION ADMIN: CPT | Performed by: FAMILY MEDICINE

## 2022-06-23 PROCEDURE — 99396 PREV VISIT EST AGE 40-64: CPT | Performed by: FAMILY MEDICINE

## 2022-06-23 PROCEDURE — 99214 OFFICE O/P EST MOD 30 MIN: CPT | Performed by: FAMILY MEDICINE

## 2022-06-23 NOTE — PROGRESS NOTES
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    Encounter for immunization  -     Pneumococcal Conjugate Vaccine 20-valent (PCV20)  -     Zoster Vaccine Recombinant IM    Encounter for screening mammogram for breast cancer  Comments:  mammo normal 2021  repeat due in 1 month order placed  Orders:  -     Mammo screening bilateral w 3d & cad; Future    Screening for cervical cancer  Comments:  follows with GYN  had her 6 month rpt colpo due to previous abnormal  results still pending  continue to follow with gyn    Screening for colorectal cancer  Comments:  last colonoscopy 2021  prelim recommendation from GI was repeat in 3 years which would be   Encounter for screening for lung cancer  Comments:  1 5 packs/day for 42 years (63 00 pk-yrs)  last CT chest was normal 2021  repeat order  Orders:  -     CT lung screening program; Future    Leukocytosis, unspecified type  -     WBC; Future    Screening cholesterol level  -     Comprehensive metabolic panel; Future  -     Lipid panel; Future    Abnormal TSH  -     TSH, 3rd generation with Free T4 reflex; Future    Tobacco abuse  Comments:  down to 1 ppd  no interest in quitting    PTSD (post-traumatic stress disorder)  -     Ambulatory Referral to Psychology; Future  -     Ambulatory Referral to Psychiatry; Future    Mild episode of recurrent major depressive disorder [facility])  -     Ambulatory Referral to Psychology; Future  -     Ambulatory Referral to Psychiatry;  Future        Immunizations and preventive care screenings were discussed with patient today  Appropriate education was printed on patient's after visit summary  Counseling:  Alcohol/drug use: discussed moderation in alcohol intake, the recommendations for healthy alcohol use, and avoidance of illicit drug use  Dental Health: discussed importance of regular tooth brushing, flossing, and dental visits  Injury prevention: discussed safety/seat belts, safety helmets, smoke detectors, carbon dioxide detectors, and smoking near bedding or upholstery  Sexual health: discussed sexually transmitted diseases, partner selection, use of condoms, avoidance of unintended pregnancy, and contraceptive alternatives  · Exercise: the importance of regular exercise/physical activity was discussed  Recommend exercise 3-5 times per week for at least 30 minutes  Return in about 6 months (around 12/23/2022) for 6 month follow up  Chief Complaint:     Chief Complaint   Patient presents with    Annual Exam     Chris Horn last week and leg is red      History of Present Illness:     Adult Annual Physical   Patient here for a comprehensive physical exam  The patient reports wants to see a new psychiatrist and do talk therapy  She wants to see the doctor to prescribe medications  She is no longer taking risperidone due to side effects  She also reports she fell last Friday and hit her right leg  Has pain there and reports she went to  on Monday and they told her everything is fine  She has been covering It with a wrap but reports it still hurts even though its better than before  Denies hx of GI bleed  She reports hx of sutures and ankle surgery but no other major injuries to the area  XR showed edema in the area which would account for the swelling  Diet and Physical Activity  · Diet/Nutrition: poor diet, frequent junk food and limited fruits/vegetables  · Exercise: no formal exercise        Depression Screening  PHQ-2/9 Depression Screening    Little interest or pleasure in doing things: 1 - several days  Feeling down, depressed, or hopeless: 1 - several days  Trouble falling or staying asleep, or sleeping too much: 0 - not at all  Feeling tired or having little energy: 1 - several days  Poor appetite or overeatin - not at all  Feeling bad about yourself - or that you are a failure or have let yourself or your family down: 0 - not at all  Trouble concentrating on things, such as reading the newspaper or watching television: 1 - several days  Moving or speaking so slowly that other people could have noticed  Or the opposite - being so fidgety or restless that you have been moving around a lot more than usual: 0 - not at all  Thoughts that you would be better off dead, or of hurting yourself in some way: 0 - not at all  PHQ-9 Score: 4   PHQ-9 Interpretation: No or Minimal depression        General Health  · Sleep: sleeps well and gets 7-8 hours of sleep on average  · Hearing: normal - bilateral   · Vision: most recent eye exam <1 year ago and wears glasses  · Dental: no dental visits for >1 year and has fitting for second set of dentures  /GYN Health  · Patient is: postmenopausal  · Last menstrual period: 8 years ago  · Contraceptive method: none       Review of Systems:     Review of Systems   Past Medical History:     Past Medical History:   Diagnosis Date    Abnormal Pap smear of cervix     Anxiety     Bell's palsy     COPD [facility]chronic obstructive pulmonary disease) ([facility] [address] )     Depression     Menopause     Mild persistent asthma without complication     Obesity     Primary osteoarthritis involving multiple joints 10/9/2018      Past Surgical History:     Past Surgical History:   Procedure Laterality Date    ANKLE SURGERY      BREAST BIOPSY  12 yrs ago     SECTION      COLONOSCOPY      COLONOSCOPY W/ POLYPECTOMY      DILATION AND CURETTAGE, DIAGNOSTIC / THERAPEUTIC      NOSE SURGERY      TOTAL HIP ARTHROPLASTY Left     TUBAL LIGATION        Social History:     Social History     Socioeconomic History    Marital status: Single     Spouse name: None    Number of children: 2    Years of education: None    Highest education level: Associate degree: academic program   Occupational History    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: [employer]     Comment: PT / Waiver Services   Tobacco Use    Smoking status: Current Every Day Smoker     Packs/day: 1 50     Years: 42 00     Pack years: 63 00     Types: Cigarettes     Start date: 1979    Smokeless tobacco: Never Used    Tobacco comment: cut down to 1 ppd  and using lozenges   Vaping Use    Vaping Use: Never used   Substance and Sexual Activity    Alcohol use: Not Currently    Drug use: Not Currently     Types: Marijuana, Cocaine    Sexual activity: Not Currently     Partners: Male     Birth control/protection: None   Other Topics Concern    None   Social History Narrative      Has 3 children    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  Lives with son and two roommates       Social Determinants of Health     Financial Resource Strain: Not on file   Food Insecurity: Not on file   Transportation Needs: Not on file   Physical Activity: Not on file   Stress: Not on file   Social Connections: Not on file   Intimate Partner Violence: Not on file   Housing Stability: Not on file      Family History:     Family History   Problem Relation Age of Onset    Mental illness Mother     Suicidality Mother     No Known Problems Father     Mental illness Brother     Suicidality Brother     No Known Problems Maternal Grandmother     No Known Problems Paternal Grandmother     No Known Problems Maternal Aunt     No Known Problems Paternal Aunt     No Known Problems Paternal Aunt     No Known Problems Paternal Aunt     No Known Problems Paternal Aunt     Cancer Family       Current Medications:     Current Outpatient Medications   Medication Sig Dispense Refill    albuterol (PROVENTIL HFA,VENTOLIN HFA) 90 mcg/act inhaler INHALE 2 PUFFS BY MOUTH EVERY 6 HOURS AS NEEDED FOR WHEEZING FOR SHORTNESS OF BREATH 18 g 5    Ascorbic Acid (C-1000/ALIX HIPS PO) Take by mouth daily        BIOTIN 5000 PO Take by mouth daily        Coenzyme Q10 (COQ10) 100 MG CAPS Take by mouth daily        DULoxetine HCl 40 MG CPEP daily        folic acid (FOLVITE) 1 mg tablet Take 1 tablet (1 mg total) by mouth daily 30 tablet 0    Loratadine 10 MG CAPS Take by mouth as needed        MAGNESIUM CITRATE PO Take 400 mg by mouth daily        Menthol, Topical Analgesic, (Biofreeze Roll-On) 4 % GEL Apply 1 application topically 3 (three) times a day as needed (pain) 150 Tube 1    montelukast (SINGULAIR) 10 mg tablet TAKE 1 TABLET BY MOUTH ONCE DAILY AT BEDTIME 90 tablet 0    pramipexole (MIRAPEX) 0 5 mg tablet Take 1 tablet by mouth once daily 30 tablet 0    pregabalin (LYRICA) 75 mg capsule Take 1 capsule (75 mg total) by mouth in the morning and 1 capsule (75 mg total) in the evening and 1 capsule (75 mg total) before bedtime  90 capsule 1    Spiriva Respimat 1 25 MCG/ACT AERS inhaler INHALE 2 SPRAY(S) BY MOUTH ONCE DAILY 4 g 2    SSD 1 % cream APPLY CREAM EXTERNALLY ONCE DAILY      vitamin E, tocopherol, 400 units capsule Take 400 Units by mouth daily      diclofenac sodium (VOLTAREN) 50 mg EC tablet Take 1 tablet (50 mg total) by mouth in the morning and 1 tablet (50 mg total) in the evening  With food  60 tablet 2    tiZANidine (ZANAFLEX) 4 mg tablet Take 1 tablet (4 mg total) by mouth 2 (two) times a day 90 tablet 0     No current facility-administered medications for this visit  Allergies: Allergies   Allergen Reactions    Pollen Extract       Physical Exam:     /74   Pulse 103   Temp 97 9 °F (36 6 °C)   Ht 5' 5" (1 651 m)   Wt 63 kg (139 lb)   LMP  (LMP Unknown)   SpO2 98%   BMI 23 13 kg/m²     Physical Exam  Vitals and nursing note reviewed     Constitutional:       General: She is not in acute distress  Appearance: Normal appearance  She is well-developed  She is not ill-appearing, toxic-appearing or diaphoretic  HENT:      Head: Normocephalic and atraumatic  Right Ear: Tympanic membrane, ear canal and external ear normal       Left Ear: Tympanic membrane, ear canal and external ear normal       Nose: Nose normal  No congestion or rhinorrhea  Mouth/Throat:      Mouth: Mucous membranes are moist       Pharynx: Oropharynx is clear  No oropharyngeal exudate or posterior oropharyngeal erythema  Eyes:      Extraocular Movements: Extraocular movements intact  Conjunctiva/sclera: Conjunctivae normal       Pupils: Pupils are equal, round, and reactive to light  Neck:      Vascular: No carotid bruit  Cardiovascular:      Rate and Rhythm: Normal rate and regular rhythm  Pulses: Normal pulses  Heart sounds: Normal heart sounds  No murmur heard  No friction rub  No gallop  Pulmonary:      Effort: Pulmonary effort is normal  No respiratory distress  Breath sounds: Normal breath sounds  No wheezing or rales  Abdominal:      General: Bowel sounds are normal       Palpations: Abdomen is soft  Tenderness: There is no abdominal tenderness  There is no guarding  Musculoskeletal:         General: Normal range of motion  Cervical back: Neck supple  No muscular tenderness  Right lower leg: Swelling and tenderness present  No deformity  No edema  Left lower leg: No edema  Legs:    Lymphadenopathy:      Cervical: No cervical adenopathy  Skin:     General: Skin is warm and dry  Findings: No lesion  Neurological:      General: No focal deficit present  Mental Status: She is alert  Cranial Nerves: No cranial nerve deficit  Sensory: No sensory deficit  Motor: No weakness            Keke Elizalde MD  [address] [facility]

## 2022-06-23 NOTE — PATIENT INSTRUCTIONS
Wellness Visit for Adults   AMBULATORY CARE:   A wellness visit  is when you see your healthcare provider to get screened for health problems  Your healthcare provider will also give you advice on how to stay healthy  Write down your questions so you remember to ask them  Ask your healthcare provider how often you should have a wellness visit  What happens at a wellness visit:  Your healthcare provider will ask about your health, and your family history of health problems  This includes high blood pressure, heart disease, and cancer  He or she will ask if you have symptoms that concern you, if you smoke, and about your mood  You may also be asked about your intake of medicines, supplements, food, and alcohol  Any of the following may be done: Your weight  will be checked  Your height may also be checked so your body mass index (BMI) can be calculated  Your BMI shows if you are at a healthy weight  Your blood pressure  and heart rate will be checked  Your temperature may also be checked  Blood and urine tests  may be done  Blood tests may be done to check your cholesterol levels  Abnormal cholesterol levels increase your risk for heart disease and stroke  You may also need a blood or urine test to check for diabetes if you are at increased risk  Urine tests may be done to look for signs of an infection or kidney disease  A physical exam  includes checking your heartbeat and lungs with a stethoscope  Your healthcare provider may also check your skin to look for sun damage  Screening tests  may be recommended  A screening test is done to check for diseases that may not cause symptoms  The screening tests you may need depend on your age, gender, family history, and lifestyle habits  For example, colorectal screening may be recommended if you are 48years old or older  Screening tests you need if you are a woman:   A Pap smear  is used to screen for cervical cancer   Pap smears are usually done every 3 to 5 years depending on your age  You may need them more often if you have had abnormal Pap smear test results in the past  Ask your healthcare provider how often you should have a Pap smear  A mammogram  is an x-ray of your breasts to screen for breast cancer  Experts recommend mammograms every 2 years starting at age 48 years  You may need a mammogram at age 52 years or younger if you have an increased risk for breast cancer  Talk to your healthcare provider about when you should start having mammograms and how often you need them  Vaccines you may need:   Get an influenza vaccine  every year  The influenza vaccine protects you from the flu  Several types of viruses cause the flu  The viruses change over time, so new vaccines are made each year  Get a tetanus-diphtheria (Td) booster vaccine  every 10 years  This vaccine protects you against tetanus and diphtheria  Tetanus is a severe infection that may cause painful muscle spasms and lockjaw  Diphtheria is a severe bacterial infection that causes a thick covering in the back of your mouth and throat  Get a human papillomavirus (HPV) vaccine  if you are female and aged 23 to 32 or male 23 to 24 and never received it  This vaccine protects you from HPV infection  HPV is the most common infection spread by sexual contact  HPV may also cause vaginal, penile, and anal cancers  Get a pneumococcal vaccine  if you are aged 72 years or older  The pneumococcal vaccine is an injection given to protect you from pneumococcal disease  Pneumococcal disease is an infection caused by pneumococcal bacteria  The infection may cause pneumonia, meningitis, or an ear infection  Get a shingles vaccine  if you are 60 or older, even if you have had shingles before  The shingles vaccine is an injection to protect you from the varicella-zoster virus  This is the same virus that causes chickenpox   Shingles is a painful rash that develops in people who had chickenpox or have been exposed to the virus  How to eat healthy:  My Plate is a model for planning healthy meals  It shows the types and amounts of foods that should go on your plate  Fruits and vegetables make up about half of your plate, and grains and protein make up the other half  A serving of dairy is included on the side of your plate  The amount of calories and serving sizes you need depends on your age, gender, weight, and height  Examples of healthy foods are listed below:  Eat a variety of vegetables  such as dark green, red, and orange vegetables  You can also include canned vegetables low in sodium (salt) and frozen vegetables without added butter or sauces  Eat a variety of fresh fruits , canned fruit in 100% juice, frozen fruit, and dried fruit  Include whole grains  At least half of the grains you eat should be whole grains  Examples include whole-wheat bread, wheat pasta, brown rice, and whole-grain cereals such as oatmeal     Eat a variety of protein foods such as seafood (fish and shellfish), lean meat, and poultry without skin (turkey and chicken)  Examples of lean meats include pork leg, shoulder, or tenderloin, and beef round, sirloin, tenderloin, and extra lean ground beef  Other protein foods include eggs and egg substitutes, beans, peas, soy products, nuts, and seeds  Choose low-fat dairy products such as skim or 1% milk or low-fat yogurt, cheese, and cottage cheese  Limit unhealthy fats  such as butter, hard margarine, and shortening  Exercise:  Exercise at least 30 minutes per day on most days of the week  Some examples of exercise include walking, biking, dancing, and swimming  You can also fit in more physical activity by taking the stairs instead of the elevator or parking farther away from stores  Include muscle strengthening activities 2 days each week  Regular exercise provides many health benefits   It helps you manage your weight, and decreases your risk for type 2 diabetes, heart disease, stroke, and high blood pressure  Exercise can also help improve your mood  Ask your healthcare provider about the best exercise plan for you  General health and safety guidelines:   Do not smoke  Nicotine and other chemicals in cigarettes and cigars can cause lung damage  Ask your healthcare provider for information if you currently smoke and need help to quit  E-cigarettes or smokeless tobacco still contain nicotine  Talk to your healthcare provider before you use these products  Limit alcohol  A drink of alcohol is 12 ounces of beer, 5 ounces of wine, or 1½ ounces of liquor  Lose weight, if needed  Being overweight increases your risk of certain health conditions  These include heart disease, high blood pressure, type 2 diabetes, and certain types of cancer  Protect your skin  Do not sunbathe or use tanning beds  Use sunscreen with a SPF 15 or higher  Apply sunscreen at least 15 minutes before you go outside  Reapply sunscreen every 2 hours  Wear protective clothing, hats, and sunglasses when you are outside  Drive safely  Always wear your seatbelt  Make sure everyone in your car wears a seatbelt  A seatbelt can save your life if you are in an accident  Do not use your cell phone when you are driving  This could distract you and cause an accident  Pull over if you need to make a call or send a text message  Practice safe sex  Use latex condoms if are sexually active and have more than one partner  Your healthcare provider may recommend screening tests for sexually transmitted infections (STIs)  Wear helmets, lifejackets, and protective gear  Always wear a helmet when you ride a bike or motorcycle, go skiing, or play sports that could cause a head injury  Wear protective equipment when you play sports  Wear a lifejacket when you are on a boat or doing water sports      © Copyright [employer] 2022 Information is for End User's use only and may not be sold, redistributed or otherwise used for commercial purposes  All illustrations and images included in CareNotes® are the copyrighted property of A D A M [employer] Inc  or Yodit Dowling  The above information is an  only  It is not intended as medical advice for individual conditions or treatments  Talk to your doctor, nurse or pharmacist before following any medical regimen to see if it is safe and effective for you  Cigarette Smoking and Your Health   AMBULATORY CARE:   Risks to your health if you smoke:  Nicotine and other chemicals found in tobacco and e-cigarettes can damage every cell in your body  Even if you are a light smoker, you have an increased risk for cancer, heart disease, and lung disease  If you are pregnant or have diabetes, smoking increases your risk for complications  Nicotine can affect an adolescent's developing brain  This can lead to trouble thinking, learning, or paying attention  Benefits to your health if you stop smoking: You decrease respiratory symptoms such as coughing, wheezing, and shortness of breath  You reduce your risk for cancers of the lung, mouth, throat, kidney, bladder, pancreas, stomach, and cervix  If you already have cancer, you increase the benefits of chemotherapy  You also reduce your risk for cancer returning or a second cancer from developing  You reduce your risk for heart disease, blood clots, heart attack, and stroke  You reduce your risk for lung infections, and diseases such as pneumonia, asthma, chronic bronchitis, and emphysema  Your circulation improves  More oxygen can be delivered to your body  If you have diabetes, you lower your risk for complications, such as kidney, artery, and eye diseases  You also lower your risk for nerve damage  Nerve damage can lead to amputations, poor vision, and blindness  You improve your body's ability to heal and to fight infections      An adolescent can help his or her brain and body develop in a healthy way  Talk to your adolescent about all the health risks of nicotine  If you can, start talking about nicotine when your child is younger than 12 years  This may make it easier for him or her not to start using nicotine as a teenager or adult  Explain to him or her that it is best never to start  It can be hard to try to quit later  Benefits to the health of others if you stop smoking:  Tobacco is harmful to nonsmokers who breathe in your secondhand smoke  The following are ways the health of others around you may improve when you stop smoking: You lower the risks for lung cancer and heart disease in nonsmoking adults  If you are pregnant, you lower the risk for miscarriage, early delivery, low birth weight, and stillbirth  You also lower your baby's risk for SIDS, obesity, developmental delay, and neurobehavioral problems, such as ADHD  If you have children, you lower their risk for ear infections, colds, pneumonia, bronchitis, and asthma  Follow up with your doctor as directed:  Write down your questions so you remember to ask them during your visits  For support and more information:   American [employer] Association  [address] [place] [address],[address]  [address]  Phone: [facility]  Phone: 3- 096 - 876-7786  Web Address: [employer]    Smokefree  gov  Phone: 8- 410 - 250-7181  Web Address: [employer] smokefree  [facility] 21 2022 Information is for End User's use only and may not be sold, redistributed or otherwise used for commercial purposes  All illustrations and images included in CareNotes® are the copyrighted property of [employer]  or [facility] Honre   The above information is an  only  It is not intended as medical advice for individual conditions or treatments  Talk to your doctor, nurse or pharmacist before following any medical regimen to see if it is safe and effective for you

## 2022-06-24 ENCOUNTER — TELEPHONE (OUTPATIENT)
Dept: PAIN MEDICINE | Facility: CLINIC | Age: 57
End: 2022-06-24

## 2022-06-25 ENCOUNTER — OFFICE VISIT (OUTPATIENT)
Dept: URGENT CARE | Facility: CLINIC | Age: 57
End: 2022-06-25
Payer: COMMERCIAL

## 2022-06-25 VITALS
RESPIRATION RATE: 18 BRPM | HEART RATE: 88 BPM | DIASTOLIC BLOOD PRESSURE: 57 MMHG | TEMPERATURE: 97.6 F | BODY MASS INDEX: 23.13 KG/M2 | SYSTOLIC BLOOD PRESSURE: 113 MMHG | OXYGEN SATURATION: 97 % | WEIGHT: 139 LBS
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
## 2022-06-25 DIAGNOSIS — L03.115 CELLULITIS OF RIGHT LEG WITHOUT FOOT: Primary | ICD-10-CM

## 2022-06-25 DIAGNOSIS — S40.262A INSECT BITE OF LEFT SHOULDER, INITIAL ENCOUNTER: ICD-10-CM

## 2022-06-25 PROCEDURE — 99213 OFFICE O/P EST LOW 20 MIN: CPT | Performed by: PHYSICIAN ASSISTANT

## 2022-06-25 RX ORDER — CEPHALEXIN 500 MG/1
500 CAPSULE ORAL EVERY 6 HOURS SCHEDULED
Qty: 28 CAPSULE | Refills: 0 | Status: SHIPPED | OUTPATIENT
Start: 2022-06-25 | End: 2022-07-02

## 2022-06-25 RX ORDER — TRIAMCINOLONE ACETONIDE 1 MG/G
CREAM TOPICAL 3 TIMES DAILY
Qty: 45 G | Refills: 0 | Status: SHIPPED | OUTPATIENT
Start: 2022-06-25

## 2022-06-25 NOTE — PROGRESS NOTES
[facility]    NAME: Zulema Lagos is a 64 y o  female  : 1965    MRN: [id number]  DATE: 2022  TIME: 12:19 PM    Assessment and Plan   Cellulitis of right leg without foot [L03 [id number]]  1  Cellulitis of right leg without foot  cephalexin (KEFLEX) 500 mg capsule   2  Insect bite of left shoulder, initial encounter  triamcinolone (KENALOG) 0 1 % cream       Patient Instructions     Patient Instructions   Cream as directed  Keflex for leg  Elevate and rest leg  Chief Complaint     Chief Complaint   Patient presents with    Ankle Pain     Right fell last week did see family doctor concerned about reddness        History of Present Illness   15-year-old female here with complaint right lower leg redness and swelling  She fell a week ago and injured her ankle  Had x-rays which were normal   Now developing redness and swelling of the right lower leg  Also noticed a rash on her left shoulder  Is itchy  Review of Systems   Review of Systems   Constitutional: Negative for chills and fever  Respiratory: Negative for cough and shortness of breath  Cardiovascular: Negative for chest pain  Musculoskeletal:        Right lower leg  Swelling and redness   Skin: Positive for rash         Current Medications     Current Outpatient Medications:     cephalexin (KEFLEX) 500 mg capsule, Take 1 capsule (500 mg total) by mouth every 6 (six) hours for 7 days, Disp: 28 capsule, Rfl: 0    triamcinolone (KENALOG) 0 1 % cream, Apply topically 3 (three) times a day, Disp: 45 g, Rfl: 0    albuterol (PROVENTIL HFA,VENTOLIN HFA) 90 mcg/act inhaler, INHALE 2 PUFFS BY MOUTH EVERY 6 HOURS AS NEEDED FOR WHEEZING FOR SHORTNESS OF BREATH, Disp: 18 g, Rfl: 5    Ascorbic Acid (C-1000/ALIX HIPS PO), Take by mouth daily  , Disp: , Rfl:     BIOTIN 5000 PO, Take by mouth daily  , Disp: , Rfl:     Coenzyme Q10 (COQ10) 100 MG CAPS, Take by mouth daily  , Disp: , Rfl:     diclofenac sodium (VOLTAREN) 50 mg EC tablet, Take 1 tablet (50 mg total) by mouth in the morning and 1 tablet (50 mg total) in the evening  With food  , Disp: 60 tablet, Rfl: 2    DULoxetine HCl 40 MG CPEP, daily  , Disp: , Rfl:     folic acid (FOLVITE) 1 mg tablet, Take 1 tablet (1 mg total) by mouth daily, Disp: 30 tablet, Rfl: 0    Loratadine 10 MG CAPS, Take by mouth as needed  , Disp: , Rfl:     MAGNESIUM CITRATE PO, Take 400 mg by mouth daily  , Disp: , Rfl:     Menthol, Topical Analgesic, (Biofreeze Roll-On) 4 % GEL, Apply 1 application topically 3 (three) times a day as needed (pain), Disp: 150 Tube, Rfl: 1    montelukast (SINGULAIR) 10 mg tablet, TAKE 1 TABLET BY MOUTH ONCE DAILY AT BEDTIME, Disp: 90 tablet, Rfl: 0    pramipexole (MIRAPEX) 0 5 mg tablet, Take 1 tablet by mouth once daily, Disp: 30 tablet, Rfl: 0    pregabalin (LYRICA) 75 mg capsule, Take 1 capsule (75 mg total) by mouth in the morning and 1 capsule (75 mg total) in the evening and 1 capsule (75 mg total) before bedtime  , Disp: 90 capsule, Rfl: 1    Spiriva Respimat 1 25 MCG/ACT AERS inhaler, INHALE 2 SPRAY(S) BY MOUTH ONCE DAILY, Disp: 4 g, Rfl: 2    SSD 1 % cream, APPLY CREAM EXTERNALLY ONCE DAILY, Disp: , Rfl:     tiZANidine (ZANAFLEX) 4 mg tablet, Take 1 tablet (4 mg total) by mouth 2 (two) times a day, Disp: 90 tablet, Rfl: 0    vitamin E, tocopherol, 400 units capsule, Take 400 Units by mouth daily, Disp: , Rfl:     Current Allergies     Allergies as of 06/25/2022 - Reviewed 06/25/2022   Allergen Reaction Noted    Pollen extract  12/02/2019          The following portions of the patient's history were reviewed and updated as appropriate: allergies, current medications, past family history, past medical history, past social history, past surgical history and problem list    Past Medical History:   Diagnosis Date    Abnormal Pap smear of cervix     Anxiety     Bell's palsy     COPD [facility]chronic obstructive pulmonary disease) ([facility] [address] )     Depression     Menopause     Mild persistent asthma without complication 15/1/9486    Obesity     Primary osteoarthritis involving multiple joints 10/9/2018     Past Surgical History:   Procedure Laterality Date    ANKLE SURGERY      BREAST BIOPSY  12 yrs ago     SECTION      COLONOSCOPY      COLONOSCOPY W/ POLYPECTOMY      DILATION AND CURETTAGE, DIAGNOSTIC / THERAPEUTIC      NOSE SURGERY      TOTAL HIP ARTHROPLASTY Left     TUBAL LIGATION       Family History   Problem Relation Age of Onset    Mental illness Mother     Suicidality Mother     No Known Problems Father     Mental illness Brother     Suicidality Brother     No Known Problems Maternal Grandmother     No Known Problems Paternal Grandmother     No Known Problems Maternal Aunt     No Known Problems Paternal Aunt     No Known Problems Paternal Aunt     No Known Problems Paternal Aunt     No Known Problems Paternal Aunt     Cancer Family      Social History     Socioeconomic History    Marital status: Single     Spouse name: Not on file    Number of children: 2    Years of education: Not on file    Highest education level: Associate degree: academic program   Occupational History    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: [employer]     Comment: PT / Waiver Services   Tobacco Use    Smoking status: Current Every Day Smoker     Packs/day: 1 50     Years: 42 00     Pack years: 63 00     Types: Cigarettes     Start date:     Smokeless tobacco: Never Used    Tobacco comment: cut down to 1 ppd  and using lozenges   Vaping Use    Vaping Use: Never used   Substance and Sexual Activity    Alcohol use: Not Currently    Drug use: Not Currently     Types: Marijuana, Cocaine    Sexual activity: Not Currently     Partners: Male     Birth control/protection: None   Other Topics Concern    Not on file   Social History Narrative      Has 3 children    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  Lives with son and two roommates  Social Determinants of Health     Financial Resource Strain: Not on file   Food Insecurity: Not on file   Transportation Needs: Not on file   Physical Activity: Not on file   Stress: Not on file   Social Connections: Not on file   Intimate Partner Violence: Not on file   Housing Stability: Not on file     Medications have been verified  Objective   /57   Pulse 88   Temp 97 6 °F (36 4 °C)   Resp 18   Wt 63 kg (139 lb)   LMP  (LMP Unknown)   SpO2 97%   BMI 23 13 kg/m²      Physical Exam   Physical Exam  Vitals and nursing note reviewed  Constitutional:       Appearance: Normal appearance  HENT:      Head: Normocephalic and atraumatic  Cardiovascular:      Rate and Rhythm: Normal rate and regular rhythm  Pulses: Normal pulses  Heart sounds: Normal heart sounds  Pulmonary:      Effort: Pulmonary effort is normal       Breath sounds: Normal breath sounds  Musculoskeletal:      Cervical back: Normal range of motion  Skin:     Findings: Erythema (Is erythema and increased warmth with swelling the right lower leg  Tender to palpation ) and rash (Blistery rash left shoulder) present  Neurological:      Mental Status: She is alert

## 2022-07-01 ENCOUNTER — TELEPHONE (OUTPATIENT)
Dept: OBGYN CLINIC | Facility: MEDICAL CENTER | Age: 57
End: 2022-07-01

## 2022-07-01 NOTE — TELEPHONE ENCOUNTER
Pt called in would like results of Colpo, would like a call back please review when available thank you

## 2022-07-14 DIAGNOSIS — M54.16 LUMBAR RADICULOPATHY: ICD-10-CM

## 2022-07-18 RX ORDER — PREGABALIN 75 MG/1
CAPSULE ORAL
Qty: 90 CAPSULE | Refills: 0 | Status: SHIPPED | OUTPATIENT
Start: 2022-07-18 | End: 2022-08-18

## 2022-07-22 ENCOUNTER — HOSPITAL ENCOUNTER (OUTPATIENT)
Dept: CT IMAGING | Facility: HOSPITAL | Age: 57
Discharge: HOME | End: 2022-07-22
Attending: FAMILY MEDICINE

## 2022-07-26 ENCOUNTER — TELEPHONE (OUTPATIENT)
Dept: FAMILY MEDICINE CLINIC | Facility: CLINIC | Age: 57
End: 2022-07-26

## 2022-07-26 DIAGNOSIS — E78.2 MIXED HYPERLIPIDEMIA: Primary | ICD-10-CM

## 2022-07-26 RX ORDER — ATORVASTATIN CALCIUM 20 MG/1
20 TABLET, FILM COATED ORAL DAILY
Qty: 90 TABLET | Refills: 0 | Status: SHIPPED | OUTPATIENT
Start: 2022-07-26 | End: 2022-10-27

## 2022-07-29 ENCOUNTER — HOSPITAL ENCOUNTER (OUTPATIENT)
Dept: MAMMOGRAPHY | Facility: HOSPITAL | Age: 57
Discharge: HOME | End: 2022-07-29
Attending: FAMILY MEDICINE
Payer: COMMERCIAL

## 2022-07-29 DIAGNOSIS — Z12.31 ENCOUNTER FOR SCREENING MAMMOGRAM FOR BREAST CANCER: ICD-10-CM

## 2022-07-29 PROCEDURE — 77067 SCR MAMMO BI INCL CAD: CPT

## 2022-07-29 PROCEDURE — 77063 BREAST TOMOSYNTHESIS BI: CPT

## 2022-08-05 ENCOUNTER — OFFICE VISIT (OUTPATIENT)
Dept: PAIN MEDICINE | Facility: CLINIC | Age: 57
End: 2022-08-05
Payer: COMMERCIAL

## 2022-08-05 VITALS
SYSTOLIC BLOOD PRESSURE: 110 MMHG | DIASTOLIC BLOOD PRESSURE: 71 MMHG | HEIGHT: 65 IN | BODY MASS INDEX: 22.49 KG/M2 | WEIGHT: 135 LBS | HEART RATE: 98 BPM

## 2022-08-05 DIAGNOSIS — M54.42 CHRONIC BILATERAL LOW BACK PAIN WITH LEFT-SIDED SCIATICA: ICD-10-CM

## 2022-08-05 DIAGNOSIS — G89.4 CHRONIC PAIN SYNDROME: Primary | ICD-10-CM

## 2022-08-05 DIAGNOSIS — G89.29 CHRONIC BILATERAL LOW BACK PAIN WITH LEFT-SIDED SCIATICA: ICD-10-CM

## 2022-08-05 DIAGNOSIS — M79.18 MYOFASCIAL PAIN SYNDROME: ICD-10-CM

## 2022-08-05 DIAGNOSIS — M47.816 LUMBAR SPONDYLOSIS: ICD-10-CM

## 2022-08-05 DIAGNOSIS — M54.16 LUMBAR RADICULOPATHY: ICD-10-CM

## 2022-08-05 PROCEDURE — 99214 OFFICE O/P EST MOD 30 MIN: CPT | Performed by: NURSE PRACTITIONER

## 2022-08-05 RX ORDER — TIZANIDINE 4 MG/1
4 TABLET ORAL 2 TIMES DAILY
Qty: 90 TABLET | Refills: 0 | Status: SHIPPED | OUTPATIENT
Start: 2022-08-05 | End: 2022-09-04

## 2022-08-05 NOTE — PROGRESS NOTES
Assessment:  1  Chronic pain syndrome    2  Chronic bilateral low back pain with left-sided sciatica    3  Lumbar radiculopathy    4  Lumbar spondylosis    5  Myofascial pain syndrome        Plan:  While the patient was in the office today, I did have a thorough conversation regarding their chronic pain syndrome, medication management, and treatment plan options  Patient is being seen for follow-up visit  She recently underwent a 2nd L5-S1 interlaminar epidural steroid injection  She reports that her overall pain improved by about 80% for 4 weeks, but then returned  She states that since the injection her leg pain is not nearly severe or as constant as it was before the injection  Her biggest complaint at this time is pain across her low back  On exam, she demonstrates findings consistent with facetogenic pain  We will schedule the patient for bilateral L4-5 and L5-S1 facet medial branch blocks with intention of moving forward towards radiofrequency ablation if there is an appropriate diagnostic response  The initial blocks will be performed with 2% lidocaine and if an appropriate response is obtained upon review of the patient's pain diary, a confirmatory block will be scheduled with 0 5% bupivacaine  Continue tizanidine 4 mg twice daily if needed for spasms  A prescription was sent to her pharmacy  Continue diclofenac 50 mg twice daily  She did not require refill this medication during today's visit  History of Present Illness: The patient is a 64 y o  female who presents for a follow up office visit in regards to Back Pain  The patients current symptoms include complaints of low back pain, intermittent left thigh pain  Current pain level is an 8/10  Quality pain is described as burning, dull, aching, sharp, pressure-like, shooting  Current pain medications includes:  Diclofenac 50 mg twice daily, tizanidine 4 mg twice daily if needed for spasms      The patient reports that this regimen is providing 20 % pain relief  The patient is reporting no side effects from this pain medication regimen  I have personally reviewed and/or updated the patient's past medical history, past surgical history, family history, social history, current medications, allergies, and vital signs today  Review of Systems  Review of Systems   Constitutional: Negative for chills and fever  HENT: Negative for ear pain and sore throat  Eyes: Negative for pain and visual disturbance  Respiratory: Negative for cough and shortness of breath  Cardiovascular: Negative for chest pain and palpitations  Gastrointestinal: Negative for abdominal pain and vomiting  Genitourinary: Negative for dysuria and hematuria  Musculoskeletal: Positive for gait problem, joint swelling (lump on back) and myalgias  Negative for arthralgias and back pain  Decreased range of motion  Pain in extremity- left leg      Skin: Negative for color change and rash  Neurological: Negative for seizures and syncope  All other systems reviewed and are negative          Past Medical History:   Diagnosis Date    Abnormal Pap smear of cervix     Anxiety     Bell's palsy     [facility] obstructive [facility] disease) [facility])     Depression     Menopause     Mild persistent asthma without complication 6945    Obesity     Primary osteoarthritis involving multiple joints 10/9/2018       Past Surgical History:   Procedure Laterality Date    ANKLE SURGERY      BREAST BIOPSY Right 12 yrs ago     SECTION      COLONOSCOPY      COLONOSCOPY W/ POLYPECTOMY      DILATION AND CURETTAGE, DIAGNOSTIC / THERAPEUTIC      NOSE SURGERY      TOTAL HIP ARTHROPLASTY Left     TUBAL LIGATION         Family History   Problem Relation Age of Onset    Mental illness Mother     Suicidality Mother     No Known Problems Father     Mental illness Brother     Suicidality Brother     No Known Problems Maternal Grandmother  No Known Problems Paternal Grandmother     No Known Problems Maternal Aunt     No Known Problems Paternal Aunt     No Known Problems Paternal Aunt     No Known Problems Paternal Aunt     No Known Problems Paternal Aunt     Cancer Family        Social History     Occupational History    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: [employer]     Comment: PT / Waiver Services   Tobacco Use    Smoking status: Current Every Day Smoker     Packs/day: 1 50     Years: 42 00     Pack years: 63 00     Types: Cigarettes     Start date: 1979    Smokeless tobacco: Never Used    Tobacco comment: cut down to 1 ppd  and using lozenges   Vaping Use    Vaping Use: Never used   Substance and Sexual Activity    Alcohol use: Not Currently    Drug use: Not Currently     Types: Marijuana, Cocaine    Sexual activity: Not Currently     Partners: Male     Birth control/protection: None         Current Outpatient Medications:     albuterol (PROVENTIL HFA,VENTOLIN HFA) 90 mcg/act inhaler, INHALE 2 PUFFS BY MOUTH EVERY 6 HOURS AS NEEDED FOR WHEEZING FOR SHORTNESS OF BREATH, Disp: 18 g, Rfl: 5    Ascorbic Acid (C-1000/ALIX HIPS PO), Take by mouth daily  , Disp: , Rfl:     atorvastatin (LIPITOR) 20 mg tablet, Take 1 tablet (20 mg total) by mouth daily, Disp: 90 tablet, Rfl: 0    BIOTIN 5000 PO, Take by mouth daily  , Disp: , Rfl:     Coenzyme Q10 (COQ10) 100 MG CAPS, Take by mouth daily  , Disp: , Rfl:     diclofenac sodium (VOLTAREN) 50 mg EC tablet, Take 1 tablet (50 mg total) by mouth in the morning and 1 tablet (50 mg total) in the evening  With food  , Disp: 60 tablet, Rfl: 2    DULoxetine HCl 40 MG CPEP, daily  , Disp: , Rfl:     folic acid (FOLVITE) 1 mg tablet, Take 1 tablet (1 mg total) by mouth daily, Disp: 30 tablet, Rfl: 0    Loratadine 10 MG CAPS, Take by mouth as needed  , Disp: , Rfl:     MAGNESIUM CITRATE PO, Take 400 mg by mouth daily  , Disp: , Rfl:     Menthol, Topical Analgesic, (Biofreeze Roll-On) 4 % GEL, Apply 1 application topically 3 (three) times a day as needed (pain), Disp: 150 Tube, Rfl: 1    montelukast (SINGULAIR) 10 mg tablet, TAKE 1 TABLET BY MOUTH ONCE DAILY AT BEDTIME, Disp: 90 tablet, Rfl: 0    pramipexole (MIRAPEX) 0 5 mg tablet, Take 1 tablet by mouth once daily, Disp: 30 tablet, Rfl: 0    pregabalin (LYRICA) 75 mg capsule, TAKE 1 CAPSULE BY MOUTH ONCE DAILY IN THE MORNING AND 1 ONCE DAILY IN THE EVENING AND 1 ONCE DAILY BEFORE  BEDTIME, Disp: 90 capsule, Rfl: 0    Spiriva Respimat 1 25 MCG/ACT AERS inhaler, INHALE 2 SPRAY(S) BY MOUTH ONCE DAILY, Disp: 4 g, Rfl: 2    SSD 1 % cream, APPLY CREAM EXTERNALLY ONCE DAILY, Disp: , Rfl:     tiZANidine (ZANAFLEX) 4 mg tablet, Take 1 tablet (4 mg total) by mouth 2 (two) times a day, Disp: 90 tablet, Rfl: 0    triamcinolone (KENALOG) 0 1 % cream, Apply topically 3 (three) times a day, Disp: 45 g, Rfl: 0    vitamin E, tocopherol, 400 units capsule, Take 400 Units by mouth daily, Disp: , Rfl:     Allergies   Allergen Reactions    Pollen Extract        Physical Exam:    /71   Pulse 98   Ht 5' 5" (1 651 m)   Wt 61 2 kg (135 lb)   LMP  (LMP Unknown)   BMI 22 47 kg/m²     Constitutional:normal, well developed, well nourished, alert, in no distress and non-toxic and no overt pain behavior  Eyes:anicteric  HEENT:grossly intact  Neck:supple, symmetric, trachea midline and no masses   Pulmonary:even and unlabored  Cardiovascular:No edema or pitting edema present  Skin:Normal without rashes or lesions and well hydrated  Psychiatric:Mood and affect appropriate  Neurologic:Cranial Nerves II-XII grossly intact  Musculoskeletal:Range of motion of the lumbar spine is limited in all planes  There is tenderness across the lower lumbar area      Imaging  FL spine and pain procedure    (Results Pending)       Orders Placed This Encounter   Procedures    FL spine and pain procedure

## 2022-08-08 DIAGNOSIS — J43.9 PULMONARY EMPHYSEMA, UNSPECIFIED EMPHYSEMA TYPE (HCC): ICD-10-CM

## 2022-08-08 RX ORDER — TIOTROPIUM BROMIDE INHALATION SPRAY 1.56 UG/1
SPRAY, METERED RESPIRATORY (INHALATION)
Qty: 4 G | Refills: 0 | Status: SHIPPED | OUTPATIENT
Start: 2022-08-08 | End: 2022-10-25

## 2022-08-09 ENCOUNTER — HOSPITAL ENCOUNTER (OUTPATIENT)
Dept: CT IMAGING | Facility: HOSPITAL | Age: 57
Discharge: HOME | End: 2022-08-09
Attending: FAMILY MEDICINE
Payer: COMMERCIAL

## 2022-08-09 DIAGNOSIS — Z12.2 ENCOUNTER FOR SCREENING FOR LUNG CANCER: ICD-10-CM

## 2022-08-09 PROCEDURE — 71271 CT THORAX LUNG CANCER SCR C-: CPT

## 2022-08-17 DIAGNOSIS — M54.16 LUMBAR RADICULOPATHY: ICD-10-CM

## 2022-08-18 RX ORDER — PREGABALIN 75 MG/1
CAPSULE ORAL
Qty: 90 CAPSULE | Refills: 0 | Status: SHIPPED | OUTPATIENT
Start: 2022-08-18 | End: 2022-10-24

## 2022-08-23 DIAGNOSIS — M54.16 LUMBAR RADICULOPATHY: ICD-10-CM

## 2022-08-23 DIAGNOSIS — M47.816 LUMBAR SPONDYLOSIS: ICD-10-CM

## 2022-08-23 DIAGNOSIS — M79.18 MYOFASCIAL PAIN SYNDROME: ICD-10-CM

## 2022-08-23 DIAGNOSIS — G25.81 RESTLESS LEG SYNDROME: ICD-10-CM

## 2022-08-23 RX ORDER — PRAMIPEXOLE DIHYDROCHLORIDE 0.5 MG/1
TABLET ORAL
Qty: 30 TABLET | Refills: 0 | Status: SHIPPED | OUTPATIENT
Start: 2022-08-23 | End: 2022-10-27
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
## 2022-08-30 ENCOUNTER — TELEPHONE (OUTPATIENT)
Dept: PSYCHIATRY | Facility: CLINIC | Age: 57
End: 2022-08-30

## 2022-08-30 NOTE — TELEPHONE ENCOUNTER
contacted patient in regards to referral in attempt to add patient to proper waitlist  unable to lvm for patient to contact intake dept due to "call cannot be completed at this time"

## 2022-09-16 ENCOUNTER — HOSPITAL ENCOUNTER (OUTPATIENT)
Dept: RADIOLOGY | Facility: HOSPITAL | Age: 57
End: 2022-09-16
Payer: COMMERCIAL

## 2022-09-16 VITALS
RESPIRATION RATE: 22 BRPM | TEMPERATURE: 96.1 F | SYSTOLIC BLOOD PRESSURE: 101 MMHG | HEART RATE: 103 BPM | DIASTOLIC BLOOD PRESSURE: 64 MMHG | OXYGEN SATURATION: 98 %

## 2022-09-16 DIAGNOSIS — M54.42 CHRONIC BILATERAL LOW BACK PAIN WITH LEFT-SIDED SCIATICA: ICD-10-CM

## 2022-09-16 DIAGNOSIS — M54.16 LUMBAR RADICULOPATHY: ICD-10-CM

## 2022-09-16 DIAGNOSIS — G89.29 CHRONIC BILATERAL LOW BACK PAIN WITH LEFT-SIDED SCIATICA: ICD-10-CM

## 2022-09-16 DIAGNOSIS — M47.816 LUMBAR SPONDYLOSIS: ICD-10-CM

## 2022-09-16 DIAGNOSIS — G89.4 CHRONIC PAIN SYNDROME: ICD-10-CM

## 2022-09-16 PROCEDURE — 64493 INJ PARAVERT F JNT L/S 1 LEV: CPT | Performed by: ANESTHESIOLOGY

## 2022-09-16 PROCEDURE — 64494 INJ PARAVERT F JNT L/S 2 LEV: CPT | Performed by: ANESTHESIOLOGY

## 2022-09-16 RX ORDER — LIDOCAINE HYDROCHLORIDE 10 MG/ML
5 INJECTION, SOLUTION EPIDURAL; INFILTRATION; INTRACAUDAL; PERINEURAL ONCE
Status: COMPLETED | OUTPATIENT
Start: 2022-09-16 | End: 2022-09-16

## 2022-09-16 RX ADMIN — LIDOCAINE HYDROCHLORIDE 5 ML: 10 INJECTION, SOLUTION EPIDURAL; INFILTRATION; INTRACAUDAL; PERINEURAL at 14:09

## 2022-09-16 RX ADMIN — Medication 3 ML: at 14:12

## 2022-09-16 NOTE — H&P
Assessment:  1  Chronic pain syndrome  FL spine and pain procedure    FL spine and pain procedure   2  Lumbar radiculopathy  FL spine and pain procedure    FL spine and pain procedure   3  Lumbar spondylosis  FL spine and pain procedure    FL spine and pain procedure   4  Chronic bilateral low back pain with left-sided sciatica  FL spine and pain procedure    FL spine and pain procedure       Plan:  Anne Chicas is a 64 y o  female with complaints of low back pain presents to surgical center for procedure  We will perform a bilateral L4-5 and L5-S1 branch block #1  2  Follow-up 1 month after injection    Complete risks and benefits including bleeding, infection, tissue reaction, nerve injury and allergic reaction were discussed  The approach was demonstrated using models and literature was provided  Verbal and written consent was obtained  My impressions and treatment recommendations were discussed in detail with the patient who verbalized understanding and had no further questions  Discharge instructions were provided  I personally saw and examined the patient and I agree with the above discussed plan of care  Orders Placed This Encounter   Procedures    FL spine and pain procedure     Standing Status:   Standing     Number of Occurrences:   1     Order Specific Question:   Reason for Exam:     Answer:   L4-5 and L5-S1 MBB #1 (B/L)     Order Specific Question:   Is the patient pregnant? Answer:   No     Order Specific Question:   Anticoagulant hold needed? Answer:   no     No orders of the defined types were placed in this encounter  History of Present Illness:  Anne Chicas is a 64 y o  female who presents for a follow up office visit in regards to low back pain     The patients current symptoms include 7/10 constant sharp some pathology and any particular time pattern      I have personally reviewed and/or updated the patient's past medical history, past surgical history, family history, social history, current medications, allergies, and vital signs today  Review of Systems   Musculoskeletal: Positive for arthralgias and back pain  All other systems reviewed and are negative        Patient Active Problem List   Diagnosis    Mixed hyperlipidemia    Post-menopausal    Pulmonary emphysema (HCC)    Tobacco abuse    Hyperplastic colonic polyp    Seasonal allergies    Primary osteoarthritis involving multiple joints    Major depressive disorder, recurrent, severe without psychotic features ([facility] [address] )    Alcohol use disorder, moderate, dependence ([facility] [address] )    History of suicidal ideation    Diverticulosis of colon    Chronic bilateral low back pain with left-sided sciatica    Numbness and tingling of right arm    Facet hypertrophy of lumbosacral region    Chronic pain syndrome    Lumbar spondylosis    Lumbar radiculopathy       Past Medical History:   Diagnosis Date    Abnormal Pap smear of cervix     Anxiety     Bell's palsy     COPD (chronic obstructive pulmonary disease) ([facility] [address] )     Depression     Menopause     Mild persistent asthma without complication     Obesity     Primary osteoarthritis involving multiple joints 10/9/2018       Past Surgical History:   Procedure Laterality Date    ANKLE SURGERY      BREAST BIOPSY Right 12 yrs ago     SECTION      COLONOSCOPY      COLONOSCOPY W/ POLYPECTOMY      DILATION AND CURETTAGE, DIAGNOSTIC / THERAPEUTIC      NOSE SURGERY      TOTAL HIP ARTHROPLASTY Left     TUBAL LIGATION         Family History   Problem Relation Age of Onset    Mental illness Mother     Suicidality Mother     No Known Problems Father     Mental illness Brother     Suicidality Brother     No Known Problems Maternal Grandmother     No Known Problems Paternal Grandmother     No Known Problems Maternal Aunt     No Known Problems Paternal Aunt     No Known Problems Paternal Aunt     No Known Problems Paternal Aunt     No Known Problems Paternal Aunt     Cancer Family Social History     Occupational History    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: [employer]     Comment: PT / Waiver Services   Tobacco Use    Smoking status: Current Every Day Smoker     Packs/day: 1 50     Years: 42 00     Pack years: 63 00     Types: Cigarettes     Start date: 1979    Smokeless tobacco: Never Used    Tobacco comment: cut down to 1 ppd  and using lozenges   Vaping Use    Vaping Use: Never used   Substance and Sexual Activity    Alcohol use: Not Currently    Drug use: Not Currently     Types: Marijuana, Cocaine    Sexual activity: Not Currently     Partners: Male     Birth control/protection: None       Current Outpatient Medications on File Prior to Encounter   Medication Sig    albuterol (PROVENTIL HFA,VENTOLIN HFA) 90 mcg/act inhaler INHALE 2 PUFFS BY MOUTH EVERY 6 HOURS AS NEEDED FOR WHEEZING FOR SHORTNESS OF BREATH    Ascorbic Acid (C-1000/ALIX HIPS PO) Take by mouth daily      atorvastatin (LIPITOR) 20 mg tablet Take 1 tablet (20 mg total) by mouth daily    BIOTIN 5000 PO Take by mouth daily      Coenzyme Q10 (COQ10) 100 MG CAPS Take by mouth daily      diclofenac sodium (VOLTAREN) 50 mg EC tablet TAKE 1 TABLET BY MOUTH IN THE MORNING AND 1 IN THE EVENING WITH FOOD    DULoxetine HCl 40 MG CPEP daily      folic acid (FOLVITE) 1 mg tablet Take 1 tablet (1 mg total) by mouth daily    Loratadine 10 MG CAPS Take by mouth as needed      MAGNESIUM CITRATE PO Take 400 mg by mouth daily      Menthol, Topical Analgesic, (Biofreeze Roll-On) 4 % GEL Apply 1 application topically 3 (three) times a day as needed (pain)    montelukast (SINGULAIR) 10 mg tablet TAKE 1 TABLET BY MOUTH ONCE DAILY AT BEDTIME    pramipexole (MIRAPEX) 0 5 mg tablet Take 1 tablet by mouth once daily    pregabalin (LYRICA) 75 mg capsule TAKE 1 CAPSULE BY MOUTH THREE TIMES DAILY IN THE MORNING, IN THE EVENING, AND BEFORE  BEDTIME    Spiriva Respimat 1 25 MCG/ACT AERS inhaler INHALE 2 SPRAY(S) BY MOUTH ONCE DAILY SSD 1 % cream APPLY CREAM EXTERNALLY ONCE DAILY    tiZANidine (ZANAFLEX) 4 mg tablet Take 1 tablet (4 mg total) by mouth 2 (two) times a day    triamcinolone (KENALOG) 0 1 % cream Apply topically 3 (three) times a day    vitamin E, tocopherol, 400 units capsule Take 400 Units by mouth daily     No current facility-administered medications on file prior to encounter  Allergies   Allergen Reactions    Pollen Extract        Physical Exam:    /64 (BP Location: Left arm)   Pulse 103   Temp (!) 96 1 °F (35 6 °C)   Resp 22   LMP  (LMP Unknown)   SpO2 98%     Constitutional:normal, well developed, well nourished, alert, in no distress and non-toxic and no overt pain behavior    Eyes:anicteric  HEENT:grossly intact  Neck:supple, symmetric, trachea midline and no masses   Pulmonary:even and unlabored  Cardiovascular:No edema or pitting edema present  Skin:Normal without rashes or lesions and well hydrated  Psychiatric:Mood and affect appropriate  Neurologic:Cranial Nerves II-XII grossly intact  Musculoskeletal:normal

## 2022-09-16 NOTE — DISCHARGE INSTR - LAB

## 2022-09-27 ENCOUNTER — TELEPHONE (OUTPATIENT)
Dept: PSYCHIATRY | Facility: CLINIC | Age: 57
End: 2022-09-27

## 2022-09-27 NOTE — TELEPHONE ENCOUNTER
called pt in regards to routine referral  LVM for pt to call intake dept to be placed on proper waitlist

## 2022-10-04 NOTE — TELEPHONE ENCOUNTER
Called Edward Gabriel  regarding routine  Lvm advising to return call to Intake Dept at [phone] to be placed on appropriate wait list

## 2022-10-24 DIAGNOSIS — M54.16 LUMBAR RADICULOPATHY: ICD-10-CM

## 2022-10-24 DIAGNOSIS — G25.81 RESTLESS LEG SYNDROME: ICD-10-CM

## 2022-10-24 DIAGNOSIS — E78.2 MIXED HYPERLIPIDEMIA: ICD-10-CM

## 2022-10-24 DIAGNOSIS — M79.18 MYOFASCIAL PAIN SYNDROME: ICD-10-CM

## 2022-10-24 DIAGNOSIS — J43.9 PULMONARY EMPHYSEMA, UNSPECIFIED EMPHYSEMA TYPE (HCC): ICD-10-CM

## 2022-10-24 DIAGNOSIS — M47.816 LUMBAR SPONDYLOSIS: ICD-10-CM

## 2022-10-24 RX ORDER — PREGABALIN 75 MG/1
CAPSULE ORAL
Qty: 90 CAPSULE | Refills: 0 | Status: SHIPPED | OUTPATIENT
Start: 2022-10-24

## 2022-10-25 RX ORDER — TIOTROPIUM BROMIDE INHALATION SPRAY 1.56 UG/1
SPRAY, METERED RESPIRATORY (INHALATION)
Qty: 4 G | Refills: 0 | Status: SHIPPED | OUTPATIENT
Start: 2022-10-25

## 2022-10-27 ENCOUNTER — TELEPHONE (OUTPATIENT)
Dept: PAIN MEDICINE | Facility: CLINIC | Age: 57
End: 2022-10-27

## 2022-10-27 RX ORDER — ATORVASTATIN CALCIUM 20 MG/1
TABLET, FILM COATED ORAL
Qty: 90 TABLET | Refills: 0 | Status: SHIPPED | OUTPATIENT
Start: 2022-10-27

## 2022-10-27 RX ORDER — PRAMIPEXOLE DIHYDROCHLORIDE 0.5 MG/1
TABLET ORAL
Qty: 30 TABLET | Refills: 0 | Status: SHIPPED | OUTPATIENT
Start: 2022-10-27

## 2022-10-27 NOTE — TELEPHONE ENCOUNTER
S/w pt, states she recently relocated to the [facility]  Pt given the address for [place] office, she is unsure how far the office is from her residence but she will look into it and cb to advise if she would like to proceed with CARLA

## 2022-10-27 NOTE — TELEPHONE ENCOUNTER
Appointment scheduled. Luisa Grover: Patient     Doctor: Zhen Palomino     Reason for call: Pain diary please be on a look out for it  It will be sent through [employer]   Pt will also be requesting a CARLA soon due to personal emergency  situations     Call back#: [phone]

## 2022-11-10 ENCOUNTER — OFFICE VISIT (OUTPATIENT)
Dept: URGENT CARE | Facility: CLINIC | Age: 57
End: 2022-11-10

## 2022-11-10 VITALS
TEMPERATURE: 97.1 F | DIASTOLIC BLOOD PRESSURE: 83 MMHG | HEART RATE: 101 BPM | OXYGEN SATURATION: 98 % | SYSTOLIC BLOOD PRESSURE: 128 MMHG | RESPIRATION RATE: 18 BRPM
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
## 2022-11-10 RX ORDER — CYCLOBENZAPRINE HCL 5 MG
5 TABLET ORAL
Qty: 10 TABLET | Refills: 0 | Status: SHIPPED | OUTPATIENT
Start: 2022-11-10 | End: 2022-11-15

## 2022-11-10 NOTE — PROGRESS NOTES
[address]        NAME: Deniz Hickey is a 62 y o  female  : 1965    MRN: [id number]  DATE: November 10, 2022  TIME: 10:36 AM    Assessment and Plan   Fall, initial encounter [W19  XXXA]  1  Fall, initial encounter  Ambulatory Referral to [facility]    cyclobenzaprine (FLEXERIL) 5 mg tablet         Patient Instructions   Ibuprofen 200mg 2 tabs by mouth every 6 hours as needed for pain with meals and alternate with Tyelnol 500mg 1-2 tabs every 6 hours for breakthrough pain  Follow up with PCP in 3-5 days  Proceed to  ER if symptoms worsen  Chief Complaint     Chief Complaint   Patient presents with   • Fall     Patient states she was walking her dog yesterday and fell, the dog was pulling her  Patient now has pain in neck, back, right knee and right wrist           History of Present Illness       61 yo female Patient states she was walking her dog yesterday and fell, the dog was pulling her  Patient now has pain in neck, back, right knee and right wrist  Used Tylenol yesterday  Pt reports the right wrist was pulled onto the ground and then her right knee  Fall  Pertinent negatives include no abdominal pain, fever, headaches, nausea or vomiting  Review of Systems   Review of Systems   Constitutional: Negative for activity change, appetite change, chills, fatigue and fever  HENT: Negative for congestion  Eyes: Negative for photophobia and visual disturbance  Respiratory: Negative for cough, chest tightness, shortness of breath and wheezing  Cardiovascular: Negative for chest pain and palpitations  Gastrointestinal: Negative for abdominal pain, diarrhea, nausea and vomiting  Musculoskeletal: Positive for back pain  Negative for neck pain and neck stiffness  Skin: Positive for color change  Neurological: Negative for dizziness, syncope, light-headedness and headaches  Psychiatric/Behavioral: Negative for agitation and confusion           Current Medications       Current Outpatient Medications:   •  albuterol (PROVENTIL HFA,VENTOLIN HFA) 90 mcg/act inhaler, INHALE 2 PUFFS BY MOUTH EVERY 6 HOURS AS NEEDED FOR WHEEZING FOR SHORTNESS OF BREATH, Disp: 18 g, Rfl: 5  •  Ascorbic Acid (C-1000/ALIX HIPS PO), Take by mouth daily  , Disp: , Rfl:   •  atorvastatin (LIPITOR) 20 mg tablet, Take 1 tablet by mouth once daily, Disp: 90 tablet, Rfl: 0  •  BIOTIN 5000 PO, Take by mouth daily  , Disp: , Rfl:   •  Coenzyme Q10 (COQ10) 100 MG CAPS, Take by mouth daily  , Disp: , Rfl:   •  cyclobenzaprine (FLEXERIL) 5 mg tablet, Take 1 tablet (5 mg total) by mouth daily at bedtime as needed for muscle spasms for up to 5 days, Disp: 10 tablet, Rfl: 0  •  DULoxetine HCl 40 MG CPEP, daily  , Disp: , Rfl:   •  folic acid (FOLVITE) 1 mg tablet, Take 1 tablet (1 mg total) by mouth daily, Disp: 30 tablet, Rfl: 0  •  Loratadine 10 MG CAPS, Take by mouth as needed  , Disp: , Rfl:   •  MAGNESIUM CITRATE PO, Take 400 mg by mouth daily  , Disp: , Rfl:   •  Menthol, Topical Analgesic, (Biofreeze Roll-On) 4 % GEL, Apply 1 application topically 3 (three) times a day as needed (pain), Disp: 150 Tube, Rfl: 1  •  montelukast (SINGULAIR) 10 mg tablet, TAKE 1 TABLET BY MOUTH ONCE DAILY AT BEDTIME, Disp: 90 tablet, Rfl: 0  •  pramipexole (MIRAPEX) 0 5 mg tablet, Take 1 tablet by mouth once daily, Disp: 30 tablet, Rfl: 0  •  pregabalin (LYRICA) 75 mg capsule, TAKE 1 CAPSULE BY MOUTH THREE TIMES DAILY IN  THE  MORNING,  EVENING,  AND  BEFORE  BEDTIME, Disp: 90 capsule, Rfl: 0  •  Spiriva Respimat 1 25 MCG/ACT AERS inhaler, INHALE 2 SPRAY(S) BY MOUTH ONCE DAILY, Disp: 4 g, Rfl: 0  •  SSD 1 % cream, APPLY CREAM EXTERNALLY ONCE DAILY, Disp: , Rfl:   •  vitamin E, tocopherol, 400 units capsule, Take 400 Units by mouth daily, Disp: , Rfl:   •  diclofenac sodium (VOLTAREN) 50 mg EC tablet, TAKE 1 TABLET BY MOUTH ONCE DAILY IN THE MORNING AND 1 ONCE DAILY IN THE EVENING WITH FOOD (Patient not taking: Reported on 11/10/2022), Disp: 60 tablet, Rfl: 0  •  tiZANidine (ZANAFLEX) 4 mg tablet, Take 1 tablet (4 mg total) by mouth 2 (two) times a day, Disp: 90 tablet, Rfl: 0  •  triamcinolone (KENALOG) 0 1 % cream, Apply topically 3 (three) times a day (Patient not taking: Reported on 11/10/2022), Disp: 45 g, Rfl: 0    Current Allergies     Allergies as of 11/10/2022 - Reviewed 11/10/2022   Allergen Reaction Noted   • Pollen extract  2019            The following portions of the patient's history were reviewed and updated as appropriate: allergies, current medications, past family history, past medical history, past social history, past surgical history and problem list      Past Medical History:   Diagnosis Date   • Abnormal Pap smear of cervix    • Anxiety    • Bell's palsy    • COPD (chronic obstructive pulmonary disease) ([facility] [address] )    • Depression    • Menopause    • Mild persistent asthma without complication    • Obesity    • Primary osteoarthritis involving multiple joints 10/9/2018       Past Surgical History:   Procedure Laterality Date   • ANKLE SURGERY     • BREAST BIOPSY Right 12 yrs ago   •  SECTION     • COLONOSCOPY     • COLONOSCOPY W/ POLYPECTOMY     • DILATION AND CURETTAGE, DIAGNOSTIC / THERAPEUTIC     • NOSE SURGERY     • TOTAL HIP ARTHROPLASTY Left    • TUBAL LIGATION         Family History   Problem Relation Age of Onset   • Mental illness Mother    • Suicidality Mother    • No Known Problems Father    • Mental illness Brother    • Suicidality Brother    • No Known Problems Maternal Grandmother    • No Known Problems Paternal Grandmother    • No Known Problems Maternal Aunt    • No Known Problems Paternal Aunt    • No Known Problems Paternal Aunt    • No Known Problems Paternal Aunt    • No Known Problems Paternal Aunt    • Cancer Family          Medications have been verified          Objective   /83   Pulse 101   Temp (!) 97 1 °F (36 2 °C)   Resp 18   LMP  (LMP Unknown) SpO2 98%        Physical Exam     Physical Exam  Vitals and nursing note reviewed  Constitutional:       General: She is not in acute distress  Appearance: Normal appearance  She is normal weight  She is not ill-appearing  HENT:      Head: Normocephalic and atraumatic  Right Ear: Tympanic membrane, ear canal and external ear normal       Left Ear: Tympanic membrane, ear canal and external ear normal       Nose: Nose normal       Mouth/Throat:      Mouth: Mucous membranes are dry  Pharynx: Oropharynx is clear  Eyes:      General: No scleral icterus  Extraocular Movements: Extraocular movements intact  Conjunctiva/sclera: Conjunctivae normal       Pupils: Pupils are equal, round, and reactive to light  Cardiovascular:      Rate and Rhythm: Tachycardia present  Pulses: Normal pulses  Heart sounds: Normal heart sounds  Pulmonary:      Effort: Pulmonary effort is normal  No respiratory distress  Breath sounds: Normal breath sounds  No wheezing, rhonchi or rales  Abdominal:      General: Abdomen is flat  Bowel sounds are normal       Palpations: Abdomen is soft  There is no mass  Tenderness: There is no abdominal tenderness  There is no guarding  Hernia: No hernia is present  Musculoskeletal:         General: Normal range of motion  Cervical back: Normal range of motion and neck supple  No tenderness  Lymphadenopathy:      Cervical: No cervical adenopathy  Skin:     General: Skin is warm and dry  Capillary Refill: Capillary refill takes less than 2 seconds  Findings: No bruising  Comments: small 1 cm superficial bruise over the dorsal region of the right hand  Skin intact  And, no bruising of the right knee noted  No inflammation, erythema, swelling, deformities, crepitus, or warmth, FROM on flexion and extension of the right knee  Neurological:      General: No focal deficit present        Mental Status: She is alert and oriented to person, place, and time  Cranial Nerves: No cranial nerve deficit  Coordination: Coordination normal       Gait: Gait normal    Psychiatric:         Mood and Affect: Mood normal          Behavior: Behavior normal          Thought Content:  Thought content normal          Judgment: Judgment normal

## 2022-11-10 NOTE — PATIENT INSTRUCTIONS
Contusion in Adults   WHAT YOU NEED TO KNOW:   A contusion is a bruise that appears on your skin after an injury  A bruise happens when small blood vessels tear but skin does not  Blood leaks into nearby tissue, such as soft tissue or muscle  DISCHARGE INSTRUCTIONS:   Return to the emergency department if:   You have new trouble moving the injured area  You have tingling or numbness in or near the injured area  Your hand or foot below the bruise gets cold or turns pale  Call your doctor if:   You find a new lump in the injured area  Your symptoms do not improve with treatment after 4 to 5 days  You have questions or concerns about your condition or care  Medicines: You may need any of the following:  NSAIDs  help decrease swelling and pain or fever  This medicine is available with or without a doctor's order  NSAIDs can cause stomach bleeding or kidney problems in certain people  If you take blood thinner medicine, always ask your healthcare provider if NSAIDs are safe for you  Always read the medicine label and follow directions  Prescription pain medicine  may be given  Ask your healthcare provider how to take this medicine safely  Some prescription pain medicines contain acetaminophen  Do not take other medicines that contain acetaminophen without talking to your healthcare provider  Too much acetaminophen may cause liver damage  Prescription pain medicine may cause constipation  Ask your healthcare provider how to prevent or treat constipation  Take your medicine as directed  Contact your healthcare provider if you think your medicine is not helping or if you have side effects  Tell him of her if you are allergic to any medicine  Keep a list of the medicines, vitamins, and herbs you take  Include the amounts, and when and why you take them  Bring the list or the pill bottles to follow-up visits  Carry your medicine list with you in case of an emergency      Help a contusion heal: Rest the injured area  or use it less than usual  If you bruised your leg or foot, you may need crutches or a cane to help you walk  This will help you keep weight off your injured body part  Apply ice  to decrease swelling and pain  Ice may also help prevent tissue damage  Use an ice pack, or put crushed ice in a plastic bag  Cover it with a towel and place it on your bruise for 15 to 20 minutes every hour or as directed  Use compression  to support the area and decrease swelling  Wrap an elastic bandage around the area over the bruised muscle  Make sure the bandage is not too tight  You should be able to fit 1 finger between the bandage and your skin  Elevate (raise) your injured body part  above the level of your heart to help decrease pain and swelling  Use pillows, blankets, or rolled towels to elevate the area as often as you can  Do not drink alcohol  as directed  Alcohol may slow healing  Do not stretch injured muscles  right after your injury  Ask your healthcare provider when and how you may safely stretch after your injury  Gentle stretches can help increase your flexibility  Do not massage the area or put heating pads  on the bruise right after your injury  Heat and massage may slow healing  Your healthcare provider may tell you to apply heat after several days  At that time, heat will start to help the injury heal     Prevent another contusion:   Stretch and warm up before you play sports or exercise  Wear protective gear when you play sports  Examples are shin guards and padding  If you begin a new physical activity, start slowly to give your body a chance to adjust     Follow up with your doctor as directed:  Write down your questions so you remember to ask them during your visits  © Copyright [employer] 2022 Information is for End User's use only and may not be sold, redistributed or otherwise used for commercial purposes   All illustrations and images included in CareNotes® are the copyrighted property of [employer]  or Yodit Horne   The above information is an  only  It is not intended as medical advice for individual conditions or treatments  Talk to your doctor, nurse or pharmacist before following any medical regimen to see if it is safe and effective for you

## 2022-11-30 ENCOUNTER — APPOINTMENT (OUTPATIENT)
Dept: RADIOLOGY | Facility: CLINIC | Age: 57
End: 2022-11-30

## 2022-11-30 ENCOUNTER — OFFICE VISIT (OUTPATIENT)
Dept: FAMILY MEDICINE CLINIC | Facility: CLINIC | Age: 57
End: 2022-11-30

## 2022-11-30 ENCOUNTER — APPOINTMENT (OUTPATIENT)
Dept: LAB | Facility: CLINIC | Age: 57
End: 2022-11-30

## 2022-11-30 VITALS
DIASTOLIC BLOOD PRESSURE: 72 MMHG | TEMPERATURE: 97.9 F | SYSTOLIC BLOOD PRESSURE: 120 MMHG | RESPIRATION RATE: 18 BRPM | HEART RATE: 96 BPM | WEIGHT: 141.2 LBS | HEIGHT: 65 IN | BODY MASS INDEX: 23.53 KG/M2 | OXYGEN SATURATION: 98 %

## 2022-11-30 DIAGNOSIS — Z76.89 ENCOUNTER TO ESTABLISH CARE: Primary | ICD-10-CM

## 2022-11-30 DIAGNOSIS — E78.2 MIXED HYPERLIPIDEMIA: ICD-10-CM

## 2022-11-30 DIAGNOSIS — R87.610 ASCUS WITH POSITIVE HIGH RISK HPV CERVICAL: ICD-10-CM

## 2022-11-30 DIAGNOSIS — M25.559 HIP PAIN: ICD-10-CM

## 2022-11-30 DIAGNOSIS — R87.810 ASCUS WITH POSITIVE HIGH RISK HPV CERVICAL: ICD-10-CM

## 2022-11-30 DIAGNOSIS — R29.898 HAND WEAKNESS: ICD-10-CM

## 2022-11-30 DIAGNOSIS — R20.0 NUMBNESS AND TINGLING OF RIGHT ARM: ICD-10-CM

## 2022-11-30 DIAGNOSIS — Z79.899 MEDICATION MANAGEMENT: ICD-10-CM

## 2022-11-30 DIAGNOSIS — G43.919 INTRACTABLE MIGRAINE WITHOUT STATUS MIGRAINOSUS, UNSPECIFIED MIGRAINE TYPE: ICD-10-CM

## 2022-11-30 DIAGNOSIS — G89.4 CHRONIC PAIN SYNDROME: ICD-10-CM

## 2022-11-30 DIAGNOSIS — R20.2 NUMBNESS AND TINGLING OF RIGHT ARM: ICD-10-CM

## 2022-11-30 DIAGNOSIS — F33.2 MAJOR DEPRESSIVE DISORDER, RECURRENT, SEVERE WITHOUT PSYCHOTIC FEATURES (HCC): Chronic | ICD-10-CM

## 2022-11-30 DIAGNOSIS — M47.817 FACET HYPERTROPHY OF LUMBOSACRAL REGION: ICD-10-CM

## 2022-11-30 LAB
25(OH)D3 SERPL-MCNC: 24.1 NG/ML (ref 30–100)
ALBUMIN SERPL BCP-MCNC: 4 G/DL (ref 3.5–5)
ALP SERPL-CCNC: 91 U/L (ref 46–116)
ALT SERPL W P-5'-P-CCNC: 28 U/L (ref 12–78)
ANION GAP SERPL CALCULATED.3IONS-SCNC: 6 MMOL/L (ref 4–13)
AST SERPL W P-5'-P-CCNC: 20 U/L (ref 5–45)
BASOPHILS # BLD AUTO: 0.11 THOUSANDS/ÂΜL (ref 0–0.1)
BASOPHILS NFR BLD AUTO: 1 % (ref 0–1)
BILIRUB SERPL-MCNC: 0.86 MG/DL (ref 0.2–1)
BUN SERPL-MCNC: 14 MG/DL (ref 5–25)
CALCIUM SERPL-MCNC: 10.1 MG/DL (ref 8.3–10.1)
CHLORIDE SERPL-SCNC: 106 MMOL/L (ref 96–108)
CHOLEST SERPL-MCNC: 252 MG/DL
CO2 SERPL-SCNC: 25 MMOL/L (ref 21–32)
CREAT SERPL-MCNC: 0.94 MG/DL (ref 0.6–1.3)
EOSINOPHIL # BLD AUTO: 0.99 THOUSAND/ÂΜL (ref 0–0.61)
EOSINOPHIL NFR BLD AUTO: 9 % (ref 0–6)
ERYTHROCYTE [DISTWIDTH] IN BLOOD BY AUTOMATED COUNT: 14.2 % (ref 11.6–15.1)
GFR SERPL CREATININE-BSD FRML MDRD: 67 ML/MIN/1.73SQ M
GLUCOSE P FAST SERPL-MCNC: 89 MG/DL (ref 65–99)
HCT VFR BLD AUTO: 44.5 % (ref 34.8–46.1)
HDLC SERPL-MCNC: 53 MG/DL
HGB BLD-MCNC: 14.2 G/DL (ref 11.5–15.4)
IMM GRANULOCYTES # BLD AUTO: 0.04 THOUSAND/UL (ref 0–0.2)
IMM GRANULOCYTES NFR BLD AUTO: 0 % (ref 0–2)
LDLC SERPL CALC-MCNC: 137 MG/DL (ref 0–100)
LYMPHOCYTES # BLD AUTO: 3.29 THOUSANDS/ÂΜL (ref 0.6–4.47)
LYMPHOCYTES NFR BLD AUTO: 31 % (ref 14–44)
MCH RBC QN AUTO: 29.9 PG (ref 26.8–34.3)
MCHC RBC AUTO-ENTMCNC: 31.9 G/DL (ref 31.4–37.4)
MCV RBC AUTO: 94 FL (ref 82–98)
MONOCYTES # BLD AUTO: 0.79 THOUSAND/ÂΜL (ref 0.17–1.22)
MONOCYTES NFR BLD AUTO: 7 % (ref 4–12)
NEUTROPHILS # BLD AUTO: 5.48 THOUSANDS/ÂΜL (ref 1.85–7.62)
NEUTS SEG NFR BLD AUTO: 52 % (ref 43–75)
NONHDLC SERPL-MCNC: 199 MG/DL
NRBC BLD AUTO-RTO: 0 /100 WBCS
PLATELET # BLD AUTO: 349 THOUSANDS/UL (ref 149–390)
PMV BLD AUTO: 10.3 FL (ref 8.9–12.7)
POTASSIUM SERPL-SCNC: 4.9 MMOL/L (ref 3.5–5.3)
PROT SERPL-MCNC: 7.9 G/DL (ref 6.4–8.4)
RBC # BLD AUTO: 4.75 MILLION/UL (ref 3.81–5.12)
SODIUM SERPL-SCNC: 137 MMOL/L (ref 135–147)
TRIGL SERPL-MCNC: 309 MG/DL
TSH SERPL DL<=0.05 MIU/L-ACNC: 0.67 UIU/ML (ref 0.45–4.5)
WBC # BLD AUTO: 10.7 THOUSAND/UL (ref 4.31–10.16)

## 2022-11-30 RX ORDER — TOPIRAMATE 50 MG/1
50 TABLET, FILM COATED ORAL EVERY 12 HOURS SCHEDULED
Qty: 60 TABLET | Refills: 2 | Status: SHIPPED | OUTPATIENT
Start: 2022-11-30

## 2022-11-30 RX ORDER — VENLAFAXINE HYDROCHLORIDE 37.5 MG/1
37.5 CAPSULE, EXTENDED RELEASE ORAL
Qty: 30 CAPSULE | Refills: 5 | Status: SHIPPED | OUTPATIENT
Start: 2022-11-30

## 2022-11-30 NOTE — PROGRESS NOTES
Name: Alcides Paris      : 1965      MRN: [id number]  Encounter Provider: MICHELLE Elizabeth  Encounter Date: 2022   Encounter department: [address]     1  Encounter to establish care  Assessment & Plan:  Patient is here to establish care  [facility]  Has multiple health concerns  Blood work ordered  Referral made to Psychiatry, Gynecology, sports Medicine  EMG ordered of right hand  Patient had a history of hip surgery feels like there is edema on the x-ray ordered  Discussed maintaining safety  Patient also has a history of alcohol and drug abuse  Patient is currently not using any drugs, has not had any alcohol for 3 years  Reports that she is going to be on house arrest for 3 months and also her license was taken away  Information given on [employer] for transportation  Discussed self-care  Discussed management of anxiety and depression  2  ASCUS with positive high risk HPV cervical  -     Ambulatory Referral to Gynecology; Future    3  Mixed hyperlipidemia  -     Lipid panel; Future    4  Major depressive disorder, recurrent, severe without psychotic features [facility])  Assessment & Plan:  Continues to have depression anxiety  Discussed self-care  Medication refill  Referral made to Psychiatry    Orders:  -     TSH, 3rd generation with Free T4 reflex; Future  -     Vitamin D 25 hydroxy; Future  -     venlafaxine (EFFEXOR-XR) 37 5 mg 24 hr capsule; Take 1 capsule (37 5 mg total) by mouth daily with breakfast  -     Ambulatory Referral to Psychiatry; Future    5  Medication management  -     CBC and differential; Future  -     Comprehensive metabolic panel; Future  -     CBC and differential; Future    6  Hip pain  -     XR hip/pelv 2-3 vws left if performed; Future; Expected date: 2022    7  Facet hypertrophy of lumbosacral region  -     Ambulatory Referral to Sports Medicine; Future    8   Hand weakness  -     EMG 1 Limb; Future    9  Intractable migraine without status migrainosus, unspecified migraine type  -     topiramate (Topamax) 50 MG tablet; Take 1 tablet (50 mg total) by mouth every 12 (twelve) hours    10  Numbness and tingling of right arm  Assessment & Plan:  Patient is currently wearing a wrist support  Will reorder EMG  11  Chronic pain syndrome  Assessment & Plan:  Patient has chronic lower back pain  Did receive a lidocaine shot has paperwork  Needs to get his 2nd shot  Referral made to sports medicine             Subjective      Patient is here to establish care  Last primary care provider- Alex  Past medical history- h/o if drug use, alcohol  Past surgical history-c section 87, tubal 2006, hip relacement 2012- dr Rosie Bhatti, ankle surgery metal implants, nose reconstruction  Social history-  - Leonides Samuel has a boyfriend  Kids- no real ship with son  Cayla Peng lives in [place], youngest lives in [place], no relationship with middle [facility]  Employment- [employer], paper work  Smoker- Cigarett, no thce  Alcohol- sober for  3 years  Other drugs- past   Last diagnostic labs screening-Due  Dental exam- dentures, top broken, needs to get fixed  Eyes-Due  Mammogram-  Cervical cancer screening- ablation  Pap smear-Needs referral, was positive for hpv  Colonoscopy- Current  Current concerns- Needs labs and referral to psych, gyn, sports medicine, emg of rt hand  Will be in house arrest soon, for 3 months, cant drive for [id number]        Review of Systems   Constitutional: Negative  HENT: Negative  Eyes: Negative  Respiratory: Negative  Cardiovascular: Negative  Gastrointestinal: Negative  Endocrine: Negative  Genitourinary: Negative  Musculoskeletal: Positive for arthralgias (rt hand)  Allergic/Immunologic: Negative  Neurological: Negative  Psychiatric/Behavioral: Positive for dysphoric mood  The patient is nervous/anxious          Current Outpatient Medications on File Prior to Visit   Medication Sig   • albuterol (PROVENTIL HFA,VENTOLIN HFA) 90 mcg/act inhaler INHALE 2 PUFFS BY MOUTH EVERY 6 HOURS AS NEEDED FOR WHEEZING FOR SHORTNESS OF BREATH   • Ascorbic Acid (C-1000/ALIX HIPS PO) Take by mouth daily     • atorvastatin (LIPITOR) 20 mg tablet Take 1 tablet by mouth once daily   • BIOTIN 5000 PO Take by mouth daily     • Coenzyme Q10 (COQ10) 100 MG CAPS Take by mouth daily     • diclofenac sodium (VOLTAREN) 50 mg EC tablet TAKE 1 TABLET BY MOUTH ONCE DAILY IN THE MORNING AND 1 ONCE DAILY IN THE EVENING WITH FOOD   • folic acid (FOLVITE) 1 mg tablet Take 1 tablet (1 mg total) by mouth daily   • Loratadine 10 MG CAPS Take by mouth as needed     • MAGNESIUM CITRATE PO Take 400 mg by mouth daily     • Menthol, Topical Analgesic, (Biofreeze Roll-On) 4 % GEL Apply 1 application topically 3 (three) times a day as needed (pain)   • montelukast (SINGULAIR) 10 mg tablet TAKE 1 TABLET BY MOUTH ONCE DAILY AT BEDTIME   • pramipexole (MIRAPEX) 0 5 mg tablet Take 1 tablet by mouth once daily   • pregabalin (LYRICA) 75 mg capsule TAKE 1 CAPSULE BY MOUTH THREE TIMES DAILY IN  THE  MORNING,  EVENING,  AND  BEFORE  BEDTIME   • Spiriva Respimat 1 25 MCG/ACT AERS inhaler INHALE 2 SPRAY(S) BY MOUTH ONCE DAILY   • SSD 1 % cream APPLY CREAM EXTERNALLY ONCE DAILY   • triamcinolone (KENALOG) 0 1 % cream Apply topically 3 (three) times a day   • vitamin E, tocopherol, 400 units capsule Take 400 Units by mouth daily   • cyclobenzaprine (FLEXERIL) 5 mg tablet Take 1 tablet (5 mg total) by mouth daily at bedtime as needed for muscle spasms for up to 5 days   • tiZANidine (ZANAFLEX) 4 mg tablet Take 1 tablet (4 mg total) by mouth 2 (two) times a day       Objective     /72   Pulse 96   Temp 97 9 °F (36 6 °C) (Temporal)   Resp 18   Ht 5' 5" (1 651 m)   Wt 64 kg (141 lb 3 2 oz)   LMP  (LMP Unknown)   SpO2 98%   BMI 23 50 kg/m²     Physical Exam  Vitals and nursing note reviewed  Constitutional:       Appearance: Normal appearance  She is well-developed  She is ill-appearing  HENT:      Head: Normocephalic and atraumatic  Right Ear: External ear normal       Left Ear: External ear normal    Eyes:      Pupils: Pupils are equal, round, and reactive to light  Cardiovascular:      Rate and Rhythm: Normal rate and regular rhythm  Pulses: Normal pulses  Heart sounds: Normal heart sounds  Pulmonary:      Effort: Pulmonary effort is normal    Abdominal:      General: Bowel sounds are normal       Palpations: Abdomen is soft  Musculoskeletal:         General: Tenderness (rt hand) present  Normal range of motion  Cervical back: Normal range of motion and neck supple  Skin:     General: Skin is warm and dry  Neurological:      Mental Status: She is alert and oriented to person, place, and time  Psychiatric:         Attention and Perception: Attention and perception normal          Mood and Affect: Mood is anxious  Affect is flat and tearful  Speech: Speech normal          Behavior: Behavior normal          Thought Content:  Thought content normal          Cognition and Memory: Cognition and memory normal          Judgment: Judgment normal        MICHELLE Ramachandran

## 2022-11-30 NOTE — PATIENT INSTRUCTIONS
Obtain fasting labs, nothing to eat after midnight, may drink water     Get xray done  Follow-up with sports medicine  Start Effexor in the morning  Take Cymbalta skip 1 day take dose skip 2 days take does skip 3 days until you skip 2 weeks  Use cocoa butter cream on your leg  Topamax twice a day  Make sure you drink a lot a water to prevent any kidney stones  Use ice packs take a nap

## 2022-12-01 PROBLEM — Z76.89 ENCOUNTER TO ESTABLISH CARE: Status: ACTIVE | Noted: 2022-12-01

## 2022-12-01 NOTE — ASSESSMENT & PLAN NOTE
Patient has chronic lower back pain  Did receive a lidocaine shot has paperwork  Needs to get his 2nd shot    Referral made to sports medicine

## 2022-12-01 NOTE — ASSESSMENT & PLAN NOTE
Patient is here to establish care  Moved from [facility] done  Has multiple health concerns  Blood work ordered  Referral made to Psychiatry, Gynecology, sports Medicine  EMG ordered of right hand  Patient had a history of hip surgery feels like there is edema on the x-ray ordered  Discussed maintaining safety  Patient also has a history of alcohol and drug abuse  Patient is currently not using any drugs, has not had any alcohol for 3 years  Reports that she is going to be on house arrest for 3 months and also her license was taken away  Information given on [employer] for transportation  Discussed self-care  Discussed management of anxiety and depression

## 2022-12-01 NOTE — ASSESSMENT & PLAN NOTE
Continues to have depression anxiety  Discussed self-care  Medication refill    Referral made to Psychiatry

## 2022-12-06 ENCOUNTER — TELEPHONE (OUTPATIENT)
Dept: PSYCHIATRY | Facility: CLINIC | Age: 57
End: 2022-12-06

## 2022-12-06 NOTE — TELEPHONE ENCOUNTER
Patient has been added to the Adult Psych and Talk therapy  wait list  Confirmed insurance, needs of service, and location preferences

## 2022-12-09 ENCOUNTER — TELEPHONE (OUTPATIENT)
Dept: PSYCHIATRY | Facility: CLINIC | Age: 57
End: 2022-12-09

## 2022-12-09 NOTE — TELEPHONE ENCOUNTER
Reached out to pt in regards to adding pt to the proper waitlist, pt was added to the med mgmt and talk therapy waitlist at this time and an additional resource packet was sent in the mail

## 2022-12-12 ENCOUNTER — OFFICE VISIT (OUTPATIENT)
Dept: OBGYN CLINIC | Facility: CLINIC | Age: 57
End: 2022-12-12

## 2022-12-12 ENCOUNTER — APPOINTMENT (OUTPATIENT)
Dept: RADIOLOGY | Facility: CLINIC | Age: 57
End: 2022-12-12

## 2022-12-12 VITALS
WEIGHT: 137 LBS | DIASTOLIC BLOOD PRESSURE: 84 MMHG | HEART RATE: 87 BPM | HEIGHT: 65 IN | OXYGEN SATURATION: 94 % | BODY MASS INDEX: 22.82 KG/M2 | SYSTOLIC BLOOD PRESSURE: 118 MMHG | RESPIRATION RATE: 18 BRPM

## 2022-12-12 DIAGNOSIS — M50.30 DEGENERATIVE DISC DISEASE, CERVICAL: Primary | ICD-10-CM

## 2022-12-12 DIAGNOSIS — M47.816 LUMBAR SPONDYLOSIS: ICD-10-CM

## 2022-12-12 DIAGNOSIS — M54.2 NECK PAIN: ICD-10-CM

## 2022-12-12 RX ORDER — NAPROXEN 500 MG/1
500 TABLET ORAL 2 TIMES DAILY WITH MEALS
Qty: 60 TABLET | Refills: 0 | Status: SHIPPED | OUTPATIENT
Start: 2022-12-12

## 2022-12-12 NOTE — PROGRESS NOTES
We are seeing Eleonora Carlisle is a 62 y o  female who presents with complaints of neck pain, referred by pcp Jillian Rivera, [address] [place] [address]    Location: neck  Duration: chronic  Quality: aching and dull  Severity: moderate  Timing/Chronicity: ongoing for the past few years  Context: movement of head  Modifying Factors: Has found no relief relief with analgesics  Associated Signs and Symptoms: occasionally has numbness and tingling in the right hand, being evaluated for neuropathy with emg scheduled in february  Comorbid Conditions:     Patient was following with pain management in [place] for back injections and would like to continue care here  The following portions of the patient's history were reviewed and updated as appropriate: allergies, current medications, past family history, past medical history, past social history, past surgical history and problem list     Review of Systems   Constitutional: Negative for fever  Eyes: Negative for vision loss  Respiratory: Negative for cough and shortness of breath  Cardiovascular: Negative for leg swelling and palpitations  Gastrointestinal: Negative for constipation and diarrhea  Skin: Negative for rash and ulcer  Neurological: Negative for dizziness and headaches  Hem/Lymph/Immuno: Negative for blood clots  Does not bruise/bleed easily  Psychiatric/Behavioral: Negative for confusion  Objective:  /84   Pulse 87   Resp 18   Ht 5' 5" (1 651 m)   Wt 62 1 kg (137 lb)   LMP  (LMP Unknown)   SpO2 94%   BMI 22 80 kg/m²      General: Awake, alert, and oriented x3 in no apparent distress  Skin: no rashes, lesions,  or lacerations  Vascular: good radial and ulnar, skin warm and dry  Neuro: no weakness in the C4-C8 nerve distribution  Musculoskeletal: cervical  inspection: no gross spinal deformity  No kyphosis, scoliosis or leg discrepancy  Palpation: + paraspinal tenderness    ROM: 45 degrees flexion 45 degrees extension 80 degrees rotation to the left and 80 degrees rotation to right 45 degrees sidebending to the left and 45 degree sidebending to the right  Special tests:  Negative spurlings on right          Imaging:     See final report        Assessment/Plan:     1  Lumbar spondylosis  Patient was following with pain management [facility] for lumbar injections with plans for nerve ablation  Patient would like to continue with management care in this region  Referral placed for Dr Tiff Cain for further consultation  - Ambulatory Referral to Sports Medicine  - Ambulatory Referral to Pain Management; Future    2  Degenerative disc disease, cervical  Patient's radiographs were reviewed independently revealing degenerative changes at the C5-C6 C6-C7 level  No acute fractures or dislocations noted follow-up on official reading  Clinical impression is the patient's pain is attributed from degenerative disc disease of the cervical spine  Advised patient to continue with physical therapy and trial a oral anti-inflammatory medication for pain relief  Patient will plan to follow-up in 2 months for reevaluation for neck pain  - XR spine cervical 2 or 3 vw injury; Future  - Ambulatory Referral to Pain Management; Future  - Ambulatory Referral to Physical Therapy; Future  - naproxen (Naprosyn) 500 mg tablet; Take 1 tablet (500 mg total) by mouth 2 (two) times a day with meals  Dispense: 60 tablet;  Refill: 0

## 2022-12-14 ENCOUNTER — OFFICE VISIT (OUTPATIENT)
Dept: FAMILY MEDICINE CLINIC | Facility: CLINIC | Age: 57
End: 2022-12-14

## 2022-12-14 VITALS
OXYGEN SATURATION: 98 % | TEMPERATURE: 97.4 F | BODY MASS INDEX: 22.53 KG/M2 | HEIGHT: 65 IN | HEART RATE: 97 BPM | RESPIRATION RATE: 18 BRPM | WEIGHT: 135.2 LBS | DIASTOLIC BLOOD PRESSURE: 68 MMHG | SYSTOLIC BLOOD PRESSURE: 110 MMHG

## 2022-12-14 DIAGNOSIS — M54.16 LUMBAR RADICULOPATHY: ICD-10-CM

## 2022-12-14 DIAGNOSIS — E78.2 MODERATE MIXED HYPERLIPIDEMIA NOT REQUIRING STATIN THERAPY: Primary | ICD-10-CM

## 2022-12-14 DIAGNOSIS — M21.619 BUNION OF GREAT TOE: ICD-10-CM

## 2022-12-14 DIAGNOSIS — L98.9 SKIN LESION: ICD-10-CM

## 2022-12-14 DIAGNOSIS — E55.9 HYPOVITAMINOSIS D: ICD-10-CM

## 2022-12-14 RX ORDER — PREGABALIN 75 MG/1
75 CAPSULE ORAL DAILY PRN
Qty: 90 CAPSULE | Refills: 0 | Status: SHIPPED | OUTPATIENT
Start: 2022-12-14

## 2022-12-14 RX ORDER — ATORVASTATIN CALCIUM 40 MG/1
40 TABLET, FILM COATED ORAL DAILY
Qty: 90 TABLET | Refills: 30 | Status: SHIPPED | OUTPATIENT
Start: 2022-12-14

## 2022-12-14 RX ORDER — ERGOCALCIFEROL 1.25 MG/1
50000 CAPSULE ORAL 2 TIMES WEEKLY
Qty: 16 CAPSULE | Refills: 0 | Status: SHIPPED | OUTPATIENT
Start: 2022-12-15 | End: 2023-02-07

## 2022-12-14 NOTE — PROGRESS NOTES
Name: Anne Chicas      : 1965      MRN: [id number]  Encounter Provider: MICHELLE Reyes  Encounter Date: 2022   Encounter department: [address] [place] [address]     1  Moderate mixed hyperlipidemia not requiring statin therapy  Assessment & Plan:  Reviewed blood work  Will increase Lipitor to 40 mg  Discussed heart healthy diet    Orders:  -     atorvastatin (LIPITOR) 40 mg tablet; Take 1 tablet (40 mg total) by mouth daily    2  Hypovitaminosis D  Assessment & Plan:  Reviewed blood work  50,000 units twice a week for 8 weeks  Increase food rich in vitamin D  Orders:  -     ergocalciferol (VITAMIN D2) 50,000 units; Take 1 capsule (50,000 Units total) by mouth 2 (two) times a week for 16 doses    3  Skin lesion  Assessment & Plan:  Patient has had a skin lesion, had it biopsied  Needs to get a full skin check  Referral made to dermatology    Orders:  -     Ambulatory Referral to Dermatology; Future    4  Bunion of great toe  -     Ambulatory Referral to Podiatry; Future    5  Lumbar radiculopathy  -     pregabalin (LYRICA) 75 mg capsule; Take 1 capsule (75 mg total) by mouth daily as needed (rt jhand spasms)         Subjective      Patient is here for follow-up  Continues to have a lot of stressors, is having difficulty getting psychiatry  Patient reports that she called [facility] and [facility]  Denies suicidal ideation right now but reports that she does have some thoughts  Had blood work done  Review of Systems   Constitutional: Negative  HENT: Negative  Eyes: Negative  Respiratory: Negative  Cardiovascular: Negative  Gastrointestinal: Negative  Endocrine: Negative  Genitourinary: Negative  Musculoskeletal: Positive for arthralgias (rt foot bunion)  Skin: Positive for rash (needs to have a complete skin check)  Allergic/Immunologic: Negative  Neurological: Negative  Psychiatric/Behavioral: Positive for agitation, decreased concentration, dysphoric mood and suicidal ideas (no plan)  Negative for self-injury  The patient is nervous/anxious          Current Outpatient Medications on File Prior to Visit   Medication Sig   • albuterol (PROVENTIL HFA,VENTOLIN HFA) 90 mcg/act inhaler INHALE 2 PUFFS BY MOUTH EVERY 6 HOURS AS NEEDED FOR WHEEZING FOR SHORTNESS OF BREATH   • Ascorbic Acid (C-1000/ALIX HIPS PO) Take by mouth daily     • BIOTIN 5000 PO Take by mouth daily     • Coenzyme Q10 (COQ10) 100 MG CAPS Take by mouth daily     • folic acid (FOLVITE) 1 mg tablet Take 1 tablet (1 mg total) by mouth daily   • Loratadine 10 MG CAPS Take by mouth as needed     • MAGNESIUM CITRATE PO Take 400 mg by mouth daily     • Menthol, Topical Analgesic, (Biofreeze Roll-On) 4 % GEL Apply 1 application topically 3 (three) times a day as needed (pain)   • montelukast (SINGULAIR) 10 mg tablet TAKE 1 TABLET BY MOUTH ONCE DAILY AT BEDTIME   • naproxen (Naprosyn) 500 mg tablet Take 1 tablet (500 mg total) by mouth 2 (two) times a day with meals   • pramipexole (MIRAPEX) 0 5 mg tablet Take 1 tablet by mouth once daily   • Spiriva Respimat 1 25 MCG/ACT AERS inhaler INHALE 2 SPRAY(S) BY MOUTH ONCE DAILY   • SSD 1 % cream APPLY CREAM EXTERNALLY ONCE DAILY   • topiramate (Topamax) 50 MG tablet Take 1 tablet (50 mg total) by mouth every 12 (twelve) hours   • triamcinolone (KENALOG) 0 1 % cream Apply topically 3 (three) times a day   • venlafaxine (EFFEXOR-XR) 37 5 mg 24 hr capsule Take 1 capsule (37 5 mg total) by mouth daily with breakfast   • vitamin E, tocopherol, 400 units capsule Take 400 Units by mouth daily   • cyclobenzaprine (FLEXERIL) 5 mg tablet Take 1 tablet (5 mg total) by mouth daily at bedtime as needed for muscle spasms for up to 5 days   • tiZANidine (ZANAFLEX) 4 mg tablet Take 1 tablet (4 mg total) by mouth 2 (two) times a day       Objective     /68   Pulse 97   Temp (!) 97 4 °F (36 3 °C) (Temporal)   Resp 18   Ht 5' 5" (1 651 m)   Wt 61 3 kg (135 lb 3 2 oz)   LMP  (LMP Unknown)   SpO2 98%   BMI 22 50 kg/m²     Physical Exam  Vitals and nursing note reviewed  Constitutional:       Appearance: She is well-developed  She is ill-appearing  HENT:      Head: Normocephalic and atraumatic  Eyes:      Pupils: Pupils are equal, round, and reactive to light  Cardiovascular:      Rate and Rhythm: Normal rate and regular rhythm  Pulses: Normal pulses  Heart sounds: Normal heart sounds  Pulmonary:      Effort: Pulmonary effort is normal       Breath sounds: Normal breath sounds  Abdominal:      General: Bowel sounds are normal       Palpations: Abdomen is soft  Musculoskeletal:         General: Deformity (rt foot bunion) present  Normal range of motion  Cervical back: Normal range of motion  Skin:     General: Skin is warm and dry  Neurological:      General: No focal deficit present  Mental Status: She is alert and oriented to person, place, and time  Psychiatric:         Attention and Perception: Attention and perception normal          Mood and Affect: Mood is anxious and depressed  Affect is tearful  Speech: Speech normal          Behavior: Behavior normal          Thought Content:  Thought content normal          Cognition and Memory: Cognition and memory normal          Judgment: Judgment normal        MICHELLE Rojas

## 2022-12-15 PROBLEM — M21.619 BUNION OF GREAT TOE: Status: ACTIVE | Noted: 2022-12-15

## 2022-12-15 PROBLEM — L98.9 SKIN LESION: Status: ACTIVE | Noted: 2022-12-15

## 2022-12-15 PROBLEM — E55.9 HYPOVITAMINOSIS D: Status: ACTIVE | Noted: 2022-12-15

## 2022-12-15 NOTE — ASSESSMENT & PLAN NOTE
Patient reports that she has had thoughts of suicide  Does not have a plan  Did [facility] and is [facility]    Discussed maintaining safety continue medication

## 2022-12-15 NOTE — ASSESSMENT & PLAN NOTE
Patient has had a skin lesion, had it biopsied  Needs to get a full skin check    Referral made to dermatology

## 2022-12-19 ENCOUNTER — TELEPHONE (OUTPATIENT)
Dept: PAIN MEDICINE | Facility: MEDICAL CENTER | Age: 57
End: 2022-12-19

## 2022-12-19 NOTE — TELEPHONE ENCOUNTER
Pt desires CARLA of care as she has moved  Dr Ng Persons do you agree to accept? Dr Mohan Rodriguez to you agree to release?

## 2022-12-19 NOTE — TELEPHONE ENCOUNTER
Caller: patient    Doctor: Jairo Stanley    Reason for call: patient moved and lives closer to [address] [place] [address] and would like to see Dr Letty Ocampo   Patient seen Dr Gonzalez in [place] [address]    Call back#: [phone]

## 2022-12-23 ENCOUNTER — TELEPHONE (OUTPATIENT)
Dept: PAIN MEDICINE | Facility: CLINIC | Age: 57
End: 2022-12-23

## 2023-01-08 DIAGNOSIS — M50.30 DEGENERATIVE DISC DISEASE, CERVICAL: ICD-10-CM

## 2023-01-09 RX ORDER — NAPROXEN 500 MG/1
TABLET ORAL
Qty: 60 TABLET | Refills: 0 | Status: SHIPPED | OUTPATIENT
Start: 2023-01-09

## 2023-01-12 ENCOUNTER — OFFICE VISIT (OUTPATIENT)
Dept: FAMILY MEDICINE CLINIC | Facility: CLINIC | Age: 58
End: 2023-01-12

## 2023-01-12 VITALS
TEMPERATURE: 98.2 F | WEIGHT: 144 LBS | BODY MASS INDEX: 23.99 KG/M2 | SYSTOLIC BLOOD PRESSURE: 108 MMHG | DIASTOLIC BLOOD PRESSURE: 68 MMHG | HEART RATE: 104 BPM | HEIGHT: 65 IN

## 2023-01-12 DIAGNOSIS — M79.18 MYOFASCIAL PAIN SYNDROME: ICD-10-CM

## 2023-01-12 DIAGNOSIS — Z79.899 ENCOUNTER FOR MEDICATION MANAGEMENT: Primary | ICD-10-CM

## 2023-01-12 DIAGNOSIS — K64.9 HEMORRHOIDS, UNSPECIFIED HEMORRHOID TYPE: ICD-10-CM

## 2023-01-12 DIAGNOSIS — K59.03 DRUG-INDUCED CONSTIPATION: ICD-10-CM

## 2023-01-12 DIAGNOSIS — F33.2 MAJOR DEPRESSIVE DISORDER, RECURRENT, SEVERE WITHOUT PSYCHOTIC FEATURES (HCC): Chronic | ICD-10-CM

## 2023-01-12 DIAGNOSIS — M54.16 LUMBAR RADICULOPATHY: ICD-10-CM

## 2023-01-12 RX ORDER — AMITRIPTYLINE HYDROCHLORIDE 10 MG/1
TABLET, FILM COATED ORAL
COMMUNITY
Start: 2022-12-31 | End: 2023-01-12 | Stop reason: HOSPADM

## 2023-01-12 RX ORDER — PREGABALIN 75 MG/1
75 CAPSULE ORAL DAILY PRN
Qty: 90 CAPSULE | Refills: 0 | Status: SHIPPED | OUTPATIENT
Start: 2023-01-12

## 2023-01-12 RX ORDER — TIZANIDINE HYDROCHLORIDE 4 MG/1
4 CAPSULE, GELATIN COATED ORAL 2 TIMES DAILY
Qty: 60 CAPSULE | Refills: 0 | Status: SHIPPED | OUTPATIENT
Start: 2023-01-12

## 2023-01-12 RX ORDER — LANOLIN ALCOHOL/MO/W.PET/CERES
CREAM (GRAM) TOPICAL
COMMUNITY
Start: 2022-12-27 | End: 2023-01-12 | Stop reason: ALTCHOICE

## 2023-01-12 RX ORDER — AMITRIPTYLINE HYDROCHLORIDE 25 MG/1
TABLET, FILM COATED ORAL
COMMUNITY
Start: 2022-12-31 | End: 2023-01-12 | Stop reason: HOSPADM

## 2023-01-12 RX ORDER — DOCUSATE SODIUM 100 MG/1
100 CAPSULE, LIQUID FILLED ORAL 2 TIMES DAILY
Qty: 30 CAPSULE | Refills: 3 | Status: SHIPPED | OUTPATIENT
Start: 2023-01-12

## 2023-01-12 RX ORDER — HYDROCORTISONE ACETATE 25 MG/1
25 SUPPOSITORY RECTAL 2 TIMES DAILY
Qty: 12 SUPPOSITORY | Refills: 0 | Status: SHIPPED | OUTPATIENT
Start: 2023-01-12

## 2023-01-12 RX ORDER — VENLAFAXINE HYDROCHLORIDE 37.5 MG/1
37.5 CAPSULE, EXTENDED RELEASE ORAL
Qty: 30 CAPSULE | Refills: 5 | Status: SHIPPED | OUTPATIENT
Start: 2023-01-12

## 2023-01-12 RX ORDER — HYDROXYZINE PAMOATE 25 MG/1
CAPSULE ORAL
COMMUNITY
Start: 2022-12-28

## 2023-01-12 NOTE — PROGRESS NOTES
Name: Blake Mata      : 1965      MRN: [id number]  Encounter Provider: Sola Rivera DO  Encounter Date: 2023   Encounter department: [address] [place] [address]     1  Encounter for medication management  Assessment & Plan:  Patient patient presents after being discharged from rehab yesterday  Patient unclear which medication she should and should not be taking  Denies any drug use since discharge  Discontinued Elavil at this time and restarted Effexor, Lyrica, Mirapex  Patient to return in 1 week for follow-up with PCP      2  Major depressive disorder, recurrent, severe without psychotic features [facility]  -     venlafaxine (EFFEXOR-XR) 37 5 mg 24 hr capsule; Take 1 capsule (37 5 mg total) by mouth daily with breakfast    3  Myofascial pain syndrome  -     TiZANidine (Zanaflex) 4 MG capsule; Take 1 capsule (4 mg total) by mouth 2 (two) times a day    4  Lumbar radiculopathy  -     pregabalin (LYRICA) 75 mg capsule; Take 1 capsule (75 mg total) by mouth daily as needed (rt jhand spasms)    5  Drug-induced constipation  -     docusate sodium (COLACE) 100 mg capsule; Take 1 capsule (100 mg total) by mouth 2 (two) times a day    6  Hemorrhoids, unspecified hemorrhoid type  -     hydrocortisone (ANUSOL-HC) 25 mg suppository; Insert 1 suppository (25 mg total) into the rectum 2 (two) times a day         Subjective      Patient presents today for follow-up after being discharged from rehab yesterday  Reports recent cocaine use which was laced with fentanyl  She states since being home she is unclear of which medication she should and should not be taking  Patient denies any drug use since discharge  Review of Systems   Constitutional: Negative for chills and fever  Respiratory: Positive for shortness of breath  Negative for cough  Cardiovascular: Negative for chest pain and palpitations  Gastrointestinal: Positive for constipation   Negative for abdominal pain, nausea and vomiting  Neurological: Negative for dizziness and headaches         Current Outpatient Medications on File Prior to Visit   Medication Sig   • albuterol (PROVENTIL HFA,VENTOLIN HFA) 90 mcg/act inhaler INHALE 2 PUFFS BY MOUTH EVERY 6 HOURS AS NEEDED FOR WHEEZING FOR SHORTNESS OF BREATH   • atorvastatin (LIPITOR) 40 mg tablet Take 1 tablet (40 mg total) by mouth daily   • BIOTIN 5000 PO Take by mouth daily     • ergocalciferol (VITAMIN D2) 50,000 units Take 1 capsule (50,000 Units total) by mouth 2 (two) times a week for 16 doses   • Menthol, Topical Analgesic, (Biofreeze Roll-On) 4 % GEL Apply 1 application topically 3 (three) times a day as needed (pain)   • montelukast (SINGULAIR) 10 mg tablet TAKE 1 TABLET BY MOUTH ONCE DAILY AT BEDTIME   • naproxen (NAPROSYN) 500 mg tablet TAKE 1 TABLET BY MOUTH TWICE A DAY WITH MEALS   • Spiriva Respimat 1 25 MCG/ACT AERS inhaler INHALE 2 SPRAY(S) BY MOUTH ONCE DAILY   • topiramate (Topamax) 50 MG tablet Take 1 tablet (50 mg total) by mouth every 12 (twelve) hours   • vitamin E, tocopherol, 400 units capsule Take 400 Units by mouth daily   • [DISCONTINUED] Loratadine 10 MG CAPS Take by mouth as needed     • [DISCONTINUED] magnesium Oxide (MAG-OX) 400 mg TABS    • [DISCONTINUED] pregabalin (LYRICA) 75 mg capsule Take 1 capsule (75 mg total) by mouth daily as needed (rt jhand spasms)   • Ascorbic Acid (C-1000/ALIX HIPS PO) Take by mouth daily     • Coenzyme Q10 (COQ10) 100 MG CAPS Take by mouth daily   (Patient not taking: Reported on 1/13/5028)   • folic acid (FOLVITE) 1 mg tablet Take 1 tablet (1 mg total) by mouth daily (Patient not taking: Reported on 1/12/2023)   • hydrOXYzine pamoate (VISTARIL) 25 mg capsule    • pramipexole (MIRAPEX) 0 5 mg tablet Take 1 tablet by mouth once daily   • [DISCONTINUED] amitriptyline (ELAVIL) 10 mg tablet    • [DISCONTINUED] amitriptyline (ELAVIL) 25 mg tablet    • [DISCONTINUED] cyclobenzaprine (FLEXERIL) 5 mg tablet Take 1 tablet (5 mg total) by mouth daily at bedtime as needed for muscle spasms for up to 5 days   • [DISCONTINUED] MAGNESIUM CITRATE PO Take 400 mg by mouth daily   (Patient not taking: Reported on 1/12/2023)   • [DISCONTINUED] SSD 1 % cream APPLY CREAM EXTERNALLY ONCE DAILY (Patient not taking: Reported on 1/12/2023)   • [DISCONTINUED] tiZANidine (ZANAFLEX) 4 mg tablet Take 1 tablet (4 mg total) by mouth 2 (two) times a day (Patient not taking: Reported on 1/12/2023)   • [DISCONTINUED] triamcinolone (KENALOG) 0 1 % cream Apply topically 3 (three) times a day (Patient not taking: Reported on 1/12/2023)   • [DISCONTINUED] venlafaxine (EFFEXOR-XR) 37 5 mg 24 hr capsule Take 1 capsule (37 5 mg total) by mouth daily with breakfast (Patient not taking: Reported on 1/12/2023)       Objective     /68 (BP Location: Left arm, Patient Position: Sitting, Cuff Size: Large)   Pulse 104   Temp 98 2 °F (36 8 °C) (Temporal)   Ht 5' 5" (1 651 m)   Wt 65 3 kg (144 lb)   LMP  (LMP Unknown)   BMI 23 96 kg/m²     Physical Exam  Vitals reviewed  HENT:      Head: Normocephalic  Eyes:      Extraocular Movements: Extraocular movements intact  Cardiovascular:      Rate and Rhythm: Normal rate and regular rhythm  Heart sounds: Normal heart sounds  Pulmonary:      Effort: Pulmonary effort is normal       Breath sounds: Normal breath sounds  Neurological:      Mental Status: She is alert and oriented to person, place, and time     Psychiatric:         Behavior: Behavior normal        Javier Stage, DO

## 2023-01-12 NOTE — ASSESSMENT & PLAN NOTE
Patient patient presents after being discharged from rehab yesterday  Patient unclear which medication she should and should not be taking  Denies any drug use since discharge  Discontinued Elavil at this time and restarted Effexor, Lyrica, Mirapex      Patient to return in 1 week for follow-up with PCP

## 2023-01-19 DIAGNOSIS — M21.619 BUNION OF GREAT TOE: Primary | ICD-10-CM

## 2023-01-19 NOTE — PROGRESS NOTES
Assessment:  1  Lumbar radiculopathy    2  Lumbar spondylosis    3  Myofascial pain syndrome        Plan:  Orders Placed This Encounter   Procedures   • FL spine and pain procedure     Standing Status:   Future     Standing Expiration Date:   2027     Order Specific Question:   Reason for Exam:     Answer:   b/l L4-5 and L5-S1 MBB #2     Order Specific Question:   Anticoagulant hold needed? Answer:   No     Order Specific Question:   Is the patient pregnant? Answer:   Unknown       New Medications Ordered This Visit   Medications   • magnesium Oxide (MAG-OX) 400 mg TABS     Si mg   • multivitamin (THERAGRAN) TABS     Sig: Take 1 tablet by mouth daily       My impressions and treatment recommendations were discussed in detail with the patient, who verbalized understanding and had no further questions  This is a 59-year-old female who presents to our office as a transfer of care   She has bilateral low back pain secondary to lumbar spondylosis  She responded favorably to bilateral L4-5 and L5-S1 medial branch block #1  In significance of relief, it is okay to schedule patient for confirmatory block #2  Complete risks and benefits including bleeding, infection, tissue reaction, nerve injury and allergic reaction were discussed  The approach was demonstrated using models and literature was provided  Verbal and written consent was obtained  Discharge instructions were provided  I personally saw and examined the patient and I agree with the above discussed plan of care  History of Present Illness:    Cricket Jovel is a 62 y o  female who presents to [facility] for initial evaluation of the above stated pain complaints  The patient has a past medical and chronic pain history as outlined in the assessment section  She is a transfer of care from Dr Devin Álvarez office due to location    She has been treated with his office for lumbar facet arthropathy and lumbar radiculopathy  Last underwent bilateral L4-5 and L5-S1 medial branch block #1 with him in 2022 with over 8 hours of 80 to 100% relief  Current pain score is 8 out of 10 bilateral lower back  Review of Systems:    Review of Systems   Respiratory: Negative for shortness of breath  Cardiovascular: Negative for chest pain  Gastrointestinal: Negative for constipation, diarrhea, nausea and vomiting  Musculoskeletal: Negative for arthralgias, gait problem, joint swelling and myalgias  Skin: Negative for rash  Neurological: Negative for dizziness, seizures and weakness  Psychiatric/Behavioral:        Anxiety   All other systems reviewed and are negative            Past Medical History:   Diagnosis Date   • Abnormal Pap smear of cervix    • Anxiety    • Bell's palsy    • COPD (chronic obstructive pulmonary disease) ([facility])    • Depression    • Menopause    • Mild persistent asthma without complication    • Obesity    • Primary osteoarthritis involving multiple joints 10/9/2018       Past Surgical History:   Procedure Laterality Date   • ANKLE SURGERY     • BREAST BIOPSY Right 12 yrs ago   •  SECTION     • COLONOSCOPY     • COLONOSCOPY W/ POLYPECTOMY     • DILATION AND CURETTAGE, DIAGNOSTIC / THERAPEUTIC     • NOSE SURGERY     • TOTAL HIP ARTHROPLASTY Left    • TUBAL LIGATION         Family History   Problem Relation Age of Onset   • Mental illness Mother    • Suicidality Mother    • No Known Problems Father    • Mental illness Brother    • Suicidality Brother    • No Known Problems Maternal Grandmother    • No Known Problems Paternal Grandmother    • No Known Problems Maternal Aunt    • No Known Problems Paternal Aunt    • No Known Problems Paternal Aunt    • No Known Problems Paternal Aunt    • No Known Problems Paternal Aunt    • Cancer Family        Social History     Occupational History   • 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: [employer]     Comment: [employer] / [employer]   Tobacco Use   • Smoking status: Every Day     Packs/day: 1 00     Years: 42 00     Pack years: 42 00     Types: Cigarettes     Start date: 1979   • Smokeless tobacco: Never   • Tobacco comments:     cut down to 1 ppd  and using lozenges   Vaping Use   • Vaping Use: Never used   Substance and Sexual Activity   • Alcohol use: Not Currently   • Drug use: Not Currently     Types: Marijuana, Cocaine   • Sexual activity: Not Currently     Partners: Male     Birth control/protection: None         Current Outpatient Medications:   •  albuterol (PROVENTIL HFA,VENTOLIN HFA) 90 mcg/act inhaler, INHALE 2 PUFFS BY MOUTH EVERY 6 HOURS AS NEEDED FOR WHEEZING FOR SHORTNESS OF BREATH, Disp: 18 g, Rfl: 5  •  atorvastatin (LIPITOR) 40 mg tablet, Take 1 tablet (40 mg total) by mouth daily, Disp: 90 tablet, Rfl: 30  •  BIOTIN 5000 PO, Take by mouth daily  , Disp: , Rfl:   •  docusate sodium (COLACE) 100 mg capsule, Take 1 capsule (100 mg total) by mouth 2 (two) times a day, Disp: 30 capsule, Rfl: 3  •  ergocalciferol (VITAMIN D2) 50,000 units, Take 1 capsule (50,000 Units total) by mouth 2 (two) times a week for 16 doses, Disp: 16 capsule, Rfl: 0  •  hydrOXYzine pamoate (VISTARIL) 25 mg capsule, , Disp: , Rfl:   •  Menthol, Topical Analgesic, (Biofreeze Roll-On) 4 % GEL, Apply 1 application topically 3 (three) times a day as needed (pain), Disp: 150 Tube, Rfl: 1  •  montelukast (SINGULAIR) 10 mg tablet, TAKE 1 TABLET BY MOUTH ONCE DAILY AT BEDTIME, Disp: 90 tablet, Rfl: 0  •  multivitamin (THERAGRAN) TABS, Take 1 tablet by mouth daily, Disp: , Rfl:   •  naproxen (NAPROSYN) 500 mg tablet, TAKE 1 TABLET BY MOUTH TWICE A DAY WITH MEALS, Disp: 60 tablet, Rfl: 0  •  pregabalin (LYRICA) 75 mg capsule, Take 1 capsule (75 mg total) by mouth daily as needed (rt jhand spasms), Disp: 90 capsule, Rfl: 0  •  Spiriva Respimat 1 25 MCG/ACT AERS inhaler, INHALE 2 SPRAY(S) BY MOUTH ONCE DAILY, Disp: 4 g, Rfl: 0  •  TiZANidine (Zanaflex) 4 MG capsule, Take 1 capsule (4 mg total) by mouth 2 (two) times a day, Disp: 60 capsule, Rfl: 0  •  topiramate (Topamax) 50 MG tablet, Take 1 tablet (50 mg total) by mouth every 12 (twelve) hours, Disp: 60 tablet, Rfl: 2  •  Ascorbic Acid (C-1000/ALIX HIPS PO), Take by mouth daily   (Patient not taking: Reported on 1/23/2023), Disp: , Rfl:   •  Coenzyme Q10 (COQ10) 100 MG CAPS, Take by mouth daily   (Patient not taking: Reported on 1/12/2023), Disp: , Rfl:   •  folic acid (FOLVITE) 1 mg tablet, Take 1 tablet (1 mg total) by mouth daily (Patient not taking: Reported on 1/12/2023), Disp: 30 tablet, Rfl: 0  •  hydrocortisone (ANUSOL-HC) 25 mg suppository, Insert 1 suppository (25 mg total) into the rectum 2 (two) times a day (Patient not taking: Reported on 1/23/2023), Disp: 12 suppository, Rfl: 0  •  magnesium Oxide (MAG-OX) 400 mg TABS, 400 mg (Patient not taking: Reported on 1/23/2023), Disp: , Rfl:   •  pramipexole (MIRAPEX) 0 5 mg tablet, Take 1 tablet by mouth once daily (Patient not taking: Reported on 1/23/2023), Disp: 30 tablet, Rfl: 0  •  venlafaxine (EFFEXOR-XR) 37 5 mg 24 hr capsule, Take 1 capsule (37 5 mg total) by mouth daily with breakfast (Patient not taking: Reported on 1/23/2023), Disp: 30 capsule, Rfl: 5  •  vitamin E, tocopherol, 400 units capsule, Take 400 Units by mouth daily (Patient not taking: Reported on 1/23/2023), Disp: , Rfl:     Allergies   Allergen Reactions   • Pollen Extract        Physical Exam:    Wt 65 8 kg (145 lb)   LMP  (LMP Unknown)   BMI 24 13 kg/m²     Constitutional: normal, well developed, well nourished, alert, in no distress and non-toxic and no overt pain behavior    Eyes: anicteric  HEENT: grossly intact  Neck: supple, symmetric, trachea midline and no masses   Pulmonary:even and unlabored  Cardiovascular:No edema or pitting edema present  Skin:Normal without rashes or lesions and well hydrated  Psychiatric:Mood and affect appropriate  Neurologic:Cranial Nerves II-XII grossly intact  Musculoskeletal:normal    Imaging  FL spine and pain procedure    (Results Pending)       Orders Placed This Encounter   Procedures   • FL spine and pain procedure

## 2023-01-23 ENCOUNTER — OFFICE VISIT (OUTPATIENT)
Dept: PAIN MEDICINE | Facility: CLINIC | Age: 58
End: 2023-01-23

## 2023-01-23 VITALS — BODY MASS INDEX: 24.13 KG/M2 | WEIGHT: 145 LBS

## 2023-01-23 DIAGNOSIS — M47.816 LUMBAR SPONDYLOSIS: ICD-10-CM

## 2023-01-23 DIAGNOSIS — M54.16 LUMBAR RADICULOPATHY: Primary | ICD-10-CM

## 2023-01-23 DIAGNOSIS — M79.18 MYOFASCIAL PAIN SYNDROME: ICD-10-CM

## 2023-01-23 RX ORDER — LANOLIN ALCOHOL/MO/W.PET/CERES
400 CREAM (GRAM) TOPICAL
COMMUNITY
Start: 2023-01-21

## 2023-01-23 RX ORDER — DIPHENOXYLATE HYDROCHLORIDE AND ATROPINE SULFATE 2.5; .025 MG/1; MG/1
1 TABLET ORAL DAILY
COMMUNITY

## 2023-01-26 ENCOUNTER — OFFICE VISIT (OUTPATIENT)
Dept: FAMILY MEDICINE CLINIC | Facility: CLINIC | Age: 58
End: 2023-01-26

## 2023-01-26 VITALS
DIASTOLIC BLOOD PRESSURE: 74 MMHG | SYSTOLIC BLOOD PRESSURE: 118 MMHG | WEIGHT: 143 LBS | OXYGEN SATURATION: 98 % | BODY MASS INDEX: 23.82 KG/M2 | RESPIRATION RATE: 16 BRPM | TEMPERATURE: 98.1 F | HEART RATE: 100 BPM | HEIGHT: 65 IN

## 2023-01-26 DIAGNOSIS — Z86.59 HISTORY OF SUICIDAL IDEATION: ICD-10-CM

## 2023-01-26 DIAGNOSIS — Z79.899 ENCOUNTER FOR MEDICATION MANAGEMENT: ICD-10-CM

## 2023-01-26 DIAGNOSIS — J43.9 PULMONARY EMPHYSEMA, UNSPECIFIED EMPHYSEMA TYPE (HCC): Primary | ICD-10-CM

## 2023-01-26 DIAGNOSIS — M54.16 LUMBAR RADICULOPATHY: ICD-10-CM

## 2023-01-26 RX ORDER — PREGABALIN 75 MG/1
75 CAPSULE ORAL DAILY
Qty: 90 CAPSULE | Refills: 0
Start: 2023-01-26

## 2023-01-26 NOTE — PROGRESS NOTES
Name: Reid Welsh      : 1965      MRN: [id number]  Encounter Provider: MICHELLE Emerson  Encounter Date: 2023   Encounter department: [address]     1  Pulmonary emphysema, unspecified emphysema type ([facility] )  Assessment & Plan:  Continue Spiriva  Continue Singulair      2  Lumbar radiculopathy  Assessment & Plan:  Patient has been taking Lyrica and tizanidine  We will stop the tizanidine  Continue Lyrica  Continue nonpharmacological interventions to help with pain management    Orders:  -     pregabalin (LYRICA) 75 mg capsule; Take 1 capsule (75 mg total) by mouth daily    3  History of suicidal ideation  Assessment & Plan:  Patient is currently approximately patient treatment  Reports that has been doing well  He any suicidal ideation at this time  Continue patient  Continue self-care      4  Encounter for medication management  Assessment & Plan:  Reviewed medications  Discontinued tizanidine  Continue Mirapex  Continue inhalers  Subjective      Patient is here for follow-up and to review medication is currently partial inpatient therapy  Goes daily  Was admitted to rehab  Reports that she had a few setbacks, used cocaine with fentanyl almost   Currently feeling very motivated to getting treatment and improving    Review of Systems   Constitutional: Negative  HENT: Negative  Eyes: Negative  Respiratory: Negative  Cardiovascular: Negative  Gastrointestinal: Negative  Endocrine: Negative  Genitourinary: Negative  Musculoskeletal: Negative  Skin: Negative  Allergic/Immunologic: Negative  Neurological: Negative  Psychiatric/Behavioral: Positive for behavioral problems, decreased concentration and dysphoric mood  Negative for suicidal ideas  The patient is nervous/anxious          Current Outpatient Medications on File Prior to Visit   Medication Sig   • albuterol Miesha hKalil HFA) 90 mcg/act inhaler INHALE 2 PUFFS BY MOUTH EVERY 6 HOURS AS NEEDED FOR WHEEZING FOR SHORTNESS OF BREATH   • Ascorbic Acid (C-1000/ALIX HIPS PO) Take by mouth daily   • atorvastatin (LIPITOR) 40 mg tablet Take 1 tablet (40 mg total) by mouth daily   • BIOTIN 5000 PO Take by mouth daily     • Coenzyme Q10 (COQ10) 100 MG CAPS Take by mouth daily   • docusate sodium (COLACE) 100 mg capsule Take 1 capsule (100 mg total) by mouth 2 (two) times a day   • ergocalciferol (VITAMIN D2) 50,000 units Take 1 capsule (50,000 Units total) by mouth 2 (two) times a week for 16 doses   • folic acid (FOLVITE) 1 mg tablet Take 1 tablet (1 mg total) by mouth daily   • hydrocortisone (ANUSOL-HC) 25 mg suppository Insert 1 suppository (25 mg total) into the rectum 2 (two) times a day   • hydrOXYzine pamoate (VISTARIL) 25 mg capsule    • magnesium Oxide (MAG-OX) 400 mg TABS 400 mg   • Menthol, Topical Analgesic, (Biofreeze Roll-On) 4 % GEL Apply 1 application topically 3 (three) times a day as needed (pain)   • montelukast (SINGULAIR) 10 mg tablet TAKE 1 TABLET BY MOUTH ONCE DAILY AT BEDTIME   • multivitamin (THERAGRAN) TABS Take 1 tablet by mouth daily   • naproxen (NAPROSYN) 500 mg tablet TAKE 1 TABLET BY MOUTH TWICE A DAY WITH MEALS   • pramipexole (MIRAPEX) 0 5 mg tablet Take 1 tablet by mouth once daily   • Spiriva Respimat 1 25 MCG/ACT AERS inhaler INHALE 2 SPRAY(S) BY MOUTH ONCE DAILY   • topiramate (Topamax) 50 MG tablet Take 1 tablet (50 mg total) by mouth every 12 (twelve) hours   • vitamin E, tocopherol, 400 units capsule Take 400 Units by mouth daily       Objective     /74   Pulse 100   Temp 98 1 °F (36 7 °C) (Temporal)   Resp 16   Ht 5' 5" (1 651 m)   Wt 64 9 kg (143 lb)   LMP  (LMP Unknown)   SpO2 98%   BMI 23 80 kg/m²     Physical Exam  Vitals and nursing note reviewed  Constitutional:       Appearance: She is well-developed  She is ill-appearing  HENT:      Head: Normocephalic and atraumatic     Eyes: Extraocular Movements: Extraocular movements intact  Pupils: Pupils are equal, round, and reactive to light  Cardiovascular:      Rate and Rhythm: Normal rate and regular rhythm  Pulses: Normal pulses  Heart sounds: Normal heart sounds  Pulmonary:      Effort: Pulmonary effort is normal       Breath sounds: Normal breath sounds  Abdominal:      General: Bowel sounds are normal       Palpations: Abdomen is soft  Musculoskeletal:         General: Normal range of motion  Cervical back: Normal range of motion  Skin:     General: Skin is warm and dry  Neurological:      General: No focal deficit present  Mental Status: She is alert and oriented to person, place, and time  Psychiatric:         Attention and Perception: Attention and perception normal          Mood and Affect: Mood is anxious  Speech: Speech is delayed  Behavior: Behavior normal          Thought Content:  Thought content normal          Cognition and Memory: Cognition and memory normal          Judgment: Judgment normal        MICHELLE Virgen

## 2023-01-26 NOTE — PATIENT INSTRUCTIONS
2000IU vitamin D daily  Stop tizanidine  Back Pain   WHAT YOU NEED TO KNOW:   Back pain is common  You may have back pain and muscle spasms  You may feel sore or stiff on one or both sides of your back  The pain may spread to your lower body  Conditions that affect the spine, joints, or muscles can cause back pain  These may include arthritis, spinal stenosis (narrowing of the spinal column), muscle tension, or breakdown of the spinal discs  DISCHARGE INSTRUCTIONS:   Call your local emergency number (911 in the [facility]) if:   You have severe back pain with chest pain  You cannot control your urine or bowel movements  Your pain becomes so severe that you cannot walk  Return to the emergency department if:   You have pain, numbness, or weakness in one or both legs  You have severe back pain, nausea, and vomiting  You have severe back pain that spreads to your side or genital area  Call your doctor if:   You have back pain that does not get better with rest and pain medicine  You have a fever  You have pain that worsens when you are on your back or when you rest     You have pain that worsens when you cough or sneeze  You lose weight without trying  You have questions or concerns about your condition or care  Medicines: You may need any of the following:  NSAIDs , such as ibuprofen, help decrease swelling, pain, and fever  This medicine is available with or without a doctor's order  NSAIDs can cause stomach bleeding or kidney problems in certain people  If you take blood thinner medicine, always ask your healthcare provider if NSAIDs are safe for you  Always read the medicine label and follow directions  Acetaminophen  decreases pain and fever  It is available without a doctor's order  Ask how much to take and how often to take it  Follow directions   Read the labels of all other medicines you are using to see if they also contain acetaminophen, or ask your doctor or pharmacist  Acetaminophen can cause liver damage if not taken correctly  Do not use more than 4 grams (4,000 milligrams) total of acetaminophen in one day  Muscle relaxers  help decrease muscle spasms and back pain  Prescription pain medicine  may be given  Ask your healthcare provider how to take this medicine safely  Some prescription pain medicines contain acetaminophen  Do not take other medicines that contain acetaminophen without talking to your healthcare provider  Too much acetaminophen may cause liver damage  Prescription pain medicine may cause constipation  Ask your healthcare provider how to prevent or treat constipation  Take your medicine as directed  Contact your healthcare provider if you think your medicine is not helping or if you have side effects  Tell him or her if you are allergic to any medicine  Keep a list of the medicines, vitamins, and herbs you take  Include the amounts, and when and why you take them  Bring the list or the pill bottles to follow-up visits  Carry your medicine list with you in case of an emergency  How to manage your back pain:   Apply ice  on your back for 15 to 20 minutes every hour or as directed  Use an ice pack, or put crushed ice in a plastic bag  Cover it with a towel before you apply it to your skin  Ice helps prevent tissue damage and decreases pain  Apply heat  on your back for 20 to 30 minutes every 2 hours for as many days as directed  Heat helps decrease pain and muscle spasms  Stay active  as much as you can without causing more pain  Bed rest could make your back pain worse  Avoid heavy lifting until your pain is gone  Go to physical therapy as directed  A physical therapist can teach you exercises to help improve movement and strength, and to decrease pain  Follow up with your doctor in 2 weeks, or as directed: You might need to see a specialist  Write down your questions so you remember to ask them during your visits    © Copyright [employer] 2022 Information is for [employer] use only and may not be sold, redistributed or otherwise used for commercial purposes  All illustrations and images included in CareNotes® are the copyrighted property of A D A M , Inc  or Yodit Dowling  The above information is an  only  It is not intended as medical advice for individual conditions or treatments  Talk to your doctor, nurse or pharmacist before following any medical regimen to see if it is safe and effective for you

## 2023-01-27 ENCOUNTER — TELEPHONE (OUTPATIENT)
Dept: RADIOLOGY | Facility: CLINIC | Age: 58
End: 2023-01-27

## 2023-01-27 DIAGNOSIS — J30.2 SEASONAL ALLERGIES: Primary | ICD-10-CM

## 2023-01-27 RX ORDER — HYDROXYZINE PAMOATE 25 MG/1
50 CAPSULE ORAL 3 TIMES DAILY PRN
Qty: 30 CAPSULE | Refills: 0 | Status: SHIPPED | OUTPATIENT
Start: 2023-01-27 | End: 2023-02-10 | Stop reason: SDUPTHER

## 2023-01-27 NOTE — TELEPHONE ENCOUNTER
Caller: patient     Doctor: Danisha Briggs    Reason for call: transferred to procedure     Call back#: n/a

## 2023-01-27 NOTE — ASSESSMENT & PLAN NOTE
Patient has been taking Lyrica and tizanidine  We will stop the tizanidine  Continue Lyrica    Continue nonpharmacological interventions to help with pain management

## 2023-01-27 NOTE — TELEPHONE ENCOUNTER
S/w pt and scheduled MBB #2 for 2/23/23 230 pm arrival  Gave pre procedure instructions and  policy

## 2023-01-27 NOTE — ASSESSMENT & PLAN NOTE
Patient is currently approximately patient treatment  Reports that has been doing well  He any suicidal ideation at this time  Continue patient    Continue self-care

## 2023-01-31 ENCOUNTER — TELEPHONE (OUTPATIENT)
Dept: PSYCHIATRY | Facility: CLINIC | Age: 58
End: 2023-01-31

## 2023-01-31 NOTE — TELEPHONE ENCOUNTER
Contacted patient off of Medication Management  to verify needs of services in attempts to offer patient an appointment   LVM for patient to contact intake dept  in regards to scheduling appt

## 2023-01-31 NOTE — TELEPHONE ENCOUNTER
Contacted patient off of Medication Management  to verify needs of services in attempts to offer patient an appointment  spoke with patient whom stated they are still interested in services but is currently in a partial program outside of [facility]  Patient was notified they may remain on list and once completion of program may contact office to attempt scheduling at that time          Prefers [facility] office   Older female provider preferred

## 2023-02-09 ENCOUNTER — TELEPHONE (OUTPATIENT)
Dept: FAMILY MEDICINE CLINIC | Facility: CLINIC | Age: 58
End: 2023-02-09

## 2023-02-09 NOTE — TELEPHONE ENCOUNTER
Patient called stating that she is a great deal of pain and the Naproxen is not doing anything  Would like to know if you can give her anything else or have any recommendations  Please advise

## 2023-02-10 DIAGNOSIS — M50.30 DEGENERATIVE DISC DISEASE, CERVICAL: ICD-10-CM

## 2023-02-10 DIAGNOSIS — J43.9 PULMONARY EMPHYSEMA, UNSPECIFIED EMPHYSEMA TYPE (HCC): ICD-10-CM

## 2023-02-10 DIAGNOSIS — M54.16 LUMBAR RADICULOPATHY: Primary | ICD-10-CM

## 2023-02-10 RX ORDER — TRAMADOL HYDROCHLORIDE 50 MG/1
50 TABLET ORAL EVERY 6 HOURS PRN
Qty: 20 TABLET | Refills: 0 | Status: SHIPPED | OUTPATIENT
Start: 2023-02-10 | End: 2023-03-15

## 2023-02-10 RX ORDER — TIOTROPIUM BROMIDE INHALATION SPRAY 1.56 UG/1
2 SPRAY, METERED RESPIRATORY (INHALATION) DAILY
Qty: 4 G | Refills: 0 | Status: SHIPPED | OUTPATIENT
Start: 2023-02-10 | End: 2023-02-14 | Stop reason: SDUPTHER

## 2023-02-10 RX ORDER — NAPROXEN 500 MG/1
TABLET ORAL
Qty: 60 TABLET | Refills: 0 | Status: SHIPPED | OUTPATIENT
Start: 2023-02-10

## 2023-02-10 NOTE — TELEPHONE ENCOUNTER
Limited doses of tramadol sent, use for acute pain, will not refill this med, need to follow up with ortho   Thanks

## 2023-02-13 ENCOUNTER — PROCEDURE VISIT (OUTPATIENT)
Dept: NEUROLOGY | Facility: CLINIC | Age: 58
End: 2023-02-13

## 2023-02-13 DIAGNOSIS — R29.898 HAND WEAKNESS: ICD-10-CM

## 2023-02-13 NOTE — PROGRESS NOTES
EMG 1 Limb     Date/Time 2/13/2023 1:02 PM     Performed by  Cordell Blair MD     Authorized by Katy Cueto, [facility]                [facility] AT [address]  [phone]    Electromyography & Nerve Conduction Studies Report          Full Name: Keo Vegas Gender: Female  MRN: [id number] YOB: 1965      Visit Date: 2/13/2023 1:48 PM  Age: 62 Years  Examining Physician: Cordell Blair MD   Referring Physician: Slick MARTINEZ  Medical History: 20-year-old female with history of alcohol abuse presents with right hand weakness, progressively worsening over the last few months  Notes she has been dropping objects  Patient is being evaluated for a focal neuropathy  On exam, motor strength is intact throughout  Sensations are diminished to light touch and pinprick in the C7 dermatome  TEMP 34      Sensory Nerve Conduction Study       Nerve / Sites Rec  Site Onset Lat Peak Lat  Amp Segments Distance Peak Diff Velocity     ms ms µV  cm ms m/s   R Median - Dig II (Antidromic)      Wrist Index 2 8 3 6 25 6 Wrist - Index 13  46      Ref  ?3 5 ? 20 0 Ref  ?50   R Ulnar - Dig V (Antidromic)      Wrist Dig V 2 0 2 9 31 5 Wrist - Dig V 11  56      Ref  ?3 1 ? 17 0 Ref  ?50   R Median, Ulnar - Palmar      Median Palm Wrist 1 7 2 2 19 0 Median Palm - Wrist 8  47      Ref  ?2 2 ? 50 0 Ref  ?50      Ulnar Palm Wrist 1 1 1 6 15 0 Ulnar Palm - Wrist 8  73      Ref  ?2 2 ? 12 0 Ref  ?50        Median Palm - Ulnar Palm  0 6         Ref  ?0 4    R Radial - Superficial (Antidromic)      Forearm Wrist 1 5 2 1 15 9 Forearm - Wrist 10  66      Ref  ?2 9 ? 15 0 Ref  ?50       Motor Nerve Conduction Study       Nerve / Sites Muscle Latency Ref  Amplitude Ref  Segments Distance Lat Diff Velocity Ref       ms ms mV mV  cm ms m/s m/s   R Median - APB      Wrist APB 3 9 ?4 4 6 7 ?4 0 Wrist - APB 7         Elbow APB 7 7  6 4  Elbow - Wrist 19 3 75 51 ?49   R Ulnar - ADM      Wrist ADM 2 3 ?3 3 8 0 ?6 0 Wrist - ADM 7         B  Elbow ADM 5 9  7 6  B  Elbow - Wrist 18 3 65 49 ?49      A  Elbow ADM 7 9  7 1  A  Elbow - B  Elbow 10 1 98 51 ?49       F Waves       Nerve F Latency Ref  ms ms   R Median - APB  21 5 ?31 0   R Ulnar - ADM  26 1 ? 32 0       EMG Summary Table     Spontaneous MUAP Recruitment   Muscle Nerve Roots IA Fib PSW Fasc H F  Dur  Amp PPP Config Pattern   R  First dorsal interosseous Ulnar C8-T1 NL None None None None NL NL None NL NL   R  Deltoid Axillary C5-C6 NL None None None None NL NL None NL NL   R  Biceps brachii Musculocut  C5-C6 NL None None None None NL NL None NL NL   R  Triceps brachii Radial C6-C8 NL None None None None NL NL None NL NL   R  Pronator teres Median C6-C7 NL None None None None NL NL None NL NL   R  Abductor pollicis brevis Median X0-T1 NL None None None None NL NL None NL NL   R  Cervical paraspinals (low)  - NL None None None None NL NL None NL NL                       Summary        Motor and sensory conduction studies were performed on the right median and ulnar nerves  The distal motor latencies were normal  The motor action potential amplitudes were normal  Motor conduction velocities were normal including conduction velocity of the ulnar nerve across the elbow  Median and ulnar F waves were normal     Median sensory peak latency was prolonged with a normal sensory action potential amplitude and slow conduction velocity across the wrist   The superficial radial and ulnar sensory action potentials were normal   The median palmar evoked response was prolonged by 0 6 ms as compared to the ulnar palmar evoked response at the same distance  Concentric needle EMG was performed on various distal and proximal muscles of the right upper extremity including APB, FDI, pronator teres, deltoid, biceps, triceps and low cervical paraspinal region  There was no evidence of active denervation in any of the muscles tested   The compound motor unit action potentials were of normal configuration with interference patterns being full or full for effort  The risks and benefits of this procedure have been explained to the patient  Impression:        Abnormal study  There is electrophysiologic evidence of a:  1  Mild median nerve compression neuropathy at the wrist with demyelinative changes, consistent with a diagnosis of carpal tunnel syndrome  2   There is no evidence of a cervical radiculopathy or ulnar neuropathy

## 2023-02-14 DIAGNOSIS — J30.2 SEASONAL ALLERGIES: ICD-10-CM

## 2023-02-14 DIAGNOSIS — J43.9 PULMONARY EMPHYSEMA, UNSPECIFIED EMPHYSEMA TYPE (HCC): ICD-10-CM

## 2023-02-14 RX ORDER — HYDROXYZINE PAMOATE 25 MG/1
50 CAPSULE ORAL 3 TIMES DAILY PRN
Qty: 30 CAPSULE | Refills: 0 | Status: SHIPPED | OUTPATIENT
Start: 2023-02-14

## 2023-02-14 RX ORDER — TIOTROPIUM BROMIDE INHALATION SPRAY 1.56 UG/1
2 SPRAY, METERED RESPIRATORY (INHALATION) DAILY
Qty: 4 G | Refills: 0 | Status: SHIPPED | OUTPATIENT
Start: 2023-02-14

## 2023-02-16 ENCOUNTER — TELEPHONE (OUTPATIENT)
Dept: OBGYN CLINIC | Facility: HOSPITAL | Age: 58
End: 2023-02-16

## 2023-02-16 NOTE — TELEPHONE ENCOUNTER
Caller: Carmen    Doctor/Office: Espinosa    Call regarding :  PT appt     Call was transferred to: PT
based on gfr & weight- dose should be 1g and then random level drawn before redosing. md will change
levetiracetam 250mg po T Th Sat 1700 post-HD, pt is being dialyzed on [facility],   -d/w Dr Almazan, recommended changing admt to [facility]

## 2023-02-17 DIAGNOSIS — M79.642 PAIN IN BOTH HANDS: Primary | ICD-10-CM

## 2023-02-17 DIAGNOSIS — G56.03 BILATERAL CARPAL TUNNEL SYNDROME: ICD-10-CM

## 2023-02-17 DIAGNOSIS — M79.641 PAIN IN BOTH HANDS: Primary | ICD-10-CM

## 2023-02-21 ENCOUNTER — TELEPHONE (OUTPATIENT)
Dept: FAMILY MEDICINE CLINIC | Facility: CLINIC | Age: 58
End: 2023-02-21

## 2023-02-21 NOTE — TELEPHONE ENCOUNTER
Patient states that she called physical therapy to schedule appointments for PT and OT and was told that they are unable to both at the same visit/day  She says that she can't miss too much of her group meetings  She would like to know which one you would like her to do first, PT or OT? Her neck pain and carpal tunnel are equally bad

## 2023-02-23 ENCOUNTER — HOSPITAL ENCOUNTER (OUTPATIENT)
Dept: RADIOLOGY | Facility: CLINIC | Age: 58
Discharge: HOME | End: 2023-02-23
Admitting: STUDENT IN AN ORGANIZED HEALTH CARE EDUCATION/TRAINING PROGRAM

## 2023-02-23 VITALS
TEMPERATURE: 96.8 F | OXYGEN SATURATION: 97 % | DIASTOLIC BLOOD PRESSURE: 69 MMHG | HEART RATE: 89 BPM | RESPIRATION RATE: 18 BRPM | SYSTOLIC BLOOD PRESSURE: 114 MMHG

## 2023-02-23 DIAGNOSIS — M47.816 LUMBAR SPONDYLOSIS: ICD-10-CM

## 2023-02-23 RX ORDER — BUPIVACAINE HYDROCHLORIDE 7.5 MG/ML
3 INJECTION, SOLUTION EPIDURAL; RETROBULBAR ONCE
Status: COMPLETED | OUTPATIENT
Start: 2023-02-23 | End: 2023-02-23

## 2023-02-23 RX ADMIN — BUPIVACAINE HYDROCHLORIDE 3 ML: 7.5 INJECTION, SOLUTION EPIDURAL; RETROBULBAR at 14:33

## 2023-02-23 NOTE — H&P
History of Present Illness:  The patient is a 62 y o  female who presents with complaints of bilateral low back pain    Past Medical History:   Diagnosis Date   • Abnormal Pap smear of cervix    • Anxiety    • Bell's palsy    • COPD (chronic obstructive pulmonary disease) (HCC)    • Depression    • Menopause    • Mild persistent asthma without complication 9833   • Obesity    • Primary osteoarthritis involving multiple joints 10/9/2018       Past Surgical History:   Procedure Laterality Date   • ANKLE SURGERY     • BREAST BIOPSY Right 12 yrs ago   •  SECTION     • COLONOSCOPY     • COLONOSCOPY W/ POLYPECTOMY     • DILATION AND CURETTAGE, DIAGNOSTIC / THERAPEUTIC     • NOSE SURGERY     • TOTAL HIP ARTHROPLASTY Left    • TUBAL LIGATION           Current Outpatient Medications:   •  albuterol (PROVENTIL HFA,VENTOLIN HFA) 90 mcg/act inhaler, INHALE 2 PUFFS BY MOUTH EVERY 6 HOURS AS NEEDED FOR WHEEZING FOR SHORTNESS OF BREATH, Disp: 18 g, Rfl: 5  •  Ascorbic Acid (C-1000/ALIX HIPS PO), Take by mouth daily, Disp: , Rfl:   •  atorvastatin (LIPITOR) 40 mg tablet, Take 1 tablet (40 mg total) by mouth daily, Disp: 90 tablet, Rfl: 30  •  BIOTIN 5000 PO, Take by mouth daily  , Disp: , Rfl:   •  Coenzyme Q10 (COQ10) 100 MG CAPS, Take by mouth daily, Disp: , Rfl:   •  docusate sodium (COLACE) 100 mg capsule, Take 1 capsule (100 mg total) by mouth 2 (two) times a day, Disp: 30 capsule, Rfl: 3  •  ergocalciferol (VITAMIN D2) 50,000 units, Take 1 capsule (50,000 Units total) by mouth 2 (two) times a week for 16 doses, Disp: 16 capsule, Rfl: 0  •  folic acid (FOLVITE) 1 mg tablet, Take 1 tablet (1 mg total) by mouth daily, Disp: 30 tablet, Rfl: 0  •  hydrocortisone (ANUSOL-HC) 25 mg suppository, Insert 1 suppository (25 mg total) into the rectum 2 (two) times a day, Disp: 12 suppository, Rfl: 0  •  hydrOXYzine pamoate (VISTARIL) 25 mg capsule, Take 2 capsules (50 mg total) by mouth 3 (three) times a day as needed for itching, Disp: 30 capsule, Rfl: 0  •  magnesium Oxide (MAG-OX) 400 mg TABS, 400 mg, Disp: , Rfl:   •  Menthol, Topical Analgesic, (Biofreeze Roll-On) 4 % GEL, Apply 1 application topically 3 (three) times a day as needed (pain), Disp: 150 Tube, Rfl: 1  •  montelukast (SINGULAIR) 10 mg tablet, TAKE 1 TABLET BY MOUTH ONCE DAILY AT BEDTIME, Disp: 90 tablet, Rfl: 0  •  multivitamin (THERAGRAN) TABS, Take 1 tablet by mouth daily, Disp: , Rfl:   •  naproxen (NAPROSYN) 500 mg tablet, TAKE 1 TABLET BY MOUTH TWICE A DAY WITH MEALS, Disp: 60 tablet, Rfl: 0  •  pramipexole (MIRAPEX) 0 5 mg tablet, Take 1 tablet by mouth once daily, Disp: 30 tablet, Rfl: 0  •  pregabalin (LYRICA) 75 mg capsule, Take 1 capsule (75 mg total) by mouth daily, Disp: 90 capsule, Rfl: 0  •  tiotropium (Spiriva Respimat) 1 25 MCG/ACT AERS inhaler, Inhale 2 puffs daily, Disp: 4 g, Rfl: 0  •  topiramate (Topamax) 50 MG tablet, Take 1 tablet (50 mg total) by mouth every 12 (twelve) hours, Disp: 60 tablet, Rfl: 2  •  traMADol (Ultram) 50 mg tablet, Take 1 tablet (50 mg total) by mouth every 6 (six) hours as needed for moderate pain, Disp: 20 tablet, Rfl: 0  •  vitamin E, tocopherol, 400 units capsule, Take 400 Units by mouth daily, Disp: , Rfl:     Allergies   Allergen Reactions   • Pollen Extract        Physical Exam:   Vitals:    02/23/23 1417   BP: 112/67   Pulse: 85   Resp: 18   Temp: (!) 96 8 °F (36 °C)   SpO2: 98%     General: Awake, Alert, Oriented x 3, Mood and affect appropriate  Respiratory: Respirations even and unlabored  Cardiovascular: Peripheral pulses intact; no edema  Musculoskeletal Exam: facet loading positive bilaterally    ASA Score: 3    Patient/Chart Verification  Patient ID Verified: Verbal  ID Band Applied: No  Consents Confirmed: Procedural, To be obtained in the Pre-Procedure area  H&P( within 30 days) Verified:  To be obtained in the Pre-Procedure area  Interval H&P(within 24 hr) Complete (required for Outpatients and Surgery Admit only): To be obtained in the Pre-Procedure area  Allergies Reviewed: Yes  Anticoag/NSAID held?: No  Currently on antibiotics?: No    Assessment:   1   Lumbar spondylosis        Plan: b/l L4-5 and L5-S1 MBB #2

## 2023-02-23 NOTE — DISCHARGE INSTR - LAB

## 2023-03-03 DIAGNOSIS — J43.9 PULMONARY EMPHYSEMA, UNSPECIFIED EMPHYSEMA TYPE (HCC): ICD-10-CM

## 2023-03-06 RX ORDER — TIOTROPIUM BROMIDE INHALATION SPRAY 1.56 UG/1
SPRAY, METERED RESPIRATORY (INHALATION)
Qty: 4 G | Refills: 3 | Status: SHIPPED | OUTPATIENT
Start: 2023-03-06

## 2023-03-07 ENCOUNTER — TELEPHONE (OUTPATIENT)
Dept: RADIOLOGY | Facility: CLINIC | Age: 58
End: 2023-03-07

## 2023-03-07 DIAGNOSIS — M47.816 LUMBAR SPONDYLOSIS: ICD-10-CM

## 2023-03-07 NOTE — TELEPHONE ENCOUNTER
S/p Bl L4-5 and L5-S1 MBB #2 2/23  Pt reports % relief for 4 hours  Please schedule RFAs per SP protocol

## 2023-03-07 NOTE — TELEPHONE ENCOUNTER
S/W pt scheduled Lt RFA 4/4/23    Patient would like a cb regarding pain med, Dr Sally Hewitt will no longer RX Tramadol as she is being seen by [facility]  Naproxen does not help her as she is having numbness/tingling down her leg, she has tried icy/hot spray and it only helps minimally    please advise

## 2023-03-08 DIAGNOSIS — G43.919 INTRACTABLE MIGRAINE WITHOUT STATUS MIGRAINOSUS, UNSPECIFIED MIGRAINE TYPE: ICD-10-CM

## 2023-03-08 RX ORDER — TOPIRAMATE 50 MG/1
TABLET, FILM COATED ORAL
Qty: 60 TABLET | Refills: 0 | Status: SHIPPED | OUTPATIENT
Start: 2023-03-08

## 2023-03-10 ENCOUNTER — TELEPHONE (OUTPATIENT)
Dept: PAIN MEDICINE | Facility: CLINIC | Age: 58
End: 2023-03-10

## 2023-03-10 NOTE — TELEPHONE ENCOUNTER
Caller: pt  Call back#: [phone]  Doctor: Florinda Whelan    Reason for call: Pt is calling in stating that the [employer] needs a prior Justin Brock: [id number]  ID: [id number]   PHONE:  ([address] [place] [address],  [address])

## 2023-03-14 DIAGNOSIS — M54.16 LUMBAR RADICULOPATHY: ICD-10-CM

## 2023-03-14 RX ORDER — PREGABALIN 75 MG/1
CAPSULE ORAL
Qty: 30 CAPSULE | Refills: 2 | Status: SHIPPED | OUTPATIENT
Start: 2023-03-14 | End: 2023-03-23 | Stop reason: SDUPTHER

## 2023-03-15 RX ORDER — TRAMADOL HYDROCHLORIDE 50 MG/1
50 TABLET ORAL EVERY 6 HOURS PRN
Qty: 20 TABLET | Refills: 0 | Status: SHIPPED | OUTPATIENT
Start: 2023-03-15

## 2023-03-18 DIAGNOSIS — J30.2 SEASONAL ALLERGIES: ICD-10-CM

## 2023-03-20 RX ORDER — MONTELUKAST SODIUM 10 MG/1
10 TABLET ORAL
Qty: 90 TABLET | Refills: 1 | Status: SHIPPED | OUTPATIENT
Start: 2023-03-20

## 2023-03-23 DIAGNOSIS — M54.16 LUMBAR RADICULOPATHY: ICD-10-CM

## 2023-03-23 RX ORDER — PREGABALIN 75 MG/1
75 CAPSULE ORAL DAILY PRN
Qty: 30 CAPSULE | Refills: 2 | Status: SHIPPED | OUTPATIENT
Start: 2023-03-23

## 2023-03-29 ENCOUNTER — TELEPHONE (OUTPATIENT)
Dept: FAMILY MEDICINE CLINIC | Facility: CLINIC | Age: 58
End: 2023-03-29

## 2023-03-29 NOTE — TELEPHONE ENCOUNTER
Patient called with complaints of seasonal allergies  She does not feel as if the loratadine is helping her anymore  She also stated the her psychiatrist started her on Respiredone    I will confirm dose when call is returned to patient

## 2023-03-29 NOTE — TELEPHONE ENCOUNTER
Caller: patient     Doctor: August Durant    Reason for call: pt is requesting to speak to a nurse regarding after care from her procedure scheduled on 4/6/23    Call back#: [phone]

## 2023-03-30 ENCOUNTER — OFFICE VISIT (OUTPATIENT)
Dept: URGENT CARE | Facility: CLINIC | Age: 58
End: 2023-03-30

## 2023-03-30 VITALS
OXYGEN SATURATION: 98 % | DIASTOLIC BLOOD PRESSURE: 70 MMHG | TEMPERATURE: 98.3 F | SYSTOLIC BLOOD PRESSURE: 112 MMHG | RESPIRATION RATE: 17 BRPM | HEART RATE: 87 BPM

## 2023-03-30 DIAGNOSIS — J44.1 COPD EXACERBATION (HCC): Primary | ICD-10-CM

## 2023-03-30 RX ORDER — AMITRIPTYLINE HYDROCHLORIDE 25 MG/1
50 TABLET, FILM COATED ORAL
COMMUNITY
Start: 2023-03-02

## 2023-03-30 RX ORDER — RISPERIDONE 0.25 MG/1
0.25 TABLET ORAL EVERY 12 HOURS
COMMUNITY
Start: 2023-03-13

## 2023-03-30 RX ORDER — PREDNISONE 20 MG/1
20 TABLET ORAL 2 TIMES DAILY WITH MEALS
Qty: 12 TABLET | Refills: 0 | Status: SHIPPED | OUTPATIENT
Start: 2023-03-30 | End: 2023-04-05

## 2023-03-30 RX ORDER — AZITHROMYCIN 250 MG/1
TABLET, FILM COATED ORAL
Qty: 6 TABLET | Refills: 0 | Status: SHIPPED | OUTPATIENT
Start: 2023-03-30 | End: 2023-04-03

## 2023-03-30 NOTE — PROGRESS NOTES
[facility]        NAME: Russell Motta is a 62 y o  female  : 1965    MRN: [id number]  DATE: 2023  TIME: 1:18 PM    Assessment and Plan   COPD exacerbation ([facility] [address] ) [J44 1]  1  COPD exacerbation (HCC)  azithromycin (ZITHROMAX) 250 mg tablet    predniSONE 20 mg tablet        Given COPD history and lung auscultation will treat with Zithromax and Prednisone for 6 days  PCP follow up encouraged     Patient Instructions       Follow up with PCP in 3-5 days  Proceed to  ER if symptoms worsen  Chief Complaint     Chief Complaint   Patient presents with   • Cold Like Symptoms     Pt c/o cough, chills,sore throat and chest tightness for 2 days  History of Present Illness       Patient is a 61 yo female with a PMH of COPD and emphysema who presents for evaluation of cough and SOB with chest tightness x 4-5 days  She denies tachypnea, wheezing, chest pain, hemoptysis      Review of Systems   Review of Systems   Constitutional: Negative for chills, fatigue and fever  HENT: Negative for congestion, ear discharge, ear pain, postnasal drip, rhinorrhea, sinus pressure, sinus pain and sore throat  Respiratory: Positive for cough, chest tightness and shortness of breath  Negative for wheezing  Cardiovascular: Negative for chest pain and palpitations  Musculoskeletal: Negative for arthralgias and myalgias  Neurological: Negative for weakness  Psychiatric/Behavioral: Negative for confusion           Current Medications       Current Outpatient Medications:   •  albuterol (PROVENTIL HFA,VENTOLIN HFA) 90 mcg/act inhaler, INHALE 2 PUFFS BY MOUTH EVERY 6 HOURS AS NEEDED FOR WHEEZING FOR SHORTNESS OF BREATH, Disp: 18 g, Rfl: 5  •  amitriptyline (ELAVIL) 25 mg tablet, Take 50 mg by mouth daily at bedtime, Disp: , Rfl:   •  Ascorbic Acid (C-1000/ALIX HIPS PO), Take by mouth daily, Disp: , Rfl:   •  atorvastatin (LIPITOR) 40 mg tablet, Take 1 tablet (40 mg total) by mouth daily, Disp: 90 tablet, Rfl: 30  •  azithromycin (ZITHROMAX) 250 mg tablet, Take 2 tablets today then 1 tablet daily x 4 days, Disp: 6 tablet, Rfl: 0  •  BIOTIN 5000 PO, Take by mouth daily  , Disp: , Rfl:   •  docusate sodium (COLACE) 100 mg capsule, Take 1 capsule (100 mg total) by mouth 2 (two) times a day, Disp: 30 capsule, Rfl: 3  •  ergocalciferol (VITAMIN D2) 50,000 units, TAKE 1 CAPSULE BY MOUTH 2 TIMES A WEEK FOR 16 DOSES, Disp: 8 capsule, Rfl: 1  •  folic acid (FOLVITE) 1 mg tablet, Take 1 tablet (1 mg total) by mouth daily, Disp: 30 tablet, Rfl: 0  •  hydrOXYzine pamoate (VISTARIL) 25 mg capsule, Take 2 capsules (50 mg total) by mouth 3 (three) times a day as needed for itching, Disp: 30 capsule, Rfl: 0  •  magnesium Oxide (MAG-OX) 400 mg TABS, 400 mg, Disp: , Rfl:   •  Menthol, Topical Analgesic, (Biofreeze Roll-On) 4 % GEL, Apply 1 application topically 3 (three) times a day as needed (pain), Disp: 150 Tube, Rfl: 1  •  montelukast (SINGULAIR) 10 mg tablet, Take 1 tablet (10 mg total) by mouth daily at bedtime, Disp: 90 tablet, Rfl: 1  •  multivitamin (THERAGRAN) TABS, Take 1 tablet by mouth daily, Disp: , Rfl:   •  predniSONE 20 mg tablet, Take 1 tablet (20 mg total) by mouth 2 (two) times a day with meals for 6 days, Disp: 12 tablet, Rfl: 0  •  pregabalin (LYRICA) 75 mg capsule, Take 1 capsule (75 mg total) by mouth daily as needed (pain in hands), Disp: 30 capsule, Rfl: 2  •  risperiDONE (RisperDAL) 0 25 mg tablet, Take 0 25 mg by mouth every 12 (twelve) hours, Disp: , Rfl:   •  Spiriva Respimat 1 25 MCG/ACT AERS inhaler, INHALE 2 PUFFS BY MOUTH DAILY, Disp: 4 g, Rfl: 3  •  topiramate (TOPAMAX) 50 MG tablet, TAKE 1 TABLET BY MOUTH EVERY 12 HOURS , Disp: 60 tablet, Rfl: 0  •  traMADol (ULTRAM) 50 mg tablet, Take 1 tablet (50 mg total) by mouth every 8 (eight) hours as needed for moderate pain, Disp: 30 tablet, Rfl: 0  •  vitamin E, tocopherol, 400 units capsule, Take 400 Units by mouth daily, Disp: , Rfl:   • celecoxib (CeleBREX) 100 mg capsule, Take 1 capsule (100 mg total) by mouth 2 (two) times a day as needed for moderate pain (Patient not taking: Reported on 3/30/2023), Disp: 30 capsule, Rfl: 0  •  Coenzyme Q10 (COQ10) 100 MG CAPS, Take by mouth daily (Patient not taking: Reported on 3/30/2023), Disp: , Rfl:   •  hydrocortisone (ANUSOL-HC) 25 mg suppository, Insert 1 suppository (25 mg total) into the rectum 2 (two) times a day (Patient not taking: Reported on 3/30/2023), Disp: 12 suppository, Rfl: 0  •  pramipexole (MIRAPEX) 0 5 mg tablet, Take 1 tablet by mouth once daily (Patient not taking: Reported on 3/30/2023), Disp: 30 tablet, Rfl: 0    Current Allergies     Allergies as of 2023 - Reviewed 2023   Allergen Reaction Noted   • Pollen extract  2019            The following portions of the patient's history were reviewed and updated as appropriate: allergies, current medications, past family history, past medical history, past social history, past surgical history and problem list      Past Medical History:   Diagnosis Date   • Abnormal Pap smear of cervix    • Anxiety    • Bell's palsy    • COPD (chronic obstructive pulmonary disease) ([facility] [address] )    • Depression    • Menopause    • Mild persistent asthma without complication 10/7/0390   • Obesity    • Primary osteoarthritis involving multiple joints 10/9/2018       Past Surgical History:   Procedure Laterality Date   • ANKLE SURGERY     • BREAST BIOPSY Right 12 yrs ago   •  SECTION     • COLONOSCOPY     • COLONOSCOPY W/ POLYPECTOMY     • DILATION AND CURETTAGE, DIAGNOSTIC / THERAPEUTIC     • NOSE SURGERY     • TOTAL HIP ARTHROPLASTY Left    • TUBAL LIGATION         Family History   Problem Relation Age of Onset   • Mental illness Mother    • Suicidality Mother    • No Known Problems Father    • Mental illness Brother    • Suicidality Brother    • No Known Problems Maternal Grandmother    • No Known Problems Paternal Grandmother    • No Known Problems Maternal Aunt    • No Known Problems Paternal Aunt    • No Known Problems Paternal Aunt    • No Known Problems Paternal Aunt    • No Known Problems Paternal Aunt    • Cancer Family          Medications have been verified  Objective   /70   Pulse 87   Temp 98 3 °F (36 8 °C)   Resp 17   LMP  (LMP Unknown)   SpO2 98%        Physical Exam     Physical Exam  Constitutional:       General: She is not in acute distress  Appearance: Normal appearance  She is not ill-appearing or diaphoretic  HENT:      Right Ear: Tympanic membrane, ear canal and external ear normal       Left Ear: Tympanic membrane, ear canal and external ear normal       Nose: Nose normal       Mouth/Throat:      Mouth: Mucous membranes are moist       Pharynx: Oropharynx is clear  Eyes:      Conjunctiva/sclera: Conjunctivae normal    Cardiovascular:      Rate and Rhythm: Normal rate and regular rhythm  Heart sounds: Normal heart sounds  Pulmonary:      Effort: Pulmonary effort is normal       Breath sounds: Rhonchi present  Comments: Posterior rhonchi in upper lung fields   Skin:     General: Skin is warm and dry  Neurological:      Mental Status: She is alert     Psychiatric:         Mood and Affect: Mood normal          Behavior: Behavior normal

## 2023-04-05 ENCOUNTER — TELEPHONE (OUTPATIENT)
Dept: FAMILY MEDICINE CLINIC | Facility: CLINIC | Age: 58
End: 2023-04-05

## 2023-04-05 ENCOUNTER — TELEPHONE (OUTPATIENT)
Dept: PSYCHIATRY | Facility: CLINIC | Age: 58
End: 2023-04-05

## 2023-04-05 NOTE — TELEPHONE ENCOUNTER
Sent Wait List Letter [employer]IA [employer]   Verify patients need of services from wait list after 15 days   If no communication remove from Medication Management  wait list

## 2023-04-05 NOTE — TELEPHONE ENCOUNTER
Hi, my name is Taylor Chol  I see Doctor Tamika Oliveira and my date of birth is [birth year]  My phone number is [phone]  I would like to know what kind of allergy pill I can take besides loratadine because that is not helping me at all and that would go with my other medicine  If you could let me know that please  Again, Taylor Chol [id number] [facility] [phone]  Thank you

## 2023-04-06 ENCOUNTER — TELEPHONE (OUTPATIENT)
Dept: RADIOLOGY | Facility: CLINIC | Age: 58
End: 2023-04-06

## 2023-04-07 DIAGNOSIS — J30.2 SEASONAL ALLERGIES: Primary | ICD-10-CM

## 2023-04-07 RX ORDER — LEVOCETIRIZINE DIHYDROCHLORIDE 5 MG/1
5 TABLET, FILM COATED ORAL EVERY EVENING
Qty: 30 TABLET | Refills: 3 | Status: SHIPPED | OUTPATIENT
Start: 2023-04-07 | End: 2023-08-08

## 2023-04-07 NOTE — TELEPHONE ENCOUNTER
S/w pt  S/p left lumbar RFA 4/6  Pt states sore with a tingling, numbing sensation to left inner leg  Advised pt soreness and some new sensations are normal post RFA  Denies weakness  Denies sunburn like sensation  Advised ice and to give procedure time to work  Pt agreeable  Needs to schedule right sided RFA   Will send to procedure

## 2023-04-07 NOTE — TELEPHONE ENCOUNTER
S/w patient scheduled Rt RFA 5/11/23, patient aware of instructions reviewed including vaccination/ policy

## 2023-04-17 DIAGNOSIS — M79.2 NEURITIS: Primary | ICD-10-CM

## 2023-04-17 RX ORDER — METHYLPREDNISOLONE 4 MG/1
TABLET ORAL
Qty: 1 EACH | Refills: 0 | Status: SHIPPED | OUTPATIENT
Start: 2023-04-17

## 2023-05-05 DIAGNOSIS — M54.16 LUMBAR RADICULOPATHY: ICD-10-CM

## 2023-05-09 RX ORDER — TRAMADOL HYDROCHLORIDE 50 MG/1
50 TABLET ORAL EVERY 8 HOURS PRN
Qty: 30 TABLET | Refills: 0 | Status: SHIPPED | OUTPATIENT
Start: 2023-05-09

## 2023-05-10 ENCOUNTER — TELEPHONE (OUTPATIENT)
Dept: PAIN MEDICINE | Facility: MEDICAL CENTER | Age: 58
End: 2023-05-10

## 2023-05-10 NOTE — TELEPHONE ENCOUNTER
Caller: patient    Doctor: keyonna    Reason for call: cancel procedure, not feeling well    Call back#: [phone]

## 2023-06-03 DIAGNOSIS — M54.16 LUMBAR RADICULOPATHY: ICD-10-CM

## 2023-06-05 RX ORDER — CELECOXIB 100 MG/1
CAPSULE ORAL
Qty: 30 CAPSULE | Refills: 0 | OUTPATIENT
Start: 2023-06-05

## 2023-06-06 RX ORDER — TRAMADOL HYDROCHLORIDE 50 MG/1
50 TABLET ORAL EVERY 8 HOURS PRN
Qty: 30 TABLET | Refills: 0 | Status: SHIPPED | OUTPATIENT
Start: 2023-06-06

## 2023-06-22 DIAGNOSIS — M54.16 LUMBAR RADICULOPATHY: ICD-10-CM

## 2023-06-23 RX ORDER — TRAMADOL HYDROCHLORIDE 50 MG/1
50 TABLET ORAL EVERY 8 HOURS PRN
Qty: 30 TABLET | Refills: 0 | OUTPATIENT
Start: 2023-06-23

## 2023-06-24 DIAGNOSIS — E55.9 HYPOVITAMINOSIS D: ICD-10-CM

## 2023-06-28 RX ORDER — ERGOCALCIFEROL 1.25 MG/1
CAPSULE ORAL
Qty: 8 CAPSULE | Refills: 1 | Status: SHIPPED | OUTPATIENT
Start: 2023-06-28

## 2023-08-08 DIAGNOSIS — J30.2 SEASONAL ALLERGIES: ICD-10-CM

## 2023-08-08 RX ORDER — LEVOCETIRIZINE DIHYDROCHLORIDE 5 MG/1
5 TABLET, FILM COATED ORAL EVERY EVENING
Qty: 30 TABLET | Refills: 3 | Status: SHIPPED | OUTPATIENT
Start: 2023-08-08

## 2023-09-17 DIAGNOSIS — J30.2 SEASONAL ALLERGIES: ICD-10-CM

## 2023-09-18 RX ORDER — MONTELUKAST SODIUM 10 MG/1
10 TABLET ORAL
Qty: 90 TABLET | Refills: 1 | OUTPATIENT
Start: 2023-09-18

## 2023-11-14 NOTE — TELEPHONE ENCOUNTER
----- Message from Emiliano Jackman, [address] sent at 3/28/2022  1:22 PM EDT -----  Please call patient and let her know that the recent MRI of her lumbar spine revealed multilevel arthritic changes and multilevel bulging discs  There is mild-to-moderate central canal and foraminal narrowing at several levels  Please advise her to keep scheduled follow-up visit  Name: Dorothy Santo      : 1955      MRN: [id number]  Encounter Provider: Tahir Sutton DO  Encounter Date: 2023   Encounter department: [address]     1. Low back pain, unspecified back pain laterality, unspecified chronicity, unspecified whether sciatica present  Comments:  Acute on chronic back pain-resolved, only notes flares when he over does it with activity, will call w/ persistent new/worse symptoms    2. Fever, unspecified fever cause  Comments:  Resolved, will check Lyme test as occurred after tick bite, will follow    3. Thrombocytopenia ([address])  Comments:  Recheck CBC - order given, on ASA 81 mg, call with clinical symptoms  Orders:  -     CBC and differential  -     CBC; Future; Expected date: 2024  -     Comprehensive metabolic panel; Future; Expected date: 2024  -     Lipid panel; Future; Expected date: 2024  -     PSA, Total Screen; Future; Expected date: 2024  -     TSH, 3rd generation with Free T4 reflex; Future; Expected date: 2024  -     Hemoglobin A1C; Future; Expected date: 2024    4. Leukopenia, unspecified type  Comments:  Recheck CBC - BW order given, will follow  Orders:  -     CBC and differential  -     CBC; Future; Expected date: 2024  -     Comprehensive metabolic panel; Future; Expected date: 2024  -     Lipid panel; Future; Expected date: 2024  -     PSA, Total Screen; Future; Expected date: 2024  -     TSH, 3rd generation with Free T4 reflex; Future; Expected date: 2024  -     Hemoglobin A1C; Future; Expected date: 2024    5. Transaminitis  Comments:  Recheck with CMP - BW order given, will follow  Orders:  -     Comprehensive metabolic panel  -     CBC; Future; Expected date: 2024  -     Comprehensive metabolic panel; Future; Expected date: 2024  -     Lipid panel;  Future; Expected date: 2024  -     PSA, Total Screen; Future; Expected date: 03/07/2024  -     TSH, 3rd generation with Free T4 reflex; Future; Expected date: 03/07/2024  -     Hemoglobin A1C; Future; Expected date: 03/07/2024    6. Hepatic cyst  Comments:  F/u US in 6-12 mos, will follow US and CMP    7. Bilateral renal cysts  Comments:  F/u US ordered, repeat UA ordered, will follow, + previous smoking history  Orders:  -     US kidney and bladder; Future; Expected date: 11/14/2023    8. Microscopic hematuria  Comments:  Recheck UA to ensure resolution - if not further eval with Uro needed  Orders:  -     UA (URINE) with reflex to Scope    9. Tick bite of left upper arm, sequela  Comments:  Check Lyme test, will follow  Orders:  -     Lyme Total AB W Reflex to IGM/IGG; Future    10. Impaired fasting glucose  Comments:  never did last BW - will repeat annually - BW order given, will follow  Orders:  -     CBC; Future; Expected date: 03/07/2024  -     Comprehensive metabolic panel; Future; Expected date: 03/07/2024  -     Lipid panel; Future; Expected date: 03/07/2024  -     TSH, 3rd generation with Free T4 reflex; Future; Expected date: 03/07/2024  -     Hemoglobin A1C; Future; Expected date: 03/07/2024    11. Essential hypertension  Comments:  BP at goal, con't current montioring off BP meds  Orders:  -     CBC; Future; Expected date: 03/07/2024  -     Comprehensive metabolic panel; Future; Expected date: 03/07/2024  -     Lipid panel; Future; Expected date: 03/07/2024  -     TSH, 3rd generation with Free T4 reflex; Future; Expected date: 03/07/2024  -     Hemoglobin A1C; Future; Expected date: 03/07/2024    12. Dyslipidemia  Comments:  FLP due in March - BW order given, con't current statin for now, will follow  Orders:  -     CBC; Future; Expected date: 03/07/2024  -     Comprehensive metabolic panel; Future; Expected date: 03/07/2024  -     Lipid panel; Future; Expected date: 03/07/2024  -     TSH, 3rd generation with Free T4 reflex;  Future; Expected date: 03/07/2024  -     Hemoglobin A1C; Future; Expected date: 03/07/2024    13. Prostate cancer screening  -     PSA, Total Screen; Future; Expected date: 03/07/2024    Colonoscopy 6/23 - 5 yrs    BW 3/23    CUS 3/23 <50% B/L - 2 yr follow up         Subjective      HPI Pt here for ER follow up    Pt was seen at [facility] and then transferred to [facility] ED on 10/28/23 - ED and UC notes reviewed. Pt presented to  with back pain and fever and was found to have microscopic hematuria. 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 He had worsening of his chronic back pain which he did not think was abnormal.  The night prior to presentation he had night sweat and felt feverish and checked temp and had no fever. Concern for poss kidney stone was noted and pt was transferred to the ED. In the ED he was again febrile with Temp 100.1 but rest of vitals nml. Exam notable for abd pain and L CVA tenderness. Work up in ED notable for WBC 3.81, plt 95, Na 132, AST/ALT 50/57 and UA with +1 protein, trace ketones and lg blood. Rest of CBC/CMP/COVID/flu/RSV/INR/PTT/lactate and procalcitonin were nml. BC came back neg x 2.  UC grew mixed contaminants. Pt had CT renal protocol and was found to have: No CT evidence of nephrolithiasis or obstructive uropathy. Bilateral complex renal cysts as above. Mild bladder wall thickening. Correlation for cystitis advised. This could be due to chronic bladder outlet obstruction. Mildly enlarged prostate. Hepatic cyst (2.4 cm in medial L hepatic  lobe anteriorly). L5 spondylolysis with grade 1 anterolisthesis of L5 over S1. Pt was advised to have MRI for eval of discitis/OM/abscess. Pt deferred as his back pain resolved while in the ED. He was given rx for Robaxin for back pain and told to f/u with PCP. Pt has been doing well since ED visit. He denies any current back pain unless he does new/significant activity.   He notes no burning with urination or visible blood in the urine. He has some urinary frequency which he thinks is d/t his enlarged prostate. He denies any further fevers. He notes no abd pain or flank pain. He notes some "upset stomach"  which sounds like nausea but no vomiting. He states his appetite is a bit decreased - he notes no pain with eating/difficulty swallowing. He admits he is snacking a lot before bed. He notes no unintentional wgt loss. He notes no issues bleeding/bruising. He is on ASA 81 mg daily. He is requesting a Lyme test as he has had 2 tick bites in his L axilla. He states it looked like a spider bite at the location of the tick but had no bulls eye lesion. He notes no other joint pains/muscle aches but he does feel more fatigued      Review of Systems   Constitutional:  Positive for appetite change and fatigue. Negative for chills, fever and unexpected weight change. HENT:  Negative for congestion and trouble swallowing. Eyes:  Negative for pain and visual disturbance. Respiratory:  Negative for cough, shortness of breath and wheezing. Cardiovascular:  Negative for chest pain, palpitations and leg swelling. Gastrointestinal:  Positive for nausea. Negative for abdominal pain, blood in stool, constipation, diarrhea and vomiting. Genitourinary:  Positive for frequency. Negative for difficulty urinating, dysuria and hematuria. Musculoskeletal:  Negative for back pain and gait problem. Skin:  Negative for rash and wound. Neurological:  Positive for light-headedness. Negative for dizziness and headaches. Lightheaded if he moves quickly or looks up for long periods of time   Hematological:  Does not bruise/bleed easily. Psychiatric/Behavioral:  Negative for confusion. Current Outpatient Medications on File Prior to Visit   Medication Sig    Ascorbic Acid (vitamin C) 100 MG tablet Take 100 mg by mouth in the morning.     aspirin 81 mg chewable tablet Chew 81 mg daily    atorvastatin (LIPITOR) 20 mg tablet TAKE 1 TABLET BY MOUTH EVERY MORNING    b complex vitamins capsule Take 1 capsule by mouth daily    Cholecalciferol (VITAMIN D3) 2000 units capsule Take 1 tablet by mouth daily      Omega-3 Fatty Acids (FISH OIL) 1200 MG CAPS Take by mouth    [DISCONTINUED] methocarbamol (ROBAXIN) 500 mg tablet Take 1 tablet (500 mg total) by mouth 2 (two) times a day for 3 days       Objective     /78   Pulse 64   Temp (!) 97 °F (36.1 °C)   Ht 5' 8" (1.727 m)   Wt 80.7 kg (178 lb)   SpO2 99%   BMI 27.06 kg/m²     Physical Exam  Constitutional:       General: He is not in acute distress. Appearance: He is well-developed. He is not ill-appearing. HENT:      Head: Normocephalic and atraumatic. Mouth/Throat:      Pharynx: No oropharyngeal exudate. Eyes:      General:         Right eye: No discharge. Left eye: No discharge. Conjunctiva/sclera: Conjunctivae normal.   Cardiovascular:      Rate and Rhythm: Normal rate and regular rhythm. Heart sounds: No murmur heard. Pulmonary:      Effort: Pulmonary effort is normal. No respiratory distress. Breath sounds: Normal breath sounds. No wheezing, rhonchi or rales. Abdominal:      General: There is no distension. Palpations: Abdomen is soft. Tenderness: There is no abdominal tenderness. There is no right CVA tenderness, left CVA tenderness, guarding or rebound. Musculoskeletal:         General: No deformity or signs of injury. Lymphadenopathy:      Cervical: No cervical adenopathy. Skin:     General: Skin is warm and dry. Coloration: Skin is not pale. Findings: No rash. Neurological:      General: No focal deficit present. Mental Status: He is alert. Mental status is at baseline. Gait: Gait normal.   Psychiatric:         Mood and Affect: Mood normal.         Behavior: Behavior normal.         Thought Content:  Thought content normal.         Judgment: Judgment normal.       Marlen Corcoran DO

## 2023-12-06 ENCOUNTER — OFFICE VISIT (OUTPATIENT)
Dept: FAMILY MEDICINE CLINIC | Facility: HOME HEALTHCARE | Age: 58
End: 2023-12-06
Payer: COMMERCIAL

## 2023-12-06 VITALS
OXYGEN SATURATION: 99 % | DIASTOLIC BLOOD PRESSURE: 78 MMHG | SYSTOLIC BLOOD PRESSURE: 122 MMHG | TEMPERATURE: 98.6 F | HEART RATE: 96 BPM | WEIGHT: 177.6 LBS | RESPIRATION RATE: 18 BRPM | HEIGHT: 65 IN | BODY MASS INDEX: 29.59 KG/M2

## 2023-12-06 DIAGNOSIS — Z59.819 HOUSING INSTABILITY: ICD-10-CM

## 2023-12-06 DIAGNOSIS — M79.602 BILATERAL ARM PAIN: ICD-10-CM

## 2023-12-06 DIAGNOSIS — F17.210 SMOKING GREATER THAN 20 PACK YEARS: ICD-10-CM

## 2023-12-06 DIAGNOSIS — M79.601 BILATERAL ARM PAIN: ICD-10-CM

## 2023-12-06 DIAGNOSIS — E55.9 VITAMIN D DEFICIENCY: ICD-10-CM

## 2023-12-06 DIAGNOSIS — Z12.2 ENCOUNTER FOR SCREENING FOR LUNG CANCER: ICD-10-CM

## 2023-12-06 DIAGNOSIS — E78.2 MIXED HYPERLIPIDEMIA: ICD-10-CM

## 2023-12-06 DIAGNOSIS — Z23 ENCOUNTER FOR IMMUNIZATION: ICD-10-CM

## 2023-12-06 DIAGNOSIS — Z76.89 ENCOUNTER TO ESTABLISH CARE: Primary | ICD-10-CM

## 2023-12-06 PROBLEM — Z86.711 HISTORY OF PULMONARY EMBOLISM: Status: ACTIVE | Noted: 2018-10-09

## 2023-12-06 PROBLEM — Z79.899 ENCOUNTER FOR MEDICATION MANAGEMENT: Status: RESOLVED | Noted: 2022-12-01 | Resolved: 2023-12-06

## 2023-12-06 PROCEDURE — 90686 IIV4 VACC NO PRSV 0.5 ML IM: CPT | Performed by: PHYSICIAN ASSISTANT

## 2023-12-06 PROCEDURE — 99204 OFFICE O/P NEW MOD 45 MIN: CPT | Performed by: PHYSICIAN ASSISTANT

## 2023-12-06 PROCEDURE — T1015 CLINIC SERVICE: HCPCS | Performed by: PHYSICIAN ASSISTANT

## 2023-12-06 RX ORDER — ARIPIPRAZOLE 5 MG/1
5 TABLET ORAL DAILY
COMMUNITY
End: 2023-12-07 | Stop reason: SDUPTHER

## 2023-12-06 SDOH — ECONOMIC STABILITY - HOUSING INSECURITY: HOUSING INSTABILITY UNSPECIFIED: Z59.819

## 2023-12-06 NOTE — PROGRESS NOTES
Assessment/Plan:     Diagnoses and all orders for this visit:    Encounter to establish care    Smoking greater than 20 pack years  -     CT lung screening program; Future    Encounter for screening for lung cancer  -     CT lung screening program; Future    Housing instability  -     Ambulatory Referral to Social Work Care Management Program; Future    Mixed hyperlipidemia  -     CBC and differential; Future  -     Comprehensive metabolic panel; Future  -     TSH, 3rd generation with Free T4 reflex; Future  -     Lipid panel; Future    Vitamin D deficiency  -     Vitamin D 25 hydroxy; Future    Encounter for immunization  -     influenza vaccine, quadrivalent, 0.5 mL, preservative-free, for adult and pediatric patients 6 mos+ (AFLURIA, FLUARIX, FLULAVAL, FLUZONE)    Bilateral arm pain  -     Ambulatory Referral to Physical Therapy; Future    Other orders  -     Discontinue: ARIPiprazole (ABILIFY) 5 mg tablet; Take 5 mg by mouth daily  -     Discontinue: sertraline (ZOLOFT) 50 mg tablet; Take 50 mg by mouth daily at bedtime        - Continue current medications as prescribed  - Labs as ordered  - Screening Lung CT ordered  - Referral provided to PT for bilateral arm pain  - Referral provided to case management to assist with housing instability    Return in about 3 months (around 3/6/2024) for Annual physical.              Subjective:        Patient ID: Adam Steinberg is a 62 y.o. female. Chief Complaint   Patient presents with   • Establish Care     Need resources from housing  Pain both upper arms       Suyapa Hill is a 62year old female with history of HLD, Hx of PE, tobacco use, alcohol use, vit D deficiency, depression, and chronic pain, presenting to establish care. HLD is managed with atorvastatin 40 mg daily. Lipid panel from 11/2022 with total cholesterol 252 and . Depression is managed with abilify 5 mg and zoloft 50 mg daily.   Patient is not currently established with a psychiatrist but is working with a [employer], since she was recently released from incarceration. She is currently living with a friend but reports she cannot stay there very long. Today patient endorses pain in bilateral upper arms. She feels that this may be due to sleeping conditions while incarcerated. She denies any overt injury.   Has a history of chronic pain for which she takes lyrica 75 mg daily and has been prescribed tramadol in the past.        The following portions of the patient's history were reviewed and updated as appropriate: allergies, current medications, past family history, past medical history, past social history, past surgical history and problem list.    Patient Active Problem List   Diagnosis   • Mixed hyperlipidemia   • Post-menopausal   • History of pulmonary embolism   • Tobacco abuse   • Hyperplastic colonic polyp   • Seasonal allergies   • Primary osteoarthritis involving multiple joints   • Major depressive disorder, recurrent, severe without psychotic features ([address])   • Alcohol use disorder, moderate, dependence ([address])   • History of suicidal ideation   • Diverticulosis of colon   • Chronic bilateral low back pain with left-sided sciatica   • Numbness and tingling of right arm   • Facet hypertrophy of lumbosacral region   • Chronic pain syndrome   • Lumbar spondylosis   • Lumbar radiculopathy   • Bunion of great toe   • Skin lesion   • Vitamin D deficiency       Current Outpatient Medications   Medication Sig Dispense Refill   • multivitamin (THERAGRAN) TABS Take 1 tablet by mouth daily     • albuterol (PROVENTIL HFA,VENTOLIN HFA) 90 mcg/act inhaler Inhale 2 puffs every 6 (six) hours as needed for wheezing 18 g 5   • ARIPiprazole (ABILIFY) 5 mg tablet Take 1 tablet (5 mg total) by mouth daily 30 tablet 2   • atorvastatin (LIPITOR) 40 mg tablet Take 1 tablet (40 mg total) by mouth daily 90 tablet 1   • levocetirizine (XYZAL) 5 MG tablet Take 1 tablet (5 mg total) by mouth every evening 90 tablet 1   • magnesium Oxide (MAG-OX) 400 mg TABS Take 1 tablet (400 mg total) by mouth daily 90 tablet 1   • montelukast (SINGULAIR) 10 mg tablet Take 1 tablet (10 mg total) by mouth daily at bedtime 90 tablet 1   • pregabalin (LYRICA) 75 mg capsule Take 1 capsule (75 mg total) by mouth daily as needed (pain in hands) 30 capsule 2   • sertraline (ZOLOFT) 50 mg tablet Take 1 tablet (50 mg total) by mouth daily at bedtime 90 tablet 1   • tiotropium (Spiriva Respimat) 1.25 MCG/ACT AERS inhaler Inhale 2 puffs daily 4 g 3   • topiramate (TOPAMAX) 50 MG tablet Take 1 tablet (50 mg total) by mouth every 12 (twelve) hours 60 tablet 2     No current facility-administered medications for this visit.         Past Medical History:   Diagnosis Date   • Abnormal Pap smear of cervix    • Anxiety    • Bell's palsy    • COPD (chronic obstructive pulmonary disease) (HCC)    • Depression    • Menopause    • Mild persistent asthma without complication 7726   • Obesity    • Primary osteoarthritis involving multiple joints 10/9/2018        Past Surgical History:   Procedure Laterality Date   • ANKLE SURGERY     • BREAST BIOPSY Right 12 yrs ago   •  SECTION     • COLONOSCOPY     • COLONOSCOPY W/ POLYPECTOMY     • DILATION AND CURETTAGE, DIAGNOSTIC / THERAPEUTIC     • NOSE SURGERY     • TOTAL HIP ARTHROPLASTY Left    • TUBAL LIGATION          Social History     Socioeconomic History   • Marital status: Single     Spouse name: Not on file   • Number of children: 2   • Years of education: Not on file   • Highest education level: Associate degree: academic program   Occupational History   • 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: [employer]     Comment: PT / Waiver Services   Tobacco Use   • Smoking status: Every Day     Packs/day: 1.00     Years: 42.00     Total pack years: 42.00     Types: Cigarettes     Start date:    • Smokeless tobacco: Never   • Tobacco comments:     cut down to 1 ppd  and using lozenges   Vaping Use   • Vaping Use: Never used   Substance and Sexual Activity   • Alcohol use: Not Currently   • Drug use: Not Currently     Types: Marijuana, Cocaine   • Sexual activity: Not Currently     Partners: Male     Birth control/protection: None   Other Topics Concern   • Not on file   Social History Narrative    . 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 Lives with son and two roommates. Social Determinants of Health     Financial Resource Strain: Medium Risk (5/25/2021)    Overall Financial Resource Strain (CARDIA)    • Difficulty of Paying Living Expenses: Somewhat hard   Food Insecurity: No Food Insecurity (5/25/2021)    Hunger Vital Sign    • Worried About Running Out of Food in the Last Year: Never true    • Ran Out of Food in the Last Year: Never true   Transportation Needs: No Transportation Needs (5/25/2021)    PRAPARE - Transportation    • Lack of Transportation (Medical): No    • Lack of Transportation (Non-Medical): No   Physical Activity: Inactive (1/13/2020)    Exercise Vital Sign    • Days of Exercise per Week: 0 days    • Minutes of Exercise per Session: 0 min   Stress: Stress Concern Present (1/13/2020)    [facility]    • Feeling of Stress : To some extent   Social Connections:  Moderately Isolated (1/13/2020)    Social Connection and Isolation Panel [NHANES]    • Frequency of Communication with Friends and Family: Twice a week    • Frequency of Social Gatherings with Friends and Family: Twice a week    • Attends Mandaeism Services: Never    • Active Member of Clubs or Organizations: No    • Attends Club or Organization Meetings: Never    • Marital Status:    Intimate Partner Violence: Not At Risk (1/13/2020)    Humiliation, Afraid, Rape, and Kick questionnaire    • Fear of Current or Ex-Partner: No    • Emotionally Abused: No    • Physically Abused: No    • Sexually Abused: No   Housing Stability: Not on file        Review of Systems   Constitutional:  Negative for chills, diaphoresis and fever. Respiratory:  Negative for cough, chest tightness, shortness of breath and wheezing. Cardiovascular:  Negative for chest pain, palpitations and leg swelling. Musculoskeletal:  Positive for myalgias. Skin:  Negative for rash and wound. Neurological:  Negative for dizziness, syncope, weakness, light-headedness and headaches. Objective:      /78   Pulse 96   Temp 98.6 °F (37 °C)   Resp 18   Ht 5' 5" (1.651 m)   Wt 80.6 kg (177 lb 9.6 oz)   LMP  (LMP Unknown)   SpO2 99%   BMI 29.55 kg/m²          Physical Exam  Vitals and nursing note reviewed. Constitutional:       General: She is not in acute distress. Appearance: Normal appearance. HENT:      Head: Normocephalic and atraumatic. Eyes:      Extraocular Movements: Extraocular movements intact. Conjunctiva/sclera: Conjunctivae normal.      Pupils: Pupils are equal, round, and reactive to light. Cardiovascular:      Rate and Rhythm: Normal rate and regular rhythm. Heart sounds: Normal heart sounds. No murmur heard. Pulmonary:      Effort: Pulmonary effort is normal. No respiratory distress. Breath sounds: Normal breath sounds. No wheezing. Musculoskeletal:      Right lower leg: No edema. Left lower leg: No edema. Skin:     General: Skin is warm and dry. Neurological:      General: No focal deficit present. Mental Status: She is alert and oriented to person, place, and time. Cranial Nerves: No cranial nerve deficit. Motor: No weakness.    Psychiatric:         Mood and Affect: Mood normal.         Behavior: Behavior normal.

## 2023-12-07 DIAGNOSIS — J43.9 PULMONARY EMPHYSEMA, UNSPECIFIED EMPHYSEMA TYPE (HCC): Primary | ICD-10-CM

## 2023-12-07 DIAGNOSIS — J30.2 SEASONAL ALLERGIES: ICD-10-CM

## 2023-12-07 DIAGNOSIS — F33.2 MAJOR DEPRESSIVE DISORDER, RECURRENT, SEVERE WITHOUT PSYCHOTIC FEATURES (HCC): Chronic | ICD-10-CM

## 2023-12-07 DIAGNOSIS — M54.16 LUMBAR RADICULOPATHY: ICD-10-CM

## 2023-12-07 DIAGNOSIS — E78.2 MODERATE MIXED HYPERLIPIDEMIA NOT REQUIRING STATIN THERAPY: ICD-10-CM

## 2023-12-07 DIAGNOSIS — G43.919 INTRACTABLE MIGRAINE WITHOUT STATUS MIGRAINOSUS, UNSPECIFIED MIGRAINE TYPE: ICD-10-CM

## 2023-12-07 DIAGNOSIS — E78.2 MIXED HYPERLIPIDEMIA: ICD-10-CM

## 2023-12-07 RX ORDER — LEVOCETIRIZINE DIHYDROCHLORIDE 5 MG/1
5 TABLET, FILM COATED ORAL EVERY EVENING
Qty: 90 TABLET | Refills: 1 | Status: SHIPPED | OUTPATIENT
Start: 2023-12-07

## 2023-12-07 RX ORDER — ATORVASTATIN CALCIUM 40 MG/1
40 TABLET, FILM COATED ORAL DAILY
Qty: 90 TABLET | Refills: 1 | Status: SHIPPED | OUTPATIENT
Start: 2023-12-07

## 2023-12-07 RX ORDER — PREGABALIN 75 MG/1
75 CAPSULE ORAL DAILY PRN
Qty: 30 CAPSULE | Refills: 2 | Status: SHIPPED | OUTPATIENT
Start: 2023-12-07

## 2023-12-07 RX ORDER — TOPIRAMATE 50 MG/1
50 TABLET, FILM COATED ORAL EVERY 12 HOURS
Qty: 60 TABLET | Refills: 2 | Status: SHIPPED | OUTPATIENT
Start: 2023-12-07

## 2023-12-07 RX ORDER — ALBUTEROL SULFATE 90 UG/1
2 AEROSOL, METERED RESPIRATORY (INHALATION) EVERY 6 HOURS PRN
Qty: 18 G | Refills: 5 | Status: SHIPPED | OUTPATIENT
Start: 2023-12-07

## 2023-12-07 RX ORDER — TIOTROPIUM BROMIDE INHALATION SPRAY 1.56 UG/1
2 SPRAY, METERED RESPIRATORY (INHALATION) DAILY
Qty: 4 G | Refills: 3 | Status: SHIPPED | OUTPATIENT
Start: 2023-12-07

## 2023-12-07 RX ORDER — ARIPIPRAZOLE 5 MG/1
5 TABLET ORAL DAILY
Qty: 30 TABLET | Refills: 2 | Status: SHIPPED | OUTPATIENT
Start: 2023-12-07

## 2023-12-07 RX ORDER — MONTELUKAST SODIUM 10 MG/1
10 TABLET ORAL
Qty: 90 TABLET | Refills: 1 | Status: SHIPPED | OUTPATIENT
Start: 2023-12-07

## 2023-12-07 RX ORDER — LANOLIN ALCOHOL/MO/W.PET/CERES
400 CREAM (GRAM) TOPICAL DAILY
Qty: 90 TABLET | Refills: 1 | Status: SHIPPED | OUTPATIENT
Start: 2023-12-07

## 2023-12-08 ENCOUNTER — PATIENT OUTREACH (OUTPATIENT)
Dept: FAMILY MEDICINE CLINIC | Facility: HOME HEALTHCARE | Age: 58
End: 2023-12-08

## 2023-12-08 NOTE — PROGRESS NOTES
DOROTHYROSE AMAYA received referral for housing instability. DOROTHYROSE MONIQUE reached out to patient regarding the above. She was recently incarcerated, however has no felonies on her record at this time. Per patient, she has a [address]. Patient gave permission for [facility] to discuss with her community CM. Per patient, Abbi Carrillo is currently working on obtaining temporary housing for patient at this time. Patient is currently staying with a friend, but is no longer able to stay.     CHRIS AMAYA reached out to Abbi Gonzalez via phone at [phone] and left a voicemail

## 2023-12-19 ENCOUNTER — PATIENT OUTREACH (OUTPATIENT)
Dept: FAMILY MEDICINE CLINIC | Facility: HOME HEALTHCARE | Age: 58
End: 2023-12-19

## 2023-12-19 NOTE — PROGRESS NOTES
OPCM SW received return call from  Sara Gonzalez whom confirmed she is working on getting her into a permanent housing program with [facility]. Camila confirmed that she will not require additional assistance at this time  she is actively working with patient.    Case will be closed.

## 2024-01-18 ENCOUNTER — TELEPHONE (OUTPATIENT)
Dept: FAMILY MEDICINE CLINIC | Facility: CLINIC | Age: 59
End: 2024-01-18

## 2024-01-18 NOTE — TELEPHONE ENCOUNTER
Pt called in to reschedule her 1/16/24 appt. Pt left a vm on 1/16/24 at 12:35 pm requesting to reschedule her appt due to weather.         I have rescheduled her appt to 1/26/24 at 3:40 pm.

## 2024-01-26 ENCOUNTER — OFFICE VISIT (OUTPATIENT)
Dept: FAMILY MEDICINE CLINIC | Facility: CLINIC | Age: 59
End: 2024-01-26

## 2024-01-26 VITALS
HEART RATE: 85 BPM | DIASTOLIC BLOOD PRESSURE: 70 MMHG | BODY MASS INDEX: 28.49 KG/M2 | HEIGHT: 65 IN | TEMPERATURE: 98.7 F | OXYGEN SATURATION: 98 % | SYSTOLIC BLOOD PRESSURE: 114 MMHG | RESPIRATION RATE: 16 BRPM | WEIGHT: 171 LBS

## 2024-01-26 DIAGNOSIS — Z23 ENCOUNTER FOR IMMUNIZATION: ICD-10-CM

## 2024-01-26 DIAGNOSIS — F33.2 MAJOR DEPRESSIVE DISORDER, RECURRENT, SEVERE WITHOUT PSYCHOTIC FEATURES (HCC): Chronic | ICD-10-CM

## 2024-01-26 DIAGNOSIS — Z96.642 S/P TOTAL LEFT HIP ARTHROPLASTY: ICD-10-CM

## 2024-01-26 DIAGNOSIS — Z12.31 BREAST CANCER SCREENING BY MAMMOGRAM: ICD-10-CM

## 2024-01-26 DIAGNOSIS — E78.2 MIXED HYPERLIPIDEMIA: ICD-10-CM

## 2024-01-26 DIAGNOSIS — M15.9 PRIMARY OSTEOARTHRITIS INVOLVING MULTIPLE JOINTS: Primary | ICD-10-CM

## 2024-01-26 DIAGNOSIS — Z12.11 COLON CANCER SCREENING: ICD-10-CM

## 2024-01-26 DIAGNOSIS — R29.898 HAND WEAKNESS: ICD-10-CM

## 2024-01-26 DIAGNOSIS — M21.619 BUNION: ICD-10-CM

## 2024-01-26 DIAGNOSIS — Z72.0 TOBACCO ABUSE: ICD-10-CM

## 2024-01-26 DIAGNOSIS — G43.919 INTRACTABLE MIGRAINE WITHOUT STATUS MIGRAINOSUS, UNSPECIFIED MIGRAINE TYPE: ICD-10-CM

## 2024-01-26 DIAGNOSIS — J30.2 SEASONAL ALLERGIES: ICD-10-CM

## 2024-01-26 DIAGNOSIS — Z00.00 ANNUAL PHYSICAL EXAM: ICD-10-CM

## 2024-01-26 DIAGNOSIS — K63.5 HYPERPLASTIC COLONIC POLYP, UNSPECIFIED PART OF COLON: ICD-10-CM

## 2024-01-26 DIAGNOSIS — Z59.00 HOMELESS: ICD-10-CM

## 2024-01-26 DIAGNOSIS — J43.9 PULMONARY EMPHYSEMA, UNSPECIFIED EMPHYSEMA TYPE (HCC): ICD-10-CM

## 2024-01-26 DIAGNOSIS — F10.20 ALCOHOL USE DISORDER, MODERATE, DEPENDENCE (HCC): ICD-10-CM

## 2024-01-26 PROBLEM — Z78.0 POST-MENOPAUSAL: Status: RESOLVED | Noted: 2018-10-09 | Resolved: 2024-01-26

## 2024-01-26 PROCEDURE — 99386 PREV VISIT NEW AGE 40-64: CPT | Performed by: NURSE PRACTITIONER

## 2024-01-26 PROCEDURE — 99204 OFFICE O/P NEW MOD 45 MIN: CPT | Performed by: NURSE PRACTITIONER

## 2024-01-26 RX ORDER — ALBUTEROL SULFATE 90 UG/1
2 AEROSOL, METERED RESPIRATORY (INHALATION) EVERY 6 HOURS PRN
Qty: 18 G | Refills: 5 | Status: SHIPPED | OUTPATIENT
Start: 2024-01-26

## 2024-01-26 RX ORDER — LANOLIN ALCOHOL/MO/W.PET/CERES
400 CREAM (GRAM) TOPICAL DAILY
Qty: 90 TABLET | Refills: 1 | Status: SHIPPED | OUTPATIENT
Start: 2024-01-26

## 2024-01-26 RX ORDER — FEXOFENADINE HCL 180 MG/1
180 TABLET ORAL DAILY
Qty: 90 TABLET | Refills: 0 | Status: SHIPPED | OUTPATIENT
Start: 2024-01-26

## 2024-01-26 RX ORDER — TIOTROPIUM BROMIDE INHALATION SPRAY 1.56 UG/1
2 SPRAY, METERED RESPIRATORY (INHALATION) DAILY
Qty: 4 G | Refills: 3 | Status: SHIPPED | OUTPATIENT
Start: 2024-01-26

## 2024-01-26 RX ORDER — NAPROXEN 500 MG/1
500 TABLET ORAL 2 TIMES DAILY WITH MEALS
Qty: 60 TABLET | Refills: 0 | Status: SHIPPED | OUTPATIENT
Start: 2024-01-26

## 2024-01-26 RX ORDER — METHYLPREDNISOLONE 4 MG/1
TABLET ORAL
Qty: 21 EACH | Refills: 0 | Status: SHIPPED | OUTPATIENT
Start: 2024-01-26

## 2024-01-26 RX ORDER — MONTELUKAST SODIUM 10 MG/1
10 TABLET ORAL
Qty: 90 TABLET | Refills: 1 | Status: SHIPPED | OUTPATIENT
Start: 2024-01-26

## 2024-01-26 RX ORDER — ZOSTER VACCINE RECOMBINANT, ADJUVANTED 50 MCG/0.5
0.5 KIT INTRAMUSCULAR ONCE
Qty: 1 EACH | Refills: 1 | Status: SHIPPED | OUTPATIENT
Start: 2024-01-26 | End: 2024-01-26

## 2024-01-26 SDOH — ECONOMIC STABILITY - HOUSING INSECURITY: HOMELESSNESS UNSPECIFIED: Z59.00

## 2024-01-26 NOTE — PATIENT INSTRUCTIONS
Meal Planning with the Plate Method   AMBULATORY CARE:   Meal planning with the plate method  is a way for people with diabetes to plan meals. The plate method can help you eat the right amount of carbohydrates and keep your blood sugar levels under control. Carbohydrates naturally raise your blood sugar level. Your blood sugar level can rise too high if you eat too much carbohydrate at one time. Carbohydrates are found in starches (bread, cereal, starchy vegetables, and beans), fruit, milk, yogurt, and sweets.  How to use the plate method to plan meals:   Draw an imaginary line down the middle of a 9-inch dinner plate.  On one side, draw another line to divide that section in half. Your plate will have 3 sections.    Fill the largest section of your plate with non-starchy vegetables.  These include broccoli, spinach, cucumbers, peppers, cauliflower, and tomatoes.    Add a carbohydrate to 1 of the small sections of your plate.  Examples are pasta, rice, whole-grain bread, tortillas, corn, potatoes, and beans. Your plan may allow a serving of low-fat dairy or fruit for a carbohydrate.    Add meat or another source of protein to the other small section of your plate.  Examples include chicken or turkey without skin, fish, lean beef or pork, low-fat cheese, tofu, or eggs.         Add a low-calorie or calorie-free drink.  Examples include water or unsweetened tea or coffee.       Serving sizes of foods:   Non-starchy vegetables:      ½ cup of cooked vegetables or 1 cup of raw vegetables    ½ cup of vegetable juice    Starches:      1 ounce of whole-wheat bread or 1 small (6 inch) flour or corn tortilla    1 small (4 inch) pancake (about ¼ inch thick)    ¾ cup of dry, unsweetened, whole-grain ready-to-eat cereal or ¼ cup of low-fat granola    ½ cup of cooked cereal or oatmeal    ? cup of rice or pasta    ½ cup of corn, green peas, sweet potatoes, or mashed potatoes    ½ cup of cooked beans and peas (juana duran,  kidney, white, split, black-eyed)    Meat and other protein sources:      3 to 4 ounces of any lean meat, fish, or poultry    ½ cup of tofu or tempeh    1 large egg    1½ ounces (about 2 tablespoons) of nuts or 2 tablespoons of peanut butter    Fruit:      1 small piece of fresh fruit     ½ cup of canned or fresh fruit or unsweetened fruit juice    ¼ cup of dried fruit    Milk and yogurt:      1 cup (8 ounces) of skim or 1% milk    ¾ cup (6 ounces) of plain, non-fat yogurt    Other healthy nutrition tips:   Limit salt and sugar.  Choose and prepare foods and drinks with less salt and added sugars. Use the nutrition information on food labels to help you make healthy choices. The percent daily value listed on the food label tells you whether a food is low or high in certain nutrients. A percent daily value of 5% or less means that the food is low in a nutrient. A percent daily value of 20% or more means that the food is high in a nutrient.         Choose healthy fats.  Choose healthy fats such as polyunsaturated and monounsaturated fats in place of unhealthy fats. Healthy fats are found in vegetable oils such as soybean, corn, canola, olive, and sunflower oil. Unhealthy fats are saturated fats, trans fats, and cholesterol. Unhealthy fats are found in shortening, butter, stick margarine, and animal fat.         Ask your healthcare provider if alcohol is okay for you.  Generally, men 65 or older and women should limit alcohol to 1 drink within 24 hours and 7 within 1 week. Men 21 to 64 years should limit alcohol to 2 drinks a day and 14 within 1 week. Your provider can tell you how many drinks are okay for you within 24 hours or within 1 week. A drink of alcohol is 12 ounces of beer, 5 ounces of wine, or 1½ ounces of liquor. Always have food when you drink alcohol. Your blood sugar may fall to a low level if you drink when your stomach is empty.    © Copyright Merative 2023 Information is for End User's use only and  may not be sold, redistributed or otherwise used for commercial purposes.  The above information is an  only. It is not intended as medical advice for individual conditions or treatments. Talk to your doctor, nurse or pharmacist before following any medical regimen to see if it is safe and effective for you.

## 2024-01-26 NOTE — ASSESSMENT & PLAN NOTE
Lab Results   Component Value Date    CHOLESTEROL 252 (H) 11/30/2022     Lab Results   Component Value Date    HDL 53 11/30/2022     Lab Results   Component Value Date    TRIG 309 (H) 11/30/2022     Lab Results   Component Value Date    NONHDLC 199 11/30/2022     Lab Results   Component Value Date    LDLCALC 137 (H) 11/30/2022     Repeat lipid panel   Continue atorvastatin 40 mg daily

## 2024-01-26 NOTE — PROGRESS NOTES
ADULT ANNUAL PHYSICAL  [facility]    NAME: Carmen Wang  AGE: 58 y.o. SEX: female  : 1965     DATE: 2024     Assessment and Plan:     Problem List Items Addressed This Visit          Digestive    Hyperplastic colonic polyp     Due for repeat colonoscopy 2024             Respiratory    Pulmonary emphysema (HCC)     Stable, continue with Spiriva and albuterol PRN          Relevant Medications    tiotropium (Spiriva Respimat) 1.25 MCG/ACT AERS inhaler    albuterol (PROVENTIL HFA,VENTOLIN HFA) 90 mcg/act inhaler    montelukast (SINGULAIR) 10 mg tablet    fexofenadine (ALLEGRA) 180 MG tablet    methylPREDNISolone 4 MG tablet therapy pack       Musculoskeletal and Integument    Primary osteoarthritis involving multiple joints - Primary    Relevant Medications    methylPREDNISolone 4 MG tablet therapy pack    naproxen (Naprosyn) 500 mg tablet    Other Relevant Orders    Ambulatory referral to Spine & Pain Management       Other    Major depressive disorder, recurrent, severe without psychotic features (HCC) (Chronic)    Relevant Orders    Ambulatory Referral to Social Work Care Management Program    Mixed hyperlipidemia     Lab Results   Component Value Date    CHOLESTEROL 252 (H) 2022     Lab Results   Component Value Date    HDL 53 2022     Lab Results   Component Value Date    TRIG 309 (H) 2022     Lab Results   Component Value Date    NONHDLC 199 2022     Lab Results   Component Value Date    LDLCALC 137 (H) 2022     Repeat lipid panel   Continue atorvastatin 40 mg daily          Tobacco abuse     Currently vaping  Continue harm reduction/ cessation          Seasonal allergies     Stop Xyzal and start Allegra          Relevant Medications    montelukast (SINGULAIR) 10 mg tablet    fexofenadine (ALLEGRA) 180 MG tablet    Alcohol use disorder, moderate, dependence (HCC)     Currently no alcohol or drug  use   Going to AA meetings near the shelter daily          S/P total left hip arthroplasty    Relevant Orders    XR hip/pelv 2-3 vws left if performed    Homeless     Currently living in the shelter  Referral to SW          Other Visit Diagnoses       Bunion        Relevant Orders    Ambulatory Referral to Podiatry    Colon cancer screening        Relevant Orders    Ambulatory Referral to Gastroenterology    Intractable migraine without status migrainosus, unspecified migraine type        Relevant Medications    magnesium Oxide (MAG-OX) 400 mg TABS    naproxen (Naprosyn) 500 mg tablet    Breast cancer screening by mammogram        Relevant Orders    Mammo screening bilateral w 3d & cad    Encounter for immunization        Relevant Medications    Zoster Vac Recomb Adjuvanted (Shingrix) 50 MCG/0.5ML SUSR    Hand weakness        Relevant Orders    Vitamin B12            Immunizations and preventive care screenings were discussed with patient today. Appropriate education was printed on patient's after visit summary.    Counseling:  Alcohol/drug use: discussed moderation in alcohol intake, the recommendations for healthy alcohol use, and avoidance of illicit drug use.  Dental Health: discussed importance of regular tooth brushing, flossing, and dental visits.  Injury prevention: discussed safety/seat belts, safety helmets, smoke detectors, carbon dioxide detectors, and smoking near bedding or upholstery.  Sexual health: discussed sexually transmitted diseases, partner selection, use of condoms, avoidance of unintended pregnancy, and contraceptive alternatives.  Exercise: the importance of regular exercise/physical activity was discussed. Recommend exercise 3-5 times per week for at least 30 minutes.     BMI Counseling: Body mass index is 28.46 kg/m². The BMI is above normal. Nutrition recommendations include decreasing portion sizes, encouraging healthy choices of fruits and vegetables, decreasing fast food intake,  consuming healthier snacks and limiting drinks that contain sugar. Exercise recommendations include exercising 3-5 times per week. Rationale for BMI follow-up plan is due to patient being overweight or obese.     Tobacco Cessation Counseling: Tobacco cessation counseling was provided. The patient is sincerely urged to quit consumption of tobacco. She is not ready to quit tobacco.         Return in about 8 weeks (around 3/22/2024) for fatigue, joint pain .     Chief Complaint:     Chief Complaint   Patient presents with    New Patient Visit     Hip pain  Allergy med  Degenerative disc diease        History of Present Illness:     Adult Annual Physical     Patient is a 57 YO female with history of chronic pain, degenerative disc disease, s/p left hip replacement, tobacco use, osteoarthritis, depression who presents to establish care.     Patient is new to the area. She was incarcerated for awhile in the [facility] and then moved to [place]. She is currently living in the shelter. She reports that she is currently drug and alcohol free. She does currently vape.     She reports that she is having left foot and hip pain. Chronic low back and neck pain. Was following with pain management and receiving injections.     Diet and Physical Activity  Diet/Nutrition: limited fruits/vegetables.   Exercise: walking.      Depression Screening  PHQ-2/9 Depression Screening           General Health  Sleep: sleeps poorly.   Hearing: normal - bilateral.  Vision: wears glasses.   Dental:  has dentures .       /GYN Health  Follows with gynecology? yes   Patient is: postmenopausal      Advanced Care Planning  Do you have an advanced directive? no  Do you have a durable medical power of ? no     Review of Systems:     Review of Systems   Constitutional:  Negative for activity change, appetite change, chills, fatigue, fever and unexpected weight change.   HENT:  Negative for hearing loss, nosebleeds, sinus pain, sneezing, sore  throat and trouble swallowing.    Eyes:  Negative for photophobia and visual disturbance.   Respiratory:  Negative for cough, chest tightness, shortness of breath and wheezing.    Cardiovascular:  Negative for chest pain, palpitations and leg swelling.   Gastrointestinal:  Negative for abdominal pain, constipation, nausea and vomiting.   Genitourinary:  Negative for decreased urine volume, difficulty urinating, dysuria, flank pain, genital sores, hematuria and urgency.   Musculoskeletal:  Positive for arthralgias, back pain, myalgias and neck pain. Negative for gait problem.   Skin:  Negative for pallor, rash and wound.   Neurological:  Negative for dizziness, seizures, syncope, weakness, numbness and headaches.   Hematological:  Negative for adenopathy. Does not bruise/bleed easily.   Psychiatric/Behavioral:  Negative for confusion, hallucinations, self-injury, sleep disturbance and suicidal ideas. The patient is not nervous/anxious.       Past Medical History:     Past Medical History:   Diagnosis Date    Abnormal Pap smear of cervix     Anxiety     Bell's palsy     COPD (chronic obstructive pulmonary disease) ([facility])     Depression     Menopause     Mild persistent asthma without complication 10/9/2018    Obesity     Primary osteoarthritis involving multiple joints 10/9/2018      Past Surgical History:     Past Surgical History:   Procedure Laterality Date    ANKLE SURGERY      BREAST BIOPSY Right 12 yrs ago     SECTION      COLONOSCOPY      COLONOSCOPY W/ POLYPECTOMY      DILATION AND CURETTAGE, DIAGNOSTIC / THERAPEUTIC      NOSE SURGERY      TOTAL HIP ARTHROPLASTY Left     TUBAL LIGATION        Social History:     Social History     Socioeconomic History    Marital status: Single     Spouse name: None    Number of children: 2    Years of education: None    Highest education level: Associate degree: academic program   Occupational History    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: [employer]      Comment: [employer] / [employer] Services   Tobacco Use    Smoking status: Every Day     Current packs/day: 1.00     Average packs/day: 1 pack/day for 45.1 years (45.1 ttl pk-yrs)     Types: Cigarettes     Start date: 1979    Smokeless tobacco: Never    Tobacco comments:     cut down to 1 ppd  and using lozenges   Vaping Use    Vaping status: Never Used   Substance and Sexual Activity    Alcohol use: Not Currently    Drug use: Not Currently     Types: Marijuana, Cocaine    Sexual activity: Not Currently     Partners: Male     Birth control/protection: None   Other Topics Concern    None   Social History Narrative    .    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    Lives with son and two roommates.     Social Determinants of Health     Financial Resource Strain: Medium Risk (5/25/2021)    Overall Financial Resource Strain (CARDIA)     Difficulty of Paying Living Expenses: Somewhat hard   Food Insecurity: No Food Insecurity (5/25/2021)    Hunger Vital Sign     Worried About Running Out of Food in the Last Year: Never true     Ran Out of Food in the Last Year: Never true   Transportation Needs: No Transportation Needs (5/25/2021)    PRAPARE - Transportation     Lack of Transportation (Medical): No     Lack of Transportation (Non-Medical): No   Physical Activity: Inactive (1/13/2020)    Exercise Vital Sign     Days of Exercise per Week: 0 days     Minutes of Exercise per Session: 0 min   Stress: Stress Concern Present (1/13/2020)    [employer] - Occupational Stress Questionnaire     Feeling of Stress : To some extent   Social Connections: Moderately Isolated (1/13/2020)    Social Connection and Isolation Panel [NHANES]     Frequency of Communication with Friends and Family: Twice a week     Frequency of Social Gatherings with Friends and Family: Twice a week     Attends [employer]ism Services: Never     Active Member of Clubs or Organizations: No     Attends Club or Organization Meetings:  Never     Marital Status:    Intimate Partner Violence: Not At Risk (1/13/2020)    Humiliation, Afraid, Rape, and Kick questionnaire     Fear of Current or Ex-Partner: No     Emotionally Abused: No     Physically Abused: No     Sexually Abused: No   Housing Stability: Not on file      Family History:     Family History   Problem Relation Age of Onset    Mental illness Mother     Suicidality Mother     No Known Problems Father     Mental illness Brother     Suicidality Brother     No Known Problems Maternal Grandmother     No Known Problems Paternal Grandmother     No Known Problems Maternal Aunt     No Known Problems Paternal Aunt     No Known Problems Paternal Aunt     No Known Problems Paternal Aunt     No Known Problems Paternal Aunt     Cancer Family       Current Medications:     Current Outpatient Medications   Medication Sig Dispense Refill    albuterol (PROVENTIL HFA,VENTOLIN HFA) 90 mcg/act inhaler Inhale 2 puffs every 6 (six) hours as needed for wheezing 18 g 5    fexofenadine (ALLEGRA) 180 MG tablet Take 1 tablet (180 mg total) by mouth daily 90 tablet 0    magnesium Oxide (MAG-OX) 400 mg TABS Take 1 tablet (400 mg total) by mouth daily 90 tablet 1    methylPREDNISolone 4 MG tablet therapy pack Use as directed on package 21 each 0    montelukast (SINGULAIR) 10 mg tablet Take 1 tablet (10 mg total) by mouth daily at bedtime 90 tablet 1    naproxen (Naprosyn) 500 mg tablet Take 1 tablet (500 mg total) by mouth 2 (two) times a day with meals 60 tablet 0    tiotropium (Spiriva Respimat) 1.25 MCG/ACT AERS inhaler Inhale 2 puffs daily 4 g 3    Zoster Vac Recomb Adjuvanted (Shingrix) 50 MCG/0.5ML SUSR Inject 0.5 mL into a muscle once for 1 dose Repeat dose in 2 to 6 months 1 each 1    ARIPiprazole (ABILIFY) 5 mg tablet Take 1 tablet (5 mg total) by mouth daily 30 tablet 2    atorvastatin (LIPITOR) 40 mg tablet Take 1 tablet (40 mg total) by mouth daily 90 tablet 1    multivitamin (THERAGRAN) TABS Take 1  "tablet by mouth daily      pregabalin (LYRICA) 75 mg capsule Take 1 capsule (75 mg total) by mouth daily as needed (pain in hands) 30 capsule 2    sertraline (ZOLOFT) 50 mg tablet Take 1 tablet (50 mg total) by mouth daily at bedtime 90 tablet 1    topiramate (TOPAMAX) 50 MG tablet Take 1 tablet (50 mg total) by mouth every 12 (twelve) hours 60 tablet 2     No current facility-administered medications for this visit.      Allergies:     Allergies   Allergen Reactions    Pollen Extract       Physical Exam:     /70 (BP Location: Right arm, Patient Position: Sitting, Cuff Size: Standard)   Pulse 85   Temp 98.7 °F (37.1 °C) (Temporal)   Resp 16   Ht 5' 5\" (1.651 m)   Wt 77.6 kg (171 lb)   LMP  (LMP Unknown)   SpO2 98%   BMI 28.46 kg/m²     Physical Exam  Vitals and nursing note reviewed.   Constitutional:       General: She is not in acute distress.     Appearance: Normal appearance. She is not diaphoretic.   HENT:      Head: Normocephalic.      Right Ear: Tympanic membrane and external ear normal.      Left Ear: Tympanic membrane and external ear normal.      Nose: Nose normal.      Mouth/Throat:      Mouth: Mucous membranes are moist.   Eyes:      General:         Right eye: No discharge.         Left eye: No discharge.      Extraocular Movements: Extraocular movements intact.      Conjunctiva/sclera: Conjunctivae normal.      Pupils: Pupils are equal, round, and reactive to light.   Cardiovascular:      Rate and Rhythm: Normal rate and regular rhythm.      Pulses: Normal pulses.      Heart sounds: Normal heart sounds.   Pulmonary:      Effort: Pulmonary effort is normal. No respiratory distress.      Breath sounds: Normal breath sounds.   Abdominal:      General: Bowel sounds are normal. There is no distension.      Palpations: Abdomen is soft.   Musculoskeletal:      Cervical back: Normal range of motion and neck supple. No rigidity.      Right lower leg: No edema.      Left lower leg: No edema. "   Lymphadenopathy:      Cervical: No cervical adenopathy.   Skin:     General: Skin is warm and dry.      Capillary Refill: Capillary refill takes less than 2 seconds.      Findings: No rash.   Neurological:      General: No focal deficit present.      Mental Status: She is alert and oriented to person, place, and time.   Psychiatric:         Mood and Affect: Mood normal.         Behavior: Behavior normal.          MICHELLE Franco  [facility]

## 2024-01-27 ENCOUNTER — APPOINTMENT (OUTPATIENT)
Dept: LAB | Facility: HOSPITAL | Age: 59
End: 2024-01-27
Payer: COMMERCIAL

## 2024-01-27 DIAGNOSIS — R29.898 HAND WEAKNESS: ICD-10-CM

## 2024-01-27 DIAGNOSIS — E78.2 MIXED HYPERLIPIDEMIA: ICD-10-CM

## 2024-01-27 DIAGNOSIS — E55.9 VITAMIN D DEFICIENCY: ICD-10-CM

## 2024-01-27 LAB
25(OH)D3 SERPL-MCNC: 26.9 NG/ML (ref 30–100)
ALBUMIN SERPL BCP-MCNC: 4.7 G/DL (ref 3.5–5)
ALP SERPL-CCNC: 76 U/L (ref 34–104)
ALT SERPL W P-5'-P-CCNC: 12 U/L (ref 7–52)
ANION GAP SERPL CALCULATED.3IONS-SCNC: 11 MMOL/L
AST SERPL W P-5'-P-CCNC: 14 U/L (ref 13–39)
BASOPHILS # BLD AUTO: 0.07 THOUSANDS/ÂΜL (ref 0–0.1)
BASOPHILS NFR BLD AUTO: 1 % (ref 0–1)
BILIRUB SERPL-MCNC: 0.6 MG/DL (ref 0.2–1)
BUN SERPL-MCNC: 15 MG/DL (ref 5–25)
CALCIUM SERPL-MCNC: 9.8 MG/DL (ref 8.4–10.2)
CHLORIDE SERPL-SCNC: 109 MMOL/L (ref 96–108)
CHOLEST SERPL-MCNC: 152 MG/DL
CO2 SERPL-SCNC: 21 MMOL/L (ref 21–32)
CREAT SERPL-MCNC: 0.94 MG/DL (ref 0.6–1.3)
EOSINOPHIL # BLD AUTO: 0.59 THOUSAND/ÂΜL (ref 0–0.61)
EOSINOPHIL NFR BLD AUTO: 7 % (ref 0–6)
ERYTHROCYTE [DISTWIDTH] IN BLOOD BY AUTOMATED COUNT: 14.9 % (ref 11.6–15.1)
GFR SERPL CREATININE-BSD FRML MDRD: 67 ML/MIN/1.73SQ M
GLUCOSE SERPL-MCNC: 78 MG/DL (ref 65–140)
HCT VFR BLD AUTO: 38.3 % (ref 34.8–46.1)
HDLC SERPL-MCNC: 43 MG/DL
HGB BLD-MCNC: 12.3 G/DL (ref 11.5–15.4)
IMM GRANULOCYTES # BLD AUTO: 0.03 THOUSAND/UL (ref 0–0.2)
IMM GRANULOCYTES NFR BLD AUTO: 0 % (ref 0–2)
LDLC SERPL CALC-MCNC: 68 MG/DL (ref 0–100)
LYMPHOCYTES # BLD AUTO: 2.27 THOUSANDS/ÂΜL (ref 0.6–4.47)
LYMPHOCYTES NFR BLD AUTO: 28 % (ref 14–44)
MCH RBC QN AUTO: 29.6 PG (ref 26.8–34.3)
MCHC RBC AUTO-ENTMCNC: 32.1 G/DL (ref 31.4–37.4)
MCV RBC AUTO: 92 FL (ref 82–98)
MONOCYTES # BLD AUTO: 0.66 THOUSAND/ÂΜL (ref 0.17–1.22)
MONOCYTES NFR BLD AUTO: 8 % (ref 4–12)
NEUTROPHILS # BLD AUTO: 4.54 THOUSANDS/ÂΜL (ref 1.85–7.62)
NEUTS SEG NFR BLD AUTO: 56 % (ref 43–75)
NONHDLC SERPL-MCNC: 109 MG/DL
NRBC BLD AUTO-RTO: 0 /100 WBCS
PLATELET # BLD AUTO: 410 THOUSANDS/UL (ref 149–390)
PMV BLD AUTO: 10 FL (ref 8.9–12.7)
POTASSIUM SERPL-SCNC: 4.4 MMOL/L (ref 3.5–5.3)
PROT SERPL-MCNC: 7.4 G/DL (ref 6.4–8.4)
RBC # BLD AUTO: 4.16 MILLION/UL (ref 3.81–5.12)
SODIUM SERPL-SCNC: 141 MMOL/L (ref 135–147)
TRIGL SERPL-MCNC: 207 MG/DL
TSH SERPL DL<=0.05 MIU/L-ACNC: 1.11 UIU/ML (ref 0.45–4.5)
VIT B12 SERPL-MCNC: 349 PG/ML (ref 180–914)
WBC # BLD AUTO: 8.16 THOUSAND/UL (ref 4.31–10.16)

## 2024-01-27 PROCEDURE — 80053 COMPREHEN METABOLIC PANEL: CPT

## 2024-01-27 PROCEDURE — 84443 ASSAY THYROID STIM HORMONE: CPT

## 2024-01-27 PROCEDURE — 82306 VITAMIN D 25 HYDROXY: CPT

## 2024-01-27 PROCEDURE — 82607 VITAMIN B-12: CPT

## 2024-01-27 PROCEDURE — 36415 COLL VENOUS BLD VENIPUNCTURE: CPT

## 2024-01-27 PROCEDURE — 85025 COMPLETE CBC W/AUTO DIFF WBC: CPT

## 2024-01-27 PROCEDURE — 80061 LIPID PANEL: CPT

## 2024-01-29 ENCOUNTER — TELEPHONE (OUTPATIENT)
Dept: FAMILY MEDICINE CLINIC | Facility: CLINIC | Age: 59
End: 2024-01-29

## 2024-01-29 DIAGNOSIS — J30.2 SEASONAL ALLERGIES: ICD-10-CM

## 2024-01-29 DIAGNOSIS — Z12.31 BREAST CANCER SCREENING BY MAMMOGRAM: Primary | ICD-10-CM

## 2024-01-29 RX ORDER — FLUTICASONE PROPIONATE 50 MCG
1 SPRAY, SUSPENSION (ML) NASAL DAILY
Qty: 16 G | Refills: 0 | Status: SHIPPED | OUTPATIENT
Start: 2024-01-29

## 2024-01-29 NOTE — TELEPHONE ENCOUNTER
Patient called and stated she was here on Friday, and a Flonase was supposed to be sent to pharmacy. Is asking if you can please send for her. Thank you.

## 2024-01-30 ENCOUNTER — TELEPHONE (OUTPATIENT)
Age: 59
End: 2024-01-30

## 2024-01-30 ENCOUNTER — APPOINTMENT (OUTPATIENT)
Dept: URGENT CARE | Age: 59
End: 2024-01-30

## 2024-01-30 NOTE — TELEPHONE ENCOUNTER
Caller: patient    Doctor: keyonna    Reason for call: FU with Dr Schneider,  patient moved to [place] and would like to go to the [place] office.    Patient is schedule with Dr PARMAR for 3/4      Call back#:

## 2024-01-31 DIAGNOSIS — E55.9 VITAMIN D INSUFFICIENCY: Primary | ICD-10-CM

## 2024-01-31 DIAGNOSIS — E53.8 VITAMIN B12 DEFICIENCY: ICD-10-CM

## 2024-01-31 RX ORDER — MELATONIN
1000 DAILY
Qty: 90 TABLET | Refills: 3 | Status: SHIPPED | OUTPATIENT
Start: 2024-01-31

## 2024-02-02 ENCOUNTER — TELEPHONE (OUTPATIENT)
Dept: FAMILY MEDICINE CLINIC | Facility: CLINIC | Age: 59
End: 2024-02-02

## 2024-02-02 NOTE — TELEPHONE ENCOUNTER
[facility] form received on 02/02/24  to be completed by PCP. Copy made and placed in PCP folder.    Forms to be delivered to PCP mailbox at assigned time.

## 2024-02-05 ENCOUNTER — PATIENT OUTREACH (OUTPATIENT)
Dept: FAMILY MEDICINE CLINIC | Facility: CLINIC | Age: 59
End: 2024-02-05

## 2024-02-05 NOTE — PROGRESS NOTES
MONIQUE NIETO did receive a new referral from provider regarding Pt is Homeless, and needs transportation. After chart review MONIQUE NIETO did place a call to Pt. to assist as needed. MONIQUE NIETO introduced herself, her role, and explained Pt the purpose of this call. Pt seems understanding. Pt inquired if she can return the call since she was unable to talk at this time. MONIQUE NIETO informed Pt that she can reach out the MONIQUE NIETO Monday through Friday within office hours. Pt seems understanding and expressed thanked for the MONIQUE NIETO support.    MONIQUE NIETO is remain available for further assistance as needed.

## 2024-02-09 ENCOUNTER — TELEPHONE (OUTPATIENT)
Dept: FAMILY MEDICINE CLINIC | Facility: CLINIC | Age: 59
End: 2024-02-09

## 2024-02-09 NOTE — TELEPHONE ENCOUNTER
Hi, this is Nilda Wang. My birthday is [id number]. I have a physical form there for Doctor Clay to fill out and you left me a message and said she couldn't fill it out because she didn't have the TB results. I had to get the TB results at [facility] now on [address] and their phone number is [phone]. They would not give me a copy of the results because it was for employment, but my TV's all negative. I don't know how to fix this. If you can call me back at [phone]. Thank you.        first attempt to contact patient. left message to return my call on answering machine.

## 2024-02-09 NOTE — TELEPHONE ENCOUNTER
----- Message from MICHELLE Franco sent at 2/6/2024  2:38 PM EST -----  Patient dropped off her physical form but not her TB testing. Can you set her up for a PPD to be done here. Looks like she won't get a bill for it from what I can tell with her insurance but I can't sign form w/o TB testing. Thanks

## 2024-02-15 ENCOUNTER — CLINICAL SUPPORT (OUTPATIENT)
Dept: FAMILY MEDICINE CLINIC | Facility: CLINIC | Age: 59
End: 2024-02-15

## 2024-02-15 DIAGNOSIS — Z11.1 ENCOUNTER FOR PPD TEST: Primary | ICD-10-CM

## 2024-02-15 PROCEDURE — 86580 TB INTRADERMAL TEST: CPT

## 2024-02-17 ENCOUNTER — CLINICAL SUPPORT (OUTPATIENT)
Dept: FAMILY MEDICINE CLINIC | Facility: CLINIC | Age: 59
End: 2024-02-17

## 2024-02-17 DIAGNOSIS — Z11.1 VISIT FOR TB SKIN TEST: Primary | ICD-10-CM

## 2024-02-17 LAB
INDURATION: 0 MM
TB SKIN TEST: NEGATIVE

## 2024-02-19 ENCOUNTER — PATIENT OUTREACH (OUTPATIENT)
Dept: FAMILY MEDICINE CLINIC | Facility: CLINIC | Age: 59
End: 2024-02-19

## 2024-02-19 ENCOUNTER — TELEPHONE (OUTPATIENT)
Dept: FAMILY MEDICINE CLINIC | Facility: CLINIC | Age: 59
End: 2024-02-19

## 2024-02-19 NOTE — TELEPHONE ENCOUNTER
2/19/24 Patient left voice message in regards to TB lab results that needed to be faxed over to [facility] for pre-employment physical.      2/19/24 Patient was contacted back. Patient answered phone and was able to clarify needs, original form faxed was located and TB results were printed and faxed over to number left by patient. Confirmation received.

## 2024-02-19 NOTE — PROGRESS NOTES
MONIQUE NIETO did place a second call to Pt but she did  the phone, VM left. MONIQUE NIETO will attempt to reach out Pt a later time.    MONIQUE NIETO is remain available for further assistance as needed.

## 2024-02-27 ENCOUNTER — CLINICAL SUPPORT (OUTPATIENT)
Dept: FAMILY MEDICINE CLINIC | Facility: CLINIC | Age: 59
End: 2024-02-27

## 2024-02-27 DIAGNOSIS — Z11.1 VISIT FOR TB SKIN TEST: Primary | ICD-10-CM

## 2024-02-27 PROCEDURE — 86580 TB INTRADERMAL TEST: CPT

## 2024-02-29 ENCOUNTER — CLINICAL SUPPORT (OUTPATIENT)
Dept: FAMILY MEDICINE CLINIC | Facility: CLINIC | Age: 59
End: 2024-02-29

## 2024-02-29 ENCOUNTER — PATIENT OUTREACH (OUTPATIENT)
Dept: FAMILY MEDICINE CLINIC | Facility: CLINIC | Age: 59
End: 2024-02-29

## 2024-02-29 DIAGNOSIS — Z11.1 ENCOUNTER FOR PPD SKIN TEST READING: Primary | ICD-10-CM

## 2024-02-29 LAB
INDURATION: 0 MM
TB SKIN TEST: NEGATIVE

## 2024-02-29 NOTE — PROGRESS NOTES
MONIQUE NIETO did place another call to Pt. Pt inquired if she can return the call since she is going for a medical appointment at this time. MONIQUE NIETO informed Pt that MONIQUE NIETO is available Monday through Friday within office hours.     MONIQUE NIETO is remain available for further assistance as needed.

## 2024-03-03 DIAGNOSIS — F33.2 MAJOR DEPRESSIVE DISORDER, RECURRENT, SEVERE WITHOUT PSYCHOTIC FEATURES (HCC): Chronic | ICD-10-CM

## 2024-03-04 ENCOUNTER — OFFICE VISIT (OUTPATIENT)
Dept: PAIN MEDICINE | Facility: MEDICAL CENTER | Age: 59
End: 2024-03-04
Payer: COMMERCIAL

## 2024-03-04 VITALS
DIASTOLIC BLOOD PRESSURE: 87 MMHG | SYSTOLIC BLOOD PRESSURE: 106 MMHG | HEART RATE: 92 BPM | BODY MASS INDEX: 28.66 KG/M2 | WEIGHT: 172 LBS | OXYGEN SATURATION: 98 % | HEIGHT: 65 IN

## 2024-03-04 DIAGNOSIS — M47.816 LUMBAR SPONDYLOSIS: Primary | ICD-10-CM

## 2024-03-04 DIAGNOSIS — M15.9 PRIMARY OSTEOARTHRITIS INVOLVING MULTIPLE JOINTS: ICD-10-CM

## 2024-03-04 DIAGNOSIS — M47.812 CERVICAL SPONDYLOSIS: ICD-10-CM

## 2024-03-04 PROCEDURE — 99214 OFFICE O/P EST MOD 30 MIN: CPT | Performed by: PHYSICAL MEDICINE & REHABILITATION

## 2024-03-04 RX ORDER — ARIPIPRAZOLE 5 MG/1
5 TABLET ORAL DAILY
Qty: 30 TABLET | Refills: 2 | Status: SHIPPED | OUTPATIENT
Start: 2024-03-04

## 2024-03-04 NOTE — PROGRESS NOTES
Assessment:  1. Lumbar spondylosis    2. Cervical spondylosis    3. Primary osteoarthritis involving multiple joints        Plan:  At this time we will repeat the patient's left L3-5 radiofrequency ablation.  This was performed by Dr. Schneider less than 1 year ago and provided about 7 months of significant pain relief for the patient.  She would like to repeat the procedure at this time.  I think that is reasonable.  Complete risks and benefits including bleeding, infection, tissue reaction, allergic reaction were discussed. Verbal consent obtained.  We will also schedule her for right C4-6 medial branch nerve block #1 with the intention of moving forward towards radiofrequency ablation if she has an appropriate diagnostic response to the medial branch nerve block in the cervical region.    My impressions and treatment recommendations were discussed in detail with the patient who verbalized understanding and had no further questions.  Discharge instructions were provided. I personally saw and examined the patient and I agree with the above discussed plan of care.    Orders Placed This Encounter   Procedures    FL spine and pain procedure     Previously done by Dr. Schneider less than one year ago, only left side treated so should be good to go.     Standing Status:   Future     Standing Expiration Date:   3/4/2025     Order Specific Question:   Reason for Exam:     Answer:   (L) L3-5 RFA (repeat procedure)     Order Specific Question:   Anticoagulant hold needed?     Answer:   no    FL spine and pain procedure     Standing Status:   Future     Standing Expiration Date:   3/4/2025     Order Specific Question:   Reason for Exam:     Answer:   (R) C4-6 MBB#1     Order Specific Question:   Anticoagulant hold needed?     Answer:   no     No orders of the defined types were placed in this encounter.      History of Present Illness:  Carmen Wang is a 58 y.o. female who presents for a follow up office visit in regards to  left-sided lower back pain as well as bilateral neck pain.  She did undergo radiofrequency ablation in the past with successful response for 6 to 7 months.  She is currently experiencing return of this pain and would like to repeat the procedure at this point.    She is also describing axial neck pain consistent with cervical spondylosis also noted on her x-rays which I reviewed today.  She also describes crepitus sensation with cervical spine range of motion.    Currently rating her pain as an 8/10 which is present throughout the entirety of the day characterized as burning dull aching sharp throbbing pressure-like shooting and numbness as well as pins and needle sensation.  This is localized mainly to the axial cervical spine and axial lumbar spine with some referred pain into the left buttock and posterior thigh but nothing radiating down the leg.    I have personally reviewed and/or updated the patient's past medical history, past surgical history, family history, social history, current medications, allergies, and vital signs today.     Review of Systems   Respiratory:  Negative for shortness of breath.    Cardiovascular:  Negative for chest pain.   Gastrointestinal:  Negative for constipation, diarrhea, nausea and vomiting.   Musculoskeletal:  Positive for back pain, gait problem, joint swelling (knee), myalgias and neck pain. Negative for arthralgias.   Skin:  Negative for rash.   Neurological:  Negative for dizziness, seizures and weakness.   All other systems reviewed and are negative.      Patient Active Problem List   Diagnosis    Mixed hyperlipidemia    History of pulmonary embolism    Tobacco abuse    Hyperplastic colonic polyp    Seasonal allergies    Primary osteoarthritis involving multiple joints    Major depressive disorder, recurrent, severe without psychotic features (HCC)    Alcohol use disorder, moderate, dependence (HCC)    History of suicidal ideation    Diverticulosis of colon    Chronic  bilateral low back pain with left-sided sciatica    Numbness and tingling of right arm    Facet hypertrophy of lumbosacral region    Chronic pain syndrome    Lumbar spondylosis    Lumbar radiculopathy    Bunion of great toe    Skin lesion    Vitamin D deficiency    S/P total left hip arthroplasty    Pulmonary emphysema (HCC)    Homeless       Past Medical History:   Diagnosis Date    Abnormal Pap smear of cervix     Anxiety     Bell's palsy     COPD (chronic obstructive pulmonary disease) (HCC)     Depression     Menopause     Mild persistent asthma without complication 10/9/2018    Obesity     Primary osteoarthritis involving multiple joints 10/9/2018       Past Surgical History:   Procedure Laterality Date    ANKLE SURGERY      BREAST BIOPSY Right 12 yrs ago     SECTION      COLONOSCOPY      COLONOSCOPY W/ POLYPECTOMY      DILATION AND CURETTAGE, DIAGNOSTIC / THERAPEUTIC      NOSE SURGERY      TOTAL HIP ARTHROPLASTY Left     TUBAL LIGATION         Family History   Problem Relation Age of Onset    Mental illness Mother     Suicidality Mother     No Known Problems Father     Mental illness Brother     Suicidality Brother     No Known Problems Maternal Grandmother     No Known Problems Paternal Grandmother     No Known Problems Maternal Aunt     No Known Problems Paternal Aunt     No Known Problems Paternal Aunt     No Known Problems Paternal Aunt     No Known Problems Paternal Aunt     Cancer Family        Social History     Occupational History    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: [employer]     Comment: PT / [employer] Services   Tobacco Use    Smoking status: Every Day     Current packs/day: 1.00     Average packs/day: 1 pack/day for 45.2 years (45.2 ttl pk-yrs)     Types: Cigarettes     Start date:     Smokeless tobacco: Never    Tobacco comments:     cut down to 1 ppd  and using lozenges   Vaping Use    Vaping status: Never Used   Substance and Sexual Activity    Alcohol use: Not  "Currently    Drug use: Not Currently     Types: Marijuana, Cocaine    Sexual activity: Not Currently     Partners: Male     Birth control/protection: None       Current Outpatient Medications on File Prior to Visit   Medication Sig    albuterol (PROVENTIL HFA,VENTOLIN HFA) 90 mcg/act inhaler Inhale 2 puffs every 6 (six) hours as needed for wheezing    ARIPiprazole (ABILIFY) 5 mg tablet take 1 tablet by mouth once daily    atorvastatin (LIPITOR) 40 mg tablet Take 1 tablet (40 mg total) by mouth daily    cholecalciferol (VITAMIN D3) 1,000 units tablet Take 1 tablet (1,000 Units total) by mouth daily    cyanocobalamin (VITAMIN B-12) 1000 MCG tablet Take 1 tablet (1,000 mcg total) by mouth daily    fexofenadine (ALLEGRA) 180 MG tablet Take 1 tablet (180 mg total) by mouth daily    fluticasone (FLONASE) 50 mcg/act nasal spray 1 spray into each nostril daily    magnesium Oxide (MAG-OX) 400 mg TABS Take 1 tablet (400 mg total) by mouth daily    montelukast (SINGULAIR) 10 mg tablet Take 1 tablet (10 mg total) by mouth daily at bedtime    multivitamin (THERAGRAN) TABS Take 1 tablet by mouth daily    naproxen (Naprosyn) 500 mg tablet Take 1 tablet (500 mg total) by mouth 2 (two) times a day with meals    pregabalin (LYRICA) 75 mg capsule Take 1 capsule (75 mg total) by mouth daily as needed (pain in hands)    sertraline (ZOLOFT) 50 mg tablet Take 1 tablet (50 mg total) by mouth daily at bedtime    tiotropium (Spiriva Respimat) 1.25 MCG/ACT AERS inhaler Inhale 2 puffs daily    topiramate (TOPAMAX) 50 MG tablet Take 1 tablet (50 mg total) by mouth every 12 (twelve) hours    methylPREDNISolone 4 MG tablet therapy pack Use as directed on package    [DISCONTINUED] ARIPiprazole (ABILIFY) 5 mg tablet Take 1 tablet (5 mg total) by mouth daily     No current facility-administered medications on file prior to visit.       Allergies   Allergen Reactions    Pollen Extract        Physical Exam:    /87   Pulse 92   Ht 5' 5\" (1.651 " m)   Wt 78 kg (172 lb)   LMP  (LMP Unknown)   SpO2 98%   BMI 28.62 kg/m²     Constitutional:normal, well developed, well nourished, alert, in no distress and non-toxic and no overt pain behavior.  Eyes:anicteric  HEENT:grossly intact  Neck:supple, symmetric, trachea midline and no masses   Pulmonary:even and unlabored  Cardiovascular:No edema or pitting edema present  Psychiatric:Mood and affect appropriate  Neurologic:Cranial Nerves II-XII grossly intact  Musculoskeletal:normal, except for cervical spine range of motion limitation and pain as well as crepitus sensation, pain is reproduced in the lower back with lumbar extension and extension with rotation    Imaging

## 2024-03-05 ENCOUNTER — TELEPHONE (OUTPATIENT)
Age: 59
End: 2024-03-05

## 2024-03-05 ENCOUNTER — TELEPHONE (OUTPATIENT)
Dept: FAMILY MEDICINE CLINIC | Facility: CLINIC | Age: 59
End: 2024-03-05

## 2024-03-05 NOTE — TELEPHONE ENCOUNTER
Procedures scheduled 3/26/24 and 4/9/24.    Reviewed instructions: , NPO 1 hour prior, loose-fitting/comfortable clothing, if ill/fever/infx/abx to call and reschedule.  No need to hold any meds prior.  Patient stated verbal understanding.      Reviewed instructions: , NPO 1 hour prior, loose-fitting/comfortable clothes, if ill/fever/infx/abx to call and reschedule.  Also pain level at leat 5/10 and refrain from PRN, as-needed pain meds 6h prior.  Patient stated verbal understanding.

## 2024-03-05 NOTE — TELEPHONE ENCOUNTER
Caller: Carmen     Doctor: Manuel     Reason for call: Patient calling to schedule procedure order is in chart please advise     Call back#: [phone]

## 2024-03-07 NOTE — TELEPHONE ENCOUNTER
second attempt to contact patient. left message to return my call on answering machine, appointment now cancelled. Letter sent.

## 2024-03-12 ENCOUNTER — PATIENT OUTREACH (OUTPATIENT)
Dept: FAMILY MEDICINE CLINIC | Facility: CLINIC | Age: 59
End: 2024-03-12

## 2024-03-12 NOTE — PROGRESS NOTES
MONIQUE NIETO did place another call to Pt. Pt and MONIQUE NIETO discussed in regard transportation. MONIQUE NIETO explained Pt about [employer] service. Pt stated that she does not want to apply for [employer] at this time. MONIQUE NIETO inquired Pt if there is any other social needs that she would like to share. Pt declined any other social needs at this time.     MONIQUE NIETO informed Pt that [facility]IQUE NIETO will close this referral today, however, she can reach out the MONIQUE  for further support Monday through Friday within office hours. Pt seems understanding and thankful for the MONIQUE NIETO assistance.    MONIQUE  is closing case today due to Pt denied social needs at this time.   [facility] is remain available for further assistance as needed.

## 2024-03-24 DIAGNOSIS — G43.919 INTRACTABLE MIGRAINE WITHOUT STATUS MIGRAINOSUS, UNSPECIFIED MIGRAINE TYPE: ICD-10-CM

## 2024-03-25 RX ORDER — TOPIRAMATE 50 MG/1
50 TABLET, FILM COATED ORAL EVERY 12 HOURS
Qty: 60 TABLET | Refills: 2 | Status: SHIPPED | OUTPATIENT
Start: 2024-03-25

## 2024-03-26 ENCOUNTER — TELEPHONE (OUTPATIENT)
Dept: RADIOLOGY | Facility: MEDICAL CENTER | Age: 59
End: 2024-03-26

## 2024-03-26 ENCOUNTER — HOSPITAL ENCOUNTER (OUTPATIENT)
Dept: RADIOLOGY | Facility: MEDICAL CENTER | Age: 59
Discharge: HOME | End: 2024-03-26
Payer: COMMERCIAL

## 2024-03-26 VITALS
SYSTOLIC BLOOD PRESSURE: 113 MMHG | OXYGEN SATURATION: 96 % | DIASTOLIC BLOOD PRESSURE: 78 MMHG | RESPIRATION RATE: 18 BRPM | TEMPERATURE: 97.4 F | HEART RATE: 82 BPM

## 2024-03-26 DIAGNOSIS — M47.816 LUMBAR SPONDYLOSIS: ICD-10-CM

## 2024-03-26 PROCEDURE — 64635 DESTROY LUMB/SAC FACET JNT: CPT | Performed by: PHYSICAL MEDICINE & REHABILITATION

## 2024-03-26 PROCEDURE — 64636 DESTROY L/S FACET JNT ADDL: CPT | Performed by: PHYSICAL MEDICINE & REHABILITATION

## 2024-03-26 RX ADMIN — Medication 5 ML: at 11:27

## 2024-03-26 NOTE — DISCHARGE INSTR - LAB

## 2024-03-26 NOTE — TELEPHONE ENCOUNTER
Call 3/27 s/p L lumbar RFA with AGATA CHATTERJEE cervical MBB #1 scheduled for 4/9 arrival time 11:10    OV with CG 5/7 arrival time 1:15 pm

## 2024-03-26 NOTE — H&P
History of Present Illness: The patient is a 58 y.o. female who presents with complaints of left low back pain    Past Medical History:   Diagnosis Date    Abnormal Pap smear of cervix     Anxiety     Bell's palsy     COPD (chronic obstructive pulmonary disease) (HCC)     Depression     Menopause     Mild persistent asthma without complication 10/9/2018    Obesity     Primary osteoarthritis involving multiple joints 10/9/2018       Past Surgical History:   Procedure Laterality Date    ANKLE SURGERY      BREAST BIOPSY Right 12 yrs ago     SECTION      COLONOSCOPY      COLONOSCOPY W/ POLYPECTOMY      DILATION AND CURETTAGE, DIAGNOSTIC / THERAPEUTIC      NOSE SURGERY      TOTAL HIP ARTHROPLASTY Left     TUBAL LIGATION           Current Outpatient Medications:     albuterol (PROVENTIL HFA,VENTOLIN HFA) 90 mcg/act inhaler, Inhale 2 puffs every 6 (six) hours as needed for wheezing, Disp: 18 g, Rfl: 5    ARIPiprazole (ABILIFY) 5 mg tablet, take 1 tablet by mouth once daily, Disp: 30 tablet, Rfl: 2    atorvastatin (LIPITOR) 40 mg tablet, Take 1 tablet (40 mg total) by mouth daily, Disp: 90 tablet, Rfl: 1    cholecalciferol (VITAMIN D3) 1,000 units tablet, Take 1 tablet (1,000 Units total) by mouth daily, Disp: 90 tablet, Rfl: 3    cyanocobalamin (VITAMIN B-12) 1000 MCG tablet, Take 1 tablet (1,000 mcg total) by mouth daily, Disp: 90 tablet, Rfl: 3    fexofenadine (ALLEGRA) 180 MG tablet, Take 1 tablet (180 mg total) by mouth daily, Disp: 90 tablet, Rfl: 0    fluticasone (FLONASE) 50 mcg/act nasal spray, 1 spray into each nostril daily, Disp: 16 g, Rfl: 0    magnesium Oxide (MAG-OX) 400 mg TABS, Take 1 tablet (400 mg total) by mouth daily, Disp: 90 tablet, Rfl: 1    methylPREDNISolone 4 MG tablet therapy pack, Use as directed on package, Disp: 21 each, Rfl: 0    montelukast (SINGULAIR) 10 mg tablet, Take 1 tablet (10 mg total) by mouth daily at bedtime, Disp: 90 tablet, Rfl: 1    multivitamin (THERAGRAN) TABS, Take 1  tablet by mouth daily, Disp: , Rfl:     naproxen (Naprosyn) 500 mg tablet, Take 1 tablet (500 mg total) by mouth 2 (two) times a day with meals, Disp: 60 tablet, Rfl: 0    pregabalin (LYRICA) 75 mg capsule, Take 1 capsule (75 mg total) by mouth daily as needed (pain in hands), Disp: 30 capsule, Rfl: 2    sertraline (ZOLOFT) 50 mg tablet, Take 1 tablet (50 mg total) by mouth daily at bedtime, Disp: 90 tablet, Rfl: 1    tiotropium (Spiriva Respimat) 1.25 MCG/ACT AERS inhaler, Inhale 2 puffs daily, Disp: 4 g, Rfl: 3    topiramate (TOPAMAX) 50 MG tablet, TAKE 1 TABLET BY MOUTH EVERY 12 HOURS, Disp: 60 tablet, Rfl: 2    Current Facility-Administered Medications:     lidocaine (PF) (XYLOCAINE-MPF) 2 % injection 5 mL, 5 mL, Perineural, Once, Nicho Jackson DO    lidocaine (PF) (XYLOCAINE-MPF) 2 % injection 5 mL, 5 mL, Perineural, Once, Nicho Jackson DO    Allergies   Allergen Reactions    Pollen Extract        Physical Exam:   Vitals:    03/26/24 1112   BP: 131/84   Pulse: 85   Resp: 18   Temp: (!) 97.4 °F (36.3 °C)   SpO2: 97%     General: Awake, Alert, Oriented x 3, Mood and affect appropriate  Respiratory: Respirations even and unlabored  Cardiovascular: Peripheral pulses intact; no edema  Musculoskeletal Exam: left low back pain    ASA Score: 3    Patient/Chart Verification  Patient ID Verified: Verbal  ID Band Applied: No  Consents Confirmed: Procedural, To be obtained in the Pre-Procedure area  H&P( within 30 days) Verified: To be obtained in the Pre-Procedure area  Allergies Reviewed: Yes  Anticoag/NSAID held?: No  Currently on antibiotics?: No    Assessment:   1. Lumbar spondylosis        Plan: (L) L3-5 RFA (repeat procedure)

## 2024-03-27 ENCOUNTER — OFFICE VISIT (OUTPATIENT)
Dept: FAMILY MEDICINE CLINIC | Facility: CLINIC | Age: 59
End: 2024-03-27

## 2024-03-27 VITALS
TEMPERATURE: 97.9 F | DIASTOLIC BLOOD PRESSURE: 76 MMHG | HEIGHT: 65 IN | RESPIRATION RATE: 16 BRPM | WEIGHT: 165 LBS | SYSTOLIC BLOOD PRESSURE: 122 MMHG | OXYGEN SATURATION: 97 % | HEART RATE: 94 BPM | BODY MASS INDEX: 27.49 KG/M2

## 2024-03-27 DIAGNOSIS — E78.2 MIXED HYPERLIPIDEMIA: ICD-10-CM

## 2024-03-27 DIAGNOSIS — J30.2 SEASONAL ALLERGIES: ICD-10-CM

## 2024-03-27 DIAGNOSIS — G89.29 CHRONIC BILATERAL LOW BACK PAIN WITH LEFT-SIDED SCIATICA: ICD-10-CM

## 2024-03-27 DIAGNOSIS — J43.9 PULMONARY EMPHYSEMA, UNSPECIFIED EMPHYSEMA TYPE (HCC): ICD-10-CM

## 2024-03-27 DIAGNOSIS — M54.42 CHRONIC BILATERAL LOW BACK PAIN WITH LEFT-SIDED SCIATICA: ICD-10-CM

## 2024-03-27 DIAGNOSIS — M21.619 BUNION OF GREAT TOE: ICD-10-CM

## 2024-03-27 DIAGNOSIS — Z23 NEED FOR COVID-19 VACCINE: Primary | ICD-10-CM

## 2024-03-27 DIAGNOSIS — F33.2 MAJOR DEPRESSIVE DISORDER, RECURRENT, SEVERE WITHOUT PSYCHOTIC FEATURES (HCC): Chronic | ICD-10-CM

## 2024-03-27 PROCEDURE — 90480 ADMN SARSCOV2 VAC 1/ONLY CMP: CPT | Performed by: NURSE PRACTITIONER

## 2024-03-27 PROCEDURE — 91320 SARSCV2 VAC 30MCG TRS-SUC IM: CPT | Performed by: NURSE PRACTITIONER

## 2024-03-27 PROCEDURE — 99214 OFFICE O/P EST MOD 30 MIN: CPT | Performed by: NURSE PRACTITIONER

## 2024-03-27 RX ORDER — FLUTICASONE PROPIONATE 50 MCG
1 SPRAY, SUSPENSION (ML) NASAL DAILY
Qty: 16 G | Refills: 2 | Status: SHIPPED | OUTPATIENT
Start: 2024-03-27

## 2024-03-27 NOTE — ASSESSMENT & PLAN NOTE
Mood has improved, she feels benefit from sertraline 25 mg daily and would like to remain on this dose

## 2024-03-27 NOTE — PATIENT INSTRUCTIONS
EVALI (E-cigarette or Vaping-Associated Lung Injury)   WHAT YOU NEED TO KNOW:   E-cigarette or vaping-associated lung injury (EVALI) is life-threatening lung damage from vaping. Effects can start days to weeks after you use a vaping product.       DISCHARGE INSTRUCTIONS:   Call your local emergency number (911 in the [place]) if:   You have severe breathing problems.    You have chest pain.    Return to the emergency department if:   Your symptoms get worse.    You cough up blood.    Your heart beats more than 100 beats in 1 minute.    You are very tired, confused, and cannot think clearly.    Your lips or fingernails turn gray or blue.    Call your doctor if:   You have a fever higher than 101°F (38.3°C).    You cannot eat, or you have loss of appetite, nausea, or are vomiting.    You have questions or concerns about your condition or care.    Medicines:  You may need any of the following:  Steroids  may be given to reduce inflammation in your lungs.    Acetaminophen  decreases pain and fever. It is available without a doctor's order. Ask how much to take and how often to take it. Follow directions. Read the labels of all other medicines you are using to see if they also contain acetaminophen, or ask your doctor or pharmacist. Acetaminophen can cause liver damage if not taken correctly.    Antiviral medicine  may be given to help fight a viral infection.    Antibiotics  help prevent or fight a bacterial infection.    Take your medicine as directed.  Contact your healthcare provider if you think your medicine is not helping or if you have side effects. Tell your provider if you are allergic to any medicine. Keep a list of the medicines, vitamins, and herbs you take. Include the amounts, and when and why you take them. Bring the list or the pill bottles to follow-up visits. Carry your medicine list with you in case of an emergency.    Manage your symptoms:   Rest as needed.  Rest often while you recover. Slowly start to  do more each day.    Drink liquids as directed.  Ask how much liquid to drink each day and which liquids are best for you. Liquids help thin your mucus, which may make it easier for you to cough it up.    Use a cool mist humidifier  to increase air moisture in your home. This may make it easier for you to breathe and help decrease your cough.    Keep your head elevated.  You may be able to breathe better if you lie with the head of your bed up. You can also prop your head and upper body up with pillows. Talk to your child's healthcare provider about how to do this safely. Do not prop a young child up with pillows. He or she can become trapped under a pillow and not be able to breathe.    Prevent or manage EVALI:   Stop using vaping products until the cause is known.  Authorities recommend that you at least do not use products that contain THC. But it is not known for sure which vaping products cause EVALI. It is safest to stop using all vaping products until the cause is known. Ask your healthcare provider for information if you want to quit but need help. Do not  start or go back to smoking cigarettes in place of vaping.    Do not buy any vaping products from outside sources.  If you plan to continue vaping, only buy from licensed sources. This is especially important if you want to vape products that contain THC. Do not use vaping products given to you by friends or family members.    Never add vitamin E acetate to thicken a product.  This is especially important for products that contain THC. You may want to stop adding anything to your vaping products until the cause of EVALI is known.    Call your healthcare provider right away if you have symptoms of EVALI.  Even if your symptoms are mild, let your provider know. Symptoms can get worse quickly.    Ask about vaccines you may need.  Influenza (the flu), COVID-19, and pneumonia can become life-threatening for a person who has EVALI. Get a flu vaccine as soon as  recommended each year, usually starting in September or October. Get a COVID-19 vaccine and recommended booster doses. The pneumonia vaccine may be given every 5 years, or as directed. Ask about other vaccines you may need and when to get them.    For support and more information:   [place] Food and Drug Administration  [address] [facility] [place] [place] [address]  Phone: 1- 029 - 665-9420  Web Address: http://www.fda.gov    [employer]oke[employer]  Phone: 0- 212 - 383-5107  Web Address: www.smokefree.gov  Follow up with your doctor in 24 to 48 hours:  Symptoms of EVALI can be mild at first but become severe. Your doctor will check your oxygen level. You may also need a chest x-ray to check your lungs. Your doctor may refer you to a lung specialist. Write down your questions so you remember to ask them during your visits.  © Copyright [employer] 2023 Information is for End User's use only and may not be sold, redistributed or otherwise used for commercial purposes.  The above information is an  only. It is not intended as medical advice for individual conditions or treatments. Talk to your doctor, nurse or pharmacist before following any medical regimen to see if it is safe and effective for you.

## 2024-03-27 NOTE — TELEPHONE ENCOUNTER
S/W pt. Pt stated current pain level is 1/10. Pt stated needle sites look good, denies S&S of infection, denies fevers, a little soreness and denies sun burn like sensation.  Advised pt if she does get pain to take her prescribed or OTC pain medications and/or use ice/heat and that it takes 4 to 6 weeks to see the full effect.  Confirmed next appt w/ pt.  Pt verbalized understanding.

## 2024-03-27 NOTE — ASSESSMENT & PLAN NOTE
Following with pain management   Yesterday had left L3-5 radiofrequency ablation   Notable improvement in pain can take 4-6 weeks

## 2024-03-27 NOTE — ASSESSMENT & PLAN NOTE
Lab Results   Component Value Date    CHOLESTEROL 152 01/27/2024    CHOLESTEROL 252 (H) 11/30/2022     Lab Results   Component Value Date    HDL 43 (L) 01/27/2024    HDL 53 11/30/2022     Lab Results   Component Value Date    TRIG 207 (H) 01/27/2024    TRIG 309 (H) 11/30/2022     Lab Results   Component Value Date    NONHDLC 109 01/27/2024    NONHDLC 199 11/30/2022     Lab Results   Component Value Date    LDLCALC 68 01/27/2024     Repeat lipid panel   Continue atorvastatin 40 mg daily

## 2024-03-27 NOTE — PROGRESS NOTES
Name: Carmen Wang      : 1965      MRN: [id number]  Encounter Provider: MICHELLE Franco  Encounter Date: 3/27/2024   Encounter department: [facility]    Assessment & Plan     1. Need for COVID-19 vaccine  -     COVID-19 Pfizer mRNA vaccine 12 yr and older (GRAY cap vial or pre-filled syringe)    2. Seasonal allergies  -     fluticasone (FLONASE) 50 mcg/act nasal spray; 1 spray into each nostril daily    3. Pulmonary emphysema, unspecified emphysema type (HCC)  Assessment & Plan:  Stable, continue with Spiriva and albuterol PRN       4. Chronic bilateral low back pain with left-sided sciatica  Assessment & Plan:  Following with pain management   Yesterday had left L3-5 radiofrequency ablation   Notable improvement in pain can take 4-6 weeks        5. Major depressive disorder, recurrent, severe without psychotic features (HCC)  Assessment & Plan:  Mood has improved, she feels benefit from sertraline 25 mg daily and would like to remain on this dose       6. Mixed hyperlipidemia  Assessment & Plan:  Lab Results   Component Value Date    CHOLESTEROL 152 2024    CHOLESTEROL 252 (H) 2022     Lab Results   Component Value Date    HDL 43 (L) 2024    HDL 53 2022     Lab Results   Component Value Date    TRIG 207 (H) 2024    TRIG 309 (H) 2022     Lab Results   Component Value Date    NONHDLC 109 2024    NONHDLC 199 2022     Lab Results   Component Value Date    LDLCALC 68 2024     Repeat lipid panel   Continue atorvastatin 40 mg daily       7. Bunion of great toe  Assessment & Plan:  Follow up with podiatry scheduled              Subjective     Patient is a 58 year old female who  has a past medical history of Abnormal Pap smear of cervix, Anxiety, Bell's palsy, COPD (chronic obstructive pulmonary disease) (HCC), Depression, Menopause, Mild persistent asthma without complication (10/9/2018), Obesity, and Primary  osteoarthritis involving multiple joints (10/9/2018) who presents today for follow up.    She reports that she is working and doing well. No new concerns. Still working on getting caught up with scheduling screening tests.     The following portions of the patient's history were reviewed and updated as appropriate: allergies, current medications, past family history, past medical history, past social history, past surgical history and problem list.          Review of Systems   Constitutional:  Negative for activity change, appetite change, chills, fatigue, fever and unexpected weight change.   HENT:  Negative for hearing loss, nosebleeds, sinus pain, sneezing, sore throat and trouble swallowing.    Eyes:  Negative for photophobia and visual disturbance.   Respiratory:  Negative for cough, chest tightness, shortness of breath and wheezing.    Cardiovascular:  Negative for chest pain, palpitations and leg swelling.   Gastrointestinal:  Negative for abdominal pain, constipation, nausea and vomiting.   Genitourinary:  Negative for decreased urine volume, difficulty urinating, dysuria, flank pain, genital sores, hematuria and urgency.   Musculoskeletal:  Positive for arthralgias, back pain, myalgias and neck pain. Negative for gait problem.   Skin:  Negative for pallor, rash and wound.   Neurological:  Negative for dizziness, seizures, syncope, weakness, numbness and headaches.   Hematological:  Negative for adenopathy. Does not bruise/bleed easily.   Psychiatric/Behavioral:  Negative for confusion, hallucinations, self-injury, sleep disturbance and suicidal ideas. The patient is not nervous/anxious.        Past Medical History:   Diagnosis Date    Abnormal Pap smear of cervix     Anxiety     Bell's palsy     COPD (chronic obstructive pulmonary disease) ([facility])     Depression     Menopause     Mild persistent asthma without complication 10/9/2018    Obesity     Primary osteoarthritis involving multiple joints 10/9/2018      Past Surgical History:   Procedure Laterality Date    ANKLE SURGERY      BREAST BIOPSY Right 12 yrs ago     SECTION      COLONOSCOPY      COLONOSCOPY W/ POLYPECTOMY      DILATION AND CURETTAGE, DIAGNOSTIC / THERAPEUTIC      NOSE SURGERY      TOTAL HIP ARTHROPLASTY Left     TUBAL LIGATION       Family History   Problem Relation Age of Onset    Mental illness Mother     Suicidality Mother     No Known Problems Father     Mental illness Brother     Suicidality Brother     No Known Problems Maternal Grandmother     No Known Problems Paternal Grandmother     No Known Problems Maternal Aunt     No Known Problems Paternal Aunt     No Known Problems Paternal Aunt     No Known Problems Paternal Aunt     No Known Problems Paternal Aunt     Cancer Family      Social History     Socioeconomic History    Marital status: Single     Spouse name: None    Number of children: 2    Years of education: None    Highest education level: Associate degree: academic program   Occupational History    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: [employer]     Comment: PT / Waiver Services   Tobacco Use    Smoking status: Every Day     Current packs/day: 1.00     Average packs/day: 1 pack/day for 45.2 years (45.2 ttl pk-yrs)     Types: Cigarettes     Start date:     Smokeless tobacco: Never    Tobacco comments:     cut down to 1 ppd  and using lozenges   Vaping Use    Vaping status: Every Day    Substances: Nicotine, Flavoring   Substance and Sexual Activity    Alcohol use: Not Currently    Drug use: Not Currently     Types: Marijuana, Cocaine    Sexual activity: Not Currently     Partners: Male     Birth control/protection: None   Other Topics Concern    None   Social History Narrative    .    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    Lives with son and two roommates.     Social Determinants of Health     Financial Resource Strain: Low Risk  (3/27/2024)    Overall Financial Resource Strain ([facility])      Difficulty of Paying Living Expenses: Not hard at all   Food Insecurity: No Food Insecurity (3/27/2024)    Hunger Vital Sign     Worried About Running Out of Food in the Last Year: Never true     Ran Out of Food in the Last Year: Never true   Transportation Needs: Unmet Transportation Needs (3/27/2024)    PRAPARE - Transportation     Lack of Transportation (Medical): Yes     Lack of Transportation (Non-Medical): Yes   Physical Activity: Inactive (1/13/2020)    Exercise Vital Sign     Days of Exercise per Week: 0 days     Minutes of Exercise per Session: 0 min   Stress: Stress Concern Present (1/13/2020)    Citizen of the [place] [employer] Occupational [employer] - Occupational Stress Questionnaire     Feeling of Stress : To some extent   Social Connections: Moderately Isolated (1/13/2020)    Social Connection and Isolation Panel [NHANES]     Frequency of Communication with Friends and Family: Twice a week     Frequency of Social Gatherings with Friends and Family: Twice a week     Attends Sikhism Services: Never     Active Member of Clubs or Organizations: No     Attends Club or Organization Meetings: Never     Marital Status:    Intimate Partner Violence: Not At Risk (1/13/2020)    Humiliation, Afraid, Rape, and Kick questionnaire     Fear of Current or Ex-Partner: No     Emotionally Abused: No     Physically Abused: No     Sexually Abused: No   Housing Stability: Not on file     Current Outpatient Medications on File Prior to Visit   Medication Sig    albuterol (PROVENTIL HFA,VENTOLIN HFA) 90 mcg/act inhaler Inhale 2 puffs every 6 (six) hours as needed for wheezing    ARIPiprazole (ABILIFY) 5 mg tablet take 1 tablet by mouth once daily    atorvastatin (LIPITOR) 40 mg tablet Take 1 tablet (40 mg total) by mouth daily    cholecalciferol (VITAMIN D3) 1,000 units tablet Take 1 tablet (1,000 Units total) by mouth daily    cyanocobalamin (VITAMIN B-12) 1000 MCG tablet Take 1 tablet (1,000 mcg total) by mouth daily     "fexofenadine (ALLEGRA) 180 MG tablet Take 1 tablet (180 mg total) by mouth daily    magnesium Oxide (MAG-OX) 400 mg TABS Take 1 tablet (400 mg total) by mouth daily    montelukast (SINGULAIR) 10 mg tablet Take 1 tablet (10 mg total) by mouth daily at bedtime    multivitamin (THERAGRAN) TABS Take 1 tablet by mouth daily    pregabalin (LYRICA) 75 mg capsule Take 1 capsule (75 mg total) by mouth daily as needed (pain in hands)    sertraline (ZOLOFT) 50 mg tablet Take 1 tablet (50 mg total) by mouth daily at bedtime    tiotropium (Spiriva Respimat) 1.25 MCG/ACT AERS inhaler Inhale 2 puffs daily    topiramate (TOPAMAX) 50 MG tablet TAKE 1 TABLET BY MOUTH EVERY 12 HOURS    [DISCONTINUED] fluticasone (FLONASE) 50 mcg/act nasal spray 1 spray into each nostril daily    [DISCONTINUED] methylPREDNISolone 4 MG tablet therapy pack Use as directed on package    [DISCONTINUED] naproxen (Naprosyn) 500 mg tablet Take 1 tablet (500 mg total) by mouth 2 (two) times a day with meals     Allergies   Allergen Reactions    Pollen Extract      Immunization History   Administered Date(s) Administered    COVID-19 MODERNA VACC 0.5 ML IM 04/24/2021, 05/22/2021, 02/01/2022    COVID-19 Pfizer mRNA vacc PF cesia-sucrose 12 yr and older (Comirnaty) 03/27/2024    INFLUENZA 11/19/2020    Influenza, injectable, quadrivalent, preservative free 0.5 mL 11/19/2020, 12/06/2023    Influenza, recombinant, quadrivalent,injectable, preservative free 11/02/2021    Pneumococcal Conjugate Vaccine 20-valent (Pcv20), Polysace 06/23/2022    Pneumococcal Polysaccharide PPV23 01/15/2020    Tdap 11/01/2016    Tuberculin Skin Test-PPD Intradermal 02/15/2024, 02/27/2024    Zoster Vaccine Recombinant 11/02/2021, 06/23/2022       Objective     /76 (BP Location: Left arm, Patient Position: Sitting, Cuff Size: Standard)   Pulse 94   Temp 97.9 °F (36.6 °C) (Temporal)   Resp 16   Ht 5' 5\" (1.651 m)   Wt 74.8 kg (165 lb)   LMP  (LMP Unknown)   SpO2 97%   BMI 27.46 " kg/m²     Physical Exam  Vitals and nursing note reviewed.   Constitutional:       General: She is not in acute distress.     Appearance: Normal appearance.   HENT:      Head: Normocephalic and atraumatic.      Right Ear: External ear normal.      Left Ear: External ear normal.   Eyes:      Extraocular Movements: Extraocular movements intact.      Conjunctiva/sclera: Conjunctivae normal.      Pupils: Pupils are equal, round, and reactive to light.   Cardiovascular:      Rate and Rhythm: Normal rate and regular rhythm.      Heart sounds: Normal heart sounds.   Pulmonary:      Effort: Pulmonary effort is normal. No respiratory distress.      Breath sounds: Normal breath sounds. No wheezing.   Musculoskeletal:      Right lower leg: No edema.      Left lower leg: No edema.   Skin:     General: Skin is warm and dry.   Neurological:      General: No focal deficit present.      Mental Status: She is alert and oriented to person, place, and time.      Cranial Nerves: No cranial nerve deficit.      Motor: No weakness.   Psychiatric:         Mood and Affect: Mood normal.         Behavior: Behavior normal.       MICHELLE Franco

## 2024-04-03 ENCOUNTER — TELEPHONE (OUTPATIENT)
Age: 59
End: 2024-04-03

## 2024-04-03 NOTE — TELEPHONE ENCOUNTER
Caller: Carmen     Doctor: Manuel     Reason for call: Patient calling stating had Covid 19 vaccine on 03/27 and would like to know if this will effect procedure on 04/09 please advise     Call back#: [phone]

## 2024-04-03 NOTE — TELEPHONE ENCOUNTER
Attempted to reach pt. Detailed VMMLOM as per release of medical information document advising pt that it is ok for her to procced with her procedure on 4/9 since it will not include a steroid.CB# provided if there are questions.     Pt is scheduled for a Cervical MBB on 4/9.

## 2024-04-09 ENCOUNTER — HOSPITAL ENCOUNTER (OUTPATIENT)
Dept: RADIOLOGY | Facility: MEDICAL CENTER | Age: 59
Discharge: HOME | End: 2024-04-09
Payer: COMMERCIAL

## 2024-04-09 VITALS
OXYGEN SATURATION: 98 % | DIASTOLIC BLOOD PRESSURE: 73 MMHG | TEMPERATURE: 98.1 F | RESPIRATION RATE: 20 BRPM | HEART RATE: 80 BPM | SYSTOLIC BLOOD PRESSURE: 111 MMHG

## 2024-04-09 DIAGNOSIS — M47.812 CERVICAL SPONDYLOSIS: ICD-10-CM

## 2024-04-09 DIAGNOSIS — F33.2 MAJOR DEPRESSIVE DISORDER, RECURRENT, SEVERE WITHOUT PSYCHOTIC FEATURES (HCC): Chronic | ICD-10-CM

## 2024-04-09 PROCEDURE — 64490 INJ PARAVERT F JNT C/T 1 LEV: CPT | Performed by: PHYSICAL MEDICINE & REHABILITATION

## 2024-04-09 PROCEDURE — 64491 INJ PARAVERT F JNT C/T 2 LEV: CPT | Performed by: PHYSICAL MEDICINE & REHABILITATION

## 2024-04-09 RX ORDER — ARIPIPRAZOLE 5 MG/1
5 TABLET ORAL DAILY
Qty: 30 TABLET | Refills: 2 | Status: SHIPPED | OUTPATIENT
Start: 2024-04-09

## 2024-04-09 RX ADMIN — Medication 1.5 ML: at 11:19

## 2024-04-09 NOTE — DISCHARGE INSTRUCTIONS

## 2024-04-09 NOTE — H&P
History of Present Illness: The patient is a 58 y.o. female who presents with complaints of right neck pain    Past Medical History:   Diagnosis Date    Abnormal Pap smear of cervix     Anxiety     Bell's palsy     COPD (chronic obstructive pulmonary disease) (HCC)     Depression     Menopause     Mild persistent asthma without complication 10/9/2018    Obesity     Primary osteoarthritis involving multiple joints 10/9/2018       Past Surgical History:   Procedure Laterality Date    ANKLE SURGERY      BREAST BIOPSY Right 12 yrs ago     SECTION      COLONOSCOPY      COLONOSCOPY W/ POLYPECTOMY      DILATION AND CURETTAGE, DIAGNOSTIC / THERAPEUTIC      NOSE SURGERY      TOTAL HIP ARTHROPLASTY Left     TUBAL LIGATION           Current Outpatient Medications:     albuterol (PROVENTIL HFA,VENTOLIN HFA) 90 mcg/act inhaler, Inhale 2 puffs every 6 (six) hours as needed for wheezing, Disp: 18 g, Rfl: 5    ARIPiprazole (ABILIFY) 5 mg tablet, take 1 tablet by mouth once daily, Disp: 30 tablet, Rfl: 2    atorvastatin (LIPITOR) 40 mg tablet, Take 1 tablet (40 mg total) by mouth daily, Disp: 90 tablet, Rfl: 1    cholecalciferol (VITAMIN D3) 1,000 units tablet, Take 1 tablet (1,000 Units total) by mouth daily, Disp: 90 tablet, Rfl: 3    cyanocobalamin (VITAMIN B-12) 1000 MCG tablet, Take 1 tablet (1,000 mcg total) by mouth daily, Disp: 90 tablet, Rfl: 3    fexofenadine (ALLEGRA) 180 MG tablet, Take 1 tablet (180 mg total) by mouth daily, Disp: 90 tablet, Rfl: 0    fluticasone (FLONASE) 50 mcg/act nasal spray, 1 spray into each nostril daily, Disp: 16 g, Rfl: 2    magnesium Oxide (MAG-OX) 400 mg TABS, Take 1 tablet (400 mg total) by mouth daily, Disp: 90 tablet, Rfl: 1    montelukast (SINGULAIR) 10 mg tablet, Take 1 tablet (10 mg total) by mouth daily at bedtime, Disp: 90 tablet, Rfl: 1    multivitamin (THERAGRAN) TABS, Take 1 tablet by mouth daily, Disp: , Rfl:     pregabalin (LYRICA) 75 mg capsule, Take 1 capsule (75 mg  total) by mouth daily as needed (pain in hands), Disp: 30 capsule, Rfl: 2    sertraline (ZOLOFT) 50 mg tablet, Take 1 tablet (50 mg total) by mouth daily at bedtime, Disp: 90 tablet, Rfl: 1    tiotropium (Spiriva Respimat) 1.25 MCG/ACT AERS inhaler, Inhale 2 puffs daily, Disp: 4 g, Rfl: 3    topiramate (TOPAMAX) 50 MG tablet, TAKE 1 TABLET BY MOUTH EVERY 12 HOURS, Disp: 60 tablet, Rfl: 2    Allergies   Allergen Reactions    Pollen Extract        Physical Exam:   Vitals:    04/09/24 1052   BP: 108/73   Pulse: 80   Resp: 18   Temp: 98.1 °F (36.7 °C)   SpO2: 99%     General: Awake, Alert, Oriented x 3, Mood and affect appropriate  Respiratory: Respirations even and unlabored  Cardiovascular: Peripheral pulses intact; no edema  Musculoskeletal Exam: right neck pain    ASA Score: 3    Patient/Chart Verification  Patient ID Verified: Verbal  ID Band Applied: No  Consents Confirmed: Procedural  H&P( within 30 days) Verified: To be obtained in the Pre-Procedure area  Interval H&P(within 24 hr) Complete (required for Outpatients and Surgery Admit only): To be obtained in the Pre-Procedure area  Allergies Reviewed: Yes  Anticoag/NSAID held?: NA  Currently on antibiotics?: No  Pregnancy denied?: NA    Assessment:   1. Cervical spondylosis        Plan: (R) C4-6 MBB#1

## 2024-04-12 ENCOUNTER — TELEPHONE (OUTPATIENT)
Dept: RADIOLOGY | Facility: MEDICAL CENTER | Age: 59
End: 2024-04-12

## 2024-04-12 NOTE — TELEPHONE ENCOUNTER
Procedure scheduled 4/24/24.    Reviewed instructions: , NPO 1 hour prior, loose-fitting/comfortable clothes, if ill/fever/infx/abx to call and reschedule.  Also pain level at leat 5/10 and refrain from PRN, as-needed pain meds 6h prior.  Patient stated verbal understanding.

## 2024-04-12 NOTE — TELEPHONE ENCOUNTER
----- Message from Nicho Jackson DO sent at 4/12/2024 12:13 PM EDT -----  Regarding: FW: Pain level chart  Contact: [phone]  Mbb#2 please    ----- Message -----  From: Cierra Middleton RN  Sent: 4/12/2024   9:26 AM EDT  To: Nicho Jackson DO  Subject: FW: Pain level chart                               ----- Message -----  From: Carmen Wang  Sent: 4/12/2024   9:20 AM EDT  To: [facility]  Subject: Pain level chart                                 Pic attached

## 2024-04-26 ENCOUNTER — TELEPHONE (OUTPATIENT)
Dept: RADIOLOGY | Facility: MEDICAL CENTER | Age: 59
End: 2024-04-26

## 2024-04-26 NOTE — TELEPHONE ENCOUNTER
Left message for patient to call me back directly to obtain new insurance information.  Patient is scheduled for Spine and Pain procedure 5/10/24 and per [employer] website current [employer] insurance will term 4/30/24.  Left my direct number [phone] on message.

## 2024-04-29 NOTE — TELEPHONE ENCOUNTER
Left message for patient to call me back directly to obtain new insurance information. Patient is scheduled for Spine and Pain procedure 5/10/24 and per [employer] website current [employer] insurance will term 4/30/24. Left my direct number [phone] on message.

## 2024-04-29 NOTE — TELEPHONE ENCOUNTER
Patient called back.  She did receive a renewal packet in the mail from her insurance company but thought that it was not due until the end of May.  Patient will call [facility] and see if she is able to do anything over the phone to continue benefits.

## 2024-05-01 ENCOUNTER — HOSPITAL ENCOUNTER (OUTPATIENT)
Dept: MAMMOGRAPHY | Facility: CLINIC | Age: 59
Discharge: HOME | End: 2024-05-01
Payer: COMMERCIAL

## 2024-05-01 VITALS — HEIGHT: 65 IN | BODY MASS INDEX: 27.49 KG/M2 | WEIGHT: 165 LBS

## 2024-05-01 DIAGNOSIS — Z12.31 BREAST CANCER SCREENING BY MAMMOGRAM: ICD-10-CM

## 2024-05-01 PROCEDURE — 77067 SCR MAMMO BI INCL CAD: CPT

## 2024-05-01 PROCEDURE — 77063 BREAST TOMOSYNTHESIS BI: CPT

## 2024-05-01 NOTE — TELEPHONE ENCOUNTER
Spoke to patient who stated that [facility] was able to renew her insurance 5/1/24 with a new ID# [id number]

## 2024-05-03 ENCOUNTER — OFFICE VISIT (OUTPATIENT)
Dept: FAMILY MEDICINE CLINIC | Facility: CLINIC | Age: 59
End: 2024-05-03

## 2024-05-03 VITALS
WEIGHT: 173 LBS | DIASTOLIC BLOOD PRESSURE: 70 MMHG | TEMPERATURE: 98.6 F | SYSTOLIC BLOOD PRESSURE: 105 MMHG | BODY MASS INDEX: 28.82 KG/M2 | OXYGEN SATURATION: 98 % | RESPIRATION RATE: 18 BRPM | HEIGHT: 65 IN | HEART RATE: 84 BPM

## 2024-05-03 DIAGNOSIS — M25.512 ACUTE PAIN OF LEFT SHOULDER: Primary | ICD-10-CM

## 2024-05-03 PROCEDURE — 99213 OFFICE O/P EST LOW 20 MIN: CPT | Performed by: PHYSICIAN ASSISTANT

## 2024-05-03 RX ORDER — CYCLOBENZAPRINE HCL 10 MG
10 TABLET ORAL 2 TIMES DAILY PRN
Qty: 60 TABLET | Refills: 1 | Status: SHIPPED | OUTPATIENT
Start: 2024-05-03

## 2024-05-03 NOTE — ASSESSMENT & PLAN NOTE
-Advised to apply ice/heating pad/topical therapies as needed to affected area.  -Will prescribe Flexeril 10 mg, twice daily as needed.  - Will prescribe Voltaren gel, apply to affected area 4 times daily as needed.  - Will order left shoulder x-ray for further evaluation.  - Provided patient with list of exercises to perform daily.  - If symptoms persist/worsen, would recommend referral to physical therapy.

## 2024-05-03 NOTE — PROGRESS NOTES
"Assessment/Plan:    Acute pain of left shoulder  -Advised to apply ice/heating pad/topical therapies as needed to affected area.  -Will prescribe Flexeril 10 mg, twice daily as needed.  - Will prescribe Voltaren gel, apply to affected area 4 times daily as needed.  - Will order left shoulder x-ray for further evaluation.  - Provided patient with list of exercises to perform daily.  - If symptoms persist/worsen, would recommend referral to physical therapy.      Diagnoses and all orders for this visit:    Acute pain of left shoulder  -     XR shoulder 2+ vw left; Future  -     cyclobenzaprine (FLEXERIL) 10 mg tablet; Take 1 tablet (10 mg total) by mouth 2 (two) times a day as needed for muscle spasms  -     Diclofenac Sodium (VOLTAREN) 1 %; Apply 2 g topically 4 (four) times a day        All of patients questions were answered. Patient understands and agrees with the above plan.     Return if symptoms worsen or fail to improve.    Leelee Spears PA-C  05/03/24  [facility]          Subjective:     Patient ID: Carmen Wang  is a 58 y.o. female with known PMH of depression, hyperlipidemia, migraines, seasonal allergies, chronic back pain, chronic neck pain, vitamin D deficiency who presents today in office for same-day visit for left shoulder pain.     - Patient is a 58 y.o. female who presents today for same-day visit for left shoulder pain. The symptoms began  a couple months ago, gradually worsening . Aggravating factors: no known event. Pain is located around the acromioclavicular (AC) joint. Discomfort is described as aching. Symptoms are exacerbated by repetitive movements. Evaluation to date: none. Therapy to date includes:  heat with little relief .  Patient notes she will also occasionally hear a \"clicking\" noise when moving her shoulder.       The following portions of the patient's history were reviewed and updated as appropriate: allergies, current medications, past family history, past medical " "history, past social history, past surgical history, and problem list.        Review of Systems   Constitutional:  Negative for chills and fever.   Musculoskeletal:  Positive for arthralgias.   All other systems reviewed and are negative.                Objective:   Vitals:    05/03/24 1329   BP: 105/70   BP Location: Left arm   Patient Position: Sitting   Cuff Size: Standard   Pulse: 84   Resp: 18   Temp: 98.6 °F (37 °C)   TempSrc: Temporal   SpO2: 98%   Weight: 78.5 kg (173 lb)   Height: 5' 5\" (1.651 m)         Physical Exam  Vitals and nursing note reviewed.   Constitutional:       General: She is not in acute distress.     Appearance: She is well-developed.   HENT:      Head: Normocephalic and atraumatic.      Right Ear: External ear normal.      Left Ear: External ear normal.      Nose: Nose normal.   Eyes:      Conjunctiva/sclera: Conjunctivae normal.   Cardiovascular:      Rate and Rhythm: Normal rate and regular rhythm.      Pulses: Normal pulses.      Heart sounds: Normal heart sounds.   Pulmonary:      Effort: Pulmonary effort is normal. No respiratory distress.      Breath sounds: Normal breath sounds. No wheezing.   Musculoskeletal:      Left shoulder: Tenderness present. No swelling. Normal range of motion.   Skin:     General: Skin is warm and dry.   Neurological:      Mental Status: She is alert and oriented to person, place, and time.   Psychiatric:         Behavior: Behavior normal.         "

## 2024-05-07 ENCOUNTER — HOSPITAL ENCOUNTER (OUTPATIENT)
Dept: RADIOLOGY | Facility: HOSPITAL | Age: 59
Discharge: HOME | End: 2024-05-07
Payer: COMMERCIAL

## 2024-05-07 ENCOUNTER — OFFICE VISIT (OUTPATIENT)
Dept: PAIN MEDICINE | Facility: MEDICAL CENTER | Age: 59
End: 2024-05-07
Payer: COMMERCIAL

## 2024-05-07 VITALS
OXYGEN SATURATION: 98 % | SYSTOLIC BLOOD PRESSURE: 110 MMHG | HEART RATE: 80 BPM | DIASTOLIC BLOOD PRESSURE: 72 MMHG | BODY MASS INDEX: 27.49 KG/M2 | HEIGHT: 65 IN | WEIGHT: 165 LBS

## 2024-05-07 DIAGNOSIS — M47.812 CERVICAL SPONDYLOSIS: Primary | ICD-10-CM

## 2024-05-07 DIAGNOSIS — M47.816 LUMBAR SPONDYLOSIS: ICD-10-CM

## 2024-05-07 DIAGNOSIS — M25.512 ACUTE PAIN OF LEFT SHOULDER: ICD-10-CM

## 2024-05-07 DIAGNOSIS — Z96.642 S/P TOTAL LEFT HIP ARTHROPLASTY: ICD-10-CM

## 2024-05-07 PROCEDURE — 99214 OFFICE O/P EST MOD 30 MIN: CPT

## 2024-05-07 PROCEDURE — 73502 X-RAY EXAM HIP UNI 2-3 VIEWS: CPT

## 2024-05-07 PROCEDURE — 73030 X-RAY EXAM OF SHOULDER: CPT

## 2024-05-07 NOTE — PROGRESS NOTES
Assessment:  1. Cervical spondylosis    2. Lumbar spondylosis        Plan:  Patient is reporting 90% reduction in low back pain following left L3-L5 radiofrequency ablation.  Pain level today is a 1/10 in intensity.  She is scheduled for right C4-C6 MBB #2 on 5/10/2024.  Follow-up pending response to MBB #2.    My impressions and treatment recommendations were discussed in detail with the patient who verbalized understanding and had no further questions.  Discharge instructions were provided. I personally saw and examined the patient and I agree with the above discussed plan of care.    History of Present Illness:  Carmen Wang is a 58 y.o. female who presents for a follow up office visit after undergoing left L3-L5 radiofrequency ablation.  Patient is very happy with these results.  She is currently reporting 90% reduction in pain and 1/10 pain level.  Residual pain is intermittent and only bothers her at night.  She describes the quality of her pain as pressure-like.  She is already scheduled to undergo right C4-C6 MBB #2 as she had a very positive to the first set of cervical blocks.  She is looking forward to this and anticipating cervical radiofrequency ablation.  She has no complaints today.    I have personally reviewed and/or updated the patient's past medical history, past surgical history, family history, social history, current medications, allergies, and vital signs today.     Review of Systems   Respiratory:  Negative for shortness of breath.    Cardiovascular:  Negative for chest pain.   Gastrointestinal:  Negative for constipation, diarrhea, nausea and vomiting.   Musculoskeletal:  Positive for back pain. Negative for arthralgias, gait problem, joint swelling and myalgias.   Skin:  Negative for rash.   Neurological:  Negative for dizziness, seizures and weakness.   All other systems reviewed and are negative.      Patient Active Problem List   Diagnosis    Mixed hyperlipidemia    History of  pulmonary embolism    Tobacco abuse    Hyperplastic colonic polyp    Seasonal allergies    Primary osteoarthritis involving multiple joints    Major depressive disorder, recurrent, severe without psychotic features ([facility])    Alcohol use disorder, moderate, dependence ([facility])    History of suicidal ideation    Diverticulosis of colon    Chronic bilateral low back pain with left-sided sciatica    Numbness and tingling of right arm    Facet hypertrophy of lumbosacral region    Chronic pain syndrome    Lumbar spondylosis    Lumbar radiculopathy    Bunion of great toe    Skin lesion    Vitamin D deficiency    S/P total left hip arthroplasty    Pulmonary emphysema ([facility])    Homeless    Cervical spondylosis    Acute pain of left shoulder       Past Medical History:   Diagnosis Date    Abnormal Pap smear of cervix     Anxiety     Bell's palsy     COPD (chronic obstructive pulmonary disease) ([facility])     Depression     Menopause     Mild persistent asthma without complication 10/9/2018    Obesity     Primary osteoarthritis involving multiple joints 10/9/2018       Past Surgical History:   Procedure Laterality Date    ANKLE SURGERY      BREAST BIOPSY Right 12 yrs ago     SECTION      COLONOSCOPY      COLONOSCOPY W/ POLYPECTOMY      DILATION AND CURETTAGE, DIAGNOSTIC / THERAPEUTIC      NOSE SURGERY      TOTAL HIP ARTHROPLASTY Left     TUBAL LIGATION         Family History   Problem Relation Age of Onset    Mental illness Mother     Suicidality Mother     No Known Problems Father     No Known Problems Maternal Grandmother     No Known Problems Maternal Grandfather     No Known Problems Paternal Grandmother     Mental illness Brother     Suicidality Brother     No Known Problems Maternal Aunt     No Known Problems Paternal Aunt     No Known Problems Paternal Aunt     No Known Problems Paternal Aunt     No Known Problems Paternal Aunt     Cancer Family        Social History     Occupational History    Occupation: Homecare      Employer: [employer]     Comment: PT / Waiver Services   Tobacco Use    Smoking status: Every Day     Current packs/day: 1.00     Average packs/day: 1 pack/day for 45.3 years (45.3 ttl pk-yrs)     Types: Cigarettes     Start date: 1979     Passive exposure: Current    Smokeless tobacco: Never    Tobacco comments:     cut down to 1 ppd  and using lozenges   Vaping Use    Vaping status: Every Day    Substances: Nicotine, Flavoring   Substance and Sexual Activity    Alcohol use: Not Currently    Drug use: Not Currently     Types: Marijuana, Cocaine    Sexual activity: Not Currently     Partners: Male     Birth control/protection: None       Current Outpatient Medications on File Prior to Visit   Medication Sig    albuterol (PROVENTIL HFA,VENTOLIN HFA) 90 mcg/act inhaler Inhale 2 puffs every 6 (six) hours as needed for wheezing    ARIPiprazole (ABILIFY) 5 mg tablet Take 1 tablet (5 mg total) by mouth daily    atorvastatin (LIPITOR) 40 mg tablet Take 1 tablet (40 mg total) by mouth daily    cholecalciferol (VITAMIN D3) 1,000 units tablet Take 1 tablet (1,000 Units total) by mouth daily    cyanocobalamin (VITAMIN B-12) 1000 MCG tablet Take 1 tablet (1,000 mcg total) by mouth daily    cyclobenzaprine (FLEXERIL) 10 mg tablet Take 1 tablet (10 mg total) by mouth 2 (two) times a day as needed for muscle spasms    Diclofenac Sodium (VOLTAREN) 1 % Apply 2 g topically 4 (four) times a day    fexofenadine (ALLEGRA) 180 MG tablet Take 1 tablet (180 mg total) by mouth daily    fluticasone (FLONASE) 50 mcg/act nasal spray 1 spray into each nostril daily    magnesium Oxide (MAG-OX) 400 mg TABS Take 1 tablet (400 mg total) by mouth daily    montelukast (SINGULAIR) 10 mg tablet Take 1 tablet (10 mg total) by mouth daily at bedtime    multivitamin (THERAGRAN) TABS Take 1 tablet by mouth daily    pregabalin (LYRICA) 75 mg capsule Take 1 capsule (75 mg total) by mouth daily as needed (pain in hands)    sertraline (ZOLOFT) 50  "mg tablet Take 1 tablet (50 mg total) by mouth daily at bedtime    tiotropium (Spiriva Respimat) 1.25 MCG/ACT AERS inhaler Inhale 2 puffs daily    topiramate (TOPAMAX) 50 MG tablet TAKE 1 TABLET BY MOUTH EVERY 12 HOURS     No current facility-administered medications on file prior to visit.       Allergies   Allergen Reactions    Pollen Extract        Physical Exam:    /72   Pulse 80   Ht 5' 5\" (1.651 m)   Wt 74.8 kg (165 lb)   LMP  (LMP Unknown)   SpO2 98%   BMI 27.46 kg/m²     Constitutional:normal, well developed, well nourished, alert, in no distress and non-toxic and no overt pain behavior.  Eyes:anicteric  HEENT:grossly intact  Neck:supple, symmetric, trachea midline and no masses   Pulmonary:even and unlabored  Cardiovascular:No edema or pitting edema present  Skin:Normal without rashes or lesions and well hydrated  Psychiatric:Mood and affect appropriate  Neurologic:Cranial Nerves II-XII grossly intact  Musculoskeletal:normal, except for pain with cervical spine extension and rotation    "

## 2024-05-10 ENCOUNTER — HOSPITAL ENCOUNTER (OUTPATIENT)
Dept: RADIOLOGY | Facility: MEDICAL CENTER | Age: 59
End: 2024-05-10
Payer: COMMERCIAL

## 2024-05-10 VITALS
DIASTOLIC BLOOD PRESSURE: 74 MMHG | TEMPERATURE: 98 F | HEART RATE: 89 BPM | SYSTOLIC BLOOD PRESSURE: 111 MMHG | OXYGEN SATURATION: 100 % | RESPIRATION RATE: 20 BRPM

## 2024-05-10 DIAGNOSIS — M47.812 CERVICAL SPONDYLOSIS: ICD-10-CM

## 2024-05-10 PROCEDURE — 64490 INJ PARAVERT F JNT C/T 1 LEV: CPT | Performed by: PHYSICAL MEDICINE & REHABILITATION

## 2024-05-10 PROCEDURE — 64491 INJ PARAVERT F JNT C/T 2 LEV: CPT | Performed by: PHYSICAL MEDICINE & REHABILITATION

## 2024-05-10 RX ORDER — BUPIVACAINE HYDROCHLORIDE 5 MG/ML
1.5 INJECTION, SOLUTION EPIDURAL; INTRACAUDAL ONCE
Status: COMPLETED | OUTPATIENT
Start: 2024-05-10 | End: 2024-05-10

## 2024-05-10 RX ADMIN — BUPIVACAINE HYDROCHLORIDE 1.5 ML: 5 INJECTION, SOLUTION EPIDURAL; INTRACAUDAL at 11:04

## 2024-05-10 NOTE — H&P
History of Present Illness: The patient is a 58 y.o. female who presents with complaints of right neck pain    Past Medical History:   Diagnosis Date    Abnormal Pap smear of cervix     Anxiety     Bell's palsy     COPD (chronic obstructive pulmonary disease) ([facility])     Depression     Menopause     Mild persistent asthma without complication 10/9/2018    Obesity     Primary osteoarthritis involving multiple joints 10/9/2018       Past Surgical History:   Procedure Laterality Date    ANKLE SURGERY      BREAST BIOPSY Right 12 yrs ago     SECTION      COLONOSCOPY      COLONOSCOPY W/ POLYPECTOMY      DILATION AND CURETTAGE, DIAGNOSTIC / THERAPEUTIC      NOSE SURGERY      TOTAL HIP ARTHROPLASTY Left     TUBAL LIGATION           Current Outpatient Medications:     albuterol (PROVENTIL HFA,VENTOLIN HFA) 90 mcg/act inhaler, Inhale 2 puffs every 6 (six) hours as needed for wheezing, Disp: 18 g, Rfl: 5    ARIPiprazole (ABILIFY) 5 mg tablet, Take 1 tablet (5 mg total) by mouth daily, Disp: 30 tablet, Rfl: 2    atorvastatin (LIPITOR) 40 mg tablet, Take 1 tablet (40 mg total) by mouth daily, Disp: 90 tablet, Rfl: 1    cholecalciferol (VITAMIN D3) 1,000 units tablet, Take 1 tablet (1,000 Units total) by mouth daily, Disp: 90 tablet, Rfl: 3    cyanocobalamin (VITAMIN B-12) 1000 MCG tablet, Take 1 tablet (1,000 mcg total) by mouth daily, Disp: 90 tablet, Rfl: 3    cyclobenzaprine (FLEXERIL) 10 mg tablet, Take 1 tablet (10 mg total) by mouth 2 (two) times a day as needed for muscle spasms, Disp: 60 tablet, Rfl: 1    Diclofenac Sodium (VOLTAREN) 1 %, Apply 2 g topically 4 (four) times a day, Disp: 150 g, Rfl: 1    fexofenadine (ALLEGRA) 180 MG tablet, Take 1 tablet (180 mg total) by mouth daily, Disp: 90 tablet, Rfl: 0    fluticasone (FLONASE) 50 mcg/act nasal spray, 1 spray into each nostril daily, Disp: 16 g, Rfl: 2    magnesium Oxide (MAG-OX) 400 mg TABS, Take 1 tablet (400 mg total) by mouth daily, Disp: 90 tablet, Rfl:  1    montelukast (SINGULAIR) 10 mg tablet, Take 1 tablet (10 mg total) by mouth daily at bedtime, Disp: 90 tablet, Rfl: 1    multivitamin (THERAGRAN) TABS, Take 1 tablet by mouth daily, Disp: , Rfl:     pregabalin (LYRICA) 75 mg capsule, Take 1 capsule (75 mg total) by mouth daily as needed (pain in hands), Disp: 30 capsule, Rfl: 2    sertraline (ZOLOFT) 50 mg tablet, Take 1 tablet (50 mg total) by mouth daily at bedtime, Disp: 90 tablet, Rfl: 1    tiotropium (Spiriva Respimat) 1.25 MCG/ACT AERS inhaler, Inhale 2 puffs daily, Disp: 4 g, Rfl: 3    topiramate (TOPAMAX) 50 MG tablet, TAKE 1 TABLET BY MOUTH EVERY 12 HOURS, Disp: 60 tablet, Rfl: 2    Current Facility-Administered Medications:     bupivacaine (PF) (MARCAINE) 0.5 % injection 1.5 mL, 1.5 mL, Injection, Once, Nicho Jackson, DO    Allergies   Allergen Reactions    Pollen Extract        Physical Exam:   Vitals:    05/10/24 1053   BP: 108/71   Pulse: 87   Resp: 16   Temp: 98 °F (36.7 °C)   SpO2: 100%     General: Awake, Alert, Oriented x 3, Mood and affect appropriate  Respiratory: Respirations even and unlabored  Cardiovascular: Peripheral pulses intact; no edema  Musculoskeletal Exam: right neck pain    ASA Score: 3    Patient/Chart Verification  Patient ID Verified: Verbal  Consents Confirmed: Procedural, To be obtained in the Pre-Procedure area  H&P( within 30 days) Verified: To be obtained in the Pre-Procedure area  Allergies Reviewed: Yes  Anticoag/NSAID held?: NA  Currently on antibiotics?: No  Pregnancy denied?: NA    Assessment:   1. Cervical spondylosis        Plan: RT C4-6 MBB #2 (87757, [id number])

## 2024-05-10 NOTE — DISCHARGE INSTRUCTIONS

## 2024-05-13 ENCOUNTER — TELEPHONE (OUTPATIENT)
Dept: RADIOLOGY | Facility: MEDICAL CENTER | Age: 59
End: 2024-05-13

## 2024-05-13 NOTE — TELEPHONE ENCOUNTER
Procedure scheduled 6/4/24.    Reviewed instructions: , NPO 1 hour prior, loose-fitting/comfortable clothing, if ill/fever/infx/abx to call and reschedule.  No need to hold any meds prior.  Patient stated verbal understanding.

## 2024-05-13 NOTE — TELEPHONE ENCOUNTER
----- Message from Nicho Jackson DO sent at 5/13/2024  9:27 AM EDT -----  Regarding: FW: Pain level chart  Contact: [phone]  RFA and 6 week PA follow up please    ----- Message -----  From: Tonie Collier RN  Sent: 5/13/2024   9:07 AM EDT  To: Nicho Jackson DO  Subject: FW: Pain level chart                               ----- Message -----  From: Carmen Wang  Sent: 5/13/2024   8:45 AM EDT  To: [facility]  Subject: Pain level chart                                 Pic

## 2024-05-20 ENCOUNTER — OFFICE VISIT (OUTPATIENT)
Dept: PODIATRY | Facility: CLINIC | Age: 59
End: 2024-05-20
Payer: COMMERCIAL

## 2024-05-20 VITALS — HEART RATE: 88 BPM | SYSTOLIC BLOOD PRESSURE: 102 MMHG | DIASTOLIC BLOOD PRESSURE: 74 MMHG

## 2024-05-20 DIAGNOSIS — M21.619 BUNION: ICD-10-CM

## 2024-05-20 DIAGNOSIS — L84 CALLUS OF FOOT: ICD-10-CM

## 2024-05-20 DIAGNOSIS — M72.2 PLANTAR FASCIITIS, LEFT: Primary | ICD-10-CM

## 2024-05-20 DIAGNOSIS — J30.2 SEASONAL ALLERGIES: ICD-10-CM

## 2024-05-20 PROCEDURE — 99203 OFFICE O/P NEW LOW 30 MIN: CPT | Performed by: PODIATRIST

## 2024-05-20 NOTE — PROGRESS NOTES
Ambulatory Visit  Name: Carmen Wang      : 1965      MRN: [id number]  Encounter Provider: José Antonio Vernon DPM  Encounter Date: 2024   Encounter department: [facility] [place]    Assessment & Plan   1. Plantar fasciitis, left  2. Bunion  Comments:  mild HAV. Discussed shoe gear, padding. No need for surgery or imaging  Orders:  -     Ambulatory Referral to Podiatry  3. Callus of foot    Diagnosis and options discussed with patient  Patient agreeable to the plan as stated below    Regarding heel pain  1. Discussed diagnosis and treatment options  2. Provided home therapy and stretching exercises  3. Deferred injections today, but did discuss the possibility.   4. Stressed compliance with home/formal PT and arch supports.    Bunion is mild. Conservative care    Callus pared out of courtesy. Moisturize daily.       History of Present Illness     Carmen Wang is a 58 y.o. female who presents with multiple issues to her feet. She has a bunion on her right foot, multiple calluses. Her left heel has been hurting. Her left limb is shorter than the right. She is a daily smoker.     Review of Systems  As stated in HPI, otherwise normal      Objective     /74 (BP Location: Left arm, Patient Position: Sitting, Cuff Size: Standard)   Pulse 88   LMP  (LMP Unknown)     Physical Exam  Vitals reviewed.   Constitutional:       Appearance: She is obese. She is not ill-appearing.   Cardiovascular:      Rate and Rhythm: Normal rate.      Pulses: Normal pulses.           Dorsalis pedis pulses are 2+ on the right side and 2+ on the left side.        Posterior tibial pulses are 2+ on the right side and 2+ on the left side.   Pulmonary:      Effort: Pulmonary effort is normal. No respiratory distress.   Musculoskeletal:      Right foot: Normal range of motion (normal STJ and ankle ROM B/L). Bunion (mild) present.      Left foot: Normal range of motion.        Feet:    Feet:      Right foot:       Protective Sensation: 10 sites tested.  10 sites sensed.      Skin integrity: Callus and dry skin present. No ulcer.      Left foot:      Protective Sensation: 10 sites tested.  10 sites sensed.      Skin integrity: Callus and dry skin present. No ulcer.   Skin:     General: Skin is dry.      Capillary Refill: Capillary refill takes less than 2 seconds.      Findings: No erythema or rash.   Neurological:      Mental Status: She is alert and oriented to person, place, and time.       Administrative Statements

## 2024-05-21 ENCOUNTER — HOSPITAL ENCOUNTER (OUTPATIENT)
Dept: MAMMOGRAPHY | Facility: CLINIC | Age: 59
Discharge: HOME | End: 2024-05-21
Payer: COMMERCIAL

## 2024-05-21 VITALS — BODY MASS INDEX: 27.49 KG/M2 | WEIGHT: 165 LBS | HEIGHT: 65 IN

## 2024-05-21 DIAGNOSIS — R92.8 ABNORMAL SCREENING MAMMOGRAM: ICD-10-CM

## 2024-05-21 DIAGNOSIS — M54.16 LUMBAR RADICULOPATHY: ICD-10-CM

## 2024-05-21 PROCEDURE — 77065 DX MAMMO INCL CAD UNI: CPT

## 2024-05-21 PROCEDURE — 76642 ULTRASOUND BREAST LIMITED: CPT

## 2024-05-21 PROCEDURE — G0279 TOMOSYNTHESIS, MAMMO: HCPCS

## 2024-05-21 RX ORDER — PREGABALIN 75 MG/1
75 CAPSULE ORAL DAILY PRN
Qty: 30 CAPSULE | Refills: 0 | Status: SHIPPED | OUTPATIENT
Start: 2024-05-21

## 2024-05-21 RX ORDER — FEXOFENADINE HCL 180 MG/1
180 TABLET ORAL DAILY
Qty: 90 TABLET | Refills: 0 | Status: SHIPPED | OUTPATIENT
Start: 2024-05-21

## 2024-05-31 ENCOUNTER — TELEPHONE (OUTPATIENT)
Age: 59
End: 2024-05-31

## 2024-05-31 NOTE — TELEPHONE ENCOUNTER
Caller: jakub Morales    Doctor: Manuel    Reason for call: pt is scheduled for procedure on 6/4/24. Pt stated it needs to  be rescheduled. Please Advise     Call back#: [phone]

## 2024-05-31 NOTE — TELEPHONE ENCOUNTER
Procedure rescheduled to 6/18/24.  Follow up appointment rescheduled to 7/30/24.    Reviewed instructions: , NPO 1 hour prior, loose-fitting/comfortable clothing, if ill/fever/infx/abx to call and reschedule.  No need to hold any meds prior.  Patient stated verbal understanding.

## 2024-06-02 DIAGNOSIS — M25.512 ACUTE PAIN OF LEFT SHOULDER: ICD-10-CM

## 2024-06-04 ENCOUNTER — TELEPHONE (OUTPATIENT)
Dept: FAMILY MEDICINE CLINIC | Facility: CLINIC | Age: 59
End: 2024-06-04

## 2024-06-04 NOTE — TELEPHONE ENCOUNTER
Received [employer] request from  [employer] Determination received on 6/4/24. Request was scanned into chart and faxed to [employer].

## 2024-06-12 ENCOUNTER — OFFICE VISIT (OUTPATIENT)
Dept: FAMILY MEDICINE CLINIC | Facility: CLINIC | Age: 59
End: 2024-06-12

## 2024-06-12 VITALS
SYSTOLIC BLOOD PRESSURE: 116 MMHG | WEIGHT: 169 LBS | HEIGHT: 65 IN | HEART RATE: 98 BPM | RESPIRATION RATE: 18 BRPM | DIASTOLIC BLOOD PRESSURE: 79 MMHG | BODY MASS INDEX: 28.16 KG/M2 | OXYGEN SATURATION: 99 % | TEMPERATURE: 97.6 F

## 2024-06-12 DIAGNOSIS — Z12.11 COLON CANCER SCREENING: ICD-10-CM

## 2024-06-12 DIAGNOSIS — Z96.642 S/P TOTAL LEFT HIP ARTHROPLASTY: ICD-10-CM

## 2024-06-12 DIAGNOSIS — M19.012 PRIMARY OSTEOARTHRITIS OF LEFT SHOULDER: ICD-10-CM

## 2024-06-12 DIAGNOSIS — M25.552 CHRONIC LEFT HIP PAIN: Primary | ICD-10-CM

## 2024-06-12 DIAGNOSIS — G89.29 CHRONIC LEFT HIP PAIN: Primary | ICD-10-CM

## 2024-06-12 PROCEDURE — 99214 OFFICE O/P EST MOD 30 MIN: CPT | Performed by: FAMILY MEDICINE

## 2024-06-12 PROCEDURE — 20610 DRAIN/INJ JOINT/BURSA W/O US: CPT | Performed by: FAMILY MEDICINE

## 2024-06-12 RX ORDER — TRIAMCINOLONE ACETONIDE 40 MG/ML
40 INJECTION, SUSPENSION INTRA-ARTICULAR; INTRAMUSCULAR
Status: COMPLETED | OUTPATIENT
Start: 2024-06-12 | End: 2024-06-12

## 2024-06-12 RX ORDER — LIDOCAINE HYDROCHLORIDE 10 MG/ML
3 INJECTION, SOLUTION INFILTRATION; PERINEURAL
Status: COMPLETED | OUTPATIENT
Start: 2024-06-12 | End: 2024-06-12

## 2024-06-12 RX ADMIN — LIDOCAINE HYDROCHLORIDE 3 ML: 10 INJECTION, SOLUTION INFILTRATION; PERINEURAL at 15:20

## 2024-06-12 RX ADMIN — TRIAMCINOLONE ACETONIDE 40 MG: 40 INJECTION, SUSPENSION INTRA-ARTICULAR; INTRAMUSCULAR at 15:20

## 2024-06-12 NOTE — ASSESSMENT & PLAN NOTE
Chronic left hip pain  History of hip replacement surgery a few years ago  Recent x-ray of left hip showed hardware stability  Referred to orthopedics

## 2024-06-12 NOTE — ASSESSMENT & PLAN NOTE
Chronic left shoulder pain  Reviewed x-ray from May 2024 which showed some degenerative arthritis  Significant improvement with cortisone injection today

## 2024-06-12 NOTE — PROGRESS NOTES
Ambulatory Visit  Name: Carmen Wang      : 1965      MRN: [id number]  Encounter Provider: Td Mayo MD  Encounter Date: 2024   Encounter department: [facility]    Assessment & Plan   1. Chronic left hip pain  Assessment & Plan:  Chronic left hip pain  History of hip replacement surgery a few years ago  Referred to orthopedics  Orders:  -     Ambulatory Referral to Orthopedic Surgery; Future  2. S/P total left hip arthroplasty  -     Ambulatory Referral to Orthopedic Surgery; Future  3. Colon cancer screening  -     Ambulatory Referral to Gastroenterology; Future  4. Primary osteoarthritis of left shoulder  Assessment & Plan:  Chronic left shoulder pain  Reviewed x-ray from May 2024 which showed some degenerative arthritis  Significant improvement with cortisone injection today         History of Present Illness     58-year-old female with a history of COPD, tobacco abuse, alcohol abuse who presents today for left shoulder pain.  This is been ongoing for the past couple of months.  She had a x-ray done a few weeks ago which shows some arthritis.  She denies any recent injury.  She sometimes hears clicking sounds when she moves her left shoulder.  She manages pain with over-the-counter Tylenol.  Patient also reports left hip pain.  The pain is worse when she lays on her left side.  Pain is worse with ambulation.  She had hip replacement surgery a few years ago.  She denies any recent fall or trauma.  She is requesting referral for orthopedic for further evaluation        Review of Systems   Constitutional:  Negative for chills, diaphoresis, fatigue and fever.   Respiratory:  Negative for shortness of breath.    Cardiovascular:  Negative for chest pain.   Gastrointestinal:  Negative for abdominal pain, nausea and vomiting.   Musculoskeletal:  Positive for arthralgias.   Skin:  Negative for rash.   Neurological:  Negative for seizures, syncope and  "light-headedness.       Objective     /79 (BP Location: Left arm, Patient Position: Sitting, Cuff Size: Standard)   Pulse 98   Temp 97.6 °F (36.4 °C) (Temporal)   Resp 18   Ht 5' 5\" (1.651 m)   Wt 76.7 kg (169 lb)   LMP  (LMP Unknown)   SpO2 99%   BMI 28.12 kg/m²     Physical Exam  Constitutional:       General: She is not in acute distress.     Appearance: Normal appearance. She is well-developed. She is not ill-appearing, toxic-appearing or diaphoretic.   HENT:      Head: Normocephalic and atraumatic.      Right Ear: External ear normal.      Left Ear: External ear normal.      Mouth/Throat:      Pharynx: No oropharyngeal exudate.   Eyes:      General:         Right eye: No discharge.         Left eye: No discharge.      Extraocular Movements: Extraocular movements intact.      Pupils: Pupils are equal, round, and reactive to light.   Cardiovascular:      Rate and Rhythm: Normal rate and regular rhythm.      Heart sounds: Normal heart sounds. No murmur heard.     No friction rub. No gallop.   Pulmonary:      Effort: Pulmonary effort is normal. No respiratory distress.      Breath sounds: No stridor. No wheezing.   Abdominal:      General: Bowel sounds are normal. There is no distension.      Palpations: Abdomen is soft. There is no mass.      Tenderness: There is no abdominal tenderness. There is no guarding.   Musculoskeletal:      Right shoulder: Crepitus present. No swelling, deformity, effusion or tenderness. Normal range of motion.      Left shoulder: Tenderness and crepitus present. No swelling, deformity or effusion. Decreased range of motion.      Cervical back: Normal range of motion.   Skin:     General: Skin is warm.      Capillary Refill: Capillary refill takes less than 2 seconds.   Neurological:      General: No focal deficit present.      Mental Status: She is alert and oriented to person, place, and time.      Cranial Nerves: No cranial nerve deficit.      Motor: No weakness.      " Gait: Gait normal.           Large joint arthrocentesis: L glenohumeral  Universal Protocol:  Consent: Verbal consent obtained.  Risks and benefits: risks, benefits and alternatives were discussed  Consent given by: patient  Patient understanding: patient states understanding of the procedure being performed  Patient consent: the patient's understanding of the procedure matches consent given  Procedure consent: procedure consent matches procedure scheduled  Relevant documents: relevant documents present and verified  Radiology Images displayed and confirmed. If images not available, report reviewed: imaging studies available  Patient identity confirmed: verbally with patient  Supporting Documentation  Indications: pain   Procedure Details  Location: shoulder - L glenohumeral  Needle size: 25 G  Ultrasound guidance: no  Approach: posterolateral  Medications administered: 3 mL lidocaine 1 %; 40 mg triamcinolone acetonide 40 mg/mL    Patient tolerance: patient tolerated the procedure well with no immediate complications  Dressing:  Sterile dressing applied          Administrative Statements

## 2024-06-18 ENCOUNTER — HOSPITAL ENCOUNTER (OUTPATIENT)
Dept: RADIOLOGY | Facility: MEDICAL CENTER | Age: 59
Discharge: HOME | End: 2024-06-18
Payer: COMMERCIAL

## 2024-06-18 ENCOUNTER — TELEPHONE (OUTPATIENT)
Dept: RADIOLOGY | Facility: MEDICAL CENTER | Age: 59
End: 2024-06-18

## 2024-06-18 VITALS
HEART RATE: 92 BPM | SYSTOLIC BLOOD PRESSURE: 119 MMHG | OXYGEN SATURATION: 97 % | RESPIRATION RATE: 18 BRPM | TEMPERATURE: 97.4 F | DIASTOLIC BLOOD PRESSURE: 80 MMHG

## 2024-06-18 DIAGNOSIS — M47.812 CERVICAL SPONDYLOSIS: ICD-10-CM

## 2024-06-18 PROCEDURE — 64633 DESTROY CERV/THOR FACET JNT: CPT | Performed by: PHYSICAL MEDICINE & REHABILITATION

## 2024-06-18 PROCEDURE — 64634 DESTROY C/TH FACET JNT ADDL: CPT | Performed by: PHYSICAL MEDICINE & REHABILITATION

## 2024-06-18 RX ADMIN — Medication 5 ML: at 11:48

## 2024-06-18 NOTE — H&P
History of Present Illness: The patient is a 58 y.o. female who presents with complaints of right neck pain    Past Medical History:   Diagnosis Date    Abnormal Pap smear of cervix     Anxiety     Bell's palsy     COPD (chronic obstructive pulmonary disease) (HCC)     Depression     Menopause     Mild persistent asthma without complication 10/9/2018    Obesity     Primary osteoarthritis involving multiple joints 10/9/2018       Past Surgical History:   Procedure Laterality Date    ANKLE SURGERY      BREAST BIOPSY Right 12 yrs ago    2012     SECTION      COLONOSCOPY      COLONOSCOPY W/ POLYPECTOMY      DILATION AND CURETTAGE, DIAGNOSTIC / THERAPEUTIC      NOSE SURGERY      TOTAL HIP ARTHROPLASTY Left     TUBAL LIGATION           Current Outpatient Medications:     albuterol (PROVENTIL HFA,VENTOLIN HFA) 90 mcg/act inhaler, Inhale 2 puffs every 6 (six) hours as needed for wheezing, Disp: 18 g, Rfl: 5    ARIPiprazole (ABILIFY) 5 mg tablet, Take 1 tablet (5 mg total) by mouth daily, Disp: 30 tablet, Rfl: 2    atorvastatin (LIPITOR) 40 mg tablet, Take 1 tablet (40 mg total) by mouth daily, Disp: 90 tablet, Rfl: 1    cholecalciferol (VITAMIN D3) 1,000 units tablet, Take 1 tablet (1,000 Units total) by mouth daily, Disp: 90 tablet, Rfl: 3    cyanocobalamin (VITAMIN B-12) 1000 MCG tablet, Take 1 tablet (1,000 mcg total) by mouth daily, Disp: 90 tablet, Rfl: 3    cyclobenzaprine (FLEXERIL) 10 mg tablet, Take 1 tablet (10 mg total) by mouth 2 (two) times a day as needed for muscle spasms, Disp: 60 tablet, Rfl: 1    Diclofenac Sodium (VOLTAREN) 1 %, apply 2 grams topically four times a day, Disp: 100 g, Rfl: 0    fexofenadine (ALLEGRA) 180 MG tablet, take 1 tablet by mouth once daily, Disp: 90 tablet, Rfl: 0    fluticasone (FLONASE) 50 mcg/act nasal spray, 1 spray into each nostril daily, Disp: 16 g, Rfl: 2    magnesium Oxide (MAG-OX) 400 mg TABS, Take 1 tablet (400 mg total) by mouth daily, Disp: 90 tablet, Rfl: 1     montelukast (SINGULAIR) 10 mg tablet, Take 1 tablet (10 mg total) by mouth daily at bedtime, Disp: 90 tablet, Rfl: 1    multivitamin (THERAGRAN) TABS, Take 1 tablet by mouth daily, Disp: , Rfl:     pregabalin (LYRICA) 75 mg capsule, Take 1 capsule (75 mg total) by mouth daily as needed (pain in hands), Disp: 30 capsule, Rfl: 0    sertraline (ZOLOFT) 50 mg tablet, Take 1 tablet (50 mg total) by mouth daily at bedtime, Disp: 90 tablet, Rfl: 1    tiotropium (Spiriva Respimat) 1.25 MCG/ACT AERS inhaler, Inhale 2 puffs daily, Disp: 4 g, Rfl: 3    topiramate (TOPAMAX) 50 MG tablet, TAKE 1 TABLET BY MOUTH EVERY 12 HOURS, Disp: 60 tablet, Rfl: 2    Allergies   Allergen Reactions    Pollen Extract        Physical Exam:   Vitals:    06/18/24 1132   BP: 122/80   Pulse: 100   Resp: 18   Temp: (!) 97.4 °F (36.3 °C)   SpO2: 96%     General: Awake, Alert, Oriented x 3, Mood and affect appropriate  Respiratory: Respirations even and unlabored  Cardiovascular: Peripheral pulses intact; no edema  Musculoskeletal Exam: right neck pain    ASA Score: 3    Patient/Chart Verification  Patient ID Verified: Verbal  ID Band Applied: No  Consents Confirmed: Procedural, To be obtained in the Pre-Procedure area  H&P( within 30 days) Verified: To be obtained in the Pre-Procedure area  Allergies Reviewed: Yes  Anticoag/NSAID held?: No  Currently on antibiotics?: No  Does Patient Have a Prosthetic Device/Implant: No (denies cardiac pacemaker / ICD)    Assessment:   1. Cervical spondylosis        Plan: RT C4-6 RFA ([id number], [id number])

## 2024-06-18 NOTE — DISCHARGE INSTR - LAB

## 2024-06-20 DIAGNOSIS — J43.9 PULMONARY EMPHYSEMA, UNSPECIFIED EMPHYSEMA TYPE (HCC): ICD-10-CM

## 2024-06-20 RX ORDER — TIOTROPIUM BROMIDE INHALATION SPRAY 1.56 UG/1
SPRAY, METERED RESPIRATORY (INHALATION)
Qty: 4 G | Refills: 3 | Status: SHIPPED | OUTPATIENT
Start: 2024-06-20

## 2024-06-21 NOTE — TELEPHONE ENCOUNTER
IHSAN   S/W pt. Pt stated current pain level is 2/10. Pt stated needle sites look good, denies S&S of infection, denies fevers, denies soreness.  Advised Pt if does have pain to take prescribed or OTC pain medications and/or use ice/heat and that it takes 4 to 6 weeks to see the full effect. Confirmed next appt w/ Pt. Pt verbalized understanding.

## 2024-06-26 DIAGNOSIS — E78.2 MIXED HYPERLIPIDEMIA: ICD-10-CM

## 2024-06-26 RX ORDER — ATORVASTATIN CALCIUM 40 MG/1
40 TABLET, FILM COATED ORAL DAILY
Qty: 90 TABLET | Refills: 1 | OUTPATIENT
Start: 2024-06-26

## 2024-06-26 RX ORDER — ATORVASTATIN CALCIUM 40 MG/1
40 TABLET, FILM COATED ORAL DAILY
Qty: 90 TABLET | Refills: 1 | Status: SHIPPED | OUTPATIENT
Start: 2024-06-26

## 2024-06-29 DIAGNOSIS — G43.919 INTRACTABLE MIGRAINE WITHOUT STATUS MIGRAINOSUS, UNSPECIFIED MIGRAINE TYPE: ICD-10-CM

## 2024-07-01 RX ORDER — TOPIRAMATE 50 MG/1
50 TABLET, FILM COATED ORAL EVERY 12 HOURS
Qty: 60 TABLET | Refills: 2 | Status: SHIPPED | OUTPATIENT
Start: 2024-07-01

## 2024-07-26 DIAGNOSIS — J43.9 PULMONARY EMPHYSEMA, UNSPECIFIED EMPHYSEMA TYPE (HCC): ICD-10-CM

## 2024-07-26 RX ORDER — ALBUTEROL SULFATE 90 UG/1
AEROSOL, METERED RESPIRATORY (INHALATION)
Qty: 8.5 G | Refills: 0 | Status: SHIPPED | OUTPATIENT
Start: 2024-07-26

## 2024-07-29 ENCOUNTER — APPOINTMENT (OUTPATIENT)
Dept: LAB | Facility: HOSPITAL | Age: 59
End: 2024-07-29
Payer: COMMERCIAL

## 2024-07-29 ENCOUNTER — OFFICE VISIT (OUTPATIENT)
Dept: FAMILY MEDICINE CLINIC | Facility: CLINIC | Age: 59
End: 2024-07-29

## 2024-07-29 VITALS
SYSTOLIC BLOOD PRESSURE: 111 MMHG | HEIGHT: 65 IN | RESPIRATION RATE: 16 BRPM | OXYGEN SATURATION: 97 % | BODY MASS INDEX: 29.94 KG/M2 | TEMPERATURE: 98.2 F | HEART RATE: 81 BPM | WEIGHT: 179.7 LBS | DIASTOLIC BLOOD PRESSURE: 71 MMHG

## 2024-07-29 DIAGNOSIS — M19.012 PRIMARY OSTEOARTHRITIS OF LEFT SHOULDER: Primary | ICD-10-CM

## 2024-07-29 DIAGNOSIS — J43.9 PULMONARY EMPHYSEMA, UNSPECIFIED EMPHYSEMA TYPE (HCC): ICD-10-CM

## 2024-07-29 DIAGNOSIS — G89.29 CHRONIC LEFT SHOULDER PAIN: ICD-10-CM

## 2024-07-29 DIAGNOSIS — F33.1 MAJOR DEPRESSIVE DISORDER, RECURRENT EPISODE, MODERATE (HCC): ICD-10-CM

## 2024-07-29 DIAGNOSIS — F10.21 ACUTE ALCOHOLIC INTOXICATION IN ALCOHOLISM, IN REMISSION (HCC): ICD-10-CM

## 2024-07-29 DIAGNOSIS — F17.210 SMOKING GREATER THAN 20 PACK YEARS: ICD-10-CM

## 2024-07-29 DIAGNOSIS — Z79.899 ENCOUNTER FOR LONG-TERM (CURRENT) USE OF OTHER MEDICATIONS: ICD-10-CM

## 2024-07-29 DIAGNOSIS — M25.512 CHRONIC LEFT SHOULDER PAIN: ICD-10-CM

## 2024-07-29 DIAGNOSIS — G89.29 CHRONIC LEFT HIP PAIN: ICD-10-CM

## 2024-07-29 DIAGNOSIS — Z12.11 COLON CANCER SCREENING: ICD-10-CM

## 2024-07-29 DIAGNOSIS — M25.552 CHRONIC LEFT HIP PAIN: ICD-10-CM

## 2024-07-29 DIAGNOSIS — Z12.2 ENCOUNTER FOR SCREENING FOR LUNG CANCER: ICD-10-CM

## 2024-07-29 DIAGNOSIS — F41.1 GENERALIZED ANXIETY DISORDER: ICD-10-CM

## 2024-07-29 LAB
25(OH)D3 SERPL-MCNC: 26.7 NG/ML (ref 30–100)
ALBUMIN SERPL BCG-MCNC: 4.1 G/DL (ref 3.5–5)
ALP SERPL-CCNC: 76 U/L (ref 34–104)
ALT SERPL W P-5'-P-CCNC: 22 U/L (ref 7–52)
ANION GAP SERPL CALCULATED.3IONS-SCNC: 6 MMOL/L (ref 4–13)
AST SERPL W P-5'-P-CCNC: 17 U/L (ref 13–39)
BASOPHILS # BLD AUTO: 0.06 THOUSANDS/ÂΜL (ref 0–0.1)
BASOPHILS NFR BLD AUTO: 1 % (ref 0–1)
BILIRUB SERPL-MCNC: 0.67 MG/DL (ref 0.2–1)
BUN SERPL-MCNC: 11 MG/DL (ref 5–25)
CALCIUM SERPL-MCNC: 9.1 MG/DL (ref 8.4–10.2)
CHLORIDE SERPL-SCNC: 107 MMOL/L (ref 96–108)
CHOLEST SERPL-MCNC: 209 MG/DL
CO2 SERPL-SCNC: 27 MMOL/L (ref 21–32)
CREAT SERPL-MCNC: 1 MG/DL (ref 0.6–1.3)
EOSINOPHIL # BLD AUTO: 0.46 THOUSAND/ÂΜL (ref 0–0.61)
EOSINOPHIL NFR BLD AUTO: 5 % (ref 0–6)
ERYTHROCYTE [DISTWIDTH] IN BLOOD BY AUTOMATED COUNT: 17.1 % (ref 11.6–15.1)
GFR SERPL CREATININE-BSD FRML MDRD: 62 ML/MIN/1.73SQ M
GLUCOSE P FAST SERPL-MCNC: 115 MG/DL (ref 65–99)
HCT VFR BLD AUTO: 38.5 % (ref 34.8–46.1)
HDLC SERPL-MCNC: 54 MG/DL
HGB BLD-MCNC: 12.1 G/DL (ref 11.5–15.4)
IMM GRANULOCYTES # BLD AUTO: 0.03 THOUSAND/UL (ref 0–0.2)
IMM GRANULOCYTES NFR BLD AUTO: 0 % (ref 0–2)
LYMPHOCYTES # BLD AUTO: 2.26 THOUSANDS/ÂΜL (ref 0.6–4.47)
LYMPHOCYTES NFR BLD AUTO: 24 % (ref 14–44)
MCH RBC QN AUTO: 29.7 PG (ref 26.8–34.3)
MCHC RBC AUTO-ENTMCNC: 31.4 G/DL (ref 31.4–37.4)
MCV RBC AUTO: 95 FL (ref 82–98)
MONOCYTES # BLD AUTO: 0.69 THOUSAND/ÂΜL (ref 0.17–1.22)
MONOCYTES NFR BLD AUTO: 7 % (ref 4–12)
NEUTROPHILS # BLD AUTO: 5.8 THOUSANDS/ÂΜL (ref 1.85–7.62)
NEUTS SEG NFR BLD AUTO: 63 % (ref 43–75)
NONHDLC SERPL-MCNC: 155 MG/DL
NRBC BLD AUTO-RTO: 0 /100 WBCS
PLATELET # BLD AUTO: 306 THOUSANDS/UL (ref 149–390)
PMV BLD AUTO: 9.9 FL (ref 8.9–12.7)
POTASSIUM SERPL-SCNC: 3.6 MMOL/L (ref 3.5–5.3)
PROT SERPL-MCNC: 7 G/DL (ref 6.4–8.4)
RBC # BLD AUTO: 4.07 MILLION/UL (ref 3.81–5.12)
SODIUM SERPL-SCNC: 140 MMOL/L (ref 135–147)
TRIGL SERPL-MCNC: 454 MG/DL
TSH SERPL DL<=0.05 MIU/L-ACNC: 1.65 UIU/ML (ref 0.45–4.5)
WBC # BLD AUTO: 9.3 THOUSAND/UL (ref 4.31–10.16)

## 2024-07-29 PROCEDURE — 80053 COMPREHEN METABOLIC PANEL: CPT

## 2024-07-29 PROCEDURE — 82306 VITAMIN D 25 HYDROXY: CPT

## 2024-07-29 PROCEDURE — 36415 COLL VENOUS BLD VENIPUNCTURE: CPT

## 2024-07-29 PROCEDURE — 85025 COMPLETE CBC W/AUTO DIFF WBC: CPT

## 2024-07-29 PROCEDURE — 80061 LIPID PANEL: CPT

## 2024-07-29 PROCEDURE — 99213 OFFICE O/P EST LOW 20 MIN: CPT | Performed by: NURSE PRACTITIONER

## 2024-07-29 PROCEDURE — 84443 ASSAY THYROID STIM HORMONE: CPT

## 2024-07-29 RX ORDER — BUSPIRONE HYDROCHLORIDE 5 MG/1
5 TABLET ORAL 2 TIMES DAILY
COMMUNITY

## 2024-07-29 NOTE — ASSESSMENT & PLAN NOTE
Previous imaging reviewed   Cortisone injection received in June effective x 1 month   Requesting additional injection today   Recommend evaluation with ortho, referral placed

## 2024-07-29 NOTE — PATIENT INSTRUCTIONS
Breast Cancer Screening    Breast cancer is the most common cancer in females in the [place] and the second most common cause of cancer death in women    The risk of breast cancer from birth to death is 12.9 percent (1 in 8 women)    Approximately 43,000 women will die in the [place] annually from breast cancer    Mammography    A mammogram is an x-ray of your breasts to screen for breast cancer    It uses the least amount of radiation necessary to provide the highest quality image able to detect early signs of breast cancer.    For most women, we recommend getting your first mammogram at the age of 40 and repeating it yearly         Signs of Breast Cancer    Lumps  Thickening or swelling of parts of the breast  Irritation or dimpling of breast skin  Red or flaky skin in the nipple area  Pain in the nipple area  Pulling in of the nipple  Change in size or shape of the breast  Pain in any area of the breast     Did you know by getting a mammogram, doctors can find breast cancer 3 YEARS before a lump is even felt!    Early detection is BEST!  Schedule your mammogram TODAY!     To schedule this appointment with [facility], please contact Central Scheduling at [phone]

## 2024-07-29 NOTE — PROGRESS NOTES
Ambulatory Visit  Name: Carmen Wang      : 1965      MRN: [id number]  Encounter Provider: MICHELLE Franco  Encounter Date: 2024   Encounter department: [facility]    Assessment & Plan   1. Primary osteoarthritis of left shoulder  Assessment & Plan:  Previous imaging reviewed   Cortisone injection received in  effective x 1 month   Requesting additional injection today   Recommend evaluation with ortho, referral placed   2. Pulmonary emphysema, unspecified emphysema type (HCC)  Assessment & Plan:  Stable, continue with Spiriva and albuterol PRN   3. Chronic left hip pain  -     Ambulatory Referral to Orthopedic Surgery; Future  4. Chronic left shoulder pain  -     Ambulatory Referral to Orthopedic Surgery; Future  5. Colon cancer screening  -     Ambulatory Referral to Gastroenterology; Future  6. Encounter for screening for lung cancer  -     CT lung screening program; Future; Expected date: 2024  7. Smoking greater than 20 pack years  -     CT lung screening program; Future; Expected date: 2024         History of Present Illness     Patient is a 58 year old female who  has a past medical history of Abnormal Pap smear of cervix, Anxiety, Bell's palsy, COPD (chronic obstructive pulmonary disease) (HCC), Depression, Menopause, Mild persistent asthma without complication (10/9/2018), Obesity, and Primary osteoarthritis involving multiple joints (10/9/2018) who presents today for follow up.    Patient was here last month and received cortisone injection for left shoulder arthritic pain, reports very effective for several weeks but now pain has returned. Asking for additional injection.       The following portions of the patient's history were reviewed and updated as appropriate: allergies, current medications, past family history, past medical history, past social history, past surgical history and problem list.        Review of Systems    Constitutional:  Negative for chills and fever.   HENT:  Negative for ear pain and sore throat.    Eyes:  Negative for pain and visual disturbance.   Respiratory:  Negative for cough and shortness of breath.    Cardiovascular:  Negative for chest pain and palpitations.   Gastrointestinal:  Negative for abdominal pain and vomiting.   Genitourinary:  Negative for dysuria and hematuria.   Musculoskeletal:  Positive for arthralgias, gait problem and myalgias. Negative for back pain.   Skin:  Negative for color change and rash.   Neurological:  Negative for seizures and syncope.   All other systems reviewed and are negative.    Past Medical History:   Diagnosis Date    Abnormal Pap smear of cervix     Anxiety     Bell's palsy     COPD (chronic obstructive pulmonary disease) ([facility])     Depression     Menopause     Mild persistent asthma without complication 10/9/2018    Obesity     Primary osteoarthritis involving multiple joints 10/9/2018     Past Surgical History:   Procedure Laterality Date    ANKLE SURGERY      BREAST BIOPSY Right 12 yrs ago    2012     SECTION      COLONOSCOPY      COLONOSCOPY W/ POLYPECTOMY      DILATION AND CURETTAGE, DIAGNOSTIC / THERAPEUTIC      NOSE SURGERY      TOTAL HIP ARTHROPLASTY Left     TUBAL LIGATION       Family History   Problem Relation Age of Onset    Mental illness Mother     Suicidality Mother     No Known Problems Father     No Known Problems Maternal Grandmother     No Known Problems Maternal Grandfather     No Known Problems Paternal Grandmother     Mental illness Brother     Suicidality Brother     No Known Problems Maternal Aunt     No Known Problems Paternal Aunt     No Known Problems Paternal Aunt     No Known Problems Paternal Aunt     No Known Problems Paternal Aunt     Cancer Family     Breast cancer Neg Hx      Social History     Tobacco Use    Smoking status: Every Day     Current packs/day: 1.00     Average packs/day: 1 pack/day for 45.6 years (45.6 ttl  pk-yrs)     Types: Cigarettes     Start date: 1979     Passive exposure: Current    Smokeless tobacco: Never    Tobacco comments:     cut down to 1 ppd  and using lozenges   Vaping Use    Vaping status: Every Day    Substances: Nicotine, Flavoring   Substance and Sexual Activity    Alcohol use: Not Currently    Drug use: Not Currently     Types: Marijuana, Cocaine    Sexual activity: Not Currently     Partners: Male     Birth control/protection: None     Current Outpatient Medications on File Prior to Visit   Medication Sig    busPIRone (BUSPAR) 5 mg tablet Take 5 mg by mouth 2 (two) times a day    albuterol (PROVENTIL HFA,VENTOLIN HFA) 90 mcg/act inhaler inhale 2 puffs by mouth and INTO THE LUNGS every 6 hours if needed for wheezing    ARIPiprazole (ABILIFY) 5 mg tablet Take 1 tablet (5 mg total) by mouth daily    atorvastatin (LIPITOR) 40 mg tablet Take 1 tablet (40 mg total) by mouth daily    cholecalciferol (VITAMIN D3) 1,000 units tablet Take 1 tablet (1,000 Units total) by mouth daily    cyanocobalamin (VITAMIN B-12) 1000 MCG tablet Take 1 tablet (1,000 mcg total) by mouth daily    cyclobenzaprine (FLEXERIL) 10 mg tablet Take 1 tablet (10 mg total) by mouth 2 (two) times a day as needed for muscle spasms    Diclofenac Sodium (VOLTAREN) 1 % apply 2 grams topically four times a day    fexofenadine (ALLEGRA) 180 MG tablet take 1 tablet by mouth once daily    fluticasone (FLONASE) 50 mcg/act nasal spray 1 spray into each nostril daily    magnesium Oxide (MAG-OX) 400 mg TABS Take 1 tablet (400 mg total) by mouth daily    montelukast (SINGULAIR) 10 mg tablet Take 1 tablet (10 mg total) by mouth daily at bedtime    multivitamin (THERAGRAN) TABS Take 1 tablet by mouth daily    pregabalin (LYRICA) 75 mg capsule Take 1 capsule (75 mg total) by mouth daily as needed (pain in hands)    sertraline (ZOLOFT) 50 mg tablet Take 1 tablet (50 mg total) by mouth daily at bedtime    Spiriva Respimat 1.25 MCG/ACT AERS inhaler  "inhale 2 puffs by mouth and INTO THE LUNGS once daily    topiramate (TOPAMAX) 50 MG tablet take 1 tablet by mouth every 12 hours     Allergies   Allergen Reactions    Pollen Extract      Immunization History   Administered Date(s) Administered    COVID-19 MODERNA VACC 0.5 ML IM 04/24/2021, 05/22/2021, 02/01/2022    COVID-19 Pfizer mRNA vacc PF cesia-sucrose 12 yr and older (Comirnaty) 03/27/2024    INFLUENZA 11/19/2020    Influenza, injectable, quadrivalent, preservative free 0.5 mL 11/19/2020, 12/06/2023    Influenza, recombinant, quadrivalent,injectable, preservative free 11/02/2021    Pneumococcal Conjugate Vaccine 20-valent (Pcv20), Polysace 06/23/2022    Pneumococcal Polysaccharide PPV23 01/15/2020    Tdap 11/01/2016    Tuberculin Skin Test-PPD Intradermal 02/15/2024, 02/27/2024    Zoster Vaccine Recombinant 11/02/2021, 06/23/2022     Objective     /71 (BP Location: Left arm, Patient Position: Sitting, Cuff Size: Standard)   Pulse 81   Temp 98.2 °F (36.8 °C) (Temporal)   Resp 16   Ht 5' 5\" (1.651 m)   Wt 81.5 kg (179 lb 11.2 oz)   LMP  (LMP Unknown)   SpO2 97%   BMI 29.90 kg/m²     Physical Exam  Vitals and nursing note reviewed.   Constitutional:       General: She is not in acute distress.     Appearance: She is well-developed.   HENT:      Head: Normocephalic and atraumatic.   Eyes:      Conjunctiva/sclera: Conjunctivae normal.   Cardiovascular:      Rate and Rhythm: Normal rate and regular rhythm.   Pulmonary:      Effort: Pulmonary effort is normal. No respiratory distress.      Breath sounds: Normal breath sounds.   Musculoskeletal:         General: No swelling.      Left shoulder: Tenderness present. Decreased range of motion.      Cervical back: Neck supple.   Skin:     General: Skin is warm and dry.      Capillary Refill: Capillary refill takes less than 2 seconds.   Neurological:      Mental Status: She is alert.   Psychiatric:         Mood and Affect: Mood normal.         "

## 2024-07-30 ENCOUNTER — OFFICE VISIT (OUTPATIENT)
Dept: PAIN MEDICINE | Facility: MEDICAL CENTER | Age: 59
End: 2024-07-30
Payer: COMMERCIAL

## 2024-07-30 VITALS
DIASTOLIC BLOOD PRESSURE: 54 MMHG | SYSTOLIC BLOOD PRESSURE: 106 MMHG | BODY MASS INDEX: 29.82 KG/M2 | WEIGHT: 179 LBS | OXYGEN SATURATION: 98 % | HEART RATE: 85 BPM | HEIGHT: 65 IN

## 2024-07-30 DIAGNOSIS — M47.812 CERVICAL SPONDYLOSIS: Primary | ICD-10-CM

## 2024-07-30 DIAGNOSIS — M54.2 CERVICALGIA: ICD-10-CM

## 2024-07-30 PROCEDURE — 99214 OFFICE O/P EST MOD 30 MIN: CPT

## 2024-07-30 NOTE — PROGRESS NOTES
Assessment:  1. Cervical spondylosis    2. Cervicalgia        Plan:  The patient is reporting 90% relief of facet mediated pain following the radiofrequnecy ablation of right C4-C6 medial branch nerves.  She is overall very happy with these results.  She now is experiencing similar pain pattern on the left and would like to pursue treatment for the left cervical region.   The patient has been experiencing moderate to severe axial spine pain that is causing functional deficit.  The pain has been present for at least 3 months and is not improving with conservative care.  Currently the patient is not experiencing any radicular features nor neurogenic claudication.  Non-facet pathology has been ruled out on clinical evaluation.  The patients pain appears facet mediated on examination today. To help with the neck pain, occurring from facet arthropathy, we discussed medial branch block/radiofrequency ablation. This is a 2 step process, where the patient would first undergo a diagnostic test called a medial branch block injection. If successful, the medial branch block injection would provide % pain relief for a few hours after the procedure. A second confirmatory block will then be completed at least 2 weeks after the first block. The next step would be a radiofrequency ablation. Radiofrequency ablation decreases pain by creating a nerve lesion to interrupt the pain signals, and weaken the pain perceived by the brain. This procedure typically provides 6-18  months of pain relief. The risks, including bleeding, infection, and tissue reaction were reviewed with the patient, and was scheduled for left C4-C6 medial branch block injection.  Follow-up pending response to MBB #1    My impressions and treatment recommendations were discussed in detail with the patient who verbalized understanding and had no further questions.  Discharge instructions were provided. I personally saw and examined the patient and I agree with  the above discussed plan of care.    Orders Placed This Encounter   Procedures    FL spine and pain procedure     Standing Status:   Future     Standing Expiration Date:   7/30/2028     Order Specific Question:   Reason for Exam:     Answer:   Left C4-C6 MBB #1     Order Specific Question:   Is the patient pregnant?     Answer:   No     Order Specific Question:   Anticoagulant hold needed?     Answer:   No     No orders of the defined types were placed in this encounter.      History of Present Illness:  Carmen Wang is a 58 y.o. female who presents for a follow up office visit after undergoing recent right C4-C6 radiofrequency ablation.  Patient reports 90% relief of right-sided cervical pain following this procedure.  She is overall very happy with these results and states that her average pain level on the right side is about a 1/10 in intensity.  Her pain is now localized to the left side in the same distribution.  The pain is intermittent, and she is currently rating it an 8/10 in intensity.  She describes the quality of her pain as dull/aching and throbbing.  She denies any radiation of this pain into the upper extremities.  She denies any upper extremity weakness or numbness associated with this pain complaint.     I have personally reviewed and/or updated the patient's past medical history, past surgical history, family history, social history, current medications, allergies, and vital signs today.     Review of Systems   Respiratory:  Negative for shortness of breath.    Cardiovascular:  Negative for chest pain.   Gastrointestinal:  Negative for constipation, diarrhea, nausea and vomiting.   Musculoskeletal:  Positive for neck pain. Negative for arthralgias, gait problem, joint swelling and myalgias.   Skin:  Negative for rash.   Neurological:  Negative for dizziness, seizures and weakness.   All other systems reviewed and are negative.      Patient Active Problem List   Diagnosis    Mixed  hyperlipidemia    History of pulmonary embolism    Tobacco abuse    Hyperplastic colonic polyp    Seasonal allergies    Primary osteoarthritis involving multiple joints    Major depressive disorder, recurrent, severe without psychotic features ([facility])    Alcohol use disorder, moderate, dependence ([facility])    History of suicidal ideation    Diverticulosis of colon    Chronic bilateral low back pain with left-sided sciatica    Numbness and tingling of right arm    Facet hypertrophy of lumbosacral region    Chronic pain syndrome    Lumbar spondylosis    Lumbar radiculopathy    Bunion of great toe    Skin lesion    Vitamin D deficiency    S/P total left hip arthroplasty    Pulmonary emphysema ([facility])    Homeless    Cervical spondylosis    Acute pain of left shoulder    Chronic left hip pain    Primary osteoarthritis of left shoulder       Past Medical History:   Diagnosis Date    Abnormal Pap smear of cervix     Anxiety     Bell's palsy     COPD (chronic obstructive pulmonary disease) ([facility])     Depression     Menopause     Mild persistent asthma without complication 10/9/2018    Obesity     Primary osteoarthritis involving multiple joints 10/9/2018       Past Surgical History:   Procedure Laterality Date    ANKLE SURGERY      BREAST BIOPSY Right 12 yrs ago    2012     SECTION      COLONOSCOPY      COLONOSCOPY W/ POLYPECTOMY      DILATION AND CURETTAGE, DIAGNOSTIC / THERAPEUTIC      NOSE SURGERY      TOTAL HIP ARTHROPLASTY Left     TUBAL LIGATION         Family History   Problem Relation Age of Onset    Mental illness Mother     Suicidality Mother     No Known Problems Father     No Known Problems Maternal Grandmother     No Known Problems Maternal Grandfather     No Known Problems Paternal Grandmother     Mental illness Brother     Suicidality Brother     No Known Problems Maternal Aunt     No Known Problems Paternal Aunt     No Known Problems Paternal Aunt     No Known Problems Paternal Aunt     No Known Problems  Paternal Aunt     Cancer Family     Breast cancer Neg Hx        Social History     Occupational History    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: [employer]     Comment: [employer]   Tobacco Use    Smoking status: Every Day     Current packs/day: 1.00     Average packs/day: 1 pack/day for 45.6 years (45.6 ttl pk-yrs)     Types: Cigarettes     Start date: 1979     Passive exposure: Current    Smokeless tobacco: Never    Tobacco comments:     cut down to 1 ppd  and using lozenges   Vaping Use    Vaping status: Every Day    Substances: Nicotine, Flavoring   Substance and Sexual Activity    Alcohol use: Not Currently    Drug use: Not Currently     Types: Marijuana, Cocaine    Sexual activity: Not Currently     Partners: Male     Birth control/protection: None       Current Outpatient Medications on File Prior to Visit   Medication Sig    albuterol (PROVENTIL HFA,VENTOLIN HFA) 90 mcg/act inhaler inhale 2 puffs by mouth and INTO THE LUNGS every 6 hours if needed for wheezing    ARIPiprazole (ABILIFY) 5 mg tablet Take 1 tablet (5 mg total) by mouth daily    atorvastatin (LIPITOR) 40 mg tablet Take 1 tablet (40 mg total) by mouth daily    busPIRone (BUSPAR) 5 mg tablet Take 5 mg by mouth 2 (two) times a day    cholecalciferol (VITAMIN D3) 1,000 units tablet Take 1 tablet (1,000 Units total) by mouth daily    cyanocobalamin (VITAMIN B-12) 1000 MCG tablet Take 1 tablet (1,000 mcg total) by mouth daily    fexofenadine (ALLEGRA) 180 MG tablet take 1 tablet by mouth once daily    fluticasone (FLONASE) 50 mcg/act nasal spray 1 spray into each nostril daily    magnesium Oxide (MAG-OX) 400 mg TABS Take 1 tablet (400 mg total) by mouth daily    montelukast (SINGULAIR) 10 mg tablet Take 1 tablet (10 mg total) by mouth daily at bedtime    multivitamin (THERAGRAN) TABS Take 1 tablet by mouth daily    pregabalin (LYRICA) 75 mg capsule Take 1 capsule (75 mg total) by mouth daily as needed (pain in hands)    sertraline  "(ZOLOFT) 50 mg tablet Take 1 tablet (50 mg total) by mouth daily at bedtime    Spiriva Respimat 1.25 MCG/ACT AERS inhaler inhale 2 puffs by mouth and INTO THE LUNGS once daily    topiramate (TOPAMAX) 50 MG tablet take 1 tablet by mouth every 12 hours    cyclobenzaprine (FLEXERIL) 10 mg tablet Take 1 tablet (10 mg total) by mouth 2 (two) times a day as needed for muscle spasms (Patient not taking: Reported on 7/30/2024)    Diclofenac Sodium (VOLTAREN) 1 % apply 2 grams topically four times a day (Patient not taking: Reported on 7/30/2024)     No current facility-administered medications on file prior to visit.       Allergies   Allergen Reactions    Pollen Extract        Physical Exam:    /54   Pulse 85   Ht 5' 5\" (1.651 m)   Wt 81.2 kg (179 lb)   LMP  (LMP Unknown)   SpO2 98%   BMI 29.79 kg/m²     Constitutional:normal, well developed, well nourished, alert, in no distress and non-toxic and no overt pain behavior.  Eyes:anicteric  HEENT:grossly intact  Neck:supple, symmetric, trachea midline and no masses   Pulmonary:even and unlabored  Cardiovascular:No edema or pitting edema present  Skin:Normal without rashes or lesions and well hydrated  Psychiatric:Mood and affect appropriate  Neurologic:Cranial Nerves II-XII grossly intact  Musculoskeletal:normal upper extremity strength bilaterally.  Pain reproduced with cervical spine extension and rotation to the left.  Tender to palpation over the lower cervical spine/facet joints.    "

## 2024-08-02 DIAGNOSIS — Z00.6 ENCOUNTER FOR EXAMINATION FOR NORMAL COMPARISON OR CONTROL IN CLINICAL RESEARCH PROGRAM: ICD-10-CM

## 2024-08-07 ENCOUNTER — APPOINTMENT (OUTPATIENT)
Dept: LAB | Facility: HOSPITAL | Age: 59
End: 2024-08-07

## 2024-08-07 DIAGNOSIS — Z00.6 ENCOUNTER FOR EXAMINATION FOR NORMAL COMPARISON OR CONTROL IN CLINICAL RESEARCH PROGRAM: ICD-10-CM

## 2024-08-07 PROCEDURE — 36415 COLL VENOUS BLD VENIPUNCTURE: CPT

## 2024-08-09 ENCOUNTER — OFFICE VISIT (OUTPATIENT)
Dept: OBGYN CLINIC | Facility: MEDICAL CENTER | Age: 59
End: 2024-08-09
Payer: COMMERCIAL

## 2024-08-09 VITALS
DIASTOLIC BLOOD PRESSURE: 80 MMHG | HEART RATE: 92 BPM | HEIGHT: 65 IN | BODY MASS INDEX: 29.82 KG/M2 | WEIGHT: 179 LBS | SYSTOLIC BLOOD PRESSURE: 128 MMHG

## 2024-08-09 DIAGNOSIS — M25.512 CHRONIC LEFT SHOULDER PAIN: Primary | ICD-10-CM

## 2024-08-09 DIAGNOSIS — M75.82 TENDINITIS OF LEFT ROTATOR CUFF: ICD-10-CM

## 2024-08-09 DIAGNOSIS — G89.29 CHRONIC LEFT SHOULDER PAIN: Primary | ICD-10-CM

## 2024-08-09 DIAGNOSIS — M19.012 PRIMARY OSTEOARTHRITIS OF LEFT SHOULDER: ICD-10-CM

## 2024-08-09 PROCEDURE — 20610 DRAIN/INJ JOINT/BURSA W/O US: CPT | Performed by: EMERGENCY MEDICINE

## 2024-08-09 PROCEDURE — 99214 OFFICE O/P EST MOD 30 MIN: CPT | Performed by: EMERGENCY MEDICINE

## 2024-08-09 RX ORDER — TRIAMCINOLONE ACETONIDE 40 MG/ML
40 INJECTION, SUSPENSION INTRA-ARTICULAR; INTRAMUSCULAR
Status: COMPLETED | OUTPATIENT
Start: 2024-08-09 | End: 2024-08-09

## 2024-08-09 RX ORDER — LIDOCAINE HYDROCHLORIDE 10 MG/ML
4 INJECTION, SOLUTION INFILTRATION; PERINEURAL
Status: COMPLETED | OUTPATIENT
Start: 2024-08-09 | End: 2024-08-09

## 2024-08-09 RX ADMIN — LIDOCAINE HYDROCHLORIDE 4 ML: 10 INJECTION, SOLUTION INFILTRATION; PERINEURAL at 11:30

## 2024-08-09 RX ADMIN — TRIAMCINOLONE ACETONIDE 40 MG: 40 INJECTION, SUSPENSION INTRA-ARTICULAR; INTRAMUSCULAR at 11:30

## 2024-08-09 NOTE — PROGRESS NOTES
Assessment/Plan:    Diagnoses and all orders for this visit:    Chronic left shoulder pain  -     Ambulatory Referral to Physical Therapy; Future  -     Large joint arthrocentesis: L subacromial bursa    Tendinitis of left rotator cuff  -     Ambulatory Referral to Physical Therapy; Future  -     Large joint arthrocentesis: L subacromial bursa    Primary osteoarthritis of left shoulder  -     Ambulatory Referral to Physical Therapy; Future    Chronic left shoulder pain we have provided corticosteroid injection of the subacromial space today.  Would recommend formal physical therapy I have also provided home exercises for her.  If no significant improvement as she is already status post 1 glenohumeral injection by PCP would recommend MRI of the shoulder    Return in about 6 weeks (around 9/20/2024).      Subjective:   Patient ID: Carmen Wang is a 58 y.o. female.    New patient presents for chronic left shoulder pain for ongoing for approximately 1 year she has been treating with pain management for her chronic neck and back pain.  She states her shoulder pain is mostly on the lateral aspect.  She has undergone imaging of the left shoulder and was provided a corticosteroid injection of the glenohumeral joint by PCP stated she had about 2-1/2 weeks of benefit.        Review of Systems    The following portions of the patient's chart were reviewed and updated as appropriate:   Allergy:    Allergies   Allergen Reactions    Pollen Extract        Medications:    Current Outpatient Medications:     albuterol (PROVENTIL HFA,VENTOLIN HFA) 90 mcg/act inhaler, inhale 2 puffs by mouth and INTO THE LUNGS every 6 hours if needed for wheezing, Disp: 8.5 g, Rfl: 0    ARIPiprazole (ABILIFY) 5 mg tablet, Take 1 tablet (5 mg total) by mouth daily, Disp: 30 tablet, Rfl: 2    atorvastatin (LIPITOR) 40 mg tablet, Take 1 tablet (40 mg total) by mouth daily, Disp: 90 tablet, Rfl: 1    busPIRone (BUSPAR) 5 mg tablet, Take 5 mg by  mouth 2 (two) times a day, Disp: , Rfl:     cholecalciferol (VITAMIN D3) 1,000 units tablet, Take 1 tablet (1,000 Units total) by mouth daily, Disp: 90 tablet, Rfl: 3    cyanocobalamin (VITAMIN B-12) 1000 MCG tablet, Take 1 tablet (1,000 mcg total) by mouth daily, Disp: 90 tablet, Rfl: 3    fexofenadine (ALLEGRA) 180 MG tablet, take 1 tablet by mouth once daily, Disp: 90 tablet, Rfl: 0    fluticasone (FLONASE) 50 mcg/act nasal spray, 1 spray into each nostril daily, Disp: 16 g, Rfl: 2    magnesium Oxide (MAG-OX) 400 mg TABS, Take 1 tablet (400 mg total) by mouth daily, Disp: 90 tablet, Rfl: 1    montelukast (SINGULAIR) 10 mg tablet, Take 1 tablet (10 mg total) by mouth daily at bedtime, Disp: 90 tablet, Rfl: 1    multivitamin (THERAGRAN) TABS, Take 1 tablet by mouth daily, Disp: , Rfl:     pregabalin (LYRICA) 75 mg capsule, Take 1 capsule (75 mg total) by mouth daily as needed (pain in hands), Disp: 30 capsule, Rfl: 0    sertraline (ZOLOFT) 50 mg tablet, Take 1 tablet (50 mg total) by mouth daily at bedtime, Disp: 90 tablet, Rfl: 1    Spiriva Respimat 1.25 MCG/ACT AERS inhaler, inhale 2 puffs by mouth and INTO THE LUNGS once daily, Disp: 4 g, Rfl: 3    topiramate (TOPAMAX) 50 MG tablet, take 1 tablet by mouth every 12 hours, Disp: 60 tablet, Rfl: 2    cyclobenzaprine (FLEXERIL) 10 mg tablet, Take 1 tablet (10 mg total) by mouth 2 (two) times a day as needed for muscle spasms (Patient not taking: Reported on 7/30/2024), Disp: 60 tablet, Rfl: 1    Diclofenac Sodium (VOLTAREN) 1 %, apply 2 grams topically four times a day (Patient not taking: Reported on 7/30/2024), Disp: 100 g, Rfl: 0    Patient Active Problem List   Diagnosis    Mixed hyperlipidemia    History of pulmonary embolism    Tobacco abuse    Hyperplastic colonic polyp    Seasonal allergies    Primary osteoarthritis involving multiple joints    Major depressive disorder, recurrent, severe without psychotic features (HCC)    Alcohol use disorder, moderate,  "dependence (HCC)    History of suicidal ideation    Diverticulosis of colon    Chronic bilateral low back pain with left-sided sciatica    Numbness and tingling of right arm    Facet hypertrophy of lumbosacral region    Chronic pain syndrome    Lumbar spondylosis    Lumbar radiculopathy    Bunion of great toe    Skin lesion    Vitamin D deficiency    S/P total left hip arthroplasty    Pulmonary emphysema (HCC)    Homeless    Cervical spondylosis    Acute pain of left shoulder    Chronic left hip pain    Primary osteoarthritis of left shoulder       Objective:  /80   Pulse 92   Ht 5' 5\" (1.651 m)   Wt 81.2 kg (179 lb)   LMP  (LMP Unknown)   BMI 29.79 kg/m²     Right Shoulder Exam     Range of Motion   Active abduction:  normal     Muscle Strength   External rotation: 5/5       Left Shoulder Exam     Range of Motion   Active abduction:  normal   External rotation:  normal   Internal rotation 0 degrees:  abnormal     Muscle Strength   External rotation: 5/5     Tests   Drop arm: negative    Other   Erythema: absent             Physical Exam      Neurologic Exam    Large joint arthrocentesis: L subacromial bursa  Universal Protocol:  Consent: Verbal consent obtained.  Risks and benefits: risks, benefits and alternatives were discussed  Consent given by: patient  Time out: Immediately prior to procedure a \"time out\" was called to verify the correct patient, procedure, equipment, support staff and site/side marked as required.  Timeout called at: 8/9/2024 12:17 PM.  Patient understanding: patient states understanding of the procedure being performed  Test results: test results available and properly labeled  Site marked: the operative site was marked  Patient identity confirmed: verbally with patient  Supporting Documentation  Indications: pain   Procedure Details  Location: shoulder - L subacromial bursa  Preparation: Patient was prepped and draped in the usual sterile fashion  Needle size: 22 G  Ultrasound " guidance: no  Approach: posterolateral  Medications administered: 4 mL lidocaine 1 %; 40 mg triamcinolone acetonide 40 mg/mL    Patient tolerance: patient tolerated the procedure well with no immediate complications  Dressing:  Sterile dressing applied    No erythema of Shoulder(s)        I have personally reviewed pertinent films in PACS. and I have personally reviewed the written report of the pertinent studies.  X-rays left shoulder            Past Medical History:   Diagnosis Date    Abnormal Pap smear of cervix     Anxiety     Bell's palsy     COPD (chronic obstructive pulmonary disease) ([facility])     Depression     Menopause     Mild persistent asthma without complication 10/9/2018    Obesity     Primary osteoarthritis involving multiple joints 10/9/2018       Past Surgical History:   Procedure Laterality Date    ANKLE SURGERY      BREAST BIOPSY Right 12 yrs ago    2012     SECTION      COLONOSCOPY      COLONOSCOPY W/ POLYPECTOMY      DILATION AND CURETTAGE, DIAGNOSTIC / THERAPEUTIC      NOSE SURGERY      TOTAL HIP ARTHROPLASTY Left     TUBAL LIGATION         Social History     Socioeconomic History    Marital status: Single     Spouse name: Not on file    Number of children: 2    Years of education: Not on file    Highest education level: Associate degree: academic program   Occupational History    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: [employer]     Comment: PT / [employer]   Tobacco Use    Smoking status: Every Day     Current packs/day: 1.00     Average packs/day: 1 pack/day for 45.6 years (45.6 ttl pk-yrs)     Types: Cigarettes     Start date:      Passive exposure: Current    Smokeless tobacco: Never    Tobacco comments:     cut down to 1 ppd  and using lozenges   Vaping Use    Vaping status: Every Day    Substances: Nicotine, Flavoring   Substance and Sexual Activity    Alcohol use: Not Currently    Drug use: Not Currently     Types: Marijuana, Cocaine    Sexual activity: Not  Currently     Partners: Male     Birth control/protection: None   Other Topics Concern    Not on file   Social History Narrative    .    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    Lives with son and two roommates.     Social Determinants of Health     Financial Resource Strain: Low Risk  (3/27/2024)    Overall Financial Resource Strain (CARDIA)     Difficulty of Paying Living Expenses: Not hard at all   Food Insecurity: No Food Insecurity (3/27/2024)    Hunger Vital Sign     Worried About Running Out of Food in the Last Year: Never true     Ran Out of Food in the Last Year: Never true   Transportation Needs: Unmet Transportation Needs (3/27/2024)    PRAPARE - Transportation     Lack of Transportation (Medical): Yes     Lack of Transportation (Non-Medical): Yes   Physical Activity: Inactive (1/13/2020)    Exercise Vital Sign     Days of Exercise per Week: 0 days     Minutes of Exercise per Session: 0 min   Stress: Stress Concern Present (1/13/2020)    [place] [employer] - Occupational Stress Questionnaire     Feeling of Stress : To some extent   Social Connections: Moderately Isolated (1/13/2020)    Social Connection and Isolation Panel [NHANES]     Frequency of Communication with Friends and Family: Twice a week     Frequency of Social Gatherings with Friends and Family: Twice a week     Attends [employer] Services: Never     Active Member of Clubs or Organizations: No     Attends Club or Organization Meetings: Never     Marital Status:    Intimate Partner Violence: Not At Risk (1/13/2020)    Humiliation, Afraid, Rape, and Kick questionnaire     Fear of Current or Ex-Partner: No     Emotionally Abused: No     Physically Abused: No     Sexually Abused: No   Housing Stability: Low Risk  (5/3/2024)    Housing Stability Vital Sign     Unable to Pay for Housing in the Last Year: No     Number of Times Moved in the Last Year: 0     Homeless in the Last Year: No       Family  History   Problem Relation Age of Onset    Mental illness Mother     Suicidality Mother     No Known Problems Father     No Known Problems Maternal Grandmother     No Known Problems Maternal Grandfather     No Known Problems Paternal Grandmother     Mental illness Brother     Suicidality Brother     No Known Problems Maternal Aunt     No Known Problems Paternal Aunt     No Known Problems Paternal Aunt     No Known Problems Paternal Aunt     No Known Problems Paternal Aunt     Cancer Family     Breast cancer Neg Hx

## 2024-08-09 NOTE — PATIENT INSTRUCTIONS
You may use Advil (ibuprofen) 400-600mg every 6 hours or at least twice per day (OR Aleve (naproxen) 250-500mg every 12 hours as needed for pain and inflammation).  However do not mix or take other NSAIDs together such as Advil, Motrin, ibuprofen, Celebrex, naproxen, diclofenac or Aleve.    You may also take Tylenol (acetaminophen) together with Advil (ibuprofen) or Aleve (naproxen) as this is safe and can help decrease your pain levels.  The dosing for Tylenol is 500mg every 4-6 hours as needed OR max 1,000mg per dose up to 3 times per day for a total of 3,000mg per day  *Check with your primary care physician to see if these medications are safe to take and to make sure they do not interfere with your other medications and medical issues.          Patient Education     Rotator Cuff Tendinitis Strengthening Exercises   About this topic   The rotator cuff is a group of muscles and tendons in your shoulder. It helps your shoulder move and be steady. These muscles help to rotate and lift the arm. Tendons are strong bands that connect muscles to bones. Tendonitis happens when the tendon becomes swollen and inflamed. Exercises can help make this problem better. If you have this problem, it may be helpful to see a physical therapist. A therapist can tell you what exercises are best for you.  General   Before starting with a program, ask your doctor if you are healthy enough to do these exercises. Your doctor may have you work with a  or physical therapist to make a safe exercise program to meet your needs.  Strengthening Exercises   Strengthening exercises keep your muscles firm and strong. Start by repeating each exercise 2 to 3 times. Work up to doing each exercise 10 times. Try to do the exercises 2 to 3 times each day. Hold each exercise for 3 to 5 seconds. Do all exercises slowly.  Shoulder blade exercises ? Lie on your stomach near the edge of a mat, bed, or supported on an exercise ball. Start with your  "arms hanging down in a relaxed position.  Y position: Raise your arms up and to the sides so your elbows are near your ears and your arms are straight out diagonally like the letter Y. Lower the arms back to the starting position.  T position: Raise your arms straight out to the sides, even with your shoulders. Lower the arms back to the starting position.  I position: Raise your arms straight back until they are in line with your body like the letter I. Lower your arms slowly back to the starting position.  Wall push-ups ? Stand about 18 inches (45 cm) away from a wall. Place your hands on the wall in front of both shoulders. Keeping your back straight, lean forward by bending your elbows until your face almost touches the wall. Now, straighten the elbows and push your body away from the wall. As this gets easier, try doing a push-up leaning on a counter to put more body weight through your arms.  Shoulder exercises ? Tie a knot in an exercise band. Secure the band in a door by shutting it in around waist level. Wrap the band around one hand for a good . Stand away from the door enough to have slight tension in the band. As the exercises get easier, you may need to change to a heavier band. Moving closer to, or further away from, the point where the band is tied down will decrease or increase the tension in the band.  Internal rotations ? Face sideways with the arm holding the band closest to the door. Bend the elbow to 90 degrees or in an \"L\" position. Keeping your elbow by your side and bent, pull the band across your body. Bring it back to the starting position. Repeat with the other arm.  External rotations ? Face sideways with the arm holding the band farthest from the door. Bend the elbow to 90 degrees or in an \"L\" position. Keeping your elbow by your side and bent, pull the band away from your body. Bring it back to the starting position. Repeat with the other arm.  Abductions ? Face sideways with the " arm holding the band farthest from the door. Be sure that your thumb is facing upwards. Keep your elbow straight and pull the band out to the side and away from your body. Go up to shoulder level. Bring it back to the starting position. Repeat with the other arm.  Flexions ? Face away from the door. Keeping your elbow straight, pull the band straight out in front of you. Bring it back to the starting position. Repeat with the other arm.         What will the results be?   Less pain and stiffness  Better range of motion  Better function  Less swelling  Increased strength  Easier to do daily activities  Helpful tips   Avoid activities that repeatedly use your shoulder in the same way which may cause your shoulder pain to worsen.  Stay active and work out to keep your muscles strong and flexible.  Eat a healthy diet to keep your muscles healthy.  Be sure you do not hold your breath when exercising. This can raise your blood pressure. If you tend to hold your breath, try counting out loud when exercising. If any exercise bothers you, stop right away.  Try walking and swinging your arms at an easy pace for a few minutes to warm up your muscles. Do this again after exercising.  After exercising, it is a good idea to use ice. Place an ice pack or a bag of frozen peas wrapped in a towel over the painful part. Never put ice right on the skin. Do not leave the ice on more than 10 to 15 minutes at a time. Ice after activity may help decrease pain and swelling. Never ice before stretching.  Doing exercises before a meal may be a good way to get into a routine.  Exercise may be slightly uncomfortable, but you should not have sharp pains. If you do get sharp pains, stop what you are doing. If the sharp pains continue, call your doctor.  Last Reviewed Date   2021-05-21  Consumer Information Use and Disclaimer   This generalized information is a limited summary of diagnosis, treatment, and/or medication information. It is not meant  to be comprehensive and should be used as a tool to help the user understand and/or assess potential diagnostic and treatment options. It does NOT include all information about conditions, treatments, medications, side effects, or risks that may apply to a specific patient. It is not intended to be medical advice or a substitute for the medical advice, diagnosis, or treatment of a health care provider based on the health care provider's examination and assessment of a patient’s specific and unique circumstances. Patients must speak with a health care provider for complete information about their health, medical questions, and treatment options, including any risks or benefits regarding use of medications. This information does not endorse any treatments or medications as safe, effective, or approved for treating a specific patient. UpToDate, Inc. and its affiliates disclaim any warranty or liability relating to this information or the use thereof. The use of this information is governed by the Terms of Use, available at https://www.woltersHiringSolveder.com/en/know/clinical-effectiveness-terms   Copyright   Copyright © 2024 UpToDate, Inc. and its affiliates and/or licensors. All rights reserved.

## 2024-08-14 ENCOUNTER — TELEPHONE (OUTPATIENT)
Age: 59
End: 2024-08-14

## 2024-08-14 DIAGNOSIS — J30.2 SEASONAL ALLERGIES: ICD-10-CM

## 2024-08-14 RX ORDER — MONTELUKAST SODIUM 10 MG/1
10 TABLET ORAL
Qty: 90 TABLET | Refills: 1 | Status: SHIPPED | OUTPATIENT
Start: 2024-08-14

## 2024-08-14 NOTE — TELEPHONE ENCOUNTER
Caller: jakub Morales    Doctor: Manuel    Reason for call: returning the 's call    Call back#: call transferred to the

## 2024-08-16 LAB
APOB+LDLR+PCSK9 GENE MUT ANL BLD/T: NOT DETECTED
BRCA1+BRCA2 DEL+DUP + FULL MUT ANL BLD/T: NOT DETECTED
MLH1+MSH2+MSH6+PMS2 GN DEL+DUP+FUL M: NOT DETECTED

## 2024-08-17 DIAGNOSIS — J30.2 SEASONAL ALLERGIES: ICD-10-CM

## 2024-08-19 DIAGNOSIS — J43.9 PULMONARY EMPHYSEMA, UNSPECIFIED EMPHYSEMA TYPE (HCC): ICD-10-CM

## 2024-08-19 RX ORDER — FEXOFENADINE HYDROCHLORIDE 180 MG/1
180 TABLET, FILM COATED ORAL DAILY
Qty: 90 TABLET | Refills: 0 | Status: SHIPPED | OUTPATIENT
Start: 2024-08-19

## 2024-08-19 RX ORDER — ALBUTEROL SULFATE 90 UG/1
AEROSOL, METERED RESPIRATORY (INHALATION)
Qty: 8.5 G | Refills: 0 | Status: SHIPPED | OUTPATIENT
Start: 2024-08-19

## 2024-08-20 NOTE — TELEPHONE ENCOUNTER
Caller: jakub Morales     Doctor: Manuel     Reason for call: pt is scheduled for procedure tomorrow pt would like to cancel the procedure. Pt does not want to r/s    Call back#: [phone]

## 2024-09-09 DIAGNOSIS — J43.9 PULMONARY EMPHYSEMA, UNSPECIFIED EMPHYSEMA TYPE (HCC): ICD-10-CM

## 2024-09-09 RX ORDER — ALBUTEROL SULFATE 90 UG/1
AEROSOL, METERED RESPIRATORY (INHALATION)
Qty: 8.5 G | Refills: 0 | Status: SHIPPED | OUTPATIENT
Start: 2024-09-09

## 2024-09-24 DIAGNOSIS — J30.2 SEASONAL ALLERGIES: ICD-10-CM

## 2024-09-24 RX ORDER — FLUTICASONE PROPIONATE 50 MCG
1 SPRAY, SUSPENSION (ML) NASAL DAILY
Qty: 16 G | Refills: 2 | Status: SHIPPED | OUTPATIENT
Start: 2024-09-24

## 2024-10-07 DIAGNOSIS — J43.9 PULMONARY EMPHYSEMA, UNSPECIFIED EMPHYSEMA TYPE (HCC): ICD-10-CM

## 2024-10-07 RX ORDER — ALBUTEROL SULFATE 90 UG/1
INHALANT RESPIRATORY (INHALATION)
Qty: 8.5 G | Refills: 0 | Status: SHIPPED | OUTPATIENT
Start: 2024-10-07

## 2024-11-04 DIAGNOSIS — J43.9 PULMONARY EMPHYSEMA, UNSPECIFIED EMPHYSEMA TYPE (HCC): ICD-10-CM

## 2024-11-04 RX ORDER — ALBUTEROL SULFATE 90 UG/1
INHALANT RESPIRATORY (INHALATION)
Qty: 8.5 G | Refills: 0 | Status: SHIPPED | OUTPATIENT
Start: 2024-11-04

## 2024-11-06 ENCOUNTER — HOSPITAL ENCOUNTER (OUTPATIENT)
Dept: RADIOLOGY | Facility: MEDICAL CENTER | Age: 59
Discharge: HOME | End: 2024-11-06
Payer: COMMERCIAL

## 2024-11-06 VITALS
OXYGEN SATURATION: 98 % | RESPIRATION RATE: 18 BRPM | TEMPERATURE: 98 F | DIASTOLIC BLOOD PRESSURE: 78 MMHG | HEART RATE: 83 BPM | SYSTOLIC BLOOD PRESSURE: 114 MMHG

## 2024-11-06 DIAGNOSIS — M47.812 CERVICAL SPONDYLOSIS: ICD-10-CM

## 2024-11-06 PROCEDURE — 64491 INJ PARAVERT F JNT C/T 2 LEV: CPT | Performed by: PHYSICAL MEDICINE & REHABILITATION

## 2024-11-06 PROCEDURE — 64490 INJ PARAVERT F JNT C/T 1 LEV: CPT | Performed by: PHYSICAL MEDICINE & REHABILITATION

## 2024-11-06 RX ADMIN — Medication 1.5 ML: at 10:41

## 2024-11-06 NOTE — DISCHARGE INSTRUCTIONS

## 2024-11-06 NOTE — H&P
History of Present Illness: The patient is a 59 y.o. female who presents with complaints of left neck pain    Past Medical History:   Diagnosis Date    Abnormal Pap smear of cervix     Anxiety     Bell's palsy     COPD (chronic obstructive pulmonary disease) (HCC)     Depression     Menopause     Mild persistent asthma without complication 10/9/2018    Obesity     Primary osteoarthritis involving multiple joints 10/9/2018       Past Surgical History:   Procedure Laterality Date    ANKLE SURGERY      BREAST BIOPSY Right 12 yrs ago    2012     SECTION      COLONOSCOPY      COLONOSCOPY W/ POLYPECTOMY      DILATION AND CURETTAGE, DIAGNOSTIC / THERAPEUTIC      NOSE SURGERY      TOTAL HIP ARTHROPLASTY Left     TUBAL LIGATION           Current Outpatient Medications:     albuterol (PROVENTIL HFA,VENTOLIN HFA) 90 mcg/act inhaler, inhale 2 puffs by mouth and INTO THE LUNGS every 6 hours if needed for wheezing, Disp: 8.5 g, Rfl: 0    Allergy Relief 180 MG tablet, take 1 tablet by mouth once daily, Disp: 90 tablet, Rfl: 0    ARIPiprazole (ABILIFY) 5 mg tablet, Take 1 tablet (5 mg total) by mouth daily, Disp: 30 tablet, Rfl: 2    atorvastatin (LIPITOR) 40 mg tablet, Take 1 tablet (40 mg total) by mouth daily, Disp: 90 tablet, Rfl: 1    busPIRone (BUSPAR) 5 mg tablet, Take 5 mg by mouth 2 (two) times a day, Disp: , Rfl:     cholecalciferol (VITAMIN D3) 1,000 units tablet, Take 1 tablet (1,000 Units total) by mouth daily, Disp: 90 tablet, Rfl: 3    cyanocobalamin (VITAMIN B-12) 1000 MCG tablet, Take 1 tablet (1,000 mcg total) by mouth daily, Disp: 90 tablet, Rfl: 3    cyclobenzaprine (FLEXERIL) 10 mg tablet, Take 1 tablet (10 mg total) by mouth 2 (two) times a day as needed for muscle spasms (Patient not taking: Reported on 2024), Disp: 60 tablet, Rfl: 1    Diclofenac Sodium (VOLTAREN) 1 %, apply 2 grams topically four times a day (Patient not taking: Reported on 2024), Disp: 100 g, Rfl: 0    fluticasone  (FLONASE) 50 mcg/act nasal spray, instill 1 spray into each nostril once daily, Disp: 16 g, Rfl: 2    magnesium Oxide (MAG-OX) 400 mg TABS, Take 1 tablet (400 mg total) by mouth daily, Disp: 90 tablet, Rfl: 1    montelukast (SINGULAIR) 10 mg tablet, take 1 tablet by mouth at bedtime, Disp: 90 tablet, Rfl: 1    multivitamin (THERAGRAN) TABS, Take 1 tablet by mouth daily, Disp: , Rfl:     pregabalin (LYRICA) 75 mg capsule, Take 1 capsule (75 mg total) by mouth daily as needed (pain in hands), Disp: 30 capsule, Rfl: 0    sertraline (ZOLOFT) 50 mg tablet, Take 1 tablet (50 mg total) by mouth daily at bedtime, Disp: 90 tablet, Rfl: 1    Spiriva Respimat 1.25 MCG/ACT AERS inhaler, inhale 2 puffs by mouth and INTO THE LUNGS once daily, Disp: 4 g, Rfl: 3    topiramate (TOPAMAX) 50 MG tablet, take 1 tablet by mouth every 12 hours, Disp: 60 tablet, Rfl: 2    Allergies   Allergen Reactions    Pollen Extract        Physical Exam:   Vitals:    11/06/24 1030   BP: 117/77   Pulse: 87   Resp: 18   Temp: 98 °F (36.7 °C)   SpO2: 99%     General: Awake, Alert, Oriented x 3, Mood and affect appropriate  Respiratory: Respirations even and unlabored  Cardiovascular: Peripheral pulses intact; no edema  Musculoskeletal Exam: left neck pain    ASA Score: 2    Patient/Chart Verification  Patient ID Verified: Verbal  ID Band Applied: No  Consents Confirmed: Procedural, To be obtained in the Pre-Procedure area  Interval H&P(within 24 hr) Complete (required for Outpatients and Surgery Admit only): To be obtained in the Procedural area  Allergies Reviewed: Yes  Anticoag/NSAID held?: NA  Currently on antibiotics?: No  Pregnancy denied?: NA    Assessment:   1. Cervical spondylosis        Plan: Left C4-C6 MBB #1

## 2024-11-08 ENCOUNTER — IMMUNIZATIONS (OUTPATIENT)
Dept: FAMILY MEDICINE CLINIC | Facility: CLINIC | Age: 59
End: 2024-11-08

## 2024-11-08 ENCOUNTER — TELEPHONE (OUTPATIENT)
Dept: RADIOLOGY | Facility: MEDICAL CENTER | Age: 59
End: 2024-11-08

## 2024-11-08 DIAGNOSIS — Z23 ENCOUNTER FOR IMMUNIZATION: Primary | ICD-10-CM

## 2024-11-08 PROCEDURE — 90673 RIV3 VACCINE NO PRESERV IM: CPT

## 2024-11-08 PROCEDURE — 90471 IMMUNIZATION ADMIN: CPT

## 2024-11-08 NOTE — TELEPHONE ENCOUNTER
Procedure scheduled 12/4/24    Reviewed instructions: , NPO 1 hour prior, loose-fitting/comfortable clothes, if ill/fever/infx/abx to call and reschedule.  Also pain level at leat 5/10 and refrain from PRN, as-needed pain meds 6h prior.  Patient stated verbal understanding.

## 2024-11-09 DIAGNOSIS — J30.2 SEASONAL ALLERGIES: ICD-10-CM

## 2024-11-11 DIAGNOSIS — G43.919 INTRACTABLE MIGRAINE WITHOUT STATUS MIGRAINOSUS, UNSPECIFIED MIGRAINE TYPE: ICD-10-CM

## 2024-11-11 RX ORDER — TOPIRAMATE 50 MG/1
50 TABLET, FILM COATED ORAL EVERY 12 HOURS
Qty: 60 TABLET | Refills: 2 | Status: SHIPPED | OUTPATIENT
Start: 2024-11-11

## 2024-11-11 RX ORDER — FEXOFENADINE HYDROCHLORIDE 180 MG/1
180 TABLET, FILM COATED ORAL DAILY
Qty: 90 TABLET | Refills: 0 | Status: SHIPPED | OUTPATIENT
Start: 2024-11-11

## 2024-11-11 NOTE — TELEPHONE ENCOUNTER
Good afternoon,    Pt contacted office in regards to needing a medication refill on:      topiramate (TOPAMAX) 50 MG tablet [[id number]]     Please assist

## 2024-11-21 ENCOUNTER — HOSPITAL ENCOUNTER (OUTPATIENT)
Dept: MAMMOGRAPHY | Facility: CLINIC | Age: 59
End: 2024-11-21
Payer: COMMERCIAL

## 2024-11-21 DIAGNOSIS — J30.2 SEASONAL ALLERGIES: ICD-10-CM

## 2024-11-21 DIAGNOSIS — R92.8 ABNORMAL MAMMOGRAM: ICD-10-CM

## 2024-11-21 PROCEDURE — 76642 ULTRASOUND BREAST LIMITED: CPT

## 2024-11-21 RX ORDER — MONTELUKAST SODIUM 10 MG/1
10 TABLET ORAL
Qty: 90 TABLET | Refills: 1 | Status: SHIPPED | OUTPATIENT
Start: 2024-11-21

## 2024-11-30 DIAGNOSIS — J43.9 PULMONARY EMPHYSEMA, UNSPECIFIED EMPHYSEMA TYPE (HCC): ICD-10-CM

## 2024-12-02 RX ORDER — ALBUTEROL SULFATE 90 UG/1
INHALANT RESPIRATORY (INHALATION)
Qty: 8.5 G | Refills: 0 | Status: SHIPPED | OUTPATIENT
Start: 2024-12-02

## 2024-12-04 ENCOUNTER — HOSPITAL ENCOUNTER (OUTPATIENT)
Dept: RADIOLOGY | Facility: MEDICAL CENTER | Age: 59
Discharge: HOME | End: 2024-12-04
Payer: COMMERCIAL

## 2024-12-04 VITALS
RESPIRATION RATE: 18 BRPM | OXYGEN SATURATION: 97 % | TEMPERATURE: 98 F | HEART RATE: 91 BPM | SYSTOLIC BLOOD PRESSURE: 129 MMHG | DIASTOLIC BLOOD PRESSURE: 83 MMHG

## 2024-12-04 DIAGNOSIS — M47.812 CERVICAL SPONDYLOSIS: ICD-10-CM

## 2024-12-04 PROCEDURE — 64491 INJ PARAVERT F JNT C/T 2 LEV: CPT | Performed by: PHYSICAL MEDICINE & REHABILITATION

## 2024-12-04 PROCEDURE — 64490 INJ PARAVERT F JNT C/T 1 LEV: CPT | Performed by: PHYSICAL MEDICINE & REHABILITATION

## 2024-12-04 RX ORDER — BUPIVACAINE HYDROCHLORIDE 5 MG/ML
1.5 INJECTION, SOLUTION EPIDURAL; INTRACAUDAL ONCE
Status: COMPLETED | OUTPATIENT
Start: 2024-12-04 | End: 2024-12-04

## 2024-12-04 RX ADMIN — BUPIVACAINE HYDROCHLORIDE 1.5 ML: 5 INJECTION, SOLUTION EPIDURAL; INTRACAUDAL; PERINEURAL at 14:51

## 2024-12-04 NOTE — DISCHARGE INSTRUCTIONS

## 2024-12-04 NOTE — H&P
History of Present Illness: The patient is a 59 y.o. female who presents with complaints of left neck pain    Past Medical History:   Diagnosis Date    Abnormal Pap smear of cervix     Anxiety     Bell's palsy     COPD (chronic obstructive pulmonary disease) (HCC)     Depression     Menopause     Mild persistent asthma without complication 10/9/2018    Obesity     Primary osteoarthritis involving multiple joints 10/9/2018       Past Surgical History:   Procedure Laterality Date    ANKLE SURGERY      BREAST BIOPSY Right 12 yrs ago    2012     SECTION      COLONOSCOPY      COLONOSCOPY W/ POLYPECTOMY      DILATION AND CURETTAGE, DIAGNOSTIC / THERAPEUTIC      NOSE SURGERY      TOTAL HIP ARTHROPLASTY Left     TUBAL LIGATION           Current Outpatient Medications:     albuterol (PROVENTIL HFA,VENTOLIN HFA) 90 mcg/act inhaler, inhale 2 puffs by mouth and INTO THE LUNGS every 6 hours if needed for wheezing, Disp: 8.5 g, Rfl: 0    Allergy Relief 180 MG tablet, take 1 tablet by mouth once daily, Disp: 90 tablet, Rfl: 0    ARIPiprazole (ABILIFY) 5 mg tablet, Take 1 tablet (5 mg total) by mouth daily, Disp: 30 tablet, Rfl: 2    atorvastatin (LIPITOR) 40 mg tablet, Take 1 tablet (40 mg total) by mouth daily, Disp: 90 tablet, Rfl: 1    busPIRone (BUSPAR) 5 mg tablet, Take 5 mg by mouth 2 (two) times a day, Disp: , Rfl:     cholecalciferol (VITAMIN D3) 1,000 units tablet, Take 1 tablet (1,000 Units total) by mouth daily, Disp: 90 tablet, Rfl: 3    cyanocobalamin (VITAMIN B-12) 1000 MCG tablet, Take 1 tablet (1,000 mcg total) by mouth daily, Disp: 90 tablet, Rfl: 3    cyclobenzaprine (FLEXERIL) 10 mg tablet, Take 1 tablet (10 mg total) by mouth 2 (two) times a day as needed for muscle spasms (Patient not taking: Reported on 2024), Disp: 60 tablet, Rfl: 1    Diclofenac Sodium (VOLTAREN) 1 %, apply 2 grams topically four times a day (Patient not taking: Reported on 2024), Disp: 100 g, Rfl: 0    fluticasone  (FLONASE) 50 mcg/act nasal spray, instill 1 spray into each nostril once daily, Disp: 16 g, Rfl: 2    magnesium Oxide (MAG-OX) 400 mg TABS, Take 1 tablet (400 mg total) by mouth daily, Disp: 90 tablet, Rfl: 1    montelukast (SINGULAIR) 10 mg tablet, Take 1 tablet (10 mg total) by mouth daily at bedtime, Disp: 90 tablet, Rfl: 1    multivitamin (THERAGRAN) TABS, Take 1 tablet by mouth daily, Disp: , Rfl:     pregabalin (LYRICA) 75 mg capsule, Take 1 capsule (75 mg total) by mouth daily as needed (pain in hands), Disp: 30 capsule, Rfl: 0    sertraline (ZOLOFT) 50 mg tablet, Take 1 tablet (50 mg total) by mouth daily at bedtime, Disp: 90 tablet, Rfl: 1    Spiriva Respimat 1.25 MCG/ACT AERS inhaler, inhale 2 puffs by mouth and INTO THE LUNGS once daily, Disp: 4 g, Rfl: 3    topiramate (TOPAMAX) 50 MG tablet, Take 1 tablet (50 mg total) by mouth every 12 (twelve) hours, Disp: 60 tablet, Rfl: 2    Allergies   Allergen Reactions    Pollen Extract        Physical Exam:   Vitals:    12/04/24 1428   BP: 114/81   Pulse: 90   Resp: 16   Temp: 98 °F (36.7 °C)   SpO2: 97%     General: Awake, Alert, Oriented x 3, Mood and affect appropriate  Respiratory: Respirations even and unlabored  Cardiovascular: Peripheral pulses intact; no edema  Musculoskeletal Exam: left neck pain    ASA Score: 3    Patient/Chart Verification  Patient ID Verified: Verbal  Consents Confirmed: Procedural, To be obtained in the Pre-Procedure area  H&P( within 30 days) Verified: To be obtained in the Procedural area  Allergies Reviewed: Yes  Anticoag/NSAID held?: NA  Currently on antibiotics?: No  Pregnancy denied?: NA    Assessment:   1. Cervical spondylosis        Plan: LT C4-C6 MBB #2 (35521, [id number])

## 2024-12-05 ENCOUNTER — TELEPHONE (OUTPATIENT)
Dept: RADIOLOGY | Facility: MEDICAL CENTER | Age: 59
End: 2024-12-05

## 2024-12-05 NOTE — TELEPHONE ENCOUNTER
Caller: patient    Doctor: AGATA    Reason for call: calling back to schedule procedure    Call back#:

## 2024-12-05 NOTE — TELEPHONE ENCOUNTER
Procedure scheduled 1/10/25    Reviewed instructions: , NPO 1 hour prior, loose-fitting/comfortable clothing, if ill/fever/infx/abx to call and reschedule.  No need to hold any meds prior.  Patient stated verbal understanding.

## 2024-12-06 DIAGNOSIS — J43.9 PULMONARY EMPHYSEMA, UNSPECIFIED EMPHYSEMA TYPE (HCC): ICD-10-CM

## 2024-12-06 RX ORDER — TIOTROPIUM BROMIDE INHALATION SPRAY 1.56 UG/1
2 SPRAY, METERED RESPIRATORY (INHALATION) DAILY
Qty: 4 G | Refills: 3 | Status: SHIPPED | OUTPATIENT
Start: 2024-12-06

## 2024-12-06 NOTE — TELEPHONE ENCOUNTER
Hi, my name is Nilda Wang. My phone number is [phone]. Last week I called to get a refill on my Spiriva and now I'm out and no resellers called in. Can you please call me back or put in a refill for my Spiriva? Thank you.

## 2024-12-09 DIAGNOSIS — G43.919 INTRACTABLE MIGRAINE WITHOUT STATUS MIGRAINOSUS, UNSPECIFIED MIGRAINE TYPE: ICD-10-CM

## 2024-12-09 RX ORDER — TOPIRAMATE 50 MG/1
50 TABLET, FILM COATED ORAL EVERY 12 HOURS
Qty: 180 TABLET | Refills: 2 | Status: SHIPPED | OUTPATIENT
Start: 2024-12-09

## 2024-12-23 DIAGNOSIS — J43.9 PULMONARY EMPHYSEMA, UNSPECIFIED EMPHYSEMA TYPE (HCC): ICD-10-CM

## 2024-12-23 RX ORDER — ALBUTEROL SULFATE 90 UG/1
INHALANT RESPIRATORY (INHALATION)
Qty: 8.5 G | Refills: 0 | Status: SHIPPED | OUTPATIENT
Start: 2024-12-23

## 2024-12-24 DIAGNOSIS — E78.2 MIXED HYPERLIPIDEMIA: ICD-10-CM

## 2024-12-24 RX ORDER — ATORVASTATIN CALCIUM 40 MG/1
40 TABLET, FILM COATED ORAL DAILY
Qty: 90 TABLET | Refills: 1 | Status: SHIPPED | OUTPATIENT
Start: 2024-12-24

## 2025-01-10 ENCOUNTER — TELEPHONE (OUTPATIENT)
Dept: PAIN MEDICINE | Facility: MEDICAL CENTER | Age: 60
End: 2025-01-10

## 2025-01-10 ENCOUNTER — HOSPITAL ENCOUNTER (OUTPATIENT)
Dept: RADIOLOGY | Facility: MEDICAL CENTER | Age: 60
Discharge: HOME | End: 2025-01-10
Payer: COMMERCIAL

## 2025-01-10 VITALS
OXYGEN SATURATION: 96 % | RESPIRATION RATE: 20 BRPM | SYSTOLIC BLOOD PRESSURE: 107 MMHG | TEMPERATURE: 97.8 F | DIASTOLIC BLOOD PRESSURE: 66 MMHG | HEART RATE: 75 BPM

## 2025-01-10 DIAGNOSIS — M47.812 CERVICAL SPONDYLOSIS: ICD-10-CM

## 2025-01-10 PROCEDURE — 64633 DESTROY CERV/THOR FACET JNT: CPT | Performed by: PHYSICAL MEDICINE & REHABILITATION

## 2025-01-10 PROCEDURE — 64634 DESTROY C/TH FACET JNT ADDL: CPT | Performed by: PHYSICAL MEDICINE & REHABILITATION

## 2025-01-10 RX ADMIN — LIDOCAINE HYDROCHLORIDE 5 ML: 20 INJECTION, SOLUTION EPIDURAL; INFILTRATION; INTRACAUDAL; PERINEURAL at 11:17

## 2025-01-10 NOTE — H&P
History of Present Illness: The patient is a 59 y.o. female who presents with complaints of left neck pain    Past Medical History:   Diagnosis Date    Abnormal Pap smear of cervix     Anxiety     Bell's palsy     COPD (chronic obstructive pulmonary disease) (HCC)     Depression     Menopause     Mild persistent asthma without complication 10/9/2018    Obesity     Primary osteoarthritis involving multiple joints 10/9/2018       Past Surgical History:   Procedure Laterality Date    ANKLE SURGERY      BREAST BIOPSY Right 12 yrs ago    2012     SECTION      COLONOSCOPY      COLONOSCOPY W/ POLYPECTOMY      DILATION AND CURETTAGE, DIAGNOSTIC / THERAPEUTIC      NOSE SURGERY      TOTAL HIP ARTHROPLASTY Left     TUBAL LIGATION           Current Outpatient Medications:     albuterol (PROVENTIL HFA,VENTOLIN HFA) 90 mcg/act inhaler, inhale 2 puffs by mouth and INTO THE LUNGS every 6 hours if needed for wheezing, Disp: 8.5 g, Rfl: 0    Allergy Relief 180 MG tablet, take 1 tablet by mouth once daily, Disp: 90 tablet, Rfl: 0    ARIPiprazole (ABILIFY) 5 mg tablet, Take 1 tablet (5 mg total) by mouth daily, Disp: 30 tablet, Rfl: 2    atorvastatin (LIPITOR) 40 mg tablet, take 1 tablet by mouth once daily, Disp: 90 tablet, Rfl: 1    busPIRone (BUSPAR) 5 mg tablet, Take 5 mg by mouth 2 (two) times a day, Disp: , Rfl:     cholecalciferol (VITAMIN D3) 1,000 units tablet, Take 1 tablet (1,000 Units total) by mouth daily, Disp: 90 tablet, Rfl: 3    cyanocobalamin (VITAMIN B-12) 1000 MCG tablet, Take 1 tablet (1,000 mcg total) by mouth daily, Disp: 90 tablet, Rfl: 3    cyclobenzaprine (FLEXERIL) 10 mg tablet, Take 1 tablet (10 mg total) by mouth 2 (two) times a day as needed for muscle spasms (Patient not taking: Reported on 2024), Disp: 60 tablet, Rfl: 1    Diclofenac Sodium (VOLTAREN) 1 %, apply 2 grams topically four times a day (Patient not taking: Reported on 2024), Disp: 100 g, Rfl: 0    fluticasone (FLONASE) 50  mcg/act nasal spray, instill 1 spray into each nostril once daily, Disp: 16 g, Rfl: 2    magnesium Oxide (MAG-OX) 400 mg TABS, Take 1 tablet (400 mg total) by mouth daily, Disp: 90 tablet, Rfl: 1    montelukast (SINGULAIR) 10 mg tablet, Take 1 tablet (10 mg total) by mouth daily at bedtime, Disp: 90 tablet, Rfl: 1    multivitamin (THERAGRAN) TABS, Take 1 tablet by mouth daily, Disp: , Rfl:     pregabalin (LYRICA) 75 mg capsule, Take 1 capsule (75 mg total) by mouth daily as needed (pain in hands), Disp: 30 capsule, Rfl: 0    sertraline (ZOLOFT) 50 mg tablet, Take 1 tablet (50 mg total) by mouth daily at bedtime, Disp: 90 tablet, Rfl: 1    tiotropium (Spiriva Respimat) 1.25 MCG/ACT AERS inhaler, Inhale 2 puffs daily inhale 2 puffs by mouth and INTO THE LUNGS once daily, Disp: 4 g, Rfl: 3    topiramate (TOPAMAX) 50 MG tablet, take 1 tablet by mouth every 12 hours, Disp: 180 tablet, Rfl: 2    Allergies   Allergen Reactions    Pollen Extract        Physical Exam:   Vitals:    01/10/25 1047   BP: 113/79   Pulse: 87   Resp: 20   Temp: 97.8 °F (36.6 °C)   SpO2: 96%     General: Awake, Alert, Oriented x 3, Mood and affect appropriate  Respiratory: Respirations even and unlabored  Cardiovascular: Peripheral pulses intact; no edema  Musculoskeletal Exam: left neck pain    ASA Score: 3    Patient/Chart Verification  Patient ID Verified: Verbal  ID Band Applied: No  Consents Confirmed: Procedural, To be obtained in the Pre-Procedure area  H&P( within 30 days) Verified: To be obtained in the Procedural area  Interval H&P(within 24 hr) Complete (required for Outpatients and Surgery Admit only): To be obtained in the Procedural area  Allergies Reviewed: Yes  Anticoag/NSAID held?: NA  Currently on antibiotics?: No    Assessment:   1. Cervical spondylosis        Plan: LT C4-C6 RFA ([id number], [id number])

## 2025-01-10 NOTE — DISCHARGE INSTRUCTIONS

## 2025-01-16 DIAGNOSIS — E55.9 VITAMIN D INSUFFICIENCY: ICD-10-CM

## 2025-01-16 RX ORDER — CHOLECALCIFEROL (VITAMIN D3) 25 MCG
1000 TABLET ORAL DAILY
Qty: 90 TABLET | Refills: 3 | Status: SHIPPED | OUTPATIENT
Start: 2025-01-16

## 2025-01-17 DIAGNOSIS — J43.9 PULMONARY EMPHYSEMA, UNSPECIFIED EMPHYSEMA TYPE (HCC): ICD-10-CM

## 2025-01-17 RX ORDER — TIOTROPIUM BROMIDE INHALATION SPRAY 1.56 UG/1
SPRAY, METERED RESPIRATORY (INHALATION)
Qty: 12 G | Refills: 3 | Status: SHIPPED | OUTPATIENT
Start: 2025-01-17

## 2025-01-22 DIAGNOSIS — J43.9 PULMONARY EMPHYSEMA, UNSPECIFIED EMPHYSEMA TYPE (HCC): ICD-10-CM

## 2025-01-22 RX ORDER — ALBUTEROL SULFATE 90 UG/1
INHALANT RESPIRATORY (INHALATION)
Qty: 8.5 G | Refills: 0 | Status: SHIPPED | OUTPATIENT
Start: 2025-01-22

## 2025-01-23 ENCOUNTER — TELEPHONE (OUTPATIENT)
Age: 60
End: 2025-01-23

## 2025-02-03 ENCOUNTER — OFFICE VISIT (OUTPATIENT)
Dept: OBGYN CLINIC | Facility: MEDICAL CENTER | Age: 60
End: 2025-02-03
Payer: COMMERCIAL

## 2025-02-03 VITALS — WEIGHT: 184 LBS | HEIGHT: 65 IN | BODY MASS INDEX: 30.66 KG/M2

## 2025-02-03 DIAGNOSIS — M25.512 CHRONIC LEFT SHOULDER PAIN: Primary | ICD-10-CM

## 2025-02-03 DIAGNOSIS — G89.29 CHRONIC LEFT SHOULDER PAIN: Primary | ICD-10-CM

## 2025-02-03 DIAGNOSIS — M50.30 DEGENERATIVE DISC DISEASE, CERVICAL: ICD-10-CM

## 2025-02-03 DIAGNOSIS — M19.012 PRIMARY OSTEOARTHRITIS OF LEFT SHOULDER: ICD-10-CM

## 2025-02-03 DIAGNOSIS — M75.82 TENDINITIS OF LEFT ROTATOR CUFF: ICD-10-CM

## 2025-02-03 PROCEDURE — 99214 OFFICE O/P EST MOD 30 MIN: CPT | Performed by: EMERGENCY MEDICINE

## 2025-02-03 RX ORDER — METHYLPREDNISOLONE 4 MG/1
TABLET ORAL
Qty: 1 EACH | Refills: 0 | Status: SHIPPED | OUTPATIENT
Start: 2025-02-03

## 2025-02-03 NOTE — PROGRESS NOTES
Assessment/Plan:    Diagnoses and all orders for this visit:    Chronic left shoulder pain  -     Ambulatory Referral to Physical Therapy; Future  -     methylPREDNISolone 4 MG tablet therapy pack; Use as directed on package    Tendinitis of left rotator cuff  -     Ambulatory Referral to Physical Therapy; Future  -     methylPREDNISolone 4 MG tablet therapy pack; Use as directed on package    Primary osteoarthritis of left shoulder  -     Ambulatory Referral to Physical Therapy; Future  -     methylPREDNISolone 4 MG tablet therapy pack; Use as directed on package    Degenerative disc disease, cervical  -     Ambulatory Referral to Physical Therapy; Future  -     methylPREDNISolone 4 MG tablet therapy pack; Use as directed on package    Patient had temporary benefit from subacromial CSI.  Patient only had 2 weeks of benefit from glenohumeral CSI provided by PCP.  At this point in time recommend formal physical therapy.  Patient has requested repeat subacromial CSI however we discussed risks.  Medrol Dosepak sent to the pharmacy.  If no improvement to consider MRI left shoulder.  Xray left shoulder results reviewed    Patient  instructions provide: While taking the oral steroid Medrol Dosepak, do not take any NSAIDs such as Advil, Motrin, ibuprofen, Motrin, Aleve, naproxen, Celebrex or Meloxicam.  You can restart the NSAIDs after you finish the steroids.    However you may take Tylenol 500mg every 4-6 hours as needed OR max 1,000mg per dose up to 3 times per day for a total of 3,000mg per day  While taking oral steroids, you may experience mild side effects such as feeling jittery or flushing.  Please call if your side effects are significant or you have any questions.         Return in about 7 weeks (around 3/24/2025).      Subjective:   Patient ID: Carmen Wang is a 59 y.o. female.    Patient returns status post left SA CSI last evaluation 8/2024 with approximately 5 weeks of benefit.  Recently underwent LT  C4-C6 RFA    Initial note:  New patient presents for chronic left shoulder pain for ongoing for approximately 1 year she has been treating with pain management for her chronic neck and back pain.  She states her shoulder pain is mostly on the lateral aspect.  She has undergone imaging of the left shoulder and was provided a corticosteroid injection of the glenohumeral joint by PCP stated she had about 2-1/2 weeks of benefit.        Review of Systems    The following portions of the patient's chart were reviewed and updated as appropriate:   Allergy:    Allergies   Allergen Reactions    Pollen Extract        Medications:    Current Outpatient Medications:     albuterol (PROVENTIL HFA,VENTOLIN HFA) 90 mcg/act inhaler, inhale 2 puffs by mouth and INTO THE LUNGS every 6 hours if needed for wheezing, Disp: 8.5 g, Rfl: 0    Allergy Relief 180 MG tablet, take 1 tablet by mouth once daily, Disp: 90 tablet, Rfl: 0    ARIPiprazole (ABILIFY) 5 mg tablet, Take 1 tablet (5 mg total) by mouth daily, Disp: 30 tablet, Rfl: 2    atorvastatin (LIPITOR) 40 mg tablet, take 1 tablet by mouth once daily, Disp: 90 tablet, Rfl: 1    busPIRone (BUSPAR) 5 mg tablet, Take 5 mg by mouth 2 (two) times a day, Disp: , Rfl:     cholecalciferol (VITAMIN D3) 1,000 units tablet, take 1 tablet by mouth daily, Disp: 90 tablet, Rfl: 3    cyanocobalamin (VITAMIN B-12) 1000 MCG tablet, Take 1 tablet (1,000 mcg total) by mouth daily, Disp: 90 tablet, Rfl: 3    fluticasone (FLONASE) 50 mcg/act nasal spray, instill 1 spray into each nostril once daily, Disp: 16 g, Rfl: 2    magnesium Oxide (MAG-OX) 400 mg TABS, Take 1 tablet (400 mg total) by mouth daily, Disp: 90 tablet, Rfl: 1    methylPREDNISolone 4 MG tablet therapy pack, Use as directed on package, Disp: 1 each, Rfl: 0    montelukast (SINGULAIR) 10 mg tablet, Take 1 tablet (10 mg total) by mouth daily at bedtime, Disp: 90 tablet, Rfl: 1    multivitamin (THERAGRAN) TABS, Take 1 tablet by mouth daily,  "Disp: , Rfl:     pregabalin (LYRICA) 75 mg capsule, Take 1 capsule (75 mg total) by mouth daily as needed (pain in hands), Disp: 30 capsule, Rfl: 0    sertraline (ZOLOFT) 50 mg tablet, Take 1 tablet (50 mg total) by mouth daily at bedtime, Disp: 90 tablet, Rfl: 1    Spiriva Respimat 1.25 MCG/ACT AERS inhaler, inhale 2 puffs by mouth and INTO THE LUNGS once daily, Disp: 12 g, Rfl: 3    topiramate (TOPAMAX) 50 MG tablet, take 1 tablet by mouth every 12 hours, Disp: 180 tablet, Rfl: 2    cyclobenzaprine (FLEXERIL) 10 mg tablet, Take 1 tablet (10 mg total) by mouth 2 (two) times a day as needed for muscle spasms (Patient not taking: Reported on 2/3/2025), Disp: 60 tablet, Rfl: 1    Diclofenac Sodium (VOLTAREN) 1 %, apply 2 grams topically four times a day (Patient not taking: Reported on 2/3/2025), Disp: 100 g, Rfl: 0    Patient Active Problem List   Diagnosis    Mixed hyperlipidemia    History of pulmonary embolism    Tobacco abuse    Hyperplastic colonic polyp    Seasonal allergies    Primary osteoarthritis involving multiple joints    Major depressive disorder, recurrent, severe without psychotic features (HCC)    Alcohol use disorder, moderate, dependence ([facility])    History of suicidal ideation    Diverticulosis of colon    Chronic bilateral low back pain with left-sided sciatica    Numbness and tingling of right arm    Facet hypertrophy of lumbosacral region    Chronic pain syndrome    Lumbar spondylosis    Lumbar radiculopathy    Bunion of great toe    Skin lesion    Vitamin D deficiency    S/P total left hip arthroplasty    Pulmonary emphysema (HCC)    Homeless    Cervical spondylosis    Acute pain of left shoulder    Chronic left hip pain    Primary osteoarthritis of left shoulder       Objective:  Ht 5' 5\" (1.651 m)   Wt 83.5 kg (184 lb)   LMP  (LMP Unknown)   BMI 30.62 kg/m²     Left Shoulder Exam     Range of Motion   Active abduction:  abnormal   External rotation:  normal   Internal rotation 0 degrees:  " abnormal     Muscle Strength   External rotation: 5/5   Supraspinatus: 4/5     Tests   Drop arm: negative             Physical Exam      Neurologic Exam    Procedures    I have personally reviewed the written report of the pertinent studies.             Past Medical History:   Diagnosis Date    Abnormal Pap smear of cervix     Anxiety     Bell's palsy     COPD (chronic obstructive pulmonary disease) (HCC)     Depression     Menopause     Mild persistent asthma without complication 10/9/2018    Obesity     Primary osteoarthritis involving multiple joints 10/9/2018       Past Surgical History:   Procedure Laterality Date    ANKLE SURGERY      BREAST BIOPSY Right 12 yrs ago    2012     SECTION      COLONOSCOPY      COLONOSCOPY W/ POLYPECTOMY      DILATION AND CURETTAGE, DIAGNOSTIC / THERAPEUTIC      NOSE SURGERY      TOTAL HIP ARTHROPLASTY Left     TUBAL LIGATION         Social History     Socioeconomic History    Marital status: Single     Spouse name: Not on file    Number of children: 2    Years of education: Not on file    Highest education level: Associate degree: academic program   Occupational History    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: [employer]     Comment: PT / Waiver Services   Tobacco Use    Smoking status: Every Day     Current packs/day: 1.00     Average packs/day: 1 pack/day for 46.1 years (46.1 ttl pk-yrs)     Types: Cigarettes     Start date:      Passive exposure: Current    Smokeless tobacco: Never    Tobacco comments:     cut down to 1 ppd  and using lozenges   Vaping Use    Vaping status: Every Day    Substances: Nicotine, Flavoring   Substance and Sexual Activity    Alcohol use: Not Currently    Drug use: Not Currently     Types: Marijuana, Cocaine    Sexual activity: Not Currently     Partners: Male     Birth control/protection: None   Other Topics Concern    Not on file   Social History Narrative    .    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     Lives with son and two roommates.     Social Drivers of Health     Financial Resource Strain: Low Risk  (3/27/2024)    Overall Financial Resource Strain (CARDIA)     Difficulty of Paying Living Expenses: Not hard at all   Food Insecurity: No Food Insecurity (3/27/2024)    Nursing - Inadequate Food Risk Classification     Worried About Running Out of Food in the Last Year: Never true     Ran Out of Food in the Last Year: Never true     Ran Out of Food in the Last Year: Not on file   Transportation Needs: Unmet Transportation Needs (3/27/2024)    PRAPARE - Transportation     Lack of Transportation (Medical): Yes     Lack of Transportation (Non-Medical): Yes   Physical Activity: Inactive (1/13/2020)    Exercise Vital Sign     Days of Exercise per Week: 0 days     Minutes of Exercise per Session: 0 min   Stress: Stress Concern Present (1/13/2020)    [employer] [employer] - Occupational Stress Questionnaire     Feeling of Stress : To some extent   Social Connections: Moderately Isolated (1/13/2020)    Social Connection and Isolation Panel [NHANES]     Frequency of Communication with Friends and Family: Twice a week     Frequency of Social Gatherings with Friends and Family: Twice a week     Attends Worship Services: Never     Active Member of Clubs or Organizations: No     Attends Club or Organization Meetings: Never     Marital Status:    Intimate Partner Violence: Not At Risk (1/13/2020)    Humiliation, Afraid, Rape, and Kick questionnaire     Fear of Current or Ex-Partner: No     Emotionally Abused: No     Physically Abused: No     Sexually Abused: No   Housing Stability: Low Risk  (5/3/2024)    Housing Stability Vital Sign     Unable to Pay for Housing in the Last Year: No     Number of Times Moved in the Last Year: 0     Homeless in the Last Year: No       Family History   Problem Relation Age of Onset    Mental illness Mother     Suicidality Mother     No Known Problems Father     No  Known Problems Maternal Grandmother     No Known Problems Maternal Grandfather     No Known Problems Paternal Grandmother     Mental illness Brother     Suicidality Brother     No Known Problems Maternal Aunt     No Known Problems Paternal Aunt     No Known Problems Paternal Aunt     No Known Problems Paternal Aunt     No Known Problems Paternal Aunt     Cancer Family     Breast cancer Neg Hx

## 2025-02-12 DIAGNOSIS — J30.2 SEASONAL ALLERGIES: ICD-10-CM

## 2025-02-12 RX ORDER — FEXOFENADINE HYDROCHLORIDE 180 MG/1
180 TABLET, FILM COATED ORAL DAILY
Qty: 90 TABLET | Refills: 0 | Status: SHIPPED | OUTPATIENT
Start: 2025-02-12

## 2025-02-18 NOTE — PROGRESS NOTES
"Subjective   Cori Adorno is a 60 y.o. female who presents for New Patient Visit and Results (discussion).  HPI    CAC f/up score in 260s  Fhx CAD in mom, aunts uncles but no sibs.   No cp or sob   Never on statin     Current Outpatient Medications on File Prior to Visit   Medication Sig Dispense Refill    cholecalciferol (Vitamin D-3) 10 MCG (400 UNIT) tablet Take 1 tablet (10 mcg) by mouth once daily.      hyoscyamine ER (Levbid) 0.375 mg 12 hr tablet Take 1 tablet (0.375 mg) by mouth every 12 hours. Do not crush or chew. 180 tablet 3    multivitamin (Multiple Vitamins) tablet Take 1 tablet by mouth once daily.      ondansetron (Zofran) 8 mg tablet Take 1 tablet (8 mg) by mouth every 8 hours if needed.       No current facility-administered medications on file prior to visit.                  Objective   /76   Pulse 77   Temp 36.3 °C (97.4 °F)   Resp 16   Ht 1.657 m (5' 5.25\")   Wt 61.7 kg (136 lb)   SpO2 96%   BMI 22.46 kg/m²    Physical Exam  General: NAD  HEENT:NCAT, PERRLA, nml OP  Neck: no cervical DIANN  Heart: RRR no murmur, no edema   Lungs: CTA b/l, no wheeze or rhonchi   MSK: no c/c/e. nml gait   Skin: warm and dry  Psych: cooperative, appropriate affect  Neuro: speech clear. A&Ox3  Assessment/Plan   Problem List Items Addressed This Visit    None  Visit Diagnoses       Hyperlipidemia, unspecified hyperlipidemia type    -  Primary  CAC 260s in LAD  LDL 94 on recent labs  Start lipitor 20  Goal LDL <70   RF cards  Start ASA 81 mg   F/up 3 mo labs prior     Relevant Medications    atorvastatin (Lipitor) 20 mg tablet    Other Relevant Orders    Hepatic Function Panel    Lipid Panel    Referral to Cardiology            " Assessment:  1  Acute exacerbation of chronic low back pain        Plan:  While the patient was in the office today, I did have a thorough conversation regarding their chronic pain syndrome, medication management, and treatment plan options  Patient is being seen for follow-up visit  She was initially seen here for consultation on 12/06/2021 for chronic low back pain  An x-ray of her lumbar spine was ordered  X-ray reveals age appropriate degenerative changes  It was recommended that she start physical therapy  So far, she has only attended 1 visit more than a week ago  She returns the office today complaining of increased low back pain  She states that she fell on ice landing flat on her buttocks 1 week ago  Pain increased after the fall, but worsened yesterday while lifting a wheelchair at work  At this point, we will order an updated x-ray of her lumbar spine to rule out acute injury  She is aware that we will call her with the results of the x-ray and as long as there are no acute findings, she will proceed with physical therapy as recommended  I discussed with the patient that at this point time since I feel that there is a significant inflammatory component to their pain symptoms, that they would benefit from a titrating dose of oral steroids over the next 7 days  I advised the patient that while on the steroids, they should not take any other oral NSAIDs except for acetaminophen or Tylenol until they have completed the steroid taper  I also advised them that once they have completed the steroid taper, they are to give our office a follow up phone call to let us know how they are doing and if there is any improvement  The patient was agreeable and verbalized an understanding  The patient will follow-up in 6 weeks for medication prescription refill and reevaluation  The patient was advised to contact the office should their symptoms worsen in the interim   The patient was agreeable and verbalized an understanding  History of Present Illness: The patient is a 64 y o  female who presents for a follow up office visit in regards to Back Pain  The patients current symptoms include complaints of increased low back pain  Current pain level is a 10/10  Quality pain is described as burning, throbbing  Current pain medications includes:  Diclofenac 50 mg 3 times daily, tizanidine 4 mg twice daily if needed for spasms   The patient reports that this regimen is providing 0 % pain relief  The patient is reporting no side effects from this pain medication regimen  I have personally reviewed and/or updated the patient's past medical history, past surgical history, family history, social history, current medications, allergies, and vital signs today  Review of Systems  Review of Systems   Musculoskeletal: Positive for back pain, gait problem and joint swelling (tailbone feels swollen)  Decreased range of motion     All other systems reviewed and are negative          Past Medical History:   Diagnosis Date    Abnormal Pap smear of cervix     Anxiety     Bell's palsy     COPD (chronic obstructive pulmonary disease) (HCC)     Depression     Menopause     Mild persistent asthma without complication 8404    Obesity     Primary osteoarthritis involving multiple joints 10/9/2018       Past Surgical History:   Procedure Laterality Date    ANKLE SURGERY      BREAST BIOPSY  12 yrs ago     SECTION      COLONOSCOPY      COLONOSCOPY W/ POLYPECTOMY      DILATION AND CURETTAGE, DIAGNOSTIC / THERAPEUTIC      NOSE SURGERY      TOTAL HIP ARTHROPLASTY Left     TUBAL LIGATION         Family History   Problem Relation Age of Onset    Mental illness Mother     Suicidality Mother     No Known Problems Father     Mental illness Brother     Suicidality Brother     No Known Problems Maternal Grandmother     No Known Problems Paternal Grandmother     No Known Problems Maternal Aunt     No Known Problems Paternal Aunt     No Known Problems Paternal Aunt     No Known Problems Paternal Aunt     No Known Problems Paternal Aunt     Cancer Family        Social History     Occupational History    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: [employer]     Comment: PT / Waiver Services   Tobacco Use    Smoking status: Current Every Day Smoker     Packs/day: 1 50     Years: 42 00     Pack years: 63 00     Types: Cigarettes     Start date: 1979    Smokeless tobacco: Never Used    Tobacco comment: cut down to 1 ppd  and using lozenges   Vaping Use    Vaping Use: Some days    Substances: Nicotine, Flavoring   Substance and Sexual Activity    Alcohol use: Not Currently    Drug use: Not Currently     Types: Marijuana, Cocaine    Sexual activity: Not Currently     Partners: Male     Birth control/protection: None         Current Outpatient Medications:     albuterol (PROVENTIL HFA,VENTOLIN HFA) 90 mcg/act inhaler, INHALE 2 PUFFS BY MOUTH EVERY 6 HOURS AS NEEDED FOR WHEEZING FOR SHORTNESS OF BREATH, Disp: 18 g, Rfl: 5    Ascorbic Acid (C-1000/ALIX HIPS PO), Take by mouth daily  , Disp: , Rfl:     benzonatate (TESSALON) 200 MG capsule, Take 1 capsule (200 mg total) by mouth 3 (three) times a day as needed for cough, Disp: 20 capsule, Rfl: 0    BIOTIN 5000 PO, Take by mouth daily  , Disp: , Rfl:     Coenzyme Q10 (COQ10) 100 MG CAPS, Take by mouth daily  , Disp: , Rfl:     diclofenac sodium (VOLTAREN) 50 mg EC tablet, Take 1 tablet (50 mg total) by mouth 3 (three) times a day, Disp: 90 tablet, Rfl: 0    DULoxetine HCl 40 MG CPEP, daily  , Disp: , Rfl:     folic acid (FOLVITE) 1 mg tablet, Take 1 tablet (1 mg total) by mouth daily, Disp: 30 tablet, Rfl: 0    Loratadine 10 MG CAPS, Take by mouth as needed  , Disp: , Rfl:     MAGNESIUM CITRATE PO, Take 400 mg by mouth daily  , Disp: , Rfl:     Menthol, Topical Analgesic, (Biofreeze Roll-On) 4 % GEL, Apply 1 application topically 3 (three) times a day as needed (pain), Disp: 150 Tube, Rfl: 1    montelukast (SINGULAIR) 10 mg tablet, TAKE 1 TABLET BY MOUTH ONCE DAILY AT BEDTIME, Disp: 90 tablet, Rfl: 0    nicotine (NICODERM CQ) 21 mg/24 hr TD 24 hr patch, Place 1 patch on the skin every 24 hours, Disp: 28 patch, Rfl: 0    nicotine polacrilex (COMMIT) 2 MG lozenge, Apply 1 lozenge (2 mg total) to the mouth or throat as needed for smoking cessation, Disp: 100 each, Rfl: 0    pramipexole (MIRAPEX) 0 5 mg tablet, Take 1 tablet (0 5 mg total) by mouth daily, Disp: 90 tablet, Rfl: 1    Spiriva Respimat 1 25 MCG/ACT AERS inhaler, INHALE 2 SPRAY(S) BY MOUTH ONCE DAILY, Disp: 4 g, Rfl: 2    tiZANidine (ZANAFLEX) 4 mg tablet, Take 1 tablet (4 mg total) by mouth 2 (two) times a day, Disp: 90 tablet, Rfl: 0    vitamin E, tocopherol, 400 units capsule, Take 400 Units by mouth daily, Disp: , Rfl:     predniSONE 10 mg tablet, 3 tabs once daily x 3 days; 2 tabs once daily x 2 days; 1 tab daily x 2 days, Disp: 15 tablet, Rfl: 0    Allergies   Allergen Reactions    Pollen Extract        Physical Exam:    /69   Pulse 90   Ht 5' 5" (1 651 m)   Wt 62 1 kg (137 lb)   LMP  (LMP Unknown)   BMI 22 80 kg/m²     Constitutional:normal, well developed, well nourished, alert, in no distress and non-toxic and no overt pain behavior  Eyes:anicteric  HEENT:grossly intact  Neck:supple, symmetric, trachea midline and no masses   Pulmonary:even and unlabored  Cardiovascular:No edema or pitting edema present  Skin:Normal without rashes or lesions and well hydrated  Psychiatric:Mood and affect appropriate  Neurologic:Cranial Nerves II-XII grossly intact  Musculoskeletal:antalgic and Decreased range of motion of the lumbar spine in all planes with complaints of pain  there are lumbar paraspinal muscle spasms, bilaterally      Imaging  X-ray lumbar spine complete 4+ views    (Results Pending)       Orders Placed This Encounter   Procedures    X-ray lumbar spine complete 4+ views

## 2025-02-21 DIAGNOSIS — J43.9 PULMONARY EMPHYSEMA, UNSPECIFIED EMPHYSEMA TYPE (HCC): ICD-10-CM

## 2025-02-25 RX ORDER — ALBUTEROL SULFATE 90 UG/1
INHALANT RESPIRATORY (INHALATION)
Qty: 8.5 G | Refills: 0 | Status: SHIPPED | OUTPATIENT
Start: 2025-02-25

## 2025-03-03 ENCOUNTER — TELEPHONE (OUTPATIENT)
Dept: RADIOLOGY | Facility: MEDICAL CENTER | Age: 60
End: 2025-03-03

## 2025-03-03 NOTE — TELEPHONE ENCOUNTER
Left message for patient to call and reschedule 2/24/25 [facility] appt with Dr. Jackson. Follow up to post injection.

## 2025-03-20 ENCOUNTER — VBI (OUTPATIENT)
Dept: ADMINISTRATIVE | Facility: OTHER | Age: 60
End: 2025-03-20

## 2025-03-20 NOTE — TELEPHONE ENCOUNTER
03/20/25 1:27 PM     Chart reviewed for Pap Smear (HPV) aka Cervical Cancer Screening ; nothing is submitted to the patient's insurance at this time.     Orion Duenas MA   [facility]

## 2025-03-27 ENCOUNTER — OFFICE VISIT (OUTPATIENT)
Dept: PAIN MEDICINE | Facility: MEDICAL CENTER | Age: 60
End: 2025-03-27
Payer: COMMERCIAL

## 2025-03-27 VITALS — HEIGHT: 65 IN | WEIGHT: 185 LBS | BODY MASS INDEX: 30.82 KG/M2

## 2025-03-27 DIAGNOSIS — M47.812 CERVICAL SPONDYLOSIS: Primary | ICD-10-CM

## 2025-03-27 PROCEDURE — 99213 OFFICE O/P EST LOW 20 MIN: CPT | Performed by: PHYSICAL MEDICINE & REHABILITATION

## 2025-03-27 NOTE — PROGRESS NOTES
Assessment  1. Cervical spondylosis        Plan  Patient to follow-up as needed for her neck or back pain complaints with the become more consistent on a daily basis.  At this point she has had significant improvement with both the cervical and lumbar radiofrequency ablation procedures.    My impressions and treatment recommendations were discussed in detail with the patient who verbalized understanding and had no further questions.  Discharge instructions were provided. I personally saw and examined the patient and I agree with the above discussed plan of care.    No orders of the defined types were placed in this encounter.    No orders of the defined types were placed in this encounter.      History of Present Illness    Carmen Wang is a 59 y.o. female returns in follow-up after cervical radiofrequency ablation.  She states that she is feeling much better and rates the pain as a 1/10.  She also had lumbar radiofrequency performed March of last year and continues with relief there but some occasional pain.    I have asked her to follow-up with us on an as-needed basis and if the pain starts becoming more of a consistent daily type pain rated as a 5/10 we can consider repeating the lumbar procedure.  She understands we need to wait at least 6 months from the cervical procedure.    I have personally reviewed and/or updated the patient's past medical history, past surgical history, family history, social history, current medications, allergies, and vital signs today.     Review of Systems   Constitutional:  Negative for chills and fever.   HENT:  Negative for ear pain, hearing loss, sinus pain and sore throat.    Eyes:  Negative for pain and redness.   Respiratory:  Negative for shortness of breath and wheezing.    Cardiovascular:  Negative for palpitations and leg swelling.   Gastrointestinal:  Negative for abdominal pain, constipation, nausea and vomiting.   Endocrine: Negative for polydipsia and polyuria.    Genitourinary:  Negative for difficulty urinating and hematuria.   Musculoskeletal:  Positive for myalgias and neck pain. Negative for gait problem, joint swelling and neck stiffness.   Skin:  Negative for rash.   Neurological:  Positive for headaches. Negative for dizziness, weakness and numbness.   Psychiatric/Behavioral:  Negative for confusion and sleep disturbance. The patient is not nervous/anxious.        Patient Active Problem List   Diagnosis    Mixed hyperlipidemia    History of pulmonary embolism    Tobacco abuse    Hyperplastic colonic polyp    Seasonal allergies    Primary osteoarthritis involving multiple joints    Major depressive disorder, recurrent, severe without psychotic features ([facility])    Alcohol use disorder, moderate, dependence ([facility])    History of suicidal ideation    Diverticulosis of colon    Chronic bilateral low back pain with left-sided sciatica    Numbness and tingling of right arm    Facet hypertrophy of lumbosacral region    Chronic pain syndrome    Lumbar spondylosis    Lumbar radiculopathy    Bunion of great toe    Skin lesion    Vitamin D deficiency    S/P total left hip arthroplasty    Pulmonary emphysema ([facility])    Homeless    Cervical spondylosis    Acute pain of left shoulder    Chronic left hip pain    Primary osteoarthritis of left shoulder       Past Medical History:   Diagnosis Date    Abnormal Pap smear of cervix     Alcoholism ([facility])     Anemia     Anxiety     Asthma     Once was told I had COPD but pcp ordered lung scan that show    Bell's palsy     COPD (chronic obstructive pulmonary disease) ([facility])     Depression     Fractures 2018    Headache(784.0)     I always had had headache problems    Low back pain 1990    Menopause     Mild persistent asthma without complication 10/09/2018    Miscarriage     2    Obesity     Primary osteoarthritis involving multiple joints 10/09/2018       Past Surgical History:   Procedure Laterality Date    ANKLE SURGERY      BREAST BIOPSY Right  12 yrs ago    2012     SECTION      COLONOSCOPY      COLONOSCOPY W/ POLYPECTOMY      DILATION AND CURETTAGE, DIAGNOSTIC / THERAPEUTIC      FOOT SURGERY      JOINT REPLACEMENT  2014    NOSE SURGERY      TOTAL HIP ARTHROPLASTY Left     TUBAL LIGATION         Family History   Problem Relation Age of Onset    Mental illness Mother     Suicidality Mother     No Known Problems Father     No Known Problems Maternal Grandmother     No Known Problems Maternal Grandfather     No Known Problems Paternal Grandmother     Mental illness Brother     Suicidality Brother     No Known Problems Maternal Aunt     Parkinsonism Paternal Aunt     No Known Problems Paternal Aunt     No Known Problems Paternal Aunt     No Known Problems Paternal Aunt     Cancer Family     Breast cancer Neg Hx        Social History     Occupational History    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: [employer]     Comment: PT / Waiver Services   Tobacco Use    Smoking status: Every Day     Current packs/day: 1.00     Average packs/day: 1 pack/day for 46.2 years (46.2 ttl pk-yrs)     Types: Cigarettes     Start date:      Passive exposure: Current    Smokeless tobacco: Never    Tobacco comments:     cut down to 1 ppd  and using lozenges   Vaping Use    Vaping status: Every Day    Substances: Nicotine, Flavoring   Substance and Sexual Activity    Alcohol use: Not Currently    Drug use: Not Currently     Types: Marijuana, Cocaine    Sexual activity: Not Currently     Partners: Male     Birth control/protection: None       Current Outpatient Medications on File Prior to Visit   Medication Sig    albuterol (PROVENTIL HFA,VENTOLIN HFA) 90 mcg/act inhaler inhale 2 puffs by mouth and INTO THE LUNGS every 6 hours if needed for wheezing    Allergy Relief 180 MG tablet take 1 tablet by mouth once daily    ARIPiprazole (ABILIFY) 5 mg tablet Take 1 tablet (5 mg total) by mouth daily    atorvastatin (LIPITOR) 40 mg tablet take 1 tablet by mouth  "once daily    busPIRone (BUSPAR) 5 mg tablet Take 5 mg by mouth 2 (two) times a day    cholecalciferol (VITAMIN D3) 1,000 units tablet take 1 tablet by mouth daily    cyanocobalamin (VITAMIN B-12) 1000 MCG tablet Take 1 tablet (1,000 mcg total) by mouth daily    fluticasone (FLONASE) 50 mcg/act nasal spray instill 1 spray into each nostril once daily    magnesium Oxide (MAG-OX) 400 mg TABS Take 1 tablet (400 mg total) by mouth daily    methylPREDNISolone 4 MG tablet therapy pack Use as directed on package    montelukast (SINGULAIR) 10 mg tablet Take 1 tablet (10 mg total) by mouth daily at bedtime    multivitamin (THERAGRAN) TABS Take 1 tablet by mouth daily    pregabalin (LYRICA) 75 mg capsule Take 1 capsule (75 mg total) by mouth daily as needed (pain in hands)    sertraline (ZOLOFT) 50 mg tablet Take 1 tablet (50 mg total) by mouth daily at bedtime    Spiriva Respimat 1.25 MCG/ACT AERS inhaler inhale 2 puffs by mouth and INTO THE LUNGS once daily    topiramate (TOPAMAX) 50 MG tablet take 1 tablet by mouth every 12 hours    cyclobenzaprine (FLEXERIL) 10 mg tablet Take 1 tablet (10 mg total) by mouth 2 (two) times a day as needed for muscle spasms (Patient not taking: Reported on 7/30/2024)    Diclofenac Sodium (VOLTAREN) 1 % apply 2 grams topically four times a day (Patient not taking: Reported on 3/27/2025)     No current facility-administered medications on file prior to visit.       Allergies   Allergen Reactions    Pollen Extract        Physical Exam    Ht 5' 5\" (1.651 m)   Wt 83.9 kg (185 lb)   LMP  (LMP Unknown)   BMI 30.79 kg/m²     Constitutional: normal, well developed, well nourished, alert, in no distress and non-toxic and no overt pain behavior.  Eyes: anicteric  HEENT: grossly intact  Neck: supple, symmetric, trachea midline and no masses   Pulmonary:even and unlabored  Cardiovascular:No edema or pitting edema present  Skin:Normal without rashes or lesions and well hydrated  Psychiatric:Mood and " affect appropriate  Neurologic:Cranial Nerves II-XII grossly intact  Musculoskeletal:normal    Imaging

## 2025-04-29 ENCOUNTER — OFFICE VISIT (OUTPATIENT)
Dept: FAMILY MEDICINE CLINIC | Facility: CLINIC | Age: 60
End: 2025-04-29

## 2025-04-29 ENCOUNTER — APPOINTMENT (OUTPATIENT)
Dept: LAB | Facility: CLINIC | Age: 60
End: 2025-04-29
Payer: COMMERCIAL

## 2025-04-29 VITALS
OXYGEN SATURATION: 97 % | SYSTOLIC BLOOD PRESSURE: 114 MMHG | TEMPERATURE: 98.4 F | HEART RATE: 96 BPM | RESPIRATION RATE: 18 BRPM | BODY MASS INDEX: 33.66 KG/M2 | WEIGHT: 202 LBS | DIASTOLIC BLOOD PRESSURE: 80 MMHG | HEIGHT: 65 IN

## 2025-04-29 DIAGNOSIS — Z12.2 ENCOUNTER FOR SCREENING FOR LUNG CANCER: ICD-10-CM

## 2025-04-29 DIAGNOSIS — Z12.11 SCREENING FOR COLORECTAL CANCER: ICD-10-CM

## 2025-04-29 DIAGNOSIS — E66.9 OBESITY (BMI 30-39.9): ICD-10-CM

## 2025-04-29 DIAGNOSIS — E55.9 VITAMIN D INSUFFICIENCY: Primary | ICD-10-CM

## 2025-04-29 DIAGNOSIS — Z72.0 TOBACCO ABUSE: ICD-10-CM

## 2025-04-29 DIAGNOSIS — J43.9 PULMONARY EMPHYSEMA, UNSPECIFIED EMPHYSEMA TYPE (HCC): ICD-10-CM

## 2025-04-29 DIAGNOSIS — E55.9 VITAMIN D INSUFFICIENCY: ICD-10-CM

## 2025-04-29 DIAGNOSIS — E78.2 MIXED HYPERLIPIDEMIA: ICD-10-CM

## 2025-04-29 DIAGNOSIS — E53.8 VITAMIN B12 DEFICIENCY: ICD-10-CM

## 2025-04-29 DIAGNOSIS — Z12.12 SCREENING FOR COLORECTAL CANCER: ICD-10-CM

## 2025-04-29 DIAGNOSIS — M54.42 CHRONIC BILATERAL LOW BACK PAIN WITH LEFT-SIDED SCIATICA: ICD-10-CM

## 2025-04-29 DIAGNOSIS — G89.29 CHRONIC BILATERAL LOW BACK PAIN WITH LEFT-SIDED SCIATICA: ICD-10-CM

## 2025-04-29 PROBLEM — M25.552 CHRONIC LEFT HIP PAIN: Status: RESOLVED | Noted: 2024-06-12 | Resolved: 2025-04-29

## 2025-04-29 PROBLEM — M47.817 FACET HYPERTROPHY OF LUMBOSACRAL REGION: Status: RESOLVED | Noted: 2021-12-21 | Resolved: 2025-04-29

## 2025-04-29 PROBLEM — M47.816 LUMBAR SPONDYLOSIS: Status: RESOLVED | Noted: 2021-12-21 | Resolved: 2025-04-29

## 2025-04-29 PROBLEM — M54.16 LUMBAR RADICULOPATHY: Status: RESOLVED | Noted: 2022-03-09 | Resolved: 2025-04-29

## 2025-04-29 LAB
25(OH)D3 SERPL-MCNC: 29.6 NG/ML (ref 30–100)
ALBUMIN SERPL BCG-MCNC: 4.5 G/DL (ref 3.5–5)
ALP SERPL-CCNC: 79 U/L (ref 34–104)
ALT SERPL W P-5'-P-CCNC: 19 U/L (ref 7–52)
ANION GAP SERPL CALCULATED.3IONS-SCNC: 8 MMOL/L (ref 4–13)
AST SERPL W P-5'-P-CCNC: 17 U/L (ref 13–39)
BASOPHILS # BLD AUTO: 0.06 THOUSANDS/ÂΜL (ref 0–0.1)
BASOPHILS NFR BLD AUTO: 1 % (ref 0–1)
BILIRUB SERPL-MCNC: 0.5 MG/DL (ref 0.2–1)
BUN SERPL-MCNC: 14 MG/DL (ref 5–25)
CALCIUM SERPL-MCNC: 9.4 MG/DL (ref 8.4–10.2)
CHLORIDE SERPL-SCNC: 109 MMOL/L (ref 96–108)
CHOLEST SERPL-MCNC: 197 MG/DL (ref ?–200)
CO2 SERPL-SCNC: 24 MMOL/L (ref 21–32)
CREAT SERPL-MCNC: 1 MG/DL (ref 0.6–1.3)
EOSINOPHIL # BLD AUTO: 0.72 THOUSAND/ÂΜL (ref 0–0.61)
EOSINOPHIL NFR BLD AUTO: 9 % (ref 0–6)
ERYTHROCYTE [DISTWIDTH] IN BLOOD BY AUTOMATED COUNT: 15.6 % (ref 11.6–15.1)
EST. AVERAGE GLUCOSE BLD GHB EST-MCNC: 134 MG/DL
GFR SERPL CREATININE-BSD FRML MDRD: 61 ML/MIN/1.73SQ M
GLUCOSE P FAST SERPL-MCNC: 103 MG/DL (ref 65–99)
HBA1C MFR BLD: 6.3 %
HCT VFR BLD AUTO: 44 % (ref 34.8–46.1)
HDLC SERPL-MCNC: 43 MG/DL
HGB BLD-MCNC: 13.4 G/DL (ref 11.5–15.4)
IMM GRANULOCYTES # BLD AUTO: 0.04 THOUSAND/UL (ref 0–0.2)
IMM GRANULOCYTES NFR BLD AUTO: 1 % (ref 0–2)
LDLC SERPL CALC-MCNC: 87 MG/DL (ref 0–100)
LYMPHOCYTES # BLD AUTO: 1.98 THOUSANDS/ÂΜL (ref 0.6–4.47)
LYMPHOCYTES NFR BLD AUTO: 24 % (ref 14–44)
MCH RBC QN AUTO: 28.9 PG (ref 26.8–34.3)
MCHC RBC AUTO-ENTMCNC: 30.5 G/DL (ref 31.4–37.4)
MCV RBC AUTO: 95 FL (ref 82–98)
MONOCYTES # BLD AUTO: 0.5 THOUSAND/ÂΜL (ref 0.17–1.22)
MONOCYTES NFR BLD AUTO: 6 % (ref 4–12)
NEUTROPHILS # BLD AUTO: 4.82 THOUSANDS/ÂΜL (ref 1.85–7.62)
NEUTS SEG NFR BLD AUTO: 59 % (ref 43–75)
NONHDLC SERPL-MCNC: 154 MG/DL
NRBC BLD AUTO-RTO: 0 /100 WBCS
PLATELET # BLD AUTO: 328 THOUSANDS/UL (ref 149–390)
PMV BLD AUTO: 10.7 FL (ref 8.9–12.7)
POTASSIUM SERPL-SCNC: 4 MMOL/L (ref 3.5–5.3)
PROT SERPL-MCNC: 7.3 G/DL (ref 6.4–8.4)
RBC # BLD AUTO: 4.64 MILLION/UL (ref 3.81–5.12)
SODIUM SERPL-SCNC: 141 MMOL/L (ref 135–147)
TRIGL SERPL-MCNC: 337 MG/DL (ref ?–150)
TSH SERPL DL<=0.05 MIU/L-ACNC: 0.85 UIU/ML (ref 0.45–4.5)
VIT B12 SERPL-MCNC: 684 PG/ML (ref 180–914)
WBC # BLD AUTO: 8.12 THOUSAND/UL (ref 4.31–10.16)

## 2025-04-29 PROCEDURE — 85025 COMPLETE CBC W/AUTO DIFF WBC: CPT

## 2025-04-29 PROCEDURE — 82306 VITAMIN D 25 HYDROXY: CPT

## 2025-04-29 PROCEDURE — 36415 COLL VENOUS BLD VENIPUNCTURE: CPT

## 2025-04-29 PROCEDURE — 80053 COMPREHEN METABOLIC PANEL: CPT

## 2025-04-29 PROCEDURE — 80061 LIPID PANEL: CPT

## 2025-04-29 PROCEDURE — 84443 ASSAY THYROID STIM HORMONE: CPT

## 2025-04-29 PROCEDURE — 83036 HEMOGLOBIN GLYCOSYLATED A1C: CPT

## 2025-04-29 PROCEDURE — 82607 VITAMIN B-12: CPT

## 2025-04-29 PROCEDURE — 99214 OFFICE O/P EST MOD 30 MIN: CPT | Performed by: NURSE PRACTITIONER

## 2025-04-29 RX ORDER — BUSPIRONE HYDROCHLORIDE 10 MG/1
1 TABLET ORAL 2 TIMES DAILY
COMMUNITY
Start: 2025-04-10

## 2025-04-29 RX ORDER — PRAZOSIN HYDROCHLORIDE 1 MG/1
CAPSULE ORAL
COMMUNITY
Start: 2025-04-10

## 2025-04-29 RX ORDER — SERTRALINE HYDROCHLORIDE 100 MG/1
TABLET, FILM COATED ORAL
COMMUNITY
Start: 2025-04-23

## 2025-04-29 NOTE — ASSESSMENT & PLAN NOTE
Lab Results   Component Value Date    CHOLESTEROL 209 (H) 07/29/2024    CHOLESTEROL 152 01/27/2024    CHOLESTEROL 252 (H) 11/30/2022     Lab Results   Component Value Date    HDL 54 07/29/2024    HDL 43 (L) 01/27/2024    HDL 53 11/30/2022     Lab Results   Component Value Date    TRIG 454 (H) 07/29/2024    TRIG 207 (H) 01/27/2024    TRIG 309 (H) 11/30/2022     Lab Results   Component Value Date    NONHDLC 155 07/29/2024    NONHDLC 109 01/27/2024    NONHDLC 199 11/30/2022     Lab Results   Component Value Date    LDLCALC  07/29/2024      Comment:      Calculated LDL invalid, triglycerides >400 mg/dl  This screening LDL is a calculated result.   It does not have the accuracy of the Direct Measured LDL in the monitoring of patients with hyperlipidemia and/or statin therapy.   Direct Measure LDL ([id number]) must be ordered separately in these patients.     Repeat lipid panel   Continue atorvastatin 40 mg daily     Orders:    Lipid panel; Future    Comprehensive metabolic panel; Future

## 2025-04-29 NOTE — PROGRESS NOTES
Name: Carmen Wang      : 1965      MRN: [id number]  Encounter Provider: MICHELLE Franco  Encounter Date: 2025   Encounter department: [facility]  :  Assessment & Plan  Vitamin D insufficiency    Orders:    Vitamin D 25 hydroxy; Future    Mixed hyperlipidemia  Lab Results   Component Value Date    CHOLESTEROL 209 (H) 2024    CHOLESTEROL 152 2024    CHOLESTEROL 252 (H) 2022     Lab Results   Component Value Date    HDL 54 2024    HDL 43 (L) 2024    HDL 53 2022     Lab Results   Component Value Date    TRIG 454 (H) 2024    TRIG 207 (H) 2024    TRIG 309 (H) 2022     Lab Results   Component Value Date    NONHDLC 155 2024    NONHDLC 109 2024    NONHDLC 199 2022     Lab Results   Component Value Date    LDLCALC  2024      Comment:      Calculated LDL invalid, triglycerides >400 mg/dl  This screening LDL is a calculated result.   It does not have the accuracy of the Direct Measured LDL in the monitoring of patients with hyperlipidemia and/or statin therapy.   Direct Measure LDL (CHD860) must be ordered separately in these patients.     Repeat lipid panel   Continue atorvastatin 40 mg daily     Orders:    Lipid panel; Future    Comprehensive metabolic panel; Future    Vitamin B12 deficiency    Orders:    Vitamin B12; Future    Obesity (BMI 30-39.9)  Prior Authorization Clinical Questions for Weight Management Pharmacotherapy    1. Does the patient have a contrainidcation to medication prescribed for weight management?: No  2. Does the patient have a diagnosis of obesity, confirmed by a BMI greater than or equal to 30 kg/m^2?: Yes  3. Does the patient have a BMI of greater than or equal to 27 kg/m^2 with at least one weight-related comorbidity/risk factor/complication (e.g. diabetes, dyslipidemia, coronary artery disease)?: Yes  4. Weight-related co-morbidities/risk factors:  dyslipidemia, hypertension, GERD, depression, asthma/reactive airway disease  7. Has the patient been on a weight loss regimen of low-calorie diet, increased physical activity, and lifestyle modifications for a minimum of 6 months?: Yes  8. Has the patient completed a comprehensive weight loss program (ie, Weight Watchers, Noom, Bariatrics, other freddy on phone)? If so, what?: No  9. Does the patient have a history of type 2 diabetes?: No  10. Has the member tried and failed other weight loss medication within the past 12 months?: No  11. Will the member use requested medication in combination with another GLP agonist or weight loss drug?: No  12. Is the medication a controlled substance?: No     Baseline weight (in pounds): 202 lbs       Discussed starting medication to assist with weight loss  Based on past medical hx and current treatments GLP-1 would be appropriate  Discussed starting Wegovy, discussed expected outcomes and reviewed possible side effects of medication      Orders:    Vitamin D 25 hydroxy; Future    Lipid panel; Future    Comprehensive metabolic panel; Future    CBC and differential; Future    TSH, 3rd generation with Free T4 reflex; Future    Hemoglobin A1C; Future    Pulmonary emphysema, unspecified emphysema type (HCC)  Stable, continue with Spiriva and albuterol PRN          Chronic bilateral low back pain with left-sided sciatica  Following with pain management            Tobacco abuse  Currently vaping  Continue harm reduction/ cessation education          Screening for colorectal cancer    Orders:    Cologuard    Encounter for screening for lung cancer  I discussed with her that she is a candidate for lung cancer CT screening.     The following Shared Decision-Making points were covered:  Benefits of screening were discussed, including the rates of reduction in death from lung cancer and other causes.  Harms of screening were reviewed, including false positive tests, radiation exposure  levels, risks of invasive procedures, risks of complications of screening, and risk of overdiagnosis.  I counseled on the importance of adherence to annual lung cancer LDCT screening, impact of co-morbidities, and ability or willingness to undergo diagnosis and treatment.  I counseled on the importance of maintaining abstinence as a former smoker or was counseled on the importance of smoking cessation if a current smoker    Review of Eligibility Criteria: She meets all of the criteria for Lung Cancer Screening.   She is 59 y.o.   She has 20 pack year tobacco history and is a current smoker or has quit within the past 15 years  She presents no signs or symptoms of lung cancer    After discussion, the patient decided to elect lung cancer screening.    Orders:    CT lung screening program; Future        BMI Counseling: Body mass index is 33.61 kg/m². The BMI is above normal. Nutrition recommendations include decreasing portion sizes, encouraging healthy choices of fruits and vegetables, consuming healthier snacks and limiting drinks that contain sugar. Exercise recommendations include exercising 3-5 times per week. Rationale for BMI follow-up plan is due to patient being overweight or obese.     Lung Cancer Screening Shared Decision Making: I discussed with her that she is a candidate for lung cancer CT screening.     The following Shared Decision-Making points were covered:  Benefits of screening were discussed, including the rates of reduction in death from lung cancer and other causes.  Harms of screening were reviewed, including false positive tests, radiation exposure levels, risks of invasive procedures, risks of complications of screening, and risk of overdiagnosis.  I counseled on the importance of adherence to annual lung cancer LDCT screening, impact of co-morbidities, and ability or willingness to undergo diagnosis and treatment.  I counseled on the importance of maintaining abstinence as a former smoker or  "was counseled on the importance of smoking cessation if a current smoker    Review of Eligibility Criteria: She meets all of the criteria for Lung Cancer Screening.   - She is 59 y.o.   - She has 20 pack year tobacco history and is a current smoker or has quit within the past 15 years  - She presents no signs or symptoms of lung cancer    After discussion, the patient decided to elect lung cancer screening.      History of Present Illness   Patient is a 59 year old female who  has a past medical history of Abnormal Pap smear of cervix, Anxiety, Bell's palsy, COPD (chronic obstructive pulmonary disease) (HCC), Depression, Menopause, Mild persistent asthma without complication (10/9/2018), Obesity, and Primary osteoarthritis involving multiple joints (10/9/2018) who presents today for follow up.    Patient reports that she is interested in medication assisted weight loss. She has not had success with diet and exercise alone. She is stable with  and continues to follow with psychiatry at [facility].       The following portions of the patient's history were reviewed and updated as appropriate: allergies, current medications, past family history, past medical history, past social history, past surgical history and problem list.          Review of Systems   Constitutional: Negative.    HENT: Negative.     Eyes: Negative.    Respiratory: Negative.     Cardiovascular: Negative.    Gastrointestinal: Negative.    Endocrine: Negative.    Genitourinary: Negative.    Musculoskeletal: Negative.    Skin: Negative.    Allergic/Immunologic: Negative.    Neurological: Negative.    Hematological: Negative.    Psychiatric/Behavioral: Negative.         Objective   /80 (BP Location: Right arm, Patient Position: Sitting, Cuff Size: Standard)   Pulse 96   Temp 98.4 °F (36.9 °C) (Temporal)   Resp 18   Ht 5' 5\" (1.651 m)   Wt 91.6 kg (202 lb)   LMP  (LMP Unknown)   SpO2 97%   BMI 33.61 kg/m²      Physical " Exam  Constitutional:       Appearance: Normal appearance.   HENT:      Head: Normocephalic and atraumatic.      Nose: Nose normal.      Mouth/Throat:      Mouth: Mucous membranes are moist.      Pharynx: Oropharynx is clear.   Eyes:      Extraocular Movements: Extraocular movements intact.      Conjunctiva/sclera: Conjunctivae normal.      Pupils: Pupils are equal, round, and reactive to light.   Cardiovascular:      Rate and Rhythm: Normal rate and regular rhythm.      Pulses: Normal pulses.      Heart sounds: Normal heart sounds.   Pulmonary:      Effort: Pulmonary effort is normal.      Breath sounds: Normal breath sounds.   Musculoskeletal:         General: Normal range of motion.      Cervical back: Normal range of motion.   Skin:     General: Skin is warm and dry.      Capillary Refill: Capillary refill takes less than 2 seconds.   Neurological:      Mental Status: She is alert and oriented to person, place, and time. Mental status is at baseline.   Psychiatric:         Mood and Affect: Mood normal.         Behavior: Behavior normal.         Thought Content: Thought content normal.         Judgment: Judgment normal.

## 2025-04-29 NOTE — ASSESSMENT & PLAN NOTE
Prior Authorization Clinical Questions for Weight Management Pharmacotherapy    1. Does the patient have a contrainidcation to medication prescribed for weight management?: No  2. Does the patient have a diagnosis of obesity, confirmed by a BMI greater than or equal to 30 kg/m^2?: Yes  3. Does the patient have a BMI of greater than or equal to 27 kg/m^2 with at least one weight-related comorbidity/risk factor/complication (e.g. diabetes, dyslipidemia, coronary artery disease)?: Yes  4. Weight-related co-morbidities/risk factors: dyslipidemia, hypertension, GERD, depression, asthma/reactive airway disease  7. Has the patient been on a weight loss regimen of low-calorie diet, increased physical activity, and lifestyle modifications for a minimum of 6 months?: Yes  8. Has the patient completed a comprehensive weight loss program (ie, Weight Watchers, Noom, Bariatrics, other freddy on phone)? If so, what?: No  9. Does the patient have a history of type 2 diabetes?: No  10. Has the member tried and failed other weight loss medication within the past 12 months?: No  11. Will the member use requested medication in combination with another GLP agonist or weight loss drug?: No  12. Is the medication a controlled substance?: No     Baseline weight (in pounds): 202 lbs       Discussed starting medication to assist with weight loss  Based on past medical hx and current treatments GLP-1 would be appropriate  Discussed starting Wegovy, discussed expected outcomes and reviewed possible side effects of medication      Orders:    Vitamin D 25 hydroxy; Future    Lipid panel; Future    Comprehensive metabolic panel; Future    CBC and differential; Future    TSH, 3rd generation with Free T4 reflex; Future    Hemoglobin A1C; Future

## 2025-04-30 ENCOUNTER — RESULTS FOLLOW-UP (OUTPATIENT)
Dept: FAMILY MEDICINE CLINIC | Facility: CLINIC | Age: 60
End: 2025-04-30

## 2025-05-02 DIAGNOSIS — J30.2 SEASONAL ALLERGIES: ICD-10-CM

## 2025-05-02 RX ORDER — FEXOFENADINE HYDROCHLORIDE 180 MG/1
180 TABLET, FILM COATED ORAL DAILY
Qty: 90 TABLET | Refills: 0 | Status: SHIPPED | OUTPATIENT
Start: 2025-05-02

## 2025-05-04 LAB — COLOGUARD RESULT REPORTABLE: NORMAL

## 2025-05-06 DIAGNOSIS — E53.8 VITAMIN B12 DEFICIENCY: ICD-10-CM

## 2025-05-12 DIAGNOSIS — F33.2 MAJOR DEPRESSIVE DISORDER, RECURRENT, SEVERE WITHOUT PSYCHOTIC FEATURES (HCC): Chronic | ICD-10-CM

## 2025-05-12 DIAGNOSIS — E55.9 VITAMIN D INSUFFICIENCY: ICD-10-CM

## 2025-05-12 DIAGNOSIS — G43.919 INTRACTABLE MIGRAINE WITHOUT STATUS MIGRAINOSUS, UNSPECIFIED MIGRAINE TYPE: ICD-10-CM

## 2025-05-12 RX ORDER — CHOLECALCIFEROL (VITAMIN D3) 25 MCG
1000 TABLET ORAL DAILY
Qty: 90 TABLET | Refills: 3 | Status: SHIPPED | OUTPATIENT
Start: 2025-05-12

## 2025-05-12 RX ORDER — SERTRALINE HYDROCHLORIDE 100 MG/1
100 TABLET, FILM COATED ORAL DAILY
Qty: 90 TABLET | Refills: 1 | Status: SHIPPED | OUTPATIENT
Start: 2025-05-12

## 2025-05-12 RX ORDER — TOPIRAMATE 50 MG/1
50 TABLET, FILM COATED ORAL EVERY 12 HOURS
Qty: 180 TABLET | Refills: 2 | Status: SHIPPED | OUTPATIENT
Start: 2025-05-12

## 2025-05-12 RX ORDER — ARIPIPRAZOLE 5 MG/1
5 TABLET ORAL DAILY
Qty: 30 TABLET | Refills: 2 | Status: SHIPPED | OUTPATIENT
Start: 2025-05-12

## 2025-05-12 NOTE — TELEPHONE ENCOUNTER
Patient request to send all her medication to a different pharmacy.    [facility] [address]     Except :   ARIPiprazole (ABILIFY) 5 mg tablet     cholecalciferol (VITAMIN D3) 1,000 units tablet     topiramate (TOPAMAX) 50 MG tablet       sertraline (ZOLOFT) 100 mg tablet

## 2025-05-13 ENCOUNTER — TELEPHONE (OUTPATIENT)
Dept: FAMILY MEDICINE CLINIC | Facility: CLINIC | Age: 60
End: 2025-05-13

## 2025-05-13 ENCOUNTER — TELEMEDICINE (OUTPATIENT)
Dept: FAMILY MEDICINE CLINIC | Facility: CLINIC | Age: 60
End: 2025-05-13

## 2025-05-13 DIAGNOSIS — G89.4 CHRONIC PAIN SYNDROME: ICD-10-CM

## 2025-05-13 DIAGNOSIS — F33.2 MAJOR DEPRESSIVE DISORDER, RECURRENT, SEVERE WITHOUT PSYCHOTIC FEATURES (HCC): ICD-10-CM

## 2025-05-13 DIAGNOSIS — E66.9 OBESITY (BMI 30-39.9): Primary | ICD-10-CM

## 2025-05-13 DIAGNOSIS — E78.00 HYPERCHOLESTEREMIA: ICD-10-CM

## 2025-05-13 DIAGNOSIS — R73.03 PREDIABETES: ICD-10-CM

## 2025-05-13 DIAGNOSIS — E78.2 MIXED HYPERLIPIDEMIA: ICD-10-CM

## 2025-05-13 PROCEDURE — 99213 OFFICE O/P EST LOW 20 MIN: CPT | Performed by: NURSE PRACTITIONER

## 2025-05-13 RX ORDER — OMEGA-3-ACID ETHYL ESTERS 1 G/1
2 CAPSULE, LIQUID FILLED ORAL 2 TIMES DAILY
Qty: 180 CAPSULE | Refills: 3 | Status: SHIPPED | OUTPATIENT
Start: 2025-05-13

## 2025-05-13 NOTE — PROGRESS NOTES
Virtual Regular VisitName: Carmen Wang      : 1965      MRN: [id number]  Encounter Provider: MICHELLE Franco  Encounter Date: 2025   Encounter department: [facility]  :  Assessment & Plan  Obesity (BMI 30-39.9)  Prior Authorization Clinical Questions for Weight Management Pharmacotherapy    1. Does the patient have a contrainidcation to medication prescribed for weight management?: No  2. Does the patient have a diagnosis of obesity, confirmed by a BMI greater than or equal to 30 kg/m^2?: Yes  3. Does the patient have a BMI of greater than or equal to 27 kg/m^2 with at least one weight-related comorbidity/risk factor/complication (e.g. diabetes, dyslipidemia, coronary artery disease)?: Yes  4. Weight-related co-morbidities/risk factors: prediabetes, metabolic syndrome, dyslipidemia, depression  7. Has the patient been on a weight loss regimen of low-calorie diet, increased physical activity, and lifestyle modifications for a minimum of 6 months?: Yes  8. Has the patient completed a comprehensive weight loss program (ie, Weight Watchers, Noom, Bariatrics, other freddy on phone)? If so, what?: No  9. Does the patient have a history of type 2 diabetes?: No  10. Has the member tried and failed other weight loss medication within the past 12 months?: No  11. Will the member use requested medication in combination with another GLP agonist or weight loss drug?: No  12. Is the medication a controlled substance?: No     Baseline weight (in pounds): 202 lbs       Discussed medication assisted weight loss, patient is a good candidate for semaglutide   Patient would like to start, discussed expected outcomes and reviewed possible side effects of medication  Follow up 4 weeks after starting medication     Orders:    Semaglutide-Weight Management (WEGOVY) 0.25 MG/0.5ML; Inject 0.5 mL (0.25 mg total) under the skin once a week    Mixed hyperlipidemia    Orders:     Semaglutide-Weight Management (WEGOVY) 0.25 MG/0.5ML; Inject 0.5 mL (0.25 mg total) under the skin once a week    Chronic pain syndrome    Orders:    Semaglutide-Weight Management (WEGOVY) 0.25 MG/0.5ML; Inject 0.5 mL (0.25 mg total) under the skin once a week    Hypercholesteremia  Reviewed results     Lab Results   Component Value Date    CHOLESTEROL 197 04/29/2025    CHOLESTEROL 209 (H) 07/29/2024    CHOLESTEROL 152 01/27/2024     Lab Results   Component Value Date    HDL 43 (L) 04/29/2025    HDL 54 07/29/2024    HDL 43 (L) 01/27/2024     Lab Results   Component Value Date    TRIG 337 (H) 04/29/2025    TRIG 454 (H) 07/29/2024    TRIG 207 (H) 01/27/2024     Lab Results   Component Value Date    NONHDLC 154 04/29/2025    NONHDLC 155 07/29/2024    NONHDLC 109 01/27/2024     Lab Results   Component Value Date    LDLCALC 87 04/29/2025       Orders:    Semaglutide-Weight Management (WEGOVY) 0.25 MG/0.5ML; Inject 0.5 mL (0.25 mg total) under the skin once a week    Prediabetes  Lab Results   Component Value Date    HGBA1C 6.3 (H) 04/29/2025     Reviewed diet/ lifestyle modifications   Starting semaglutide will also help   Orders:    Semaglutide-Weight Management (WEGOVY) 0.25 MG/0.5ML; Inject 0.5 mL (0.25 mg total) under the skin once a week    Major depressive disorder, recurrent, severe without psychotic features (HCC)    Orders:    Semaglutide-Weight Management (WEGOVY) 0.25 MG/0.5ML; Inject 0.5 mL (0.25 mg total) under the skin once a week          History of Present Illness     Patient is a 59 year old female who  has a past medical history of Abnormal Pap smear of cervix, Anxiety, Bell's palsy, COPD (chronic obstructive pulmonary disease) (HCC), Depression, Menopause, Mild persistent asthma without complication (10/9/2018), Obesity, and Primary osteoarthritis involving multiple joints (10/9/2018) who presents today for virtual follow up.    Patient reports that she is interested in medication assisted weight loss.  She has not had success with diet and exercise alone. She is stable with  and continues to follow with psychiatry at [facility].       The following portions of the patient's history were reviewed and updated as appropriate: allergies, current medications, past family history, past medical history, past social history, past surgical history and problem list.          Review of Systems   Constitutional: Negative.    HENT: Negative.     Eyes: Negative.    Respiratory: Negative.     Cardiovascular: Negative.    Gastrointestinal: Negative.    Endocrine: Negative.    Genitourinary: Negative.    Musculoskeletal: Negative.    Skin: Negative.    Allergic/Immunologic: Negative.    Neurological: Negative.    Hematological: Negative.    Psychiatric/Behavioral: Negative.         Objective   LMP  (LMP Unknown)     Physical Exam  Constitutional:       General: She is not in acute distress.  Pulmonary:      Effort: No respiratory distress.   Neurological:      Mental Status: She is alert and oriented to person, place, and time.   Psychiatric:         Mood and Affect: Mood normal.         Behavior: Behavior normal.         Administrative Statements   Encounter provider MICHELLE Franco    The Patient is located at Home and in the following state in which I hold an active license [place].    The patient was identified by name and date of birth. Carmen Wang was informed that this is a telemedicine visit and that the visit is being conducted through the Epic Embedded platform. She agrees to proceed..  My office door was closed. No one else was in the room.  She acknowledged consent and understanding of privacy and security of the video platform. The patient has agreed to participate and understands they can discontinue the visit at any time.    I have spent a total time of 12 minutes in caring for this patient on the day of the visit/encounter including Risks and benefits of tx options, Instructions for  management, Patient and family education, Risk factor reductions, Impressions, and Counseling / Coordination of care, not including the time spent for establishing the audio/video connection.

## 2025-05-24 DIAGNOSIS — J43.9 PULMONARY EMPHYSEMA, UNSPECIFIED EMPHYSEMA TYPE (HCC): ICD-10-CM

## 2025-05-27 RX ORDER — ALBUTEROL SULFATE 90 UG/1
INHALANT RESPIRATORY (INHALATION)
Qty: 8.5 G | Refills: 0 | Status: SHIPPED | OUTPATIENT
Start: 2025-05-27

## 2025-05-30 DIAGNOSIS — J30.2 SEASONAL ALLERGIES: ICD-10-CM

## 2025-05-30 RX ORDER — FEXOFENADINE HYDROCHLORIDE 180 MG/1
180 TABLET, FILM COATED ORAL DAILY
Qty: 30 TABLET | Refills: 0 | Status: SHIPPED | OUTPATIENT
Start: 2025-05-30

## 2025-06-02 ENCOUNTER — PREP FOR PROCEDURE (OUTPATIENT)
Age: 60
End: 2025-06-02

## 2025-06-02 ENCOUNTER — TELEPHONE (OUTPATIENT)
Age: 60
End: 2025-06-02

## 2025-06-02 DIAGNOSIS — Z86.0100 HISTORY OF COLON POLYPS: Primary | ICD-10-CM

## 2025-06-02 NOTE — TELEPHONE ENCOUNTER
Scheduled date of colonoscopy (as of today): 6/19/25  Physician performing colonoscopy:    Location of colonoscopy: [place]   Bowel prep reviewed with patient: Miralax Dulcolax prep instructions reviewed and sent via email  Instructions reviewed with patient by: md  Clearances:   Aware of Wegovy hold

## 2025-06-02 NOTE — TELEPHONE ENCOUNTER
25  Screened by: Shantelle Jara MA    Referring Provider     Pre- Screenin'4  200 lbs   BMI  34.3  Has patient been referred for a routine screening Colonoscopy? yes  Is the patient between 45-75 years old? yes      Previous Colonoscopy yes   If yes:    Date: 21    Facility: [facility]    Reason: history of polyp      Does the patient want to see a Gastroenterologist prior to their procedure OR are they having any GI symptoms? no    Has the patient been hospitalized or had abdominal surgery in the past 6 months? no    Does the patient use supplemental oxygen? no    Does the patient take Coumadin, Lovenox, Plavix, Elliquis, Xarelto, or other blood thinning medication? no    Has the patient had a stroke, cardiac event, or stent placed in the past year? no      If patient is between 45yrs - 49yrs, please advise patient that we will have to confirm benefits & coverage with their insurance company for a routine screening colonoscopy.

## 2025-06-05 DIAGNOSIS — J43.9 PULMONARY EMPHYSEMA, UNSPECIFIED EMPHYSEMA TYPE (HCC): ICD-10-CM

## 2025-06-05 RX ORDER — ALBUTEROL SULFATE 90 UG/1
INHALANT RESPIRATORY (INHALATION)
Qty: 8.5 G | Refills: 0 | OUTPATIENT
Start: 2025-06-05

## 2025-06-11 DIAGNOSIS — E66.9 OBESITY (BMI 30-39.9): ICD-10-CM

## 2025-06-11 DIAGNOSIS — F33.2 MAJOR DEPRESSIVE DISORDER, RECURRENT, SEVERE WITHOUT PSYCHOTIC FEATURES (HCC): ICD-10-CM

## 2025-06-11 DIAGNOSIS — E78.2 MIXED HYPERLIPIDEMIA: ICD-10-CM

## 2025-06-11 DIAGNOSIS — E78.00 HYPERCHOLESTEREMIA: ICD-10-CM

## 2025-06-11 DIAGNOSIS — G89.4 CHRONIC PAIN SYNDROME: ICD-10-CM

## 2025-06-11 DIAGNOSIS — R73.03 PREDIABETES: ICD-10-CM

## 2025-06-19 ENCOUNTER — HOSPITAL ENCOUNTER (OUTPATIENT)
Dept: GASTROENTEROLOGY | Facility: HOSPITAL | Age: 60
Setting detail: OUTPATIENT SURGERY
End: 2025-06-19
Attending: INTERNAL MEDICINE
Payer: COMMERCIAL

## 2025-06-19 ENCOUNTER — ANESTHESIA EVENT (OUTPATIENT)
Dept: GASTROENTEROLOGY | Facility: HOSPITAL | Age: 60
End: 2025-06-19
Payer: COMMERCIAL

## 2025-06-19 ENCOUNTER — ANESTHESIA (OUTPATIENT)
Dept: GASTROENTEROLOGY | Facility: HOSPITAL | Age: 60
End: 2025-06-19
Payer: COMMERCIAL

## 2025-06-19 VITALS
RESPIRATION RATE: 16 BRPM | TEMPERATURE: 97.6 F | DIASTOLIC BLOOD PRESSURE: 65 MMHG | OXYGEN SATURATION: 95 % | HEART RATE: 52 BPM | SYSTOLIC BLOOD PRESSURE: 112 MMHG

## 2025-06-19 DIAGNOSIS — Z86.0100 HISTORY OF COLON POLYPS: ICD-10-CM

## 2025-06-19 PROCEDURE — 88305 TISSUE EXAM BY PATHOLOGIST: CPT | Performed by: PATHOLOGY

## 2025-06-19 PROCEDURE — 45385 COLONOSCOPY W/LESION REMOVAL: CPT | Performed by: INTERNAL MEDICINE

## 2025-06-19 RX ORDER — SODIUM CHLORIDE, SODIUM LACTATE, POTASSIUM CHLORIDE, CALCIUM CHLORIDE 600; 310; 30; 20 MG/100ML; MG/100ML; MG/100ML; MG/100ML
20 INJECTION, SOLUTION INTRAVENOUS CONTINUOUS
Status: DISCONTINUED | OUTPATIENT
Start: 2025-06-19 | End: 2025-06-23 | Stop reason: HOSPADM

## 2025-06-19 RX ORDER — PROPOFOL 10 MG/ML
INJECTION, EMULSION INTRAVENOUS AS NEEDED
Status: DISCONTINUED | OUTPATIENT
Start: 2025-06-19 | End: 2025-06-19

## 2025-06-19 RX ORDER — GLYCOPYRROLATE 0.2 MG/ML
INJECTION INTRAMUSCULAR; INTRAVENOUS AS NEEDED
Status: DISCONTINUED | OUTPATIENT
Start: 2025-06-19 | End: 2025-06-19

## 2025-06-19 RX ORDER — LIDOCAINE HYDROCHLORIDE 10 MG/ML
INJECTION, SOLUTION EPIDURAL; INFILTRATION; INTRACAUDAL; PERINEURAL AS NEEDED
Status: DISCONTINUED | OUTPATIENT
Start: 2025-06-19 | End: 2025-06-19

## 2025-06-19 RX ADMIN — PROPOFOL 100 MG: 10 INJECTION, EMULSION INTRAVENOUS at 13:02

## 2025-06-19 RX ADMIN — PROPOFOL 100 MG: 10 INJECTION, EMULSION INTRAVENOUS at 12:47

## 2025-06-19 RX ADMIN — GLYCOPYRROLATE 0.2 MCG: 0.2 INJECTION, SOLUTION INTRAMUSCULAR; INTRAVENOUS at 13:06

## 2025-06-19 RX ADMIN — PROPOFOL 100 MG: 10 INJECTION, EMULSION INTRAVENOUS at 12:57

## 2025-06-19 RX ADMIN — LIDOCAINE HYDROCHLORIDE 50 MG: 10 INJECTION, SOLUTION EPIDURAL; INFILTRATION; INTRACAUDAL at 12:47

## 2025-06-19 RX ADMIN — Medication 40 MG: at 13:02

## 2025-06-19 RX ADMIN — PROPOFOL 100 MG: 10 INJECTION, EMULSION INTRAVENOUS at 12:53

## 2025-06-19 RX ADMIN — SODIUM CHLORIDE, SODIUM LACTATE, POTASSIUM CHLORIDE, AND CALCIUM CHLORIDE 20 ML/HR: .6; .31; .03; .02 INJECTION, SOLUTION INTRAVENOUS at 11:07

## 2025-06-19 NOTE — H&P
History and Physical -  [facility]  Carmen Wang 59 y.o. female MRN: [id number]                  HPI: Carmen Wang is a 59 y.o. year old female who presents for CRC screening.      REVIEW OF SYSTEMS: Per the HPI, and otherwise unremarkable.    Historical Information   Past Medical History[1]  Past Surgical History[2]  Social History   Social History     Substance and Sexual Activity   Alcohol Use Not Currently     Social History     Substance and Sexual Activity   Drug Use Not Currently    Types: Marijuana, Cocaine     Tobacco Use History[3]  Family History[4]    Meds/Allergies     Not in a hospital admission.    Allergies[5]    Objective     Blood pressure 137/61, pulse 66, temperature 98.3 °F (36.8 °C), temperature source Temporal, resp. rate 18, SpO2 95%.      PHYSICAL EXAMINATION:    General Appearance:   Alert, cooperative, no distress   HEENT:  Normocephalic, atraumatic, anicteric. Neck supple, symmetrical, trachea midline.   Lungs:   Equal chest rise and unlabored breathing, normal effort, no coughing.   Cardiovascular:   No visualized JVD.   Abdomen:   No abdominal distension.   Skin:   No jaundice, rashes, or lesions.    Musculoskeletal:   Normal range of motion visualized.   Psych:  Normal affect and normal insight.   Neuro:  Alert and appropriate.           ASSESSMENT/PLAN:  This is a 59 y.o. year old female here for colonoscopy, and she is stable and optimized for her procedure.             [1]   Past Medical History:  Diagnosis Date    Abnormal Pap smear of cervix     Alcoholism (HCC)     Anemia     Anxiety     Asthma     Once was told I had COPD but pcp ordered lung scan that show    Bell's palsy     COPD (chronic obstructive pulmonary disease) (HCC)     Depression     Fractures 2018    Headache(784.0)     I always had had headache problems    Low back pain 1990    Menopause     Mild persistent asthma without complication 10/09/2018    Miscarriage     2    Obesity      Primary osteoarthritis involving multiple joints 10/09/2018   [2]   Past Surgical History:  Procedure Laterality Date    ANKLE SURGERY      BREAST BIOPSY Right 12 yrs ago    2012     SECTION      COLONOSCOPY      COLONOSCOPY W/ POLYPECTOMY      DILATION AND CURETTAGE, DIAGNOSTIC / THERAPEUTIC      FOOT SURGERY      JOINT REPLACEMENT  2014    NOSE SURGERY      TOTAL HIP ARTHROPLASTY Left     TUBAL LIGATION     [3]   Social History  Tobacco Use   Smoking Status Some Days    Current packs/day: 1.00    Average packs/day: 1 pack/day for 46.5 years (46.5 ttl pk-yrs)    Types: Cigarettes    Start date:     Passive exposure: Current   Smokeless Tobacco Never   Tobacco Comments    Last vape nicotine tuesday   [4]   Family History  Problem Relation Name Age of Onset    Mental illness Mother      Suicidality Mother      No Known Problems Father      No Known Problems Maternal Grandmother      No Known Problems Maternal Grandfather      No Known Problems Paternal Grandmother      Mental illness Brother      Suicidality Brother      No Known Problems Maternal Aunt jayda     Parkinsonism Paternal Aunt kip     No Known Problems Paternal Aunt mamta     No Known Problems Paternal Aunt na     No Known Problems Paternal Aunt michael     Cancer Family      Breast cancer Neg Hx     [5]   Allergies  Allergen Reactions    Pollen Extract

## 2025-06-19 NOTE — ANESTHESIA PREPROCEDURE EVALUATION
Procedure:  COLONOSCOPY    Relevant Problems   CARDIO   (+) Mixed hyperlipidemia      MUSCULOSKELETAL   (+) Chronic bilateral low back pain with left-sided sciatica   (+) Primary osteoarthritis involving multiple joints   (+) Primary osteoarthritis of left shoulder      NEURO/PSYCH   (+) Chronic bilateral low back pain with left-sided sciatica   (+) Chronic pain syndrome   (+) Major depressive disorder, recurrent, severe without psychotic features (HCC)   (+) Numbness and tingling of right arm      PULMONARY   (+) Pulmonary emphysema (HCC)      Behavioral Health   (+) Alcohol use disorder, moderate, dependence (HCC)   (+) History of suicidal ideation   (+) Tobacco abuse      Care Coordination   (+) Homeless      Orthopedic/Musculoskeletal   (+) S/P total left hip arthroplasty      FEN/Gastrointestinal   (+) Hyperplastic colonic polyp      Other   (+) History of pulmonary embolism   (+) Obesity (BMI 30-39.9)        Physical Exam    Airway     Mallampati score: II  TM Distance: >3 FB  Neck ROM: full      Cardiovascular  Cardiovascular exam normal    Dental   No notable dental hx     Pulmonary  Pulmonary exam normal     Neurological  - normal exam  She appears oriented x3.      Other Findings  post-pubertal.      Anesthesia Plan  ASA Score- 3     Anesthesia Type- IV sedation with anesthesia with ASA Monitors.         Additional Monitors:     Airway Plan:            Plan Factors-Exercise tolerance (METS): <4 METS.    Chart reviewed. EKG reviewed.  Existing labs reviewed. Patient summary reviewed.    Patient is a current smoker.  Patient instructed to abstain from smoking on day of procedure. Patient did not smoke on day of surgery.            Induction-     Postoperative Plan- .   Monitoring Plan - Monitoring plan - standard ASA monitoring      Perioperative Resuscitation Plan - Level 1 - Full Code.       Informed Consent- Anesthetic plan and risks discussed with patient.  I personally reviewed this patient with the  CRNA. Discussed and agreed on the Anesthesia Plan with the CRNA..      NPO Status:  Vitals Value Taken Time   Date of last liquid 06/19/25 06/19/25 11:00   Time of last liquid 0600 06/19/25 11:00   Date of last solid 06/17/25 06/19/25 11:00   Time of last solid 1800 06/19/25 11:00         Lab Results   Component Value Date    HGBA1C 6.3 (H) 04/29/2025       Lab Results   Component Value Date    K 4.0 04/29/2025     (H) 04/29/2025    CO2 24 04/29/2025    BUN 14 04/29/2025    CREATININE 1.00 04/29/2025    GLUF 103 (H) 04/29/2025    CALCIUM 9.4 04/29/2025    AST 17 04/29/2025    ALT 19 04/29/2025    ALKPHOS 79 04/29/2025    EGFR 61 04/29/2025       Lab Results   Component Value Date    WBC 8.12 04/29/2025    HGB 13.4 04/29/2025    HCT 44.0 04/29/2025    MCV 95 04/29/2025     04/29/2025   Normal sinus rhythm  Cannot rule out prior  Septal infarct , age undetermined  Otherwise normal ECG  When compared with ECG of 08-JAN-2020 21:38,  Appearance of prior septal MI likely related to change in lead postionin

## 2025-06-23 ENCOUNTER — RESULTS FOLLOW-UP (OUTPATIENT)
Age: 60
End: 2025-06-23

## 2025-06-23 PROCEDURE — 88305 TISSUE EXAM BY PATHOLOGIST: CPT | Performed by: PATHOLOGY

## 2025-06-26 ENCOUNTER — HOSPITAL ENCOUNTER (OUTPATIENT)
Dept: MAMMOGRAPHY | Facility: CLINIC | Age: 60
End: 2025-06-26
Attending: NURSE PRACTITIONER
Payer: COMMERCIAL

## 2025-06-26 VITALS — BODY MASS INDEX: 32.95 KG/M2 | HEIGHT: 64 IN | WEIGHT: 193 LBS

## 2025-06-26 DIAGNOSIS — R92.8 ABNORMAL MAMMOGRAM: ICD-10-CM

## 2025-06-26 PROCEDURE — G0279 TOMOSYNTHESIS, MAMMO: HCPCS

## 2025-06-26 PROCEDURE — 77066 DX MAMMO INCL CAD BI: CPT

## 2025-06-26 PROCEDURE — 76642 ULTRASOUND BREAST LIMITED: CPT

## 2025-07-06 DIAGNOSIS — E78.2 MIXED HYPERLIPIDEMIA: ICD-10-CM

## 2025-07-06 DIAGNOSIS — E66.9 OBESITY (BMI 30-39.9): ICD-10-CM

## 2025-07-06 DIAGNOSIS — F33.2 MAJOR DEPRESSIVE DISORDER, RECURRENT, SEVERE WITHOUT PSYCHOTIC FEATURES (HCC): ICD-10-CM

## 2025-07-06 DIAGNOSIS — G89.4 CHRONIC PAIN SYNDROME: ICD-10-CM

## 2025-07-06 DIAGNOSIS — R73.03 PREDIABETES: ICD-10-CM

## 2025-07-06 DIAGNOSIS — E78.00 HYPERCHOLESTEREMIA: ICD-10-CM

## 2025-07-07 RX ORDER — SEMAGLUTIDE 0.25 MG/.5ML
INJECTION, SOLUTION SUBCUTANEOUS
Qty: 2 ML | Refills: 0 | Status: SHIPPED | OUTPATIENT
Start: 2025-07-07

## 2025-07-18 ENCOUNTER — TELEPHONE (OUTPATIENT)
Dept: FAMILY MEDICINE CLINIC | Facility: CLINIC | Age: 60
End: 2025-07-18

## 2025-07-18 NOTE — TELEPHONE ENCOUNTER
Pt called in regards to her wegovy she stated she has been on the same dose for three months and would like to know if the dose id going to be titrated up

## 2025-07-23 ENCOUNTER — OFFICE VISIT (OUTPATIENT)
Dept: FAMILY MEDICINE CLINIC | Facility: CLINIC | Age: 60
End: 2025-07-23

## 2025-07-23 VITALS
OXYGEN SATURATION: 97 % | RESPIRATION RATE: 18 BRPM | HEIGHT: 65 IN | WEIGHT: 187 LBS | HEART RATE: 86 BPM | BODY MASS INDEX: 31.16 KG/M2 | SYSTOLIC BLOOD PRESSURE: 100 MMHG | TEMPERATURE: 98 F | DIASTOLIC BLOOD PRESSURE: 62 MMHG

## 2025-07-23 DIAGNOSIS — E66.9 OBESITY (BMI 30-39.9): Primary | ICD-10-CM

## 2025-07-23 PROCEDURE — G2211 COMPLEX E/M VISIT ADD ON: HCPCS

## 2025-07-23 PROCEDURE — 99213 OFFICE O/P EST LOW 20 MIN: CPT

## 2025-07-23 RX ORDER — SEMAGLUTIDE 0.5 MG/.5ML
INJECTION, SOLUTION SUBCUTANEOUS
Qty: 2 ML | Refills: 2 | Status: SHIPPED | OUTPATIENT
Start: 2025-07-23

## 2025-07-23 NOTE — PROGRESS NOTES
Name: Carmen Wang      : 1965      MRN: [id number]  Encounter Provider: MICHELLE Blanton  Encounter Date: 2025   Encounter department: [facility]  :  Assessment & Plan  Obesity (BMI 30-39.9)  Prior Authorization Clinical Questions for Weight Management Pharmacotherapy    1. Does the patient have a contrainidcation to medication prescribed for weight management?: No  2. Does the patient have a diagnosis of obesity, confirmed by a BMI greater than or equal to 30 kg/m^2?: Yes  3. Does the patient have a BMI of greater than or equal to 27 kg/m^2 with at least one weight-related comorbidity/risk factor/complication (e.g. diabetes, dyslipidemia, coronary artery disease)?: Yes  4. Weight-related co-morbidities/risk factors: dyslipidemia, depression  5. WEGOVY CVA Indication: Does patient have established documented cardiovascular disease (history of a prior heart attack (myocardial infarction), stroke, or symptomatic peripheral arterial disease (PAD)?: No  6. ZEPBOUND JANAY Indication: Does patient have documented JANAY diagnosed via sleep study (insurance will require copy of sleep study results for approval)?: No  7. Has the patient been on a weight loss regimen of low-calorie diet, increased physical activity, and lifestyle modifications for a minimum of 6 months?: Yes  8. Has the patient completed a comprehensive weight loss program (ie, Weight Watchers, Noom, Bariatrics, other freddy on phone)? If so, what?: No  9. Does the patient have a history of type 2 diabetes?: No  10. Has the member tried and failed other weight loss medication within the past 12 months?: No  11. Will the member use requested medication in combination with another GLP agonist or weight loss drug?: No  12. Is the medication a controlled substance?: No  For renewals: Has the patient had a positive outcome with current weight management medication (i.e., change in body weight of at least 4-5% after  "12-16 weeks on maximally tolerated dose)?: Yes     Baseline weight (in pounds): 202 lbs  Current weight (in pounds): 187 lbs  Weight loss percentage: -7.43%       Reviewed medication instructions and side effects.   Educational handout including lifestyle recommendations given on AVS.   Follow up in 3 months or sooner if she wishes to increase dose sooner.     Orders:    Semaglutide-Weight Management (Wegovy) 0.5 MG/0.5ML; Inject 0.5 mg under the skin weekly           History of Present Illness       Carmen Wang is a 59 y.o. female who  has a past medical history of Abnormal Pap smear of cervix, Alcoholism ([facility]), Anemia, Anxiety, Asthma, Bell's palsy, COPD (chronic obstructive pulmonary disease) ([facility]), Depression, Fractures, Headache(784.0), Low back pain, Menopause, Mild persistent asthma without complication, Miscarriage, Obesity, and Primary osteoarthritis involving multiple joints.    She presents to the office today for follow up of weight loss. She reports she has been taking the Wegovy weekly and denies any medication side effects. She states she is doing well, but her weight loss has plateaued and she would like to increase the dose.         Review of Systems   Constitutional:  Negative for chills and fever.   Respiratory:  Negative for cough and shortness of breath.    Cardiovascular:  Negative for chest pain and palpitations.   Gastrointestinal:  Negative for abdominal pain, diarrhea, nausea and vomiting.   All other systems reviewed and are negative.      Objective   /62 (BP Location: Left arm, Patient Position: Sitting, Cuff Size: Standard)   Pulse 86   Temp 98 °F (36.7 °C) (Temporal)   Resp 18   Ht 5' 5\" (1.651 m)   Wt 84.8 kg (187 lb)   LMP  (LMP Unknown)   SpO2 97%   BMI 31.12 kg/m²      Physical Exam  Vitals and nursing note reviewed.   Constitutional:       General: She is not in acute distress.     Appearance: She is well-developed.   HENT:      Head: Normocephalic and " atraumatic.     Eyes:      Conjunctiva/sclera: Conjunctivae normal.       Cardiovascular:      Rate and Rhythm: Normal rate and regular rhythm.      Heart sounds: No murmur heard.  Pulmonary:      Effort: Pulmonary effort is normal. No respiratory distress.      Breath sounds: Normal breath sounds.   Abdominal:      Palpations: Abdomen is soft.      Tenderness: There is no abdominal tenderness.     Musculoskeletal:         General: No swelling.      Cervical back: Neck supple.     Skin:     General: Skin is warm and dry.      Capillary Refill: Capillary refill takes less than 2 seconds.     Neurological:      Mental Status: She is alert.     Psychiatric:         Mood and Affect: Mood normal.

## 2025-07-23 NOTE — ASSESSMENT & PLAN NOTE
Prior Authorization Clinical Questions for Weight Management Pharmacotherapy    1. Does the patient have a contrainidcation to medication prescribed for weight management?: No  2. Does the patient have a diagnosis of obesity, confirmed by a BMI greater than or equal to 30 kg/m^2?: Yes  3. Does the patient have a BMI of greater than or equal to 27 kg/m^2 with at least one weight-related comorbidity/risk factor/complication (e.g. diabetes, dyslipidemia, coronary artery disease)?: Yes  4. Weight-related co-morbidities/risk factors: dyslipidemia, depression  5. WEGOVY CVA Indication: Does patient have established documented cardiovascular disease (history of a prior heart attack (myocardial infarction), stroke, or symptomatic peripheral arterial disease (PAD)?: No  6. ZEPBOUND JANAY Indication: Does patient have documented JANAY diagnosed via sleep study (insurance will require copy of sleep study results for approval)?: No  7. Has the patient been on a weight loss regimen of low-calorie diet, increased physical activity, and lifestyle modifications for a minimum of 6 months?: Yes  8. Has the patient completed a comprehensive weight loss program (ie, Weight Watchers, Noom, Bariatrics, other freddy on phone)? If so, what?: No  9. Does the patient have a history of type 2 diabetes?: No  10. Has the member tried and failed other weight loss medication within the past 12 months?: No  11. Will the member use requested medication in combination with another GLP agonist or weight loss drug?: No  12. Is the medication a controlled substance?: No  For renewals: Has the patient had a positive outcome with current weight management medication (i.e., change in body weight of at least 4-5% after 12-16 weeks on maximally tolerated dose)?: Yes     Baseline weight (in pounds): 202 lbs  Current weight (in pounds): 187 lbs  Weight loss percentage: -7.43%       Reviewed medication instructions and side effects.   Educational handout including  lifestyle recommendations given on AVS.   Follow up in 3 months or sooner if she wishes to increase dose sooner.     Orders:    Semaglutide-Weight Management (Wegovy) 0.5 MG/0.5ML; Inject 0.5 mg under the skin weekly

## 2025-07-23 NOTE — PATIENT INSTRUCTIONS
What Are These Medications?  Glucagon-like peptide-1 (GLP-1) receptor agonists (semaglutide [Wegovy, Ozempic, Rybelsus]) and dual incretin agonists (tirzepatide [Zepbound, Mounjaro]) are medications that reduce food cravings, increase fullness, slow digestion, and can help control blood glucose. They are popular treatments for obesity.    What Should I Eat While Taking GLP-1 Medications?  For most benefit and fewer side effects, follow our MEAL plan:  Muscle maintenance: GLP-1 medications can cause loss of muscle and fat. Begin each meal with 20 to 30 g of protein from foods like fish, beans, chickpeas, or tofu. Aim for 1.0 to 1.5 g of protein per kg of body weight daily, if moderately active. If your appetite is very low, use protein shakes with at least 20 g protein per serving.  Energy balance: Because these medications reduce appetite, keep your energy by eating smaller meals plus snacks (fruit, handful of nuts, unsweetened yogurt). Choose slowly digested carbohydrates like sweet potatoes or oatmeal instead of refined grains (white bread, pastries) or sugary drinks that can cause blood glucose swings. To feel full longer, add healthy fats like olive oil or avocado to meals.  Avoid side effects: GLP-1 medications can cause digestive side effects. For nausea, avoid high-fat foods (fried items, processed meats) and eat whole-grain toast or cereal. Gertrude tea or fruit can also help. For heartburn, eat smaller portions and avoid lying down for 2 to 3 hours after meals. Choose baking or steaming over deep frying. Avoid irritating spices (black pepper, chili, garlic). For constipation, increase both soluble fiber (oats, apples) and insoluble fiber (vegetable skins, nuts) with plenty of water. Consider over-the-counter stool softeners or laxatives.  Liquid intake: GLP-1 medications can cause dehydration. Drink 2 to 3 liters (8-12 cups) of fluid daily, mainly water. Include water-rich vegetables and fruits  (cucumbers, watermelon) and soups. Minimize alcohol, caffeine, and sweetened beverages.    Which Diets Should I Avoid While Taking GLP-1 Medications?  Extreme calorie restriction can worsen dehydration, fatigue, and kidney problems, and limit essential nutrients. Combining these restrictive diets with GLP-1 medications can also result in excessive weight loss. Strict vegan diets can lack vitamin B12, iron, and protein.    Do I Need Vitamins or Other Supplements?  A well-balanced, nutrient-dense diet is usually better than supplements. Consult your doctor about vitamin D, calcium, or a multivitamin if at risk (eg, low dairy, minimal sun, highly restrictive diet, malabsorption).    What About Exercise?  To retain muscle and overall health, follow a 3-step approach to exercise. First, gradually increase moderate activity like brisk walking, starting with 10 minutes daily and building to 150 minutes weekly. Second, add strength training 2 to 3 times weekly for 30 minutes using bands, weights, or body weight exercises (squats, lunges). Third, maintain progress with 30 to 60 minutes of daily activity while continuing the 2 to 3 weekly 30-minute strength-training sessions.    How Can I Avoid Regaining Weight if I Stop Medication?  Weight regain varies but is less common with ongoing lifestyle changes. To maintain weight loss, continue physical activity (aim for 60 minutes daily) and resistance training (2-3 times weekly). Maintain healthy eating habits, especially adequate protein intake.

## 2025-08-01 DIAGNOSIS — J43.9 PULMONARY EMPHYSEMA, UNSPECIFIED EMPHYSEMA TYPE (HCC): ICD-10-CM

## 2025-08-01 RX ORDER — TIOTROPIUM BROMIDE INHALATION SPRAY 1.56 UG/1
SPRAY, METERED RESPIRATORY (INHALATION)
Qty: 12 G | Refills: 3 | Status: SHIPPED | OUTPATIENT
Start: 2025-08-01

## 2025-08-05 DIAGNOSIS — J30.2 SEASONAL ALLERGIES: ICD-10-CM

## 2025-08-05 RX ORDER — MONTELUKAST SODIUM 10 MG/1
10 TABLET ORAL
Qty: 90 TABLET | Refills: 1 | Status: SHIPPED | OUTPATIENT
Start: 2025-08-05

## 2025-08-06 DIAGNOSIS — E78.2 MIXED HYPERLIPIDEMIA: ICD-10-CM

## 2025-08-06 RX ORDER — OMEGA-3-ACID ETHYL ESTERS 1 G/1
2 CAPSULE, LIQUID FILLED ORAL 2 TIMES DAILY
Qty: 180 CAPSULE | Refills: 3 | Status: SHIPPED | OUTPATIENT
Start: 2025-08-06